# Patient Record
Sex: FEMALE | Race: WHITE | Employment: OTHER | URBAN - METROPOLITAN AREA
[De-identification: names, ages, dates, MRNs, and addresses within clinical notes are randomized per-mention and may not be internally consistent; named-entity substitution may affect disease eponyms.]

---

## 2017-01-18 ENCOUNTER — TELEPHONE (OUTPATIENT)
Dept: CARDIOLOGY CLINIC | Age: 67
End: 2017-01-18

## 2017-01-25 ENCOUNTER — NURSE ONLY (OUTPATIENT)
Dept: CARDIOLOGY CLINIC | Age: 67
End: 2017-01-25

## 2017-01-25 DIAGNOSIS — Z45.09 ENCOUNTER FOR LOOP RECORDER CHECK: ICD-10-CM

## 2017-01-25 DIAGNOSIS — R55 SYNCOPE, UNSPECIFIED SYNCOPE TYPE: ICD-10-CM

## 2017-01-26 PROCEDURE — 93298 REM INTERROG DEV EVAL SCRMS: CPT | Performed by: INTERNAL MEDICINE

## 2017-01-26 PROCEDURE — 93299 PR REM INTERROG ICPMS/SCRMS <30 D TECH REVIEW: CPT | Performed by: INTERNAL MEDICINE

## 2017-02-21 RX ORDER — IBUPROFEN 800 MG/1
TABLET ORAL
Qty: 120 TABLET | Refills: 2 | Status: SHIPPED | OUTPATIENT
Start: 2017-02-21 | End: 2017-05-12 | Stop reason: SDUPTHER

## 2017-03-13 ENCOUNTER — TELEPHONE (OUTPATIENT)
Dept: FAMILY MEDICINE CLINIC | Age: 67
End: 2017-03-13

## 2017-03-13 RX ORDER — CEPHALEXIN 500 MG/1
500 CAPSULE ORAL 3 TIMES DAILY
Qty: 30 CAPSULE | Refills: 0 | Status: SHIPPED | OUTPATIENT
Start: 2017-03-13 | End: 2017-04-10

## 2017-03-31 ENCOUNTER — PROCEDURE VISIT (OUTPATIENT)
Dept: CARDIOLOGY CLINIC | Age: 67
End: 2017-03-31

## 2017-03-31 DIAGNOSIS — R55 SYNCOPE, UNSPECIFIED SYNCOPE TYPE: ICD-10-CM

## 2017-03-31 DIAGNOSIS — I45.5 SINUS PAUSE: ICD-10-CM

## 2017-03-31 DIAGNOSIS — Z45.09 ENCOUNTER FOR LOOP RECORDER CHECK: Primary | ICD-10-CM

## 2017-03-31 PROCEDURE — 93298 REM INTERROG DEV EVAL SCRMS: CPT | Performed by: INTERNAL MEDICINE

## 2017-03-31 PROCEDURE — 93299 PR REM INTERROG ICPMS/SCRMS <30 D TECH REVIEW: CPT | Performed by: INTERNAL MEDICINE

## 2017-04-11 PROBLEM — I49.5 SICK SINUS SYNDROME (HCC): Status: ACTIVE | Noted: 2017-04-11

## 2017-04-17 ENCOUNTER — TELEPHONE (OUTPATIENT)
Dept: FAMILY MEDICINE CLINIC | Age: 67
End: 2017-04-17

## 2017-04-19 ENCOUNTER — OFFICE VISIT (OUTPATIENT)
Dept: FAMILY MEDICINE CLINIC | Age: 67
End: 2017-04-19

## 2017-04-19 VITALS
BODY MASS INDEX: 27.6 KG/M2 | SYSTOLIC BLOOD PRESSURE: 130 MMHG | OXYGEN SATURATION: 97 % | HEART RATE: 66 BPM | DIASTOLIC BLOOD PRESSURE: 82 MMHG | WEIGHT: 171 LBS

## 2017-04-19 DIAGNOSIS — R55 SYNCOPE AND COLLAPSE: Primary | ICD-10-CM

## 2017-04-19 DIAGNOSIS — I25.10 CORONARY ARTERY DISEASE INVOLVING NATIVE HEART WITHOUT ANGINA PECTORIS, UNSPECIFIED VESSEL OR LESION TYPE: ICD-10-CM

## 2017-04-19 DIAGNOSIS — I49.5 SICK SINUS SYNDROME (HCC): ICD-10-CM

## 2017-04-19 DIAGNOSIS — R56.9 NEW ONSET SEIZURE (HCC): ICD-10-CM

## 2017-04-19 PROCEDURE — 99214 OFFICE O/P EST MOD 30 MIN: CPT | Performed by: FAMILY MEDICINE

## 2017-04-19 RX ORDER — LOSARTAN POTASSIUM AND HYDROCHLOROTHIAZIDE 25; 100 MG/1; MG/1
1 TABLET ORAL DAILY
COMMUNITY
End: 2017-05-19 | Stop reason: DRUGHIGH

## 2017-05-03 ENCOUNTER — NURSE ONLY (OUTPATIENT)
Dept: CARDIOLOGY CLINIC | Age: 67
End: 2017-05-03

## 2017-05-03 DIAGNOSIS — Z45.09 ENCOUNTER FOR LOOP RECORDER CHECK: ICD-10-CM

## 2017-05-03 DIAGNOSIS — R55 SYNCOPE, UNSPECIFIED SYNCOPE TYPE: ICD-10-CM

## 2017-05-03 PROCEDURE — 93299 PR REM INTERROG ICPMS/SCRMS <30 D TECH REVIEW: CPT | Performed by: INTERNAL MEDICINE

## 2017-05-03 PROCEDURE — 93298 REM INTERROG DEV EVAL SCRMS: CPT | Performed by: INTERNAL MEDICINE

## 2017-05-12 RX ORDER — OMEPRAZOLE 20 MG/1
CAPSULE, DELAYED RELEASE ORAL
Qty: 60 CAPSULE | Refills: 0 | Status: SHIPPED | OUTPATIENT
Start: 2017-05-12 | End: 2017-05-19 | Stop reason: SDUPTHER

## 2017-05-12 RX ORDER — IBUPROFEN 800 MG/1
TABLET ORAL
Qty: 120 TABLET | Refills: 0 | Status: SHIPPED | OUTPATIENT
Start: 2017-05-12 | End: 2017-09-07 | Stop reason: SDUPTHER

## 2017-05-19 ENCOUNTER — OFFICE VISIT (OUTPATIENT)
Dept: FAMILY MEDICINE CLINIC | Age: 67
End: 2017-05-19

## 2017-05-19 VITALS
OXYGEN SATURATION: 98 % | SYSTOLIC BLOOD PRESSURE: 120 MMHG | BODY MASS INDEX: 27.44 KG/M2 | RESPIRATION RATE: 12 BRPM | DIASTOLIC BLOOD PRESSURE: 72 MMHG | HEART RATE: 73 BPM | WEIGHT: 170 LBS

## 2017-05-19 DIAGNOSIS — I10 ESSENTIAL HYPERTENSION: Primary | ICD-10-CM

## 2017-05-19 DIAGNOSIS — J30.1 SEASONAL ALLERGIC RHINITIS DUE TO POLLEN: ICD-10-CM

## 2017-05-19 DIAGNOSIS — I73.9 PAD (PERIPHERAL ARTERY DISEASE) (HCC): ICD-10-CM

## 2017-05-19 PROCEDURE — 3288F FALL RISK ASSESSMENT DOCD: CPT | Performed by: FAMILY MEDICINE

## 2017-05-19 PROCEDURE — 99213 OFFICE O/P EST LOW 20 MIN: CPT | Performed by: FAMILY MEDICINE

## 2017-05-19 PROCEDURE — G8510 SCR DEP NEG, NO PLAN REQD: HCPCS | Performed by: FAMILY MEDICINE

## 2017-05-19 RX ORDER — OMEPRAZOLE 20 MG/1
20 CAPSULE, DELAYED RELEASE ORAL DAILY
Qty: 30 CAPSULE | Refills: 5 | Status: SHIPPED | OUTPATIENT
Start: 2017-05-19 | End: 2018-03-07

## 2017-05-19 RX ORDER — CLOPIDOGREL BISULFATE 75 MG/1
TABLET ORAL
Qty: 90 TABLET | Refills: 3 | Status: SHIPPED | OUTPATIENT
Start: 2017-05-19 | End: 2018-03-07 | Stop reason: SDUPTHER

## 2017-05-19 RX ORDER — LOSARTAN POTASSIUM AND HYDROCHLOROTHIAZIDE 12.5; 5 MG/1; MG/1
1 TABLET ORAL DAILY
Qty: 30 TABLET | Refills: 5 | Status: SHIPPED | OUTPATIENT
Start: 2017-05-19 | End: 2017-11-20 | Stop reason: DRUGHIGH

## 2017-05-19 RX ORDER — LOSARTAN POTASSIUM AND HYDROCHLOROTHIAZIDE 25; 100 MG/1; MG/1
1 TABLET ORAL DAILY
Qty: 90 TABLET | Refills: 1 | Status: CANCELLED | OUTPATIENT
Start: 2017-05-19

## 2017-05-19 RX ORDER — OMEPRAZOLE 20 MG/1
CAPSULE, DELAYED RELEASE ORAL
Qty: 60 CAPSULE | Refills: 5 | Status: CANCELLED | OUTPATIENT
Start: 2017-05-19

## 2017-05-19 RX ORDER — MONTELUKAST SODIUM 4 MG/1
1 TABLET, CHEWABLE ORAL 2 TIMES DAILY
Qty: 60 TABLET | Refills: 5 | Status: SHIPPED | OUTPATIENT
Start: 2017-05-19 | End: 2018-03-07

## 2017-05-19 ASSESSMENT — PATIENT HEALTH QUESTIONNAIRE - PHQ9
SUM OF ALL RESPONSES TO PHQ QUESTIONS 1-9: 0
1. LITTLE INTEREST OR PLEASURE IN DOING THINGS: 0
2. FEELING DOWN, DEPRESSED OR HOPELESS: 0
SUM OF ALL RESPONSES TO PHQ9 QUESTIONS 1 & 2: 0

## 2017-06-13 ENCOUNTER — NURSE ONLY (OUTPATIENT)
Dept: CARDIOLOGY CLINIC | Age: 67
End: 2017-06-13

## 2017-06-13 DIAGNOSIS — Z45.09 ENCOUNTER FOR LOOP RECORDER CHECK: ICD-10-CM

## 2017-06-13 DIAGNOSIS — R55 SYNCOPE AND COLLAPSE: ICD-10-CM

## 2017-06-13 PROCEDURE — 93299 PR REM INTERROG ICPMS/SCRMS <30 D TECH REVIEW: CPT | Performed by: INTERNAL MEDICINE

## 2017-06-13 PROCEDURE — 93298 REM INTERROG DEV EVAL SCRMS: CPT | Performed by: INTERNAL MEDICINE

## 2017-06-15 ENCOUNTER — TELEPHONE (OUTPATIENT)
Dept: CARDIOLOGY CLINIC | Age: 67
End: 2017-06-15

## 2017-06-28 ENCOUNTER — CARE COORDINATION (OUTPATIENT)
Dept: FAMILY MEDICINE CLINIC | Age: 67
End: 2017-06-28

## 2017-07-07 ENCOUNTER — CARE COORDINATION (OUTPATIENT)
Dept: INTERNAL MEDICINE | Age: 67
End: 2017-07-07

## 2017-07-19 ENCOUNTER — NURSE ONLY (OUTPATIENT)
Dept: CARDIOLOGY CLINIC | Age: 67
End: 2017-07-19

## 2017-07-19 DIAGNOSIS — Z45.09 ENCOUNTER FOR LOOP RECORDER CHECK: ICD-10-CM

## 2017-07-19 DIAGNOSIS — I45.5 SINUS PAUSE: ICD-10-CM

## 2017-07-19 PROCEDURE — 93299 PR REM INTERROG ICPMS/SCRMS <30 D TECH REVIEW: CPT | Performed by: INTERNAL MEDICINE

## 2017-07-19 PROCEDURE — 93298 REM INTERROG DEV EVAL SCRMS: CPT | Performed by: INTERNAL MEDICINE

## 2017-08-21 PROCEDURE — 93298 REM INTERROG DEV EVAL SCRMS: CPT | Performed by: INTERNAL MEDICINE

## 2017-08-21 PROCEDURE — 93299 PR REM INTERROG ICPMS/SCRMS <30 D TECH REVIEW: CPT | Performed by: INTERNAL MEDICINE

## 2017-08-22 ENCOUNTER — TELEPHONE (OUTPATIENT)
Dept: CARDIOLOGY CLINIC | Age: 67
End: 2017-08-22

## 2017-08-23 ENCOUNTER — NURSE ONLY (OUTPATIENT)
Dept: CARDIOLOGY CLINIC | Age: 67
End: 2017-08-23

## 2017-08-23 DIAGNOSIS — R55 SYNCOPE AND COLLAPSE: ICD-10-CM

## 2017-08-23 DIAGNOSIS — Z45.09 ENCOUNTER FOR LOOP RECORDER CHECK: ICD-10-CM

## 2017-09-07 RX ORDER — IBUPROFEN 800 MG/1
TABLET ORAL
Qty: 120 TABLET | Refills: 0 | Status: SHIPPED | OUTPATIENT
Start: 2017-09-07 | End: 2017-12-21 | Stop reason: SDUPTHER

## 2017-09-27 ENCOUNTER — NURSE ONLY (OUTPATIENT)
Dept: CARDIOLOGY CLINIC | Age: 67
End: 2017-09-27

## 2017-09-27 DIAGNOSIS — Z45.09 ENCOUNTER FOR LOOP RECORDER CHECK: ICD-10-CM

## 2017-09-27 DIAGNOSIS — I45.5 SINUS PAUSE: ICD-10-CM

## 2017-09-27 PROCEDURE — 93298 REM INTERROG DEV EVAL SCRMS: CPT | Performed by: INTERNAL MEDICINE

## 2017-09-27 PROCEDURE — 93299 PR REM INTERROG ICPMS/SCRMS <30 D TECH REVIEW: CPT | Performed by: INTERNAL MEDICINE

## 2017-11-20 ENCOUNTER — OFFICE VISIT (OUTPATIENT)
Dept: FAMILY MEDICINE CLINIC | Age: 67
End: 2017-11-20

## 2017-11-20 VITALS
WEIGHT: 172 LBS | HEART RATE: 55 BPM | HEIGHT: 62 IN | DIASTOLIC BLOOD PRESSURE: 76 MMHG | SYSTOLIC BLOOD PRESSURE: 154 MMHG | OXYGEN SATURATION: 94 % | BODY MASS INDEX: 31.65 KG/M2

## 2017-11-20 DIAGNOSIS — E78.00 PURE HYPERCHOLESTEROLEMIA: ICD-10-CM

## 2017-11-20 DIAGNOSIS — I70.219 ATHEROSCLEROSIS OF ARTERY OF EXTREMITY WITH INTERMITTENT CLAUDICATION (HCC): ICD-10-CM

## 2017-11-20 DIAGNOSIS — L57.0 AK (ACTINIC KERATOSIS): ICD-10-CM

## 2017-11-20 DIAGNOSIS — I10 ESSENTIAL HYPERTENSION: Primary | ICD-10-CM

## 2017-11-20 PROCEDURE — G0008 ADMIN INFLUENZA VIRUS VAC: HCPCS | Performed by: FAMILY MEDICINE

## 2017-11-20 PROCEDURE — 99214 OFFICE O/P EST MOD 30 MIN: CPT | Performed by: FAMILY MEDICINE

## 2017-11-20 PROCEDURE — 90662 IIV NO PRSV INCREASED AG IM: CPT | Performed by: FAMILY MEDICINE

## 2017-11-20 PROCEDURE — 17000 DESTRUCT PREMALG LESION: CPT | Performed by: FAMILY MEDICINE

## 2017-11-20 RX ORDER — LOSARTAN POTASSIUM AND HYDROCHLOROTHIAZIDE 25; 100 MG/1; MG/1
1 TABLET ORAL DAILY
Qty: 30 TABLET | Refills: 3 | Status: SHIPPED | OUTPATIENT
Start: 2017-11-20 | End: 2018-02-20 | Stop reason: SDUPTHER

## 2017-11-20 RX ORDER — ATORVASTATIN CALCIUM 10 MG/1
10 TABLET, FILM COATED ORAL DAILY
Qty: 30 TABLET | Refills: 3 | Status: SHIPPED | OUTPATIENT
Start: 2017-11-20 | End: 2018-02-20 | Stop reason: SDUPTHER

## 2017-11-20 ASSESSMENT — ENCOUNTER SYMPTOMS
BLURRED VISION: 0
COUGH: 0
HEARTBURN: 0
ABDOMINAL PAIN: 0
CONSTIPATION: 0
SHORTNESS OF BREATH: 0
BACK PAIN: 0

## 2017-11-20 NOTE — PROGRESS NOTES
Chief Complaint   Patient presents with    Hypertension    Hyperlipidemia    Chronic Kidney Disease    Coronary Artery Disease    Hyperglycemia      Patient has complaints of :  Left foot pain and numbness in toes  Bilateral leg and foot cramping  Skin lesion on her left cheek    Electronically signed by Negar Orosco MA on 11/20/2017 at 9:29 AM       Patient is here for follow-up of the following problems:    Has skin lesion face   has claudication with walking about 2 blocks    Has been intolerant of statins in the past: stomach upset      Treatment Adherence:   Medication compliance:  compliant all of the time  Diet compliance:  compliant most of the time  Weight trend: stable  Current exercise: no regular exercise  Barriers: impairment:  physical: claudication    Hypertension:  Home blood pressure monitoring: No. Patient denies chest pain and shortness of breath. Antihypertensive medication side effects: no medication side effects noted. Use of agents associated with hypertension: none.                                         Sodium (mmol/L)   Date Value   04/12/2017 140    BUN (mg/dL)   Date Value   04/12/2017 16    Glucose (mg/dL)   Date Value   04/12/2017 109 (H)      Potassium (mmol/L)   Date Value   04/12/2017 3.7    CREATININE (mg/dL)   Date Value   04/12/2017 0.9         Hyperlipidemia:  No meds      Lab Results   Component Value Date    CHOL 155 06/08/2016    TRIG 168 (H) 06/08/2016    HDL 32 (L) 06/08/2016    LDLCALC 89 06/08/2016     Lab Results   Component Value Date    ALT 13 04/10/2017    AST 20 04/10/2017        Current Outpatient Prescriptions on File Prior to Visit   Medication Sig Dispense Refill    ibuprofen (ADVIL;MOTRIN) 800 MG tablet TAKE 1 TABLET BY MOUTH 3 TIMES DAILY AS NEEDED FOR PAIN 120 tablet 0    clopidogrel (PLAVIX) 75 MG tablet TAKE 1 TABLET BY MOUTH DAILY 90 tablet 3    colestipol (COLESTID) 1 G tablet Take 1 tablet by mouth 2 times daily 60 tablet 5    visit:    Essential hypertension    Pure hypercholesterolemia    Atherosclerosis of artery of extremity with intermittent claudication (HCC)    AK (actinic keratosis)    Other orders  -     INFLUENZA, HIGH DOSE, 65 YRS +, IM, PF, PREFILL SYR, 0.5ML (FLUZONE HD)     cryo facila lseion   HTN not controlled   increase losartan, trial of stati n again\  rtc 3 months

## 2017-11-20 NOTE — PROGRESS NOTES
Cryotherapy Procedure Note    Pre-operative Diagnosis:actinic keratosis 1    Post-operative Diagnosis: same    Locations:face    Procedure Details   2 cycles of liquid nitrogen applied to affected sites. Complications: none. Plan:  1. Patient was educated regarding the potential risks of blister formation, discomfort, hypopigmentation, and scar. 2. Wound care was discussed. 3. Recommended that the patient use OTC analgesics as needed for pain. 4. Plan for RTC in 3-4 weeks for re-treatment if needed.

## 2017-12-21 RX ORDER — IBUPROFEN 800 MG/1
TABLET ORAL
Qty: 120 TABLET | Refills: 0 | Status: SHIPPED | OUTPATIENT
Start: 2017-12-21 | End: 2018-02-28 | Stop reason: SDUPTHER

## 2018-01-16 ENCOUNTER — NURSE ONLY (OUTPATIENT)
Dept: CARDIOLOGY CLINIC | Age: 68
End: 2018-01-16

## 2018-01-16 DIAGNOSIS — R55 SYNCOPE AND COLLAPSE: ICD-10-CM

## 2018-01-16 DIAGNOSIS — Z45.09 ENCOUNTER FOR LOOP RECORDER CHECK: ICD-10-CM

## 2018-01-16 PROCEDURE — 93298 REM INTERROG DEV EVAL SCRMS: CPT | Performed by: INTERNAL MEDICINE

## 2018-01-16 PROCEDURE — 93299 PR REM INTERROG ICPMS/SCRMS <30 D TECH REVIEW: CPT | Performed by: INTERNAL MEDICINE

## 2018-01-26 ENCOUNTER — HOSPITAL ENCOUNTER (OUTPATIENT)
Dept: OTHER | Age: 68
Discharge: HOME | End: 2018-01-26
Attending: FAMILY MEDICINE | Admitting: FAMILY MEDICINE

## 2018-01-26 LAB
A/G RATIO: 1 (ref 1.1–2.2)
ALBUMIN SERPL-MCNC: 4.1 G/DL (ref 3.4–5)
ALP BLD-CCNC: 79 U/L (ref 40–129)
ALT SERPL-CCNC: 13 U/L (ref 10–40)
ANION GAP SERPL CALCULATED.3IONS-SCNC: 14 MMOL/L (ref 3–16)
AST SERPL-CCNC: 20 U/L (ref 15–37)
BILIRUB SERPL-MCNC: 0.4 MG/DL (ref 0–1)
BUN BLDV-MCNC: 23 MG/DL (ref 7–20)
CALCIUM SERPL-MCNC: 9.6 MG/DL (ref 8.3–10.6)
CHLORIDE BLD-SCNC: 103 MMOL/L (ref 99–110)
CHOLESTEROL, TOTAL: 136 MG/DL (ref 0–199)
CO2: 25 MMOL/L (ref 21–32)
CREAT SERPL-MCNC: 1.2 MG/DL (ref 0.6–1.2)
GFR AFRICAN AMERICAN: 54
GFR NON-AFRICAN AMERICAN: 44
GLOBULIN: 4 G/DL
GLUCOSE BLD-MCNC: 92 MG/DL (ref 70–99)
HDLC SERPL-MCNC: 33 MG/DL (ref 40–60)
LDL CHOLESTEROL CALCULATED: 77 MG/DL
POTASSIUM SERPL-SCNC: 4.6 MMOL/L (ref 3.5–5.1)
SODIUM BLD-SCNC: 142 MMOL/L (ref 136–145)
TOTAL PROTEIN: 8.1 G/DL (ref 6.4–8.2)
TRIGL SERPL-MCNC: 128 MG/DL (ref 0–150)
VLDLC SERPL CALC-MCNC: 26 MG/DL

## 2018-02-20 RX ORDER — ATORVASTATIN CALCIUM 10 MG/1
10 TABLET, FILM COATED ORAL DAILY
Qty: 90 TABLET | Refills: 0 | Status: SHIPPED | OUTPATIENT
Start: 2018-02-20 | End: 2018-03-07 | Stop reason: SDUPTHER

## 2018-02-20 RX ORDER — LOSARTAN POTASSIUM AND HYDROCHLOROTHIAZIDE 25; 100 MG/1; MG/1
1 TABLET ORAL DAILY
Qty: 90 TABLET | Refills: 0 | Status: SHIPPED | OUTPATIENT
Start: 2018-02-20 | End: 2018-03-07 | Stop reason: SDUPTHER

## 2018-02-28 RX ORDER — IBUPROFEN 800 MG/1
TABLET ORAL
Qty: 120 TABLET | Refills: 0 | Status: SHIPPED | OUTPATIENT
Start: 2018-02-28 | End: 2018-04-25 | Stop reason: SDUPTHER

## 2018-03-06 ENCOUNTER — NURSE ONLY (OUTPATIENT)
Dept: CARDIOLOGY CLINIC | Age: 68
End: 2018-03-06

## 2018-03-06 DIAGNOSIS — I45.5 SINUS PAUSE: ICD-10-CM

## 2018-03-06 DIAGNOSIS — Z45.09 ENCOUNTER FOR LOOP RECORDER CHECK: ICD-10-CM

## 2018-03-06 PROCEDURE — 93298 REM INTERROG DEV EVAL SCRMS: CPT | Performed by: INTERNAL MEDICINE

## 2018-03-06 PROCEDURE — 93299 PR REM INTERROG ICPMS/SCRMS <30 D TECH REVIEW: CPT | Performed by: INTERNAL MEDICINE

## 2018-03-07 ENCOUNTER — OFFICE VISIT (OUTPATIENT)
Dept: FAMILY MEDICINE CLINIC | Age: 68
End: 2018-03-07

## 2018-03-07 VITALS
HEART RATE: 70 BPM | SYSTOLIC BLOOD PRESSURE: 138 MMHG | TEMPERATURE: 98.3 F | BODY MASS INDEX: 32.53 KG/M2 | OXYGEN SATURATION: 97 % | DIASTOLIC BLOOD PRESSURE: 80 MMHG | WEIGHT: 175 LBS

## 2018-03-07 DIAGNOSIS — J30.1 CHRONIC SEASONAL ALLERGIC RHINITIS DUE TO POLLEN: ICD-10-CM

## 2018-03-07 DIAGNOSIS — I73.9 PAD (PERIPHERAL ARTERY DISEASE) (HCC): ICD-10-CM

## 2018-03-07 DIAGNOSIS — E78.00 PURE HYPERCHOLESTEROLEMIA: ICD-10-CM

## 2018-03-07 DIAGNOSIS — N18.30 CKD (CHRONIC KIDNEY DISEASE) STAGE 3, GFR 30-59 ML/MIN (HCC): ICD-10-CM

## 2018-03-07 DIAGNOSIS — I10 ESSENTIAL HYPERTENSION: Primary | ICD-10-CM

## 2018-03-07 PROCEDURE — 99214 OFFICE O/P EST MOD 30 MIN: CPT | Performed by: FAMILY MEDICINE

## 2018-03-07 RX ORDER — ASPIRIN 81 MG/1
81 TABLET ORAL DAILY
Status: ON HOLD | COMMUNITY
End: 2018-05-21

## 2018-03-07 RX ORDER — LOSARTAN POTASSIUM AND HYDROCHLOROTHIAZIDE 25; 100 MG/1; MG/1
1 TABLET ORAL DAILY
Qty: 90 TABLET | Refills: 3 | Status: SHIPPED | OUTPATIENT
Start: 2018-03-07 | End: 2018-09-07 | Stop reason: SDUPTHER

## 2018-03-07 RX ORDER — ATORVASTATIN CALCIUM 10 MG/1
10 TABLET, FILM COATED ORAL DAILY
Qty: 90 TABLET | Refills: 3 | Status: SHIPPED | OUTPATIENT
Start: 2018-03-07 | End: 2018-05-24 | Stop reason: SDUPTHER

## 2018-03-07 RX ORDER — CLOPIDOGREL BISULFATE 75 MG/1
TABLET ORAL
Qty: 90 TABLET | Refills: 3 | Status: SHIPPED | OUTPATIENT
Start: 2018-03-07 | End: 2018-09-07 | Stop reason: SDUPTHER

## 2018-03-07 ASSESSMENT — ENCOUNTER SYMPTOMS
ABDOMINAL PAIN: 0
COUGH: 0
HEARTBURN: 0
BACK PAIN: 0
BLURRED VISION: 0
SHORTNESS OF BREATH: 0
CONSTIPATION: 0

## 2018-03-07 NOTE — PROGRESS NOTES
Chief Complaint   Patient presents with    Hypertension    Hyperlipidemia    Coronary Artery Disease    Chronic Kidney Disease      Patient has complaints of:  Headache times 2-3 months  Right eye redness, itching, nasal drainage, runny nose, slightly productive cough times 2 weeks     Electronically signed by Maritza Funes MA on 3/7/2018 at 9:18 AM       Patient is here for follow-up of the following problems:  Has sneezing at times   and has headache right side of scalp and face   no visual changes   needs eye exam   has had left TKR  Using benadryl      Treatment Adherence:   Medication compliance:  compliant all of the time  Diet compliance:  compliant most of the time  Weight trend: stable  Current exercise: lmited due to numbness of legs/feet with walking  Barriers: PAD    Hypertension:  Home blood pressure monitoring: No. Patient denies chest pain and shortness of breath. Antihypertensive medication side effects: no medication side effects noted. Use of agents associated with hypertension: NSAID                                      Sodium (mmol/L)   Date Value   01/26/2018 142    BUN (mg/dL)   Date Value   01/26/2018 23 (H)    Glucose (mg/dL)   Date Value   01/26/2018 92      Potassium (mmol/L)   Date Value   01/26/2018 4.6    CREATININE (mg/dL)   Date Value   01/26/2018 1.2         Hyperlipidemia:  No new myalgias or GI upset on atorvastatin (Lipitor). Lab Results   Component Value Date    CHOL 136 01/26/2018    TRIG 128 01/26/2018    HDL 33 (L) 01/26/2018    LDLCALC 77 01/26/2018     Lab Results   Component Value Date    ALT 13 01/26/2018    AST 20 01/26/2018         Social History     Social History    Marital status:      Spouse name: N/A    Number of children: N/A    Years of education: N/A     Occupational History    Not on file.      Social History Main Topics    Smoking status: Former Smoker     Packs/day: 0.50     Years: 40.00     Types: Cigarettes     Quit date: 7/6/2015   Umm [place] Normal rate, regular rhythm, normal heart sounds and intact distal pulses. Pulses:       Dorsalis pedis pulses are 1+ on the right side, and 1+ on the left side. Posterior tibial pulses are 1+ on the right side, and 1+ on the left side. Pulmonary/Chest: Effort normal and breath sounds normal.   Lymphadenopathy:     She has no cervical adenopathy. Neurological: She is alert and oriented to person, place, and time. She has normal strength. She displays no atrophy. No cranial nerve deficit. She exhibits normal muscle tone. Coordination normal.        Puneet Humphrey was seen today for hypertension, hyperlipidemia, coronary artery disease and chronic kidney disease. Diagnoses and all orders for this visit:    Essential hypertension    Pure hypercholesterolemia    CKD (chronic kidney disease) stage 3, GFR 30-59 ml/min    PAD (peripheral artery disease) ([facility])  -     Guido Plascencia MD    Chronic seasonal allergic rhinitis due to pollen    Other orders  -     losartan-hydrochlorothiazide (HYZAAR) 100-25 MG per tablet; Take 1 tablet by mouth daily  -     atorvastatin (LIPITOR) 10 MG tablet; Take 1 tablet by mouth daily  -     clopidogrel (PLAVIX) 75 MG tablet; TAKE 1 TABLET BY MOUTH DAILY    Reviewed recent labs with patient   BP anf lipids conttollrd, add steroid nasal spray for nose and claritan to try and help with her chronic allergies, other conditions are stable, no change in her chronic medications.   She is to see Dr. Mena Couch because of more symptoms in her legs which she attributes to her PAD

## 2018-04-10 ENCOUNTER — NURSE ONLY (OUTPATIENT)
Dept: CARDIOLOGY CLINIC | Age: 68
End: 2018-04-10

## 2018-04-10 DIAGNOSIS — I45.5 SINUS PAUSE: ICD-10-CM

## 2018-04-10 DIAGNOSIS — R55 SYNCOPE, UNSPECIFIED SYNCOPE TYPE: ICD-10-CM

## 2018-04-10 DIAGNOSIS — Z45.09 ENCOUNTER FOR LOOP RECORDER CHECK: ICD-10-CM

## 2018-04-10 PROCEDURE — 93299 PR REM INTERROG ICPMS/SCRMS <30 D TECH REVIEW: CPT | Performed by: INTERNAL MEDICINE

## 2018-04-10 PROCEDURE — 93298 REM INTERROG DEV EVAL SCRMS: CPT | Performed by: INTERNAL MEDICINE

## 2018-04-13 ENCOUNTER — OFFICE VISIT (OUTPATIENT)
Dept: VASCULAR SURGERY | Age: 68
End: 2018-04-13

## 2018-04-13 VITALS
DIASTOLIC BLOOD PRESSURE: 90 MMHG | WEIGHT: 175 LBS | BODY MASS INDEX: 33.04 KG/M2 | HEIGHT: 61 IN | SYSTOLIC BLOOD PRESSURE: 136 MMHG

## 2018-04-13 DIAGNOSIS — I70.223 ATHEROSCLEROSIS OF NATIVE ARTERIES OF EXTREMITIES WITH REST PAIN, BILATERAL LEGS (HCC): Primary | ICD-10-CM

## 2018-04-13 PROCEDURE — 99213 OFFICE O/P EST LOW 20 MIN: CPT | Performed by: SURGERY

## 2018-04-23 ENCOUNTER — HOSPITAL ENCOUNTER (OUTPATIENT)
Dept: VASCULAR LAB | Age: 68
Discharge: HOME | End: 2018-04-23
Attending: SURGERY | Admitting: SURGERY

## 2018-04-23 DIAGNOSIS — I70.223 ATHEROSCLEROSIS OF NATIVE ARTERY OF BOTH LOWER EXTREMITIES WITH REST PAIN (HCC): ICD-10-CM

## 2018-04-23 DIAGNOSIS — I70.223 ATHEROSCLEROSIS OF NATIVE ARTERIES OF EXTREMITIES WITH REST PAIN, BILATERAL LEGS (HCC): ICD-10-CM

## 2018-04-25 RX ORDER — IBUPROFEN 800 MG/1
TABLET ORAL
Qty: 120 TABLET | Refills: 0 | Status: SHIPPED | OUTPATIENT
Start: 2018-04-25 | End: 2019-03-12

## 2018-04-30 ENCOUNTER — TELEPHONE (OUTPATIENT)
Dept: VASCULAR SURGERY | Age: 68
End: 2018-04-30

## 2018-05-01 ENCOUNTER — TELEPHONE (OUTPATIENT)
Dept: VASCULAR SURGERY | Age: 68
End: 2018-05-01

## 2018-05-01 DIAGNOSIS — I73.9 INTERMITTENT CLAUDICATION (HCC): Primary | ICD-10-CM

## 2018-05-01 DIAGNOSIS — R93.6 ABNORMAL FINDINGS ON DIAGNOSTIC IMAGING OF LIMBS: ICD-10-CM

## 2018-05-10 ENCOUNTER — HOSPITAL ENCOUNTER (OUTPATIENT)
Dept: OTHER | Age: 68
Discharge: HOME | End: 2018-05-10
Attending: NURSE PRACTITIONER | Admitting: NURSE PRACTITIONER

## 2018-05-10 DIAGNOSIS — R93.6 ABNORMAL FINDINGS ON DIAGNOSTIC IMAGING OF LIMBS: ICD-10-CM

## 2018-05-10 DIAGNOSIS — I73.9 INTERMITTENT CLAUDICATION (HCC): ICD-10-CM

## 2018-05-10 LAB
ANION GAP SERPL CALCULATED.3IONS-SCNC: 11 MMOL/L (ref 3–16)
APTT: 35.1 SEC (ref 24.1–34.9)
BASOPHILS ABSOLUTE: 0.1 K/UL (ref 0–0.2)
BASOPHILS RELATIVE PERCENT: 1 %
BUN BLDV-MCNC: 20 MG/DL (ref 7–20)
CALCIUM SERPL-MCNC: 9.2 MG/DL (ref 8.3–10.6)
CHLORIDE BLD-SCNC: 108 MMOL/L (ref 99–110)
CO2: 23 MMOL/L (ref 21–32)
CREAT SERPL-MCNC: 1 MG/DL (ref 0.6–1.2)
EOSINOPHILS ABSOLUTE: 0.2 K/UL (ref 0–0.6)
EOSINOPHILS RELATIVE PERCENT: 2.9 %
GFR AFRICAN AMERICAN: >60
GFR NON-AFRICAN AMERICAN: 55
GLUCOSE BLD-MCNC: 95 MG/DL (ref 70–99)
HCT VFR BLD CALC: 44.8 % (ref 36–48)
HEMOGLOBIN: 15 G/DL (ref 12–16)
INR BLD: 1.04 (ref 0.85–1.15)
LYMPHOCYTES ABSOLUTE: 1.9 K/UL (ref 1–5.1)
LYMPHOCYTES RELATIVE PERCENT: 23.3 %
MCH RBC QN AUTO: 31.2 PG (ref 26–34)
MCHC RBC AUTO-ENTMCNC: 33.5 G/DL (ref 31–36)
MCV RBC AUTO: 93.2 FL (ref 80–100)
MONOCYTES ABSOLUTE: 0.6 K/UL (ref 0–1.3)
MONOCYTES RELATIVE PERCENT: 7.1 %
NEUTROPHILS ABSOLUTE: 5.3 K/UL (ref 1.7–7.7)
NEUTROPHILS RELATIVE PERCENT: 65.7 %
PDW BLD-RTO: 14.5 % (ref 12.4–15.4)
PLATELET # BLD: 201 K/UL (ref 135–450)
PMV BLD AUTO: 9.9 FL (ref 5–10.5)
POTASSIUM SERPL-SCNC: 4.6 MMOL/L (ref 3.5–5.1)
PROTHROMBIN TIME: 11.8 SEC (ref 9.6–13)
RBC # BLD: 4.81 M/UL (ref 4–5.2)
SODIUM BLD-SCNC: 142 MMOL/L (ref 136–145)
WBC # BLD: 8.1 K/UL (ref 4–11)

## 2018-05-15 ENCOUNTER — NURSE ONLY (OUTPATIENT)
Dept: CARDIOLOGY CLINIC | Age: 68
End: 2018-05-15

## 2018-05-15 DIAGNOSIS — R55 SYNCOPE AND COLLAPSE: ICD-10-CM

## 2018-05-15 DIAGNOSIS — Z45.09 ENCOUNTER FOR LOOP RECORDER CHECK: ICD-10-CM

## 2018-05-15 PROCEDURE — 93298 REM INTERROG DEV EVAL SCRMS: CPT | Performed by: INTERNAL MEDICINE

## 2018-05-15 PROCEDURE — 93299 PR REM INTERROG ICPMS/SCRMS <30 D TECH REVIEW: CPT | Performed by: INTERNAL MEDICINE

## 2018-05-22 ENCOUNTER — TELEPHONE (OUTPATIENT)
Dept: CARDIOLOGY CLINIC | Age: 68
End: 2018-05-22

## 2018-05-24 RX ORDER — ATORVASTATIN CALCIUM 10 MG/1
10 TABLET, FILM COATED ORAL DAILY
Qty: 90 TABLET | Refills: 0 | Status: SHIPPED | OUTPATIENT
Start: 2018-05-24 | End: 2018-08-26 | Stop reason: SDUPTHER

## 2018-06-15 ENCOUNTER — TELEPHONE (OUTPATIENT)
Dept: CARDIOLOGY CLINIC | Age: 68
End: 2018-06-15

## 2018-06-19 ENCOUNTER — NURSE ONLY (OUTPATIENT)
Dept: CARDIOLOGY CLINIC | Age: 68
End: 2018-06-19

## 2018-06-19 DIAGNOSIS — Z45.09 ENCOUNTER FOR LOOP RECORDER CHECK: ICD-10-CM

## 2018-06-19 DIAGNOSIS — I45.5 SINUS PAUSE: ICD-10-CM

## 2018-06-19 PROCEDURE — 93298 REM INTERROG DEV EVAL SCRMS: CPT | Performed by: INTERNAL MEDICINE

## 2018-06-19 PROCEDURE — 93299 PR REM INTERROG ICPMS/SCRMS <30 D TECH REVIEW: CPT | Performed by: INTERNAL MEDICINE

## 2018-08-20 PROCEDURE — 93298 REM INTERROG DEV EVAL SCRMS: CPT | Performed by: INTERNAL MEDICINE

## 2018-08-20 PROCEDURE — 93299 PR REM INTERROG ICPMS/SCRMS <30 D TECH REVIEW: CPT | Performed by: INTERNAL MEDICINE

## 2018-08-21 ENCOUNTER — NURSE ONLY (OUTPATIENT)
Dept: CARDIOLOGY CLINIC | Age: 68
End: 2018-08-21

## 2018-08-21 DIAGNOSIS — I45.5 SINUS PAUSE: ICD-10-CM

## 2018-08-21 DIAGNOSIS — Z45.09 ENCOUNTER FOR ELECTRONIC ANALYSIS OF REVEAL EVENT RECORDER: Primary | ICD-10-CM

## 2018-08-27 RX ORDER — ATORVASTATIN CALCIUM 10 MG/1
10 TABLET, FILM COATED ORAL DAILY
Qty: 90 TABLET | Refills: 0 | Status: SHIPPED | OUTPATIENT
Start: 2018-08-27 | End: 2018-09-07 | Stop reason: SDUPTHER

## 2018-09-07 ENCOUNTER — OFFICE VISIT (OUTPATIENT)
Dept: FAMILY MEDICINE CLINIC | Age: 68
End: 2018-09-07

## 2018-09-07 VITALS
BODY MASS INDEX: 32.12 KG/M2 | HEART RATE: 68 BPM | DIASTOLIC BLOOD PRESSURE: 76 MMHG | SYSTOLIC BLOOD PRESSURE: 120 MMHG | WEIGHT: 170 LBS | OXYGEN SATURATION: 96 %

## 2018-09-07 DIAGNOSIS — I10 ESSENTIAL HYPERTENSION: Primary | ICD-10-CM

## 2018-09-07 DIAGNOSIS — E78.00 PURE HYPERCHOLESTEROLEMIA: ICD-10-CM

## 2018-09-07 DIAGNOSIS — R53.82 CHRONIC FATIGUE: ICD-10-CM

## 2018-09-07 DIAGNOSIS — I73.9 PAD (PERIPHERAL ARTERY DISEASE) (HCC): ICD-10-CM

## 2018-09-07 DIAGNOSIS — Z23 NEED FOR VACCINATION: ICD-10-CM

## 2018-09-07 DIAGNOSIS — I70.213 ATHEROSCLEROSIS OF NATIVE ARTERIES OF EXTREMITIES WITH INTERMITTENT CLAUDICATION, BILATERAL LEGS (HCC): ICD-10-CM

## 2018-09-07 PROCEDURE — G0008 ADMIN INFLUENZA VIRUS VAC: HCPCS | Performed by: FAMILY MEDICINE

## 2018-09-07 PROCEDURE — 90662 IIV NO PRSV INCREASED AG IM: CPT | Performed by: FAMILY MEDICINE

## 2018-09-07 PROCEDURE — 99214 OFFICE O/P EST MOD 30 MIN: CPT | Performed by: FAMILY MEDICINE

## 2018-09-07 RX ORDER — ATORVASTATIN CALCIUM 10 MG/1
10 TABLET, FILM COATED ORAL DAILY
Qty: 90 TABLET | Refills: 3 | Status: SHIPPED | OUTPATIENT
Start: 2018-09-07 | End: 2020-02-21 | Stop reason: SDUPTHER

## 2018-09-07 RX ORDER — CLOPIDOGREL BISULFATE 75 MG/1
75 TABLET ORAL DAILY
Qty: 30 TABLET | Refills: 11 | Status: SHIPPED | OUTPATIENT
Start: 2018-09-07 | End: 2019-06-18 | Stop reason: SDUPTHER

## 2018-09-07 RX ORDER — LOSARTAN POTASSIUM AND HYDROCHLOROTHIAZIDE 25; 100 MG/1; MG/1
1 TABLET ORAL DAILY
Qty: 90 TABLET | Refills: 3 | Status: SHIPPED | OUTPATIENT
Start: 2018-09-07 | End: 2020-01-29 | Stop reason: SDUPTHER

## 2018-09-07 ASSESSMENT — PATIENT HEALTH QUESTIONNAIRE - PHQ9
SUM OF ALL RESPONSES TO PHQ QUESTIONS 1-9: 0
1. LITTLE INTEREST OR PLEASURE IN DOING THINGS: 0
SUM OF ALL RESPONSES TO PHQ9 QUESTIONS 1 & 2: 0
SUM OF ALL RESPONSES TO PHQ QUESTIONS 1-9: 0
2. FEELING DOWN, DEPRESSED OR HOPELESS: 0

## 2018-09-07 ASSESSMENT — ENCOUNTER SYMPTOMS
DIARRHEA: 0
SHORTNESS OF BREATH: 0
CONSTIPATION: 0
BACK PAIN: 0
ABDOMINAL PAIN: 0

## 2018-09-07 NOTE — PROGRESS NOTES
SUBJECTIVE:    Patient ID: Say Roy is a 77 y.o. female. HPI Patient is here for a follow-up with multiple medical problems. Patient is here for follow-up of her multiple medical problems has history of hypertension and hyperlipidemia and PAD. She has also had a loop recorder implanted. Interestingly the loop recorder is showing some short pauses. Patient complains of being lightheaded at times both when getting up but also notes that at times with walking. She will feel suddenly lightheaded did not pass out but have to stop for a few minutes. She reports having some knee pain at times when walking on heels    OTC medications has been reviewed. Treatment Adherence:   Medication compliance:  compliant all of the time  Diet compliance:  compliant most of the time  Weight trend: stable  Current exercise: Tends to be busy every day  Barriers: Arthritic pain lightheadedness    Hypertension:  Home blood pressure monitoring: No. Patient denies chest pain and shortness of breath. Antihypertensive medication side effects: no medication side effects noted. Use of agents associated with hypertension: NSAIDS. Takes ibuprofen rarely                                      Sodium (mmol/L)   Date Value   05/10/2018 142    BUN (mg/dL)   Date Value   05/10/2018 20    Glucose (mg/dL)   Date Value   05/10/2018 95      Potassium (mmol/L)   Date Value   05/10/2018 4.6    CREATININE (mg/dL)   Date Value   05/10/2018 1.0         Hyperlipidemia:  No new myalgias or GI upset on atorvastatin (Lipitor).      Lab Results   Component Value Date    CHOL 136 01/26/2018    TRIG 128 01/26/2018    HDL 33 (L) 01/26/2018    LDLCALC 77 01/26/2018     Lab Results   Component Value Date    ALT 13 01/26/2018    AST 20 01/26/2018        Current Outpatient Prescriptions on File Prior to Visit   Medication Sig Dispense Refill    ibuprofen (ADVIL;MOTRIN) 800 MG tablet TAKE 1 TABLET BY MOUTH 3 TIMES DAILY AS NEEDED FOR PAIN 120 tablet 0    Multiple Vitamins-Minerals (MULTIVITAMIN ADULTS PO) Take by mouth       No current facility-administered medications on file prior to visit. Social History     Social History    Marital status:      Spouse name: N/A    Number of children: N/A    Years of education: N/A     Occupational History    Not on file. Social History Main Topics    Smoking status: Former Smoker     Packs/day: 0.50     Years: 40.00     Types: Cigarettes     Quit date: 7/6/2015    Smokeless tobacco: Never Used      Comment: <1 pack of cigarettes per day    Alcohol use No    Drug use: No    Sexual activity: Not on file     Other Topics Concern    Not on file     Social History Narrative    No narrative on file          Review of Systems   Constitutional: Negative for fatigue and unexpected weight change. HENT: Negative for hearing loss. Eyes: Negative for visual disturbance. Respiratory: Negative for shortness of breath. Cardiovascular: Negative for chest pain and leg swelling. Gastrointestinal: Negative for abdominal pain, constipation and diarrhea. Musculoskeletal: Positive for arthralgias. Negative for back pain and joint swelling. Neurological: Positive for dizziness and numbness (Left foot). Psychiatric/Behavioral: Negative for sleep disturbance. OBJECTIVE:  Physical Exam   Constitutional: She is oriented to person, place, and time. She appears well-developed and well-nourished. Neck: Carotid bruit is not present. Cardiovascular: Normal rate, regular rhythm, normal heart sounds and intact distal pulses. Pulses:       Dorsalis pedis pulses are 1+ on the right side, and 1+ on the left side. Posterior tibial pulses are 1+ on the right side, and 1+ on the left side. Pulmonary/Chest: Effort normal and breath sounds normal.   Lymphadenopathy:     She has no cervical adenopathy. Neurological: She is alert and oriented to person, place, and time. She has normal strength.  She displays no atrophy. No cranial nerve deficit. She exhibits normal muscle tone. Coordination normal.   Psychiatric: She has a normal mood and affect. ASSESSMENT:     Diagnosis Orders   1. Essential hypertension  Lipid Panel   2. PAD (peripheral artery disease) ([facility])     3. Atherosclerosis of native arteries of extremities with intermittent claudication, bilateral legs ([facility])  Comprehensive Metabolic Panel    Lipid Panel   4. Pure hypercholesterolemia  Lipid Panel   5. Need for vaccination  INFLUENZA, HIGH DOSE, 65 YRS +, IM, PF, PREFILL SYR, 0.5ML (FLUZONE HD)    zoster recombinant adjuvanted vaccine (SHINGRIX) 50 MCG SUSR injection   6. Chronic fatigue  TSH WITH REFLEX TO FT4           PLAN:    Patient is having some fatigue at times. I'm concerned with the pauses noted with the loop recorder and  the patient's lightheadedness and dizziness that she could need a pacer at some time. Her blood pressure seems controlled. We'll recheck her lipids and CMP and TSH. Flu vaccine given today. Also an Rx for shingles vaccine. She is continuing on the same medicines that are progressive exercise. She will be following up with Dr. Collin Gore in 6 months.

## 2018-12-17 NOTE — PROGRESS NOTES
Pure hypercholesterolemia     Risk for falls 2/2/16    S/p Left TKR    Unspecified essential hypertension         Past Surgical History:   Procedure Laterality Date    CHOLECYSTECTOMY      COLONOSCOPY      INSERTABLE CARDIAC MONITOR  09/2015    JOINT REPLACEMENT Left 20015    knee replacement    KNEE SURGERY      RT    LEG SURGERY      2 stents placed in right leg, 1 stent and balloon in left leg    ROTATOR CUFF REPAIR      TOTAL KNEE ARTHROPLASTY Left 1/12/16    Dr Amira Skelton TUBAL LIGATION         Allergies: Allergies   Allergen Reactions    Ace Inhibitors Other (See Comments)     Cough     Chantix [Varenicline]      depression    Keflex [Cephalexin] Diarrhea    Morphine      Causes mental impairment    Niaspan [Niacin Er] Other (See Comments)     Skin irritation    Pletal [Cilostazol]      Headache and diarrhea    Pravastatin        Social History:  Reviewed. reports that she quit smoking about 3 years ago. Her smoking use included Cigarettes. She has a 20.00 pack-year smoking history. She has never used smokeless tobacco. She reports that she does not drink alcohol or use drugs. Family History:  Reviewed. family history includes Cancer in her brother and mother; Diabetes in her brother and sister; Emphysema in her brother; Heart Disease in her brother and father; Hypertension in her brother and sister; Stroke in her brother. Review of System:  All other systems reviewed and are negative except for that noted above.  Pertinent negatives are:     · General: negative for fever, chills   · Ophthalmic ROS: negative for - eye pain or loss of vision  · ENT ROS: negative for - headaches, sore throat   · Respiratory: negative for - cough, sputum  · Cardiovascular: Reviewed in HPI  · Gastrointestinal: negative for - abdominal pain, diarrhea, N/V  · Hematology: negative for - bleeding, blood clots, bruising or jaundice  · Genito-Urinary:  negative for - Dysuria or incontinence  · Musculoskeletal:

## 2018-12-18 ENCOUNTER — HOSPITAL ENCOUNTER (OUTPATIENT)
Age: 68
Discharge: HOME | End: 2018-12-18
Payer: COMMERCIAL

## 2018-12-18 ENCOUNTER — OFFICE VISIT (OUTPATIENT)
Dept: CARDIOLOGY CLINIC | Age: 68
End: 2018-12-18
Payer: COMMERCIAL

## 2018-12-18 ENCOUNTER — PROCEDURE VISIT (OUTPATIENT)
Dept: CARDIOLOGY CLINIC | Age: 68
End: 2018-12-18
Payer: COMMERCIAL

## 2018-12-18 VITALS
HEART RATE: 70 BPM | SYSTOLIC BLOOD PRESSURE: 152 MMHG | WEIGHT: 170 LBS | BODY MASS INDEX: 32.1 KG/M2 | DIASTOLIC BLOOD PRESSURE: 75 MMHG | HEIGHT: 61 IN

## 2018-12-18 DIAGNOSIS — I25.10 CAD IN NATIVE ARTERY: ICD-10-CM

## 2018-12-18 DIAGNOSIS — I45.5 SINUS PAUSE: ICD-10-CM

## 2018-12-18 DIAGNOSIS — I49.5 SICK SINUS SYNDROME (HCC): Primary | ICD-10-CM

## 2018-12-18 DIAGNOSIS — Z45.09 ENCOUNTER FOR ELECTRONIC ANALYSIS OF REVEAL EVENT RECORDER: ICD-10-CM

## 2018-12-18 DIAGNOSIS — R55 SYNCOPE AND COLLAPSE: ICD-10-CM

## 2018-12-18 LAB
A/G RATIO: 1 (ref 1.1–2.2)
ALBUMIN SERPL-MCNC: 3.9 G/DL (ref 3.4–5)
ALP BLD-CCNC: 79 U/L (ref 40–129)
ALT SERPL-CCNC: 9 U/L (ref 10–40)
ANION GAP SERPL CALCULATED.3IONS-SCNC: 13 MMOL/L (ref 3–16)
AST SERPL-CCNC: 13 U/L (ref 15–37)
BILIRUB SERPL-MCNC: 0.4 MG/DL (ref 0–1)
BUN BLDV-MCNC: 20 MG/DL (ref 7–20)
CALCIUM SERPL-MCNC: 9.6 MG/DL (ref 8.3–10.6)
CHLORIDE BLD-SCNC: 100 MMOL/L (ref 99–110)
CHOLESTEROL, TOTAL: 144 MG/DL (ref 0–199)
CO2: 25 MMOL/L (ref 21–32)
CREAT SERPL-MCNC: 1.1 MG/DL (ref 0.6–1.2)
GFR AFRICAN AMERICAN: 59
GFR NON-AFRICAN AMERICAN: 49
GLOBULIN: 4 G/DL
GLUCOSE BLD-MCNC: 98 MG/DL (ref 70–99)
HDLC SERPL-MCNC: 29 MG/DL (ref 40–60)
LDL CHOLESTEROL CALCULATED: 87 MG/DL
POTASSIUM SERPL-SCNC: 4.4 MMOL/L (ref 3.5–5.1)
SODIUM BLD-SCNC: 138 MMOL/L (ref 136–145)
TOTAL PROTEIN: 7.9 G/DL (ref 6.4–8.2)
TRIGL SERPL-MCNC: 141 MG/DL (ref 0–150)
TSH REFLEX: 2.44 UIU/ML (ref 0.27–4.2)
VLDLC SERPL CALC-MCNC: 28 MG/DL

## 2018-12-18 PROCEDURE — 84443 ASSAY THYROID STIM HORMONE: CPT

## 2018-12-18 PROCEDURE — 93291 INTERROG DEV EVAL SCRMS IP: CPT | Performed by: INTERNAL MEDICINE

## 2018-12-18 PROCEDURE — 80061 LIPID PANEL: CPT

## 2018-12-18 PROCEDURE — 99214 OFFICE O/P EST MOD 30 MIN: CPT | Performed by: NURSE PRACTITIONER

## 2018-12-18 PROCEDURE — 36415 COLL VENOUS BLD VENIPUNCTURE: CPT

## 2018-12-18 PROCEDURE — 93000 ELECTROCARDIOGRAM COMPLETE: CPT | Performed by: NURSE PRACTITIONER

## 2018-12-18 PROCEDURE — 80053 COMPREHEN METABOLIC PANEL: CPT

## 2018-12-20 ENCOUNTER — TELEPHONE (OUTPATIENT)
Dept: FAMILY MEDICINE CLINIC | Age: 68
End: 2018-12-20

## 2019-01-03 ENCOUNTER — TELEPHONE (OUTPATIENT)
Dept: FAMILY MEDICINE CLINIC | Age: 69
End: 2019-01-03

## 2019-03-12 ENCOUNTER — OFFICE VISIT (OUTPATIENT)
Dept: FAMILY MEDICINE CLINIC | Age: 69
End: 2019-03-12
Payer: COMMERCIAL

## 2019-03-12 VITALS
HEART RATE: 70 BPM | SYSTOLIC BLOOD PRESSURE: 150 MMHG | WEIGHT: 169.2 LBS | DIASTOLIC BLOOD PRESSURE: 90 MMHG | BODY MASS INDEX: 31.97 KG/M2 | RESPIRATION RATE: 18 BRPM

## 2019-03-12 DIAGNOSIS — M17.12 PRIMARY OSTEOARTHRITIS OF LEFT KNEE: ICD-10-CM

## 2019-03-12 DIAGNOSIS — I49.5 SICK SINUS SYNDROME (HCC): ICD-10-CM

## 2019-03-12 DIAGNOSIS — I10 ESSENTIAL HYPERTENSION: Primary | ICD-10-CM

## 2019-03-12 DIAGNOSIS — E78.00 PURE HYPERCHOLESTEROLEMIA: ICD-10-CM

## 2019-03-12 DIAGNOSIS — I70.213 ATHEROSCLEROSIS OF NATIVE ARTERIES OF EXTREMITIES WITH INTERMITTENT CLAUDICATION, BILATERAL LEGS (HCC): ICD-10-CM

## 2019-03-12 PROCEDURE — 99214 OFFICE O/P EST MOD 30 MIN: CPT | Performed by: INTERNAL MEDICINE

## 2019-03-12 RX ORDER — LOSARTAN POTASSIUM AND HYDROCHLOROTHIAZIDE 25; 100 MG/1; MG/1
1 TABLET ORAL DAILY
Qty: 90 TABLET | Refills: 3 | Status: SHIPPED | OUTPATIENT
Start: 2019-03-12 | End: 2019-06-18 | Stop reason: SDUPTHER

## 2019-03-12 RX ORDER — ATORVASTATIN CALCIUM 10 MG/1
10 TABLET, FILM COATED ORAL DAILY
Qty: 90 TABLET | Refills: 3 | Status: SHIPPED | OUTPATIENT
Start: 2019-03-12 | End: 2019-06-18 | Stop reason: SDUPTHER

## 2019-03-12 RX ORDER — CLOPIDOGREL BISULFATE 75 MG/1
TABLET ORAL
Qty: 90 TABLET | Refills: 3 | Status: SHIPPED | OUTPATIENT
Start: 2019-03-12 | End: 2020-02-21 | Stop reason: SDUPTHER

## 2019-03-12 ASSESSMENT — PATIENT HEALTH QUESTIONNAIRE - PHQ9
SUM OF ALL RESPONSES TO PHQ QUESTIONS 1-9: 0
SUM OF ALL RESPONSES TO PHQ9 QUESTIONS 1 & 2: 0
2. FEELING DOWN, DEPRESSED OR HOPELESS: 0
SUM OF ALL RESPONSES TO PHQ QUESTIONS 1-9: 0
1. LITTLE INTEREST OR PLEASURE IN DOING THINGS: 0

## 2019-03-14 ASSESSMENT — ENCOUNTER SYMPTOMS
SHORTNESS OF BREATH: 0
ABDOMINAL PAIN: 0
VOMITING: 0
COUGH: 0
CHEST TIGHTNESS: 0
CONSTIPATION: 0
SINUS PRESSURE: 0
NAUSEA: 0
DIARRHEA: 0

## 2019-04-02 ENCOUNTER — OFFICE VISIT (OUTPATIENT)
Dept: VASCULAR SURGERY | Age: 69
End: 2019-04-02
Payer: COMMERCIAL

## 2019-04-02 VITALS
DIASTOLIC BLOOD PRESSURE: 72 MMHG | SYSTOLIC BLOOD PRESSURE: 138 MMHG | HEIGHT: 62 IN | WEIGHT: 168 LBS | BODY MASS INDEX: 30.91 KG/M2

## 2019-04-02 DIAGNOSIS — I70.222 ATHEROSCLEROSIS OF NATIVE ARTERIES OF EXTREMITIES WITH REST PAIN, LEFT LEG (HCC): ICD-10-CM

## 2019-04-02 DIAGNOSIS — I70.222 ATHEROSCLEROSIS OF NATIVE ARTERIES OF EXTREMITIES WITH REST PAIN, LEFT LEG (HCC): Primary | ICD-10-CM

## 2019-04-02 LAB
ANION GAP SERPL CALCULATED.3IONS-SCNC: 13 MMOL/L (ref 3–16)
BUN BLDV-MCNC: 23 MG/DL (ref 7–20)
CALCIUM SERPL-MCNC: 9.5 MG/DL (ref 8.3–10.6)
CHLORIDE BLD-SCNC: 103 MMOL/L (ref 99–110)
CO2: 23 MMOL/L (ref 21–32)
CREAT SERPL-MCNC: 1.3 MG/DL (ref 0.6–1.2)
GFR AFRICAN AMERICAN: 49
GFR NON-AFRICAN AMERICAN: 40
GLUCOSE BLD-MCNC: 99 MG/DL (ref 70–99)
HCT VFR BLD CALC: 43.8 % (ref 36–48)
HEMOGLOBIN: 14.4 G/DL (ref 12–16)
INR BLD: 1.04 (ref 0.86–1.14)
MCH RBC QN AUTO: 30.2 PG (ref 26–34)
MCHC RBC AUTO-ENTMCNC: 32.9 G/DL (ref 31–36)
MCV RBC AUTO: 91.9 FL (ref 80–100)
PDW BLD-RTO: 14.2 % (ref 12.4–15.4)
PLATELET # BLD: 227 K/UL (ref 135–450)
PMV BLD AUTO: 9.9 FL (ref 5–10.5)
POTASSIUM SERPL-SCNC: 4.2 MMOL/L (ref 3.5–5.1)
PROTHROMBIN TIME: 11.9 SEC (ref 9.8–13)
RBC # BLD: 4.76 M/UL (ref 4–5.2)
SODIUM BLD-SCNC: 139 MMOL/L (ref 136–145)
WBC # BLD: 8.1 K/UL (ref 4–11)

## 2019-04-02 PROCEDURE — 99212 OFFICE O/P EST SF 10 MIN: CPT | Performed by: SURGERY

## 2019-04-02 NOTE — PROGRESS NOTES
[facility])   Vascular Surgery Followup    Referring Provider:  Bola Freire MD     Chief Complaint   Patient presents with    Follow-up        History of Present Illness:   52-year-old female here today for follow-up of peripheral vascular disease. She presents today with complaints of numbness in her left foot. She has a history of left SFA PTA and stent placement May 2018 for bilateral lower extremity claudication. He states the last several months she's had mild pain at rest as well as progressive numbness of her left foot. She has very significant claudication in her left leg. Denies any complaints in her right leg. Past Medical History:   has a past medical history of Lumbago, Osteoarthrosis, unspecified whether generalized or localized, unspecified site, Other abnormal blood chemistry, Pure hypercholesterolemia, Risk for falls, and Unspecified essential hypertension. Surgical History:   has a past surgical history that includes Cholecystectomy; Tubal ligation; knee surgery; Rotator cuff repair; Colonoscopy; Leg Surgery; Insertable Cardiac Monitor (09/2015); Total knee arthroplasty (Left, 1/12/16); and joint replacement (Left, 20015). Social History:   reports that she quit smoking about 3 years ago. Her smoking use included cigarettes. She has a 20.00 pack-year smoking history. She has never used smokeless tobacco. She reports that she does not drink alcohol or use drugs. Family History:  family history includes Cancer in her brother and mother; Diabetes in her brother and sister; Emphysema in her brother; Heart Disease in her brother and father; Hypertension in her brother and sister; Stroke in her brother.      Home Medications:  Current Outpatient Medications   Medication Sig Dispense Refill    atorvastatin (LIPITOR) 10 MG tablet TAKE 1 TABLET BY MOUTH DAILY 90 tablet 3    losartan-hydrochlorothiazide (HYZAAR) 100-25 MG per tablet TAKE 1 TABLET BY MOUTH DAILY 90 tablet 3    clopidogrel (PLAVIX) 75 MG tablet TAKE 1 TABLET BY MOUTH DAILY 90 tablet 3    atorvastatin (LIPITOR) 10 MG tablet Take 1 tablet by mouth daily 90 tablet 3    clopidogrel (PLAVIX) 75 MG tablet Take 1 tablet by mouth daily 30 tablet 11    losartan-hydrochlorothiazide (HYZAAR) 100-25 MG per tablet Take 1 tablet by mouth daily 90 tablet 3     No current facility-administered medications for this visit. Allergies:  Ace inhibitors; Chantix [varenicline]; Keflex [cephalexin]; Morphine; Niaspan [niacin er]; Pletal [cilostazol]; and Pravastatin     Review of Systems:   · Constitutional: there has been no unanticipated weight loss. There's been no change in energy level, sleep pattern, or activity level. · Eyes: No visual changes or diplopia. No scleral icterus. · ENT: No Headaches, hearing loss or vertigo. No mouth sores or sore throat. · Cardiovascular: Reviewed in HPI  · Respiratory: No cough or wheezing, no sputum production. No hematemesis. · Gastrointestinal: No abdominal pain, appetite loss, blood in stools. No change in bowel or bladder habits. · Genitourinary: No dysuria, trouble voiding, or hematuria. · Musculoskeletal:  No gait disturbance, weakness or joint complaints. · Integumentary: No rash or pruritis. · Neurological: No headache, diplopia, change in muscle strength, numbness or tingling. No change in gait, balance, coordination, mood, affect, memory, mentation, behavior. · Psychiatric: No anxiety, no depression. · Endocrine: No malaise, fatigue or temperature intolerance. No excessive thirst, fluid intake, or urination. No tremor. · Hematologic/Lymphatic: No abnormal bruising or bleeding, blood clots or swollen lymph nodes. · Allergic/Immunologic: No nasal congestion or hives.     Physical Examination:    Vitals:    04/02/19 0903   BP: 138/72          General appearance: alert, appears stated age, cooperative and no distress  Lungs: clear to auscultation bilaterally  Heart: regular rate and rhythm  Abdomen: soft, non-tender. Bowel sounds normal. No masses,  no organomegaly  Extremities: Moderate rubor of the left foot. No ulceration. 3 second capillary refill. Weak monophasic left posterior tibial signal.  Monophasic anterior tibial signal on the left. Nonpalpable pulses. Biphasic pedal signals noted on the right. Assessment:     Patient Active Problem List   Diagnosis    Essential hypertension    Hyperglycemia    Pure hypercholesterolemia    Lumbago    Osteoarthritis    CKD (chronic kidney disease) stage 3, GFR 30-59 ml/min ([facility])    Abnormal mammogram    Fibrocystic disease of breast    Diarrhea    [facility]peripheral artery disease) [facility]    Atherosclerosis of native arteries of extremities with intermittent claudication, bilateral legs ([facility])    Syncope    CAD (coronary artery disease)    Sinus pause    Encounter for loop recorder check    Status post total left knee replacement    Adhesive capsulitis of right shoulder    Right shoulder pain    Psoriasis    Sick sinus syndrome ([facility]    Syncope and collapse    Urinary tract infection without hematuria    New onset seizure ([facility]    HTN (hypertension), benign    CAD in native artery    Seasonal allergic rhinitis due to pollen       Plan:  1. Atherosclerosis of native arteries of extremities with rest pain, left leg [facility])  58-year-old female with progressive rest pain and significant claudication of the left lower extremity. I suspect she has occlusion of her left SFA stent. Have recommended moving forward peripheral angiography and intervention. All risks of procedure discussed in detail.  - Basic Metabolic Panel; Future  - CBC; Future  - Protime-INR; Future        Thank you for allowing me to participate in the care of this individual.  Please do not hesitate to contact me with any questions. Jakob Florez M.D., FACS.  4/2/2019  9:05 AM

## 2019-04-08 ENCOUNTER — HOSPITAL ENCOUNTER (OUTPATIENT)
Dept: CARDIAC CATH/INVASIVE PROCEDURES | Age: 69
Discharge: HOME | End: 2019-04-08
Attending: SURGERY | Admitting: SURGERY
Payer: COMMERCIAL

## 2019-04-08 LAB
POC ACT LR: 170 SEC
POC ACT LR: 256 SEC
POC ACT LR: 351 SEC

## 2019-04-08 PROCEDURE — C1725 CATH, TRANSLUMIN NON-LASER: HCPCS

## 2019-04-08 PROCEDURE — 37228 HC TIB PER TERRITORY PLASTY: CPT

## 2019-04-08 PROCEDURE — 6360000004 HC RX CONTRAST MEDICATION: Performed by: SURGERY

## 2019-04-08 PROCEDURE — C1769 GUIDE WIRE: HCPCS

## 2019-04-08 PROCEDURE — 85347 COAGULATION TIME ACTIVATED: CPT

## 2019-04-08 PROCEDURE — 6360000002 HC RX W HCPCS

## 2019-04-08 PROCEDURE — 37225 PR REVSC OPN/PRQ FEM/POP W/ATHRC/ANGIOP SM VSL: CPT | Performed by: SURGERY

## 2019-04-08 PROCEDURE — 75625 CONTRAST EXAM ABDOMINL AORTA: CPT | Performed by: SURGERY

## 2019-04-08 PROCEDURE — 2500000003 HC RX 250 WO HCPCS

## 2019-04-08 PROCEDURE — 2580000003 HC RX 258

## 2019-04-08 PROCEDURE — C2623 CATH, TRANSLUMIN, DRUG-COAT: HCPCS

## 2019-04-08 PROCEDURE — C1887 CATHETER, GUIDING: HCPCS

## 2019-04-08 PROCEDURE — 99153 MOD SED SAME PHYS/QHP EA: CPT

## 2019-04-08 PROCEDURE — 75716 ARTERY X-RAYS ARMS/LEGS: CPT | Performed by: SURGERY

## 2019-04-08 PROCEDURE — 6370000000 HC RX 637 (ALT 250 FOR IP)

## 2019-04-08 PROCEDURE — 75625 CONTRAST EXAM ABDOMINL AORTA: CPT

## 2019-04-08 PROCEDURE — 99152 MOD SED SAME PHYS/QHP 5/>YRS: CPT

## 2019-04-08 PROCEDURE — 75774 ARTERY X-RAY EACH VESSEL: CPT | Performed by: SURGERY

## 2019-04-08 PROCEDURE — 37225 HC FEM POP TERRITORY ATHERECTOMY: CPT

## 2019-04-08 PROCEDURE — C1757 CATH, THROMBECTOMY/EMBOLECT: HCPCS

## 2019-04-08 PROCEDURE — 75716 ARTERY X-RAYS ARMS/LEGS: CPT

## 2019-04-08 PROCEDURE — C1724 CATH, TRANS ATHEREC,ROTATION: HCPCS

## 2019-04-08 PROCEDURE — C1894 INTRO/SHEATH, NON-LASER: HCPCS

## 2019-04-08 PROCEDURE — 37228 PR REVSC OPN/PRQ TIB/PERO W/ANGIOPLASTY UNI: CPT | Performed by: SURGERY

## 2019-04-08 PROCEDURE — 75774 ARTERY X-RAY EACH VESSEL: CPT

## 2019-04-08 PROCEDURE — 37184 PRIM ART M-THRMBC 1ST VSL: CPT | Performed by: SURGERY

## 2019-04-08 PROCEDURE — 2709999900 HC NON-CHARGEABLE SUPPLY

## 2019-04-08 RX ORDER — IODIXANOL 320 MG/ML
80 INJECTION, SOLUTION INTRAVASCULAR
Status: COMPLETED | OUTPATIENT
Start: 2019-04-08 | End: 2019-04-08

## 2019-04-08 RX ADMIN — IODIXANOL 80 ML: 320 INJECTION, SOLUTION INTRAVASCULAR at 10:52

## 2019-04-08 NOTE — BRIEF OP NOTE
Brief Postoperative Note    Val Borrego  YOB: 1950  [id number]    Pre-operative Diagnosis: LLE claudication    Post-operative Diagnosis: Same    Procedure: 1. Abdominal aortogram with BLE runoff  2. Selective left PT angiogram  3. Left SFA thrombectomy  4. Left SFA atherectomy  5. Left SFA/pop PTA  6.   Left PT PTA    Anesthesia: Local    Surgeons/Assistants: Sincere Geller    Estimated Blood Loss: less than 50     Complications: None    Specimens: Was Not Obtained    Findings: Left SFA occlusion, 80% left popliteal stenosis, 80% left PT stenosis    Electronically signed by Giuseppe Chan MD on 4/8/2019 at 10:43 AM

## 2019-04-09 NOTE — OP NOTE
[address]                     [address] [facility], [address] [facility]                                OPERATIVE REPORT    PATIENT NAME: Iesha Cid                    :        1950  MED REC NO:   [id number]                          ROOM:  ACCOUNT NO:   [de-identified]                           ADMIT DATE: 2019  PROVIDER:     Darron Khalil Ii, MD    DATE OF PROCEDURE:  2019    PRE-PROCEDURE DIAGNOSIS:  Left lower extremity claudication. POSTPROCEDURE DIAGNOSIS:  Left lower extremity claudication. PROCEDURE:  1. Abdominal aortogram with bilateral lower extremity runoff. 2.  Selective left posterior tibial angiogram.  3.  Left SFA percutaneous mechanical thrombectomy (AngioJet Solent  Omni). 4.  Left SFA atherectomy (Jetstream 2.1/3.0). 5.  Left SFA and popliteal PTA (Fernando 5 x 220, Admiral Impact 5 x  200, 5 x 150). 6.  Left posterior tibial PTA (Kirk 2.5 x 40, 3 x 40). SURGEON:  Sagar Atkins MD    ANESTHESIA:  Local/IV. ESTIMATED BLOOD LOSS:  Minimal    COMPLICATIONS:  None. INDICATIONS:  The patient is a 28-year-old female with a known history  of peripheral vascular disease and previous left lower extremity  intervention, who has developed significant lifestyle-limiting  claudication and borderline rest pain of her left lower extremity. She  presents for repeat angiographic evaluation. All risks, benefits,  alternatives were discussed in detail. All questions were answered. PROCEDURE:  After witnessed consent form was obtained, the patient was  brought to the cath lab, where IV sedation was administered. Her right  groin was carefully prepped and draped. 1% lidocaine was infiltrated  locally into the area overlying the right common femoral artery. Using  a 4-Burundian micropuncture kit, access was obtained without difficulty. A  Bentson wire was introduced into the abdominal aorta and a 5-Burundian  sheath was placed.   An Omniflush catheter was inserted into the level of  renal arteries and an abdominal aortogram was performed. It was then  pulled back to the level of the aortic bifurcation and a bilateral lower  extremity runoff was performed. With this then done, the Omniflush was  used to hook the aortic bifurcation. A stiff Glidewire was placed up  and over the bifurcation into the left profunda. The Omniflush and  5-Ghanaian sheath were removed and a 7-Ghanaian Cook flexor sheath was  placed in the right groin, up and over the bifurcation into the left  common femoral artery and heparin was given to maintain ACTs greater  than 250. A Glidewire and Quick-Cross catheter were advanced into the  origin of the occluded left SFA stent. An 0.014 Command wire and 0.014  Quick-Cross catheter were advanced through the entire length of the SFA  stent into the proximal popliteal and an angiogram confirmed  intraluminal placement. The wire was advanced down into the posterior  tibial as well as the 0.014 Quick-Cross and an angiogram was performed  in the left posterior tibial showing widely patent three-vessel runoff  on the left. With this then done, given the nature of the stenosis  within the stent, there is feeling there was thrombus within the stent  itself. As a result, a Versacore wire was advanced distally. An  AngioJet Solent Omni was used to provide percutaneous mechanical  thrombectomy of the superficial femoral artery. With this then done,  the 0.035 wire was exchanged over an 0.035 Quick-Cross for an 0.014  Spartacore wire that was advanced into the posterior tibial artery. Atherectomy was performed using the Jetstream 2.1/3.0, two passes blades  down in the entire length of the SFA stent. This was then followed  using a Fernando 5 x 220 in the P2 segment of the popliteal, extending  up and through the SFA and then back to the origin of the SFA on two  separate inflations with excellent flow in this location.   There regimen.         Callie Hawkins MD    D: 04/08/2019 10:50:37       T: 04/08/2019 10:52:10     [id number]  Job#: [id number]     Doc#: [id number]    CC:

## 2019-04-30 ENCOUNTER — OFFICE VISIT (OUTPATIENT)
Dept: VASCULAR SURGERY | Age: 69
End: 2019-04-30
Payer: COMMERCIAL

## 2019-04-30 VITALS
WEIGHT: 169 LBS | DIASTOLIC BLOOD PRESSURE: 70 MMHG | SYSTOLIC BLOOD PRESSURE: 132 MMHG | HEIGHT: 62 IN | BODY MASS INDEX: 31.1 KG/M2

## 2019-04-30 DIAGNOSIS — M54.16 LUMBAR RADICULOPATHY: Primary | ICD-10-CM

## 2019-04-30 PROCEDURE — 99212 OFFICE O/P EST SF 10 MIN: CPT | Performed by: SURGERY

## 2019-04-30 NOTE — PROGRESS NOTES
[facility])   Vascular Surgery Followup    Referring Provider:  Jethro Phelan MD     Chief Complaint   Patient presents with    Post-Op Check        History of Present Illness:   49-year-old female here today for follow-up with known history of peripheral vascular disease. Underwent recent peripheral angiography and intervention of left SFA and popliteal and posterior tibial artery, April 8, 2019 for left leg claudication and borderline rest pain. She continues to have numbness in her left foot as well as pain on the left lateral side of her left leg. He feels much better when she is walking. It only occurs when she is sitting or laying. Past Medical History:   has a past medical history of Lumbago, Osteoarthrosis, unspecified whether generalized or localized, unspecified site, Other abnormal blood chemistry, Pure hypercholesterolemia, Risk for falls, and Unspecified essential hypertension. Surgical History:   has a past surgical history that includes Cholecystectomy; Tubal ligation; knee surgery; Rotator cuff repair; Colonoscopy; Leg Surgery; Insertable Cardiac Monitor (09/2015); Total knee arthroplasty (Left, 1/12/16); and joint replacement (Left, 20015). Social History:   reports that she quit smoking about 3 years ago. Her smoking use included cigarettes. She has a 20.00 pack-year smoking history. She has never used smokeless tobacco. She reports that she does not drink alcohol or use drugs. Family History:  family history includes Cancer in her brother and mother; Diabetes in her brother and sister; Emphysema in her brother; Heart Disease in her brother and father; Hypertension in her brother and sister; Stroke in her brother.      Home Medications:  Current Outpatient Medications   Medication Sig Dispense Refill    atorvastatin (LIPITOR) 10 MG tablet TAKE 1 TABLET BY MOUTH DAILY 90 tablet 3    losartan-hydrochlorothiazide (HYZAAR) 100-25 MG per tablet TAKE 1 TABLET BY MOUTH DAILY 90 tablet 3    clopidogrel (PLAVIX) 75 MG tablet TAKE 1 TABLET BY MOUTH DAILY 90 tablet 3    atorvastatin (LIPITOR) 10 MG tablet Take 1 tablet by mouth daily 90 tablet 3    clopidogrel (PLAVIX) 75 MG tablet Take 1 tablet by mouth daily 30 tablet 11    losartan-hydrochlorothiazide (HYZAAR) 100-25 MG per tablet Take 1 tablet by mouth daily 90 tablet 3     No current facility-administered medications for this visit. Allergies:  Ace inhibitors; Chantix [varenicline]; Keflex [cephalexin]; Morphine; Niaspan [niacin er]; Pletal [cilostazol]; and Pravastatin     Review of Systems:   · Constitutional: there has been no unanticipated weight loss. There's been no change in energy level, sleep pattern, or activity level. · Eyes: No visual changes or diplopia. No scleral icterus. · ENT: No Headaches, hearing loss or vertigo. No mouth sores or sore throat. · Cardiovascular: Reviewed in HPI  · Respiratory: No cough or wheezing, no sputum production. No hematemesis. · Gastrointestinal: No abdominal pain, appetite loss, blood in stools. No change in bowel or bladder habits. · Genitourinary: No dysuria, trouble voiding, or hematuria. · Musculoskeletal:  No gait disturbance, weakness or joint complaints. · Integumentary: No rash or pruritis. · Neurological: No headache, diplopia, change in muscle strength, numbness or tingling. No change in gait, balance, coordination, mood, affect, memory, mentation, behavior. · Psychiatric: No anxiety, no depression. · Endocrine: No malaise, fatigue or temperature intolerance. No excessive thirst, fluid intake, or urination. No tremor. · Hematologic/Lymphatic: No abnormal bruising or bleeding, blood clots or swollen lymph nodes. · Allergic/Immunologic: No nasal congestion or hives. Physical Examination:    Vitals:    04/30/19 1236   BP: 132/70          General appearance: alert, appears stated age, cooperative and no distress  Left leg warm.   Palpable posterior tibial

## 2019-05-24 ENCOUNTER — TELEPHONE (OUTPATIENT)
Dept: VASCULAR SURGERY | Age: 69
End: 2019-05-24

## 2019-05-24 ENCOUNTER — HOSPITAL ENCOUNTER (OUTPATIENT)
Dept: MRI IMAGING | Age: 69
Discharge: HOME | End: 2019-05-24
Payer: COMMERCIAL

## 2019-05-24 DIAGNOSIS — M54.16 LUMBAR RADICULOPATHY: ICD-10-CM

## 2019-05-24 DIAGNOSIS — M54.16 LUMBAR RADICULOPATHY: Primary | ICD-10-CM

## 2019-05-24 LAB
BUN BLDV-MCNC: 25 MG/DL (ref 7–20)
CREAT SERPL-MCNC: 1.2 MG/DL (ref 0.6–1.2)
GFR AFRICAN AMERICAN: 54
GFR NON-AFRICAN AMERICAN: 44

## 2019-05-24 PROCEDURE — 82565 ASSAY OF CREATININE: CPT

## 2019-05-24 PROCEDURE — 72148 MRI LUMBAR SPINE W/O DYE: CPT

## 2019-05-24 PROCEDURE — 84520 ASSAY OF UREA NITROGEN: CPT

## 2019-05-24 PROCEDURE — 36415 COLL VENOUS BLD VENIPUNCTURE: CPT

## 2019-05-26 ENCOUNTER — TELEPHONE (OUTPATIENT)
Dept: PRIMARY CARE CLINIC | Age: 69
End: 2019-05-26

## 2019-05-26 NOTE — TELEPHONE ENCOUNTER
Please ask pt if her surgeon contacted her about the results of her MRI, which shows some arthritis in her low back. She can schedule an appt with me if she wants to talk about it more.  Thanks, [facility]

## 2019-05-31 ENCOUNTER — TELEPHONE (OUTPATIENT)
Dept: VASCULAR SURGERY | Age: 69
End: 2019-05-31

## 2019-05-31 NOTE — TELEPHONE ENCOUNTER
Discuss MRI results with patient. She has normal peripheral pulses. Do feel that her symptoms could be because of a radiculopathy.   She is scheduled to follow-up with her primary care physician next month

## 2019-06-17 NOTE — PROGRESS NOTES
[address]   Electrophysiology follow up   Date: 6/18/2019    Consult Requesting Physician: Zoila Hodges MD      HPI: Yvan Fournier is a 68 y.o. female with history of HLD and HTN and syncope. Presented to the hospital with syncope, with no warning symptoms. She has history of syncope before. On her last syncope, she was sitting at the computer playing [employer] and felt lightheaded and dizzy and then lost consciousness. Her grandson, who witnessed it, thought she was out for 1.5 minutes. She had bowel incontinence after she regained consciousness. Reportedly she was not confused. She had dry-heaves once afterwards. She denies chest pain or racing HR surrounding the syncope. She has lightheadedness that lasts for a few seconds, she is unsure of their exact frequency. She has a history of PAD with stents bilateral LE's. She also has history of diffuse non-obstructive coronary artery disease by cath about 10 years ago. Nuclear stress test and echo in the summer of 2015 were normal.     30 day MCOT from 7/24/15 - 8/22/15 revealed a 3.6 second pause (asymptomatic)     She underwent ILR on 10/2/15. On her ILR she has had pauses for 3-4 seconds after midnight, asymptomatic. Declined sleep study.       She has not had any syncope since implantation of loop recorder. Today Bozena's ILR battery is depleted. She states hat she is too busy to do a sleep study. Her heart rate is slow with sleep. She refuses to have a sleep study or consult. Denies having any chest pressure or shortness of breath.     Past Medical History:   Diagnosis Date    Lumbago     Osteoarthrosis, unspecified whether generalized or localized, unspecified site     Other abnormal blood chemistry     Pure hypercholesterolemia     Risk for falls 2/2/16    S/p Left TKR    Unspecified essential hypertension         Past Surgical History:   Procedure Laterality Date    CHOLECYSTECTOMY      COLONOSCOPY      INSERTABLE CARDIAC MONITOR  09/2015    JOINT REPLACEMENT Left 20015    knee replacement    KNEE SURGERY      RT    LEG SURGERY      2 stents placed in right leg, 1 stent and balloon in left leg    ROTATOR CUFF REPAIR      TOTAL KNEE ARTHROPLASTY Left 1/12/16    Dr Burgos Current TUBAL LIGATION         Allergies   Allergen Reactions    Ace Inhibitors Other (See Comments)     Cough     Chantix [Varenicline]      depression    Keflex [Cephalexin] Diarrhea    Morphine      Causes mental impairment    Niaspan [Niacin Er] Other (See Comments)     Skin irritation    Pletal [Cilostazol]      Headache and diarrhea    Pravastatin        Social History:   reports that she quit smoking about 3 years ago. Her smoking use included cigarettes. She has a 20.00 pack-year smoking history. She has never used smokeless tobacco. She reports that she does not drink alcohol or use drugs. Family History:  family history includes Cancer in her brother and mother; Diabetes in her brother and sister; Emphysema in her brother; Heart Disease in her brother and father; Hypertension in her brother and sister; Stroke in her brother. Review of System:  All other systems reviewed except for that noted above. Pertinent negatives and positives are:      General: negative for fever, chills   Ophthalmic ROS: negative for - eye pain or loss of vision  ENT ROS: negative for - headaches, sore throat   Respiratory: negative for - cough, sputum  Cardiovascular: Reviewed in HPI  Gastrointestinal: negative for - abdominal pain, diarrhea, N/V  Hematology: negative for - bleeding, blood clots, bruising or jaundice  Genito-Urinary:  negative for - Dysuria or incontinence  Musculoskeletal: negative for - Joint swelling, muscle pain  Neurological: negative for - confusion, dizziness, headaches   Psychiatric: No anxiety, no depression.   Dermatological: negative for - rash    Physical Examination:  Vitals:    06/18/19 1025   BP: 136/81   Pulse: 69   Resp: 14 Wt Readings from Last 3 Encounters:   19 169 lb (76.7 kg)   19 169 lb (76.7 kg)   19 168 lb (76.2 kg)     · Constitutional: Oriented. No distress. · Head: Normocephalic and atraumatic. · Mouth/Throat: Oropharynx is clear and moist.   · Eyes: Conjunctivae normal. EOM are normal.   · Neck: Neck supple. No JVD present. · Cardiovascular: Normal rate, regular rhythm, S1&S2. · Pulmonary/Chest: Bilateral respiratory sounds. No rhonchi. · Abdominal: Soft. No tenderness. · Musculoskeletal: No tenderness. No edema    · Lymphadenopathy: Has no cervical adenopathy. · Neurological: Alert and oriented. Follows command, No Gross deficit   · Skin: Skin is warm, No rash noted. · Psychiatric: Has a normal behavior     Labs, diagnostic and imaging results reviewed. Reviewed. EC19 sinus rhythm  QTcH 423    Echo: 2019  Summary   -Normal left ventricle size, wall thickness and systolic function with an   estimated ejection fraction of 55%.   -Mild mitral regurgitation is present.   -There is mild tricuspid regurgitation with RVSP estimated at 35 mmHg.       Medication:  Current Outpatient Medications   Medication Sig Dispense Refill    clopidogrel (PLAVIX) 75 MG tablet TAKE 1 TABLET BY MOUTH DAILY 90 tablet 3    atorvastatin (LIPITOR) 10 MG tablet Take 1 tablet by mouth daily 90 tablet 3    losartan-hydrochlorothiazide (HYZAAR) 100-25 MG per tablet Take 1 tablet by mouth daily 90 tablet 3     No current facility-administered medications for this visit. Assessment and plan:     Bradycardia:    Episode of bradycardia detected after midnight, asymptomatic, ? HARRY    - Patient snores at night and has signs of HARRY.    - Discussed HARRY evaluation including sleep studies. She refuses it. - Discussed the risks associated with sleep apnea, including arrhythmia, pulmonary HTN, etc. She does not want to have sleep studies.      S/p ILR implantation:    Device battery is depleted. Discussed removal and replacing loop recorder for continued monitoring. Risks benefits and alternatives discussed. She is not interested in loop removal or replacing it. I have discussed her bradycardia episodes in the past and again today with her. If she has another episode of syncope, she needs a pacemaker implantation.     - Myoview normal with no ischemia in 7/2015. - Echo with normal EF in 7/2015.      - PAD: s/p stent   - On ASA and plavix. - BP controlled. Thank you for allowing me to participate in the care of Bozena Franco. Further evaluation will be based upon the patient's clinical course and testing results. All questions and concerns were addressed to the patient/family. Alternatives to my treatment were discussed. I have discussed the above stated plan and the patient verbalized understanding and agreed with the plan. Scribe attestation: This note was scribed in the presence of Clint Kaufman MD by Brandon Sterlign RN  Physician Attestation: I, Dr. Clint Kaufman, confirm that the scribe's documentation has been prepared under my direction and personally reviewed by me in its entirety. I also confirm that the note above accurately reflects all work, treatment, procedures, and medical decision making performed by me. NOTE: This report was transcribed using voice recognition software. Every effort was made to ensure accuracy, however, inadvertent computerized transcription errors may be present.        Clint Kaufman MD, MPH  [facility]   Office: [phone]

## 2019-06-18 ENCOUNTER — OFFICE VISIT (OUTPATIENT)
Dept: CARDIOLOGY CLINIC | Age: 69
End: 2019-06-18
Payer: COMMERCIAL

## 2019-06-18 VITALS
HEIGHT: 61 IN | RESPIRATION RATE: 14 BRPM | WEIGHT: 169 LBS | HEART RATE: 69 BPM | DIASTOLIC BLOOD PRESSURE: 81 MMHG | SYSTOLIC BLOOD PRESSURE: 136 MMHG | BODY MASS INDEX: 31.91 KG/M2

## 2019-06-18 DIAGNOSIS — R55 SYNCOPE AND COLLAPSE: Primary | ICD-10-CM

## 2019-06-18 PROCEDURE — 99214 OFFICE O/P EST MOD 30 MIN: CPT | Performed by: INTERNAL MEDICINE

## 2019-06-18 PROCEDURE — 93000 ELECTROCARDIOGRAM COMPLETE: CPT | Performed by: INTERNAL MEDICINE

## 2019-07-30 ENCOUNTER — OFFICE VISIT (OUTPATIENT)
Dept: VASCULAR SURGERY | Age: 69
End: 2019-07-30
Payer: COMMERCIAL

## 2019-07-30 VITALS
BODY MASS INDEX: 31.91 KG/M2 | HEIGHT: 61 IN | DIASTOLIC BLOOD PRESSURE: 68 MMHG | WEIGHT: 169 LBS | SYSTOLIC BLOOD PRESSURE: 122 MMHG

## 2019-07-30 DIAGNOSIS — I70.222 ATHEROSCLEROSIS OF NATIVE ARTERIES OF EXTREMITIES WITH REST PAIN, LEFT LEG (HCC): Primary | ICD-10-CM

## 2019-07-30 PROCEDURE — 99212 OFFICE O/P EST SF 10 MIN: CPT | Performed by: SURGERY

## 2019-09-17 ENCOUNTER — OFFICE VISIT (OUTPATIENT)
Dept: FAMILY MEDICINE CLINIC | Age: 69
End: 2019-09-17
Payer: COMMERCIAL

## 2019-09-17 VITALS
BODY MASS INDEX: 31.63 KG/M2 | HEART RATE: 70 BPM | DIASTOLIC BLOOD PRESSURE: 84 MMHG | WEIGHT: 167.4 LBS | OXYGEN SATURATION: 96 % | SYSTOLIC BLOOD PRESSURE: 132 MMHG

## 2019-09-17 DIAGNOSIS — E78.49 OTHER HYPERLIPIDEMIA: ICD-10-CM

## 2019-09-17 DIAGNOSIS — M48.062 SPINAL STENOSIS OF LUMBAR REGION WITH NEUROGENIC CLAUDICATION: ICD-10-CM

## 2019-09-17 DIAGNOSIS — I10 ESSENTIAL HYPERTENSION: Primary | ICD-10-CM

## 2019-09-17 DIAGNOSIS — Z23 NEED FOR VACCINATION: ICD-10-CM

## 2019-09-17 DIAGNOSIS — M17.11 PRIMARY OSTEOARTHRITIS OF RIGHT KNEE: ICD-10-CM

## 2019-09-17 DIAGNOSIS — N18.30 CHRONIC KIDNEY DISEASE, STAGE III (MODERATE) (HCC): ICD-10-CM

## 2019-09-17 PROCEDURE — 90732 PPSV23 VACC 2 YRS+ SUBQ/IM: CPT | Performed by: INTERNAL MEDICINE

## 2019-09-17 PROCEDURE — G0009 ADMIN PNEUMOCOCCAL VACCINE: HCPCS | Performed by: INTERNAL MEDICINE

## 2019-09-17 PROCEDURE — 99213 OFFICE O/P EST LOW 20 MIN: CPT | Performed by: INTERNAL MEDICINE

## 2019-09-17 ASSESSMENT — ENCOUNTER SYMPTOMS
SHORTNESS OF BREATH: 0
CHEST TIGHTNESS: 0
NAUSEA: 0
SINUS PRESSURE: 0
CONSTIPATION: 0
VOMITING: 0
COUGH: 0
ABDOMINAL PAIN: 0
DIARRHEA: 0

## 2019-09-17 NOTE — PATIENT INSTRUCTIONS
Patient Education        Pneumococcal Polysaccharide Vaccine: Care Instructions  Your Care Instructions    The pneumococcal polysaccharide vaccine (PPSV) can prevent some of the serious complications of pneumonia. This includes infection in the bloodstream (bacteremia) or throughout the body (septicemia). PPSV is recommended for people ages 72 years and older. People ages 3 to 59 who have a long-term illness should also get the vaccine. This includes people with diabetes, heart disease, liver disease, or lung disease. PPSV can also help people who have a weakened immune system. This includes cancer patients and people who don't have a spleen. The immune system helps your body fight infection and other illnesses. PPSV is given as a shot. It's usually given in the arm. Healthy older adults get the shot once. Other people may need to have a second dose. The shot may cause pain and redness at the site. It may also cause a mild fever for a short time. Follow-up care is a key part of your treatment and safety. Be sure to make and go to all appointments, and call your doctor if you are having problems. It's also a good idea to know your test results and keep a list of the medicines you take. How can you care for yourself at home? · Take an over-the-counter pain medicine, such as acetaminophen (Tylenol), ibuprofen (Advil, Motrin), or naproxen (Aleve), if your arm is sore after the shot. Be safe with medicines. Read and follow all instructions on the label. · Give acetaminophen (Tylenol) or ibuprofen (Advil, Motrin) to your child for pain or fussiness after the shot. Read and follow all instructions on the label. Do not give aspirin to anyone younger than 20. It has been linked to Reye syndrome, a serious illness. · Put ice or a cold pack on the sore area for 10 to 20 minutes at a time. Put a thin cloth between the ice and your skin. When should you call for help?   Call 911 anytime you think you may need emergency

## 2019-09-17 NOTE — PROGRESS NOTES
Guerline Franco  : 1950  Encounter date: 2019    This nancy 68 y.o. female who presents with  Chief Complaint   Patient presents with    Hypertension       History of present illness:    HPI Patient presents today for 6 month follow up on hypertension. Patient reports she is doing well overall without any major complaints. She reports her back is been doing better over the summer and she has not had any major flares. She reports her right knee continues to bother her and give her problems, especially on stairs or after sitting for long periods of time. The pain is primarily in the medial aspect of her knee, and does not radiate. She reports it feels like her left knee prior to her knee replacement. Allergies   Allergen Reactions    Ace Inhibitors Other (See Comments)     Cough     Chantix [Varenicline]      depression    Keflex [Cephalexin] Diarrhea    Morphine      Causes mental impairment    Niaspan [Niacin Er] Other (See Comments)     Skin irritation    Pletal [Cilostazol]      Headache and diarrhea    Pravastatin      Current Outpatient Medications   Medication Sig Dispense Refill    zoster recombinant adjuvanted vaccine (SHINGRIX) 50 MCG/0.5ML SUSR injection Inject 0.5 mLs into the muscle See Admin Instructions 1 dose now and repeat in 2-6 months 0.5 mL 0    clopidogrel (PLAVIX) 75 MG tablet TAKE 1 TABLET BY MOUTH DAILY 90 tablet 3    atorvastatin (LIPITOR) 10 MG tablet Take 1 tablet by mouth daily 90 tablet 3    losartan-hydrochlorothiazide (HYZAAR) 100-25 MG per tablet Take 1 tablet by mouth daily 90 tablet 3     No current facility-administered medications for this visit. Review of Systems   Constitutional: Negative for activity change, appetite change, chills, fatigue and fever. HENT: Negative for congestion and sinus pressure. Respiratory: Negative for cough, chest tightness and shortness of breath. Cardiovascular: Negative for chest pain and palpitations. Metabolic Panel; Future  -At goal, continue current regimen    2. Other hyperlipidemia  - Lipid Panel; Future  -Repeat lipids, continue statin. 3. Primary osteoarthritis of right knee  -Not interested in surgery at this time, declines steroid injections or physical therapy. At this point I do not feel imaging would necessarily impact her plan of care. She will contact us when the pain is severe enough that she wants to see a surgeon. 4. Spinal stenosis of lumbar region with neurogenic claudication  -Spent long time reviewing images of MRI with patient. She has pretty significant multilevel degenerative disc disease throughout her lumbar spine, and I suspect these will continue to intermittently cause problems for her. Discussed signs of acute worsening and what she needs to pay attention to, but patient again does not want to do any epidurals or physical therapy at this time. 5. Need for vaccination  - Pneumococcal polysaccharide vaccine 23-valent greater than or equal to 1yo subcutaneous/IM        No follow-ups on file.

## 2020-01-29 RX ORDER — LOSARTAN POTASSIUM AND HYDROCHLOROTHIAZIDE 25; 100 MG/1; MG/1
1 TABLET ORAL DAILY
Qty: 90 TABLET | Refills: 3 | Status: SHIPPED | OUTPATIENT
Start: 2020-01-29 | End: 2020-05-18 | Stop reason: SDUPTHER

## 2020-02-21 RX ORDER — CLOPIDOGREL BISULFATE 75 MG/1
TABLET ORAL
Qty: 90 TABLET | Refills: 0 | Status: SHIPPED | OUTPATIENT
Start: 2020-02-21 | End: 2020-05-19 | Stop reason: SDUPTHER

## 2020-02-21 RX ORDER — ATORVASTATIN CALCIUM 10 MG/1
10 TABLET, FILM COATED ORAL DAILY
Qty: 90 TABLET | Refills: 0 | Status: SHIPPED | OUTPATIENT
Start: 2020-02-21 | End: 2020-06-03

## 2020-05-19 RX ORDER — CLOPIDOGREL BISULFATE 75 MG/1
TABLET ORAL
Qty: 90 TABLET | Refills: 0 | Status: SHIPPED | OUTPATIENT
Start: 2020-05-19 | End: 2020-07-28

## 2020-05-19 RX ORDER — LOSARTAN POTASSIUM AND HYDROCHLOROTHIAZIDE 25; 100 MG/1; MG/1
1 TABLET ORAL DAILY
Qty: 90 TABLET | Refills: 0 | Status: SHIPPED | OUTPATIENT
Start: 2020-05-19 | End: 2021-02-18

## 2020-06-02 ENCOUNTER — TELEPHONE (OUTPATIENT)
Dept: CARDIOTHORACIC SURGERY | Age: 70
End: 2020-06-02

## 2020-06-02 ENCOUNTER — OFFICE VISIT (OUTPATIENT)
Dept: VASCULAR SURGERY | Age: 70
End: 2020-06-02
Payer: COMMERCIAL

## 2020-06-02 VITALS
DIASTOLIC BLOOD PRESSURE: 74 MMHG | TEMPERATURE: 98.4 F | WEIGHT: 174 LBS | HEIGHT: 62 IN | SYSTOLIC BLOOD PRESSURE: 118 MMHG | BODY MASS INDEX: 32.02 KG/M2

## 2020-06-02 PROCEDURE — 99212 OFFICE O/P EST SF 10 MIN: CPT | Performed by: SURGERY

## 2020-06-02 NOTE — PROGRESS NOTES
One daily 90 tablet 0    atorvastatin (LIPITOR) 10 MG tablet Take 1 tablet by mouth daily 90 tablet 0    zoster recombinant adjuvanted vaccine (SHINGRIX) 50 MCG/0.5ML SUSR injection Inject 0.5 mLs into the muscle See Admin Instructions 1 dose now and repeat in 2-6 months 0.5 mL 0     No current facility-administered medications for this visit. Allergies:  Ace inhibitors; Chantix [varenicline]; Keflex [cephalexin]; Morphine; Niaspan [niacin er]; Pletal [cilostazol]; and Pravastatin     Review of Systems:   · Constitutional: there has been no unanticipated weight loss. There's been no change in energy level, sleep pattern, or activity level. · Eyes: No visual changes or diplopia. No scleral icterus. · ENT: No Headaches, hearing loss or vertigo. No mouth sores or sore throat. · Cardiovascular: Reviewed in HPI  · Respiratory: No cough or wheezing, no sputum production. No hematemesis. · Gastrointestinal: No abdominal pain, appetite loss, blood in stools. No change in bowel or bladder habits. · Genitourinary: No dysuria, trouble voiding, or hematuria. · Musculoskeletal:  No gait disturbance, weakness or joint complaints. · Integumentary: No rash or pruritis. · Neurological: No headache, diplopia, change in muscle strength, numbness or tingling. No change in gait, balance, coordination, mood, affect, memory, mentation, behavior. · Psychiatric: No anxiety, no depression. · Endocrine: No malaise, fatigue or temperature intolerance. No excessive thirst, fluid intake, or urination. No tremor. · Hematologic/Lymphatic: No abnormal bruising or bleeding, blood clots or swollen lymph nodes. · Allergic/Immunologic: No nasal congestion or hives. Physical Examination:    Vitals:    06/02/20 1421   Temp: 98.4 °F (36.9 °C)          General appearance: alert, appears stated age, cooperative and no distress  Lungs: clear to auscultation bilaterally  Heart: regular rate and rhythm  Abdomen: soft, non-tender.

## 2020-06-03 RX ORDER — ATORVASTATIN CALCIUM 10 MG/1
10 TABLET, FILM COATED ORAL DAILY
Qty: 90 TABLET | Refills: 0 | Status: SHIPPED | OUTPATIENT
Start: 2020-06-03 | End: 2020-10-27

## 2020-07-10 ENCOUNTER — HOSPITAL ENCOUNTER (OUTPATIENT)
Dept: VASCULAR LAB | Age: 70
Discharge: HOME | End: 2020-07-10
Payer: COMMERCIAL

## 2020-07-10 PROCEDURE — 93925 LOWER EXTREMITY STUDY: CPT

## 2020-07-16 ENCOUNTER — TELEPHONE (OUTPATIENT)
Dept: VASCULAR SURGERY | Age: 70
End: 2020-07-16

## 2020-07-17 NOTE — TELEPHONE ENCOUNTER
Left message for patient regarding results of arterial duplex which shows some moderate disease in the right lower extremity and no significant arterial disease in the left lower extremity. Will discuss and review imaging further with Dr. Ubaldo Green when he returns next week and will then contact patient with further plan.     Electronically signed by JIM Larose CNP on 7/17/2020 at 1:14 PM

## 2020-07-21 ENCOUNTER — TELEPHONE (OUTPATIENT)
Dept: VASCULAR SURGERY | Age: 70
End: 2020-07-21

## 2020-07-22 ENCOUNTER — OFFICE VISIT (OUTPATIENT)
Dept: ORTHOPEDIC SURGERY | Age: 70
End: 2020-07-22
Payer: COMMERCIAL

## 2020-07-22 VITALS — HEIGHT: 62 IN | TEMPERATURE: 96.5 F | BODY MASS INDEX: 32.02 KG/M2 | WEIGHT: 174 LBS

## 2020-07-22 PROCEDURE — 99204 OFFICE O/P NEW MOD 45 MIN: CPT | Performed by: ORTHOPAEDIC SURGERY

## 2020-07-22 NOTE — PROGRESS NOTES
Present Illness:  Mattie Wilhelm is a 65 y.o. female seen today for left shoulder pain no specific injury started the second week of June and is gotten significantly worse she cannot use this arm at all    Chief complaint presents in the office today: Severe left shoulder pain    Location left shoulder  Severity severe   Duration more than 4 weeks  Associated sign/symptoms pain, loss of motion, loss of strength, sharp stabbing pain, cannot even sleep    I have reviewed and discussed the below Pain assessment findings with the patient. Medical History  Patient's medications, allergies, past medical, surgical, social and family histories were reviewed and updated as appropriate. Past Medical History:   Diagnosis Date    Lumbago     Osteoarthrosis, unspecified whether generalized or localized, unspecified site     Other abnormal blood chemistry     Pure hypercholesterolemia     Risk for falls 2/2/16    S/p Left TKR    Unspecified essential hypertension      Family History   Problem Relation Age of Onset    Cancer Mother     Heart Disease Father     Cancer Brother     Emphysema Brother     Stroke Brother     Diabetes Brother     Diabetes Sister     Heart Disease Brother     Hypertension Brother     Hypertension Sister     High Cholesterol Neg Hx     High Blood Pressure Neg Hx      Social History     Socioeconomic History    Marital status:       Spouse name: Not on file    Number of children: Not on file    Years of education: Not on file    Highest education level: Not on file   Occupational History    Not on file   Social Needs    Financial resource strain: Not on file    Food insecurity     Worry: Not on file     Inability: Not on file    Transportation needs     Medical: Not on file     Non-medical: Not on file   Tobacco Use    Smoking status: Former Smoker     Packs/day: 0.50     Years: 40.00     Pack years: 20.00     Types: Cigarettes     Last attempt to quit: 7/6/2015 Years since quittin.0    Smokeless tobacco: Never Used    Tobacco comment: <1 pack of cigarettes per day   Substance and Sexual Activity    Alcohol use: No     Alcohol/week: 0.0 standard drinks    Drug use: No    Sexual activity: Not on file   Lifestyle    Physical activity     Days per week: Not on file     Minutes per session: Not on file    Stress: Not on file   Relationships    Social connections     Talks on phone: Not on file     Gets together: Not on file     Attends [employer] service: Not on file     Active member of club or organization: Not on file     Attends meetings of clubs or organizations: Not on file     Relationship status: Not on file    Intimate partner violence     Fear of current or ex partner: Not on file     Emotionally abused: Not on file     Physically abused: Not on file     Forced sexual activity: Not on file   Other Topics Concern    Not on file   Social History Narrative    Not on file     Current Outpatient Medications   Medication Sig Dispense Refill    atorvastatin (LIPITOR) 10 MG tablet TAKE 1 TABLET BY MOUTH DAILY 90 tablet 0    losartan-hydrochlorothiazide (HYZAAR) 100-25 MG per tablet Take 1 tablet by mouth daily 90 tablet 0    clopidogrel (PLAVIX) 75 MG tablet One daily 90 tablet 0    zoster recombinant adjuvanted vaccine (SHINGRIX) 50 MCG/0.5ML SUSR injection Inject 0.5 mLs into the muscle See Admin Instructions 1 dose now and repeat in 2-6 months 0.5 mL 0     No current facility-administered medications for this visit.       Allergies   Allergen Reactions    Ace Inhibitors Other (See Comments)     Cough     Chantix [Varenicline]      depression    Keflex [Cephalexin] Diarrhea    Morphine      Causes mental impairment    Niaspan [Niacin Er] Other (See Comments)     Skin irritation    Pletal [Cilostazol]      Headache and diarrhea    Pravastatin        REVIEW OF SYSTEMS:   No rash  No numbness  No tingling  No fever  No depression      Pertinent items are noted in HPI  Review of systems reviewed from Patient History Form dated on 7/22/2020 and available in the patient's chart under the Media tab. Examination:    General Exam:    Vitals: not currently breastfeeding. BMI Readings from Last 3 Encounters:   06/02/20 32.34 kg/m²   09/17/19 31.63 kg/m²   07/30/19 31.93 kg/m²     Constitutional: Patient is adequately groomed with no evidence of malnutrition  Mental Status: The patient is oriented to time, place and person. The patient's mood and affect are appropriate. Lymphatic: The lymphatic examination bilaterally reveals all areas to be without enlargement or induration. Vascular: Examination reveals no swelling or calf tenderness. Peripheral pulses are palpable and 2+. Neurological: The patient has good coordination. There is no weakness or sensory deficit. Skin:    Head/Neck: inspection reveals no rashes, ulcerations or lesions. Trunk: inspection reveals no rashes, ulcerations or lesions. Right Upper Extremity: inspection reveals no rashes, ulcerations or lesions. Left Upper Extremity: inspection reveals no rashes, ulcerations or lesions. PHYSICAL EXAM:      Shoulder Examination  Inspection:  No obvious deformity, no erythema, no abrasions or lacerations, no obvious muscle atrophy.       Palpation:  Lateral deltoid severe pain to palpation  AC joint severe pain to palpation  Moderate pain Anterior to palpation  Moderate pain Posterior to palpation  Severe trapezial pain to palpation  Range of Motion passively:  Abduction --150 degrees  Flexion-- 180 degrees  Extension-- between 45-60 degrees  Latera/external rotation --close to 90 degrees  Medial/ internal rotation -- between 70-90 degrees  She has minimal motion actively  Strength: Left shoulder strength:   internal rotation against resistance is 5/5  external rotation against resistance is 3/5  and supraspinatus isolation against resistance is 1/5, Shoulder shrug is 5 over 5 , cervical spine strength is excellent, flexion extension at the elbow is 5 over 5, wrist and hand strength is equal bilaterally with supination pronation and flexion and extension  no winging no muscle atrophy. Special Tests:  Palpation demonstrates no swelling no effusion severe pain. There is severely limited active and near full passive range of motion. Strength is extremely weak and painful with internal rotation against resistance external rotation against resistance supraspinatus isolation against resistance. Shoulder shrug strength is 5 over 5 equal bilaterally. Radial ulnar and median nerve function is intact. Capillary refill is brisk. Sensation is intact from neck down to the fingers. Elbow motion finger and wrist motion is full equal bilaterally. Deep tendon reflexes of the Brachial radialis, biceps, triceps are all +2/4 equal bilaterally. Cervical spine range of motion is full without pain negative Spurling's test.  Load-and-shift test is negative. Crank test is negative. Apprehension and relocation are negative. Anterior and posterior glide are equal bilaterally. Negative sulcus sign. No signs of any significant multidirectional instability. There is no scapular winging. There is no muscle atrophy of the latissimus dorsi, the deltoid, the periscapular musculature, the trapezius musculature or the pectoralis musculature. Positive Neer's test, positive Wilder test, positive pain with crossarm elevation. Gait: normal gait     Reflex: Deep tendon reflexes of the biceps, triceps, brachioradialis +2/4 equal bilaterally. Lower extremity reflexes:  +2/4 and equal bilaterally for patella and Achilles. Contralateral Shoulder exam: Palpation demonstrates no swelling no effusion no pain. There is full active and passive range of motion bilaterally.   Strength is excellent with internal rotation against resistance external rotation against resistance supraspinatus isolation against resistance. Shoulder shrug strength is 5 over 5 equal bilaterally. Radial ulnar and median nerve function is intact. Capillary refill is brisk. Elbow motion finger and wrist motion is full equal bilaterally. Deep tendon reflexes of the Brachial radialis, biceps, triceps are all +2/4 equal bilaterally. Cervical spine range of motion is full without pain negative Spurling's test.  Load-and-shift test is negative. Crank test is negative. Apprehension and relocation are negative. Anterior and posterior glide are equal bilaterally. Negative sulcus sign. No signs of any significant multidirectional instability. There is no scapular winging. There is no muscle atrophy of the latissimus dorsi, the deltoid, the periscapular musculature, the trapezius musculature or the pectoralis musculature. Negative Neer's test, negative Wilder test, no pain with crossarm elevation. Abduction --150 degrees  Flexion-- 180 degrees  Extension-- between 45-60 degrees  Latera/external rotation --close to 90 degrees  Medial/ internal rotation -- between 70-90 degree    CERVICAL SPINE  EXAMINATION:  · Inspection: Local inspection shows no step-off or bruising. Cervical alignment is normal. No instability is noted. · Palpation and Percussion: No evidence of tenderness at the midline. Paraspinal tenderness is not present. There is no paraspinal spasm. · Range of Motion:  pain-free ROM   · Strength: 5/5 bilateral upper extremities. · Special Tests:   Spurling's and Child's are negative bilaterally. Wilder and Impingement tests are negative bilaterally. · Skin:There are no rashes, ulcerations or lesions. · Reflexes: Bilaterally triceps, biceps and brachioradialis are 2+. Clonus absent bilaterally at the feet. No pathological reflexes are noted.   · Gait & station:  normal, patient ambulates without assistance and no ataxia      Cranial nerve exam:    1- smell-- patient states no Olfactory problem  2- visual acuity is intact  3- moves eyes, and pupils are reactive  4- extra-ocular muscles are intact  5- facial sensation is intact no muscle atrophy  6- extra ocular muscles are intact  7- mouth moist and facial expressions are intact  8- good hearing and no difficulty with recognition  9- patient has no difficulty swallowing  10- no difficulty breathing and no Gastrointestinal problems good cough   11- moves head with all motion and no swallowing problems  12- normal speech and tongue protrudes midline    Additional Examinations:  Right Lower Extremity: Examination of the right lower extremity does not show any tenderness, deformity or injury. Range of motion is unremarkable. There is no gross instability. There are no rashes, ulcerations or lesions. Strength and tone are normal.  Left Lower Extremity: Examination of the left lower extremity does not show any tenderness, deformity or injury. Range of motion is unremarkable. There is no gross instability. There are no rashes, ulcerations or lesions. Strength and tone are normal.        IMPRESSION:    Diagnostic testing:  X-rays reviewed in office by myself, I personally and independently reviewed the films in the office today : I reviewed multiple X-rays today of the left shoulder:  Anterior posterior, lateral, axillary:  Show no fracture, no dislocation, no signs of any masses or tumors, no significant glenohumeral arthritis,  significant A.C. Joint arthritis, good joint space maintenance,  Very large subacromial spur type III plus  Impression large subacromial spur and severe AC joint arthritis  MRI: Ordered today to look at how big her rotator cuff tear is  Labs:None at this time.     Vl Dup Lower Extremity Arteries Bilateral    Result Date: 7/10/2020  Vascular Lower Extremities Arterial Duplex and Lower Arterial Plethysmography Procedure  Demographics   Patient Name      Esteban Dunbar   Date of Study     07/10/2020         Gender Female   Patient Number    [id number]         Date of Birth       1950   Visit Number      [id number]          Age                 65 year(s)   Accession Number  [id number]          Room Number   Corporate ID      [id number]           Sonographer         JEN Bagley   Ordering          Amparo Gregory MD Interpreting        [facility]  Physician                            Physician           Catherine Manning MD,                                                           [facility]  Procedure Type of Study:   Extremities Arteries:Lower Extremities Arterial Duplex, VL LOWER EXTREMITY  ARTERIES DUPLEX BILATERAL, Lower Arterial Plethysmography, VL ANKLE /  BRACHIAL INDICES EXTREMITY COMPLETE. Vascular Sonographer Report  Additional Indications: Atherosclerosis of native arteries of extremities with intermittent claudication. Impressions Right Impression Right FRANK: 0.75. This is consistent with moderate arterial insufficiency at rest. Duplex imaging reveals <50% stenosis of the proximal common femoral artery and superficial femoral artery. Flow is noted throughout the anterior tibial and posterior tibial artery, the distal peroneal was visualized with monophasic flow noted, the proximal to mid segment was not visualized due to depth of vessel. Left Impression Left FRANK: 0.96. This is consistent with no significant PAD at rest. Duplex imaging reveals <50% stenosis of the proximal superficial femoral artery. Flow is noted throughout the anterior tibial and posterior tibial artery, the distal peroneal was visualized with biphasic flow noted, the proximal to mid segment was not visualized due to depth of vessel. Conclusions   Summary   Right FRANK: 0.75. This is consistent with moderate arterial insufficiency at  rest. Duplex imaging reveals <50% stenosis of the proximal common femoral  artery and superficial femoral artery.  Flow is noted throughout the anterior tibial and posterior tibial artery, the distal peroneal was visualized with  monophasic flow noted, the proximal to mid segment was not visualized due to  depth of vessel. Left FRANK: 0.96. This is consistent with no significant PAD at rest.  Duplex imaging reveals <50% stenosis of the proximal superficial femoral  artery. Flow is noted throughout the anterior tibial and posterior tibial  artery, the distal peroneal was visualized with biphasic flow noted, the  proximal to mid segment was not visualized due to depth of vessel. Signature   ------------------------------------------------------------------  Electronically signed by Jag Barreto MD, [facility] (Interpreting  physician) on 07/10/2020 at 05:12 PM  ------------------------------------------------------------------  Patient Status:Routine. [address] [place] [address] - Vascular Lab. Technical Quality:Limited visualization. Velocities are measured in cm/s ; Diameters are measured in mm LE Duplex Measurements +-------------------++-----+-----+----------++----+-----+----------+ ! ! !Right! ! Left      !!    !     !          ! +-------------------++-----+-----+----------++----+-----+----------+ ! Location           ! !PSV  ! Ratio! Wave Desc. !!PSV ! Ratio! Wave Desc.! +-------------------++-----+-----+----------++----+-----+----------+ ! Prox Common Femoral!!391  ! ! Biphasic  !!140 ! ! Triphasic ! +-------------------++-----+-----+----------++----+-----+----------+ ! Dist Common Femoral!!247  ! ! Biphasic  !!141 ! ! Triphasic ! +-------------------++-----+-----+----------++----+-----+----------+ ! PFA                !!98.8 ! ! Monophasic!!179 ! ! Monophasic! +-------------------++-----+-----+----------++----+-----+----------+ ! Prox SFA           !!329  !1.33 ! Monophasic!!206 !1.46 ! Biphasic  ! +-------------------++-----+-----+----------++----+-----+----------+ ! Mid SFA            !!74.5 !0.23 ! Monophasic!!106 !0.51 !Biphasic  ! +-------------------++-----+-----+----------++----+-----+----------+ ! Dist SFA           !!93.3 !1.25 ! Monophasic! !116 !1.09 ! Biphasic  ! +-------------------++-----+-----+----------++----+-----+----------+ ! Prox Popliteal     !!68.3 !0.73 ! Monophasic!!102 !0.88 ! Biphasic  ! +-------------------++-----+-----+----------++----+-----+----------+ ! Dist Popliteal     !!56.5 !0.83 ! Monophasic!!52.1!0.51 ! Biphasic  ! +-------------------++-----+-----+----------++----+-----+----------+ ! Prox PTA           !!40.3 !0.71 ! Monophasic!!48.3!0.93 ! Biphasic  ! +-------------------++-----+-----+----------++----+-----+----------+ ! Mid PTA            !!27   !0.67 ! Monophasic!!49.4!1.02 ! Biphasic  ! +-------------------++-----+-----+----------++----+-----+----------+ ! Dist PTA           !!31.4 !1.16 ! Monophasic!!37.9!0.77 ! Biphasic  ! +-------------------++-----+-----+----------++----+-----+----------+ ! Prox ANIKET           !!34.9 !0.62 ! Biphasic  ! !56  !1.07 ! Biphasic  ! +-------------------++-----+-----+----------++----+-----+----------+ ! Mid ANIKET            !!27.3 !0.78 ! Monophasic!!50.4!0.9  ! Biphasic  ! +-------------------++-----+-----+----------++----+-----+----------+ ! Dist ANIKET           !!32.4 !1.19 ! Biphasic  !!49  !0.97 ! Biphasic  ! +-------------------++-----+-----+----------++----+-----+----------+ ! Dist Peroneal      !!17.6 !0.44 ! Monophasic! !21.4!0.44 ! Biphasic  ! +-------------------++-----+-----+----------++----+-----+----------+ Velocities are measured in cm/s ; Diameters are measured in mm Pressures +--------++--------+-----+----+--------+-----+ ! ! !Right   ! ! Left!        !     ! +--------++--------+-----+----+--------+-----+ ! Location! !Pressure! Ratio! !Pressure! Ratio! +--------++--------+-----+----+--------+-----+ ! Ankle PT!!110     !0.72 !    !146     ! 0.96 ! +--------++--------+-----+----+--------+-----+ ! Ankle AT!!108     !0.71 !    !138     !0.91 ! +--------++--------+-----+----+--------+-----+   - Brachial Pressure:Right: 148. Left:152.   - FRANK:Right: 0.72. Left: 0.96. Right Plethysmographic Results   - Right Brachial Pressure:148.   - Right FRANK:0.72. Left Plethysmographic Results   - Left Brachial Pressure:152. - Left FRANK:0.96. Past Surgical History:   Procedure Laterality Date    CHOLECYSTECTOMY      COLONOSCOPY      INSERTABLE CARDIAC MONITOR  09/2015    JOINT REPLACEMENT Left 20015    knee replacement    KNEE SURGERY      RT    LEG SURGERY      2 stents placed in right leg, 1 stent and balloon in left leg    ROTATOR CUFF REPAIR      TOTAL KNEE ARTHROPLASTY Left 1/12/16    Dr Ryan Arreaga     . Office Procedures:  Orders Placed This Encounter   Procedures    XR SHOULDER LEFT (MIN 2 VIEWS)     Standing Status:   Future     Number of Occurrences:   1     Standing Expiration Date:   7/22/2021       Previous Treatments: Ice, rest, physical therapy, X-ray, anti-inflammatories,    Differential Diagnoses: Impingement, AC joint osteoarthritis, Rotator cuff tear, Labral tear, Instability, loose body, long head of bicep injury, glenohumeral osteoarthritis, AC joint separation, SLAP tear, Posterior labral tear, Anterior Labral tear, neck pathology, brachioplexis injury, muscle injury, neck radiculopathy, bone tumor, fracture or stress fracture. Diagnosis massive rotator cuff tear      Plan (Medical Decision Making):    I discussed the diagnosis and the treatment options with Leanna Franco today. In Summary:  The various treatment options were outlined and discussed with Leanna Franco including:  Conservative care options: physical therapy, ice, medications, bracing, and activity modification. The indications for therapeutic injections. The indications for additional imaging/laboratory studies. The indications for (possible future) interventions.      After considering the various options discussed, Raheem Franco elected to

## 2020-07-28 RX ORDER — CLOPIDOGREL BISULFATE 75 MG/1
TABLET ORAL
Qty: 90 TABLET | Refills: 0 | Status: SHIPPED | OUTPATIENT
Start: 2020-07-28 | End: 2020-10-27

## 2020-07-31 ENCOUNTER — HOSPITAL ENCOUNTER (OUTPATIENT)
Dept: MRI IMAGING | Age: 70
Discharge: HOME | End: 2020-07-31
Payer: COMMERCIAL

## 2020-07-31 PROCEDURE — 73221 MRI JOINT UPR EXTREM W/O DYE: CPT

## 2020-08-05 ENCOUNTER — OFFICE VISIT (OUTPATIENT)
Dept: ORTHOPEDIC SURGERY | Age: 70
End: 2020-08-05
Payer: COMMERCIAL

## 2020-08-05 VITALS — TEMPERATURE: 97.3 F | BODY MASS INDEX: 32.85 KG/M2 | HEIGHT: 61 IN | WEIGHT: 174 LBS

## 2020-08-05 PROBLEM — M75.122 NONTRAUMATIC COMPLETE TEAR OF LEFT ROTATOR CUFF: Status: ACTIVE | Noted: 2020-08-05

## 2020-08-05 PROCEDURE — 99214 OFFICE O/P EST MOD 30 MIN: CPT | Performed by: ORTHOPAEDIC SURGERY

## 2020-08-05 NOTE — PROGRESS NOTES
Present Illness:  Ernesto Cash is a 65 y.o. female recheck evaluation left shoulder    Chief complaint presents in the office today: Shoulder    Location left shoulder  Severity severe/moderate  Duration several months now  Associated sign/symptoms pain, weakness, loss of motion    Medical History  Patient's medications, allergies, past medical, surgical, social and family histories were reviewed and updated as appropriate. Past Medical History:   Diagnosis Date    Lumbago     Osteoarthrosis, unspecified whether generalized or localized, unspecified site     Other abnormal blood chemistry     Pure hypercholesterolemia     Risk for falls 16    S/p Left TKR    Unspecified essential hypertension      Family History   Problem Relation Age of Onset    Cancer Mother     Heart Disease Father     Cancer Brother     Emphysema Brother     Stroke Brother     Diabetes Brother     Diabetes Sister     Heart Disease Brother     Hypertension Brother     Hypertension Sister     High Cholesterol Neg Hx     High Blood Pressure Neg Hx      Social History     Socioeconomic History    Marital status:       Spouse name: None    Number of children: None    Years of education: None    Highest education level: None   Occupational History    None   Social Needs    Financial resource strain: None    Food insecurity     Worry: None     Inability: None    Transportation needs     Medical: None     Non-medical: None   Tobacco Use    Smoking status: Former Smoker     Packs/day: 0.50     Years: 40.00     Pack years: 20.00     Types: Cigarettes     Last attempt to quit: 2015     Years since quittin.0    Smokeless tobacco: Never Used    Tobacco comment: <1 pack of cigarettes per day   Substance and Sexual Activity    Alcohol use: No     Alcohol/week: 0.0 standard drinks    Drug use: No    Sexual activity: None   Lifestyle    Physical activity     Days per week: None     Minutes per session: None    Stress: None   Relationships    Social connections     Talks on phone: None     Gets together: None     Attends Evangel[employer] service: None     Active member of club or organization: None     Attends meetings of clubs or organizations: None     Relationship status: None    Intimate partner violence     Fear of current or ex partner: None     Emotionally abused: None     Physically abused: None     Forced sexual activity: None   Other Topics Concern    None   Social History Narrative    None     Current Outpatient Medications   Medication Sig Dispense Refill    clopidogrel (PLAVIX) 75 MG tablet TAKE 1 TABLET BY MOUTH DAILY 90 tablet 0    atorvastatin (LIPITOR) 10 MG tablet TAKE 1 TABLET BY MOUTH DAILY 90 tablet 0    losartan-hydrochlorothiazide (HYZAAR) 100-25 MG per tablet Take 1 tablet by mouth daily 90 tablet 0    zoster recombinant adjuvanted vaccine (SHINGRIX) 50 MCG/0.5ML SUSR injection Inject 0.5 mLs into the muscle See Admin Instructions 1 dose now and repeat in 2-6 months 0.5 mL 0     No current facility-administered medications for this visit. Allergies   Allergen Reactions    Ace Inhibitors Other (See Comments)     Cough     Chantix [Varenicline]      depression    Keflex [Cephalexin] Diarrhea    Morphine      Causes mental impairment    Niaspan [Niacin Er] Other (See Comments)     Skin irritation    Pletal [Cilostazol]      Headache and diarrhea    Pravastatin        REVIEW OF SYSTEMS:   No rash  No numbness  No tingling  No fever  No depression      Pertinent items are noted in HPI  Review of systems reviewed from Patient History Form dated on 7/22/2020 and available in the patient's chart under the Media tab. Examination:    General Exam:    Vitals: Temperature 97.3 °F (36.3 °C), height 5' 1\" (1.549 m), weight 174 lb (78.9 kg), not currently breastfeeding.   BMI Readings from Last 3 Encounters:   08/05/20 32.88 kg/m²   07/22/20 32.34 kg/m²   06/02/20 32.34 kg/m²     Constitutional: Patient is adequately groomed with no evidence of malnutrition  Mental Status: The patient is oriented to time, place and person. The patient's mood and affect are appropriate. Lymphatic: The lymphatic examination bilaterally reveals all areas to be without enlargement or induration. Vascular: Examination reveals no swelling or calf tenderness. Peripheral pulses are palpable and 2+. Neurological: The patient has good coordination. There is no weakness or sensory deficit. Skin:    Head/Neck: inspection reveals no rashes, ulcerations or lesions. Trunk: inspection reveals no rashes, ulcerations or lesions. Right Upper Extremity: inspection reveals no rashes, ulcerations or lesions. Left Upper Extremity: inspection reveals no rashes, ulcerations or lesions. PHYSICAL EXAM:      Shoulder Examination  Inspection:  No obvious deformity, no erythema, no abrasions or lacerations, no obvious muscle atrophy. Palpation:  Lateral deltoid moderate pain to palpation  AC joint mild pain to palpation  Moderate pain Anterior to palpation  Mild pain Posterior to palpation  Moderate trapezial pain to palpation  Range of Motion:  Abduction --150 degrees  Flexion-- 180 degrees  Extension-- between 45-60 degrees  Latera/external rotation --close to 90 degrees  Medial/ internal rotation -- between 70-90 degrees    Strength: Left shoulder strength:   internal rotation against resistance is 5/5  external rotation against resistance is 3/5  and supraspinatus isolation against resistance is 3/5, Shoulder shrug is 5 over 5 , cervical spine strength is excellent, flexion extension at the elbow is 5 over 5, wrist and hand strength is equal bilaterally with supination pronation and flexion and extension  no winging no muscle atrophy. Special Tests:  Palpation demonstrates no swelling no effusion moderate to severe pain.   There is near full active and passive range of motion. Strength is he can painful with internal rotation against resistance external rotation against resistance supraspinatus isolation against resistance. Shoulder shrug strength is 5 over 5 equal bilaterally. Radial ulnar and median nerve function is intact. Capillary refill is brisk. Sensation is intact from neck down to the fingers. Elbow motion finger and wrist motion is full equal bilaterally. Deep tendon reflexes of the Brachial radialis, biceps, triceps are all +2/4 equal bilaterally. Cervical spine range of motion is full without pain negative Spurling's test.  Load-and-shift test is negative. Crank test is negative. Apprehension and relocation are negative. Anterior and posterior glide are equal bilaterally. Negative sulcus sign. No signs of any significant multidirectional instability. There is no scapular winging. There is no muscle atrophy of the latissimus dorsi, the deltoid, the periscapular musculature, the trapezius musculature or the pectoralis musculature. Positive Neer's test, positive Wilder test, positive pain with crossarm elevation. Gait: normal gait     Reflex: Deep tendon reflexes of the biceps, triceps, brachioradialis +2/4 equal bilaterally. Lower extremity reflexes:  +2/4 and equal bilaterally for patella and Achilles. Contralateral Shoulder exam: Palpation demonstrates no swelling no effusion moderate pain. There is extremely limited active and passive range of motion bilaterally. Strength is weak and painful with internal rotation against resistance external rotation against resistance supraspinatus isolation against resistance. Shoulder shrug strength is 5 over 5 equal bilaterally. Radial ulnar and median nerve function is intact. Capillary refill is brisk. Elbow motion finger and wrist motion is full equal bilaterally. Deep tendon reflexes of the Brachial radialis, biceps, triceps are all +2/4 equal bilaterally.   Cervical spine range of motion is full without pain negative Spurling's test.  Load-and-shift test is negative. Crank test is negative. Apprehension and relocation are negative. Anterior and posterior glide are equal bilaterally. Negative sulcus sign. No signs of any significant multidirectional instability. There is no scapular winging. There is no muscle atrophy of the latissimus dorsi, the deltoid, the periscapular musculature, the trapezius musculature or the pectoralis musculature. Positive Neer's test, positive Wilder test, no pain with crossarm elevation. Abduction --150 degrees  Flexion-- 180 degrees  Extension-- between 45-60 degrees  Latera/external rotation --close to 90 degrees  Medial/ internal rotation -- between 70-90 degree    CERVICAL SPINE  EXAMINATION:  · Inspection: Local inspection shows no step-off or bruising. Cervical alignment is normal. No instability is noted. · Palpation and Percussion: No evidence of tenderness at the midline. Paraspinal tenderness is not present. There is no paraspinal spasm. · Range of Motion:  pain-free ROM   · Strength: 5/5 bilateral upper extremities. · Special Tests:   Spurling's and Child's are negative bilaterally. Wilder and Impingement tests are negative bilaterally. · Skin:There are no rashes, ulcerations or lesions. · Reflexes: Bilaterally triceps, biceps and brachioradialis are 2+. Clonus absent bilaterally at the feet. No pathological reflexes are noted.   · Gait & station:  normal, patient ambulates without assistance and no ataxia      Cranial nerve exam:    1- smell-- patient states no Olfactory problem  2- visual acuity is intact  3- moves eyes, and pupils are reactive  4- extra-ocular muscles are intact  5- facial sensation is intact no muscle atrophy  6- extra ocular muscles are intact  7- mouth moist and facial expressions are intact  8- good hearing and no difficulty with recognition  9- patient has no difficulty swallowing  10- no difficulty breathing and no Gastrointestinal problems good cough   11- moves head with all motion and no swallowing problems  12- normal speech and tongue protrudes midline    Additional Examinations:  Right Lower Extremity: Examination of the right lower extremity does not show any tenderness, deformity or injury. Range of motion is unremarkable. There is no gross instability. There are no rashes, ulcerations or lesions. Strength and tone are normal.  Left Lower Extremity: Examination of the left lower extremity does not show any tenderness, deformity or injury. Range of motion is unremarkable. There is no gross instability. There are no rashes, ulcerations or lesions. Strength and tone are normal.        IMPRESSION:    Diagnostic testing:  X-rays reviewed in office by myself, I personally and independently reviewed the films in the office today : I reviewed multiple X-rays today of the left shoulder:  Anterior posterior, lateral, axillary:  Show no fracture, no dislocation, no signs of any masses or tumors, no significant glenohumeral arthritis, no significant A.C. Joint arthritis, good joint space maintenance,    Impression no significant bony abnormality  MRI: MRI demonstrates a large supraspinatus rotator cuff tear with retraction  Labs:None at this time. Xr Shoulder Left (min 2 Views)    Result Date: 7/22/2020  Radiology exam is complete. No Radiologist dictation. Please follow up with ordering provider. Mri Shoulder Left Wo Contrast    Result Date: 7/31/2020  EXAMINATION: MRI OF THE LEFT SHOULDER WITHOUT CONTRAST   7/31/2020 8:58 am TECHNIQUE: Multiplanar multisequence MRI of the left shoulder was performed without the administration of intravenous contrast. COMPARISON: None.  HISTORY: ORDERING SYSTEM PROVIDED HISTORY: Acute pain of left shoulder TECHNOLOGIST PROVIDED HISTORY: [facility] 7/31/20 at 8:30am Reason for exam:->r/o supraspinatious tear Reason for Exam: Pain in left shoulder since May 2020, no known inj, no prev surg FINDINGS: ROTATOR CUFF: High-grade partial-thickness tear of the supraspinatus tendon with full-thickness perforation anteriorly. Tendinosis of the infraspinatus tendon. Intact teres minor tendon. Tendinosis of the subscapularis tendon. Atrophy of supraspinatus and infraspinatus muscles. Atrophy of the deltoid muscle. BICEPS TENDON: The biceps tendon is normal. LABRUM: Labral degeneration. No paralabral cyst. GLENOHUMERAL JOINT: Glenohumeral joint degenerative changes with osteophyte formation and chondral loss. Cortical irregularity of the humeral head. No evidence of adhesive capsulitis. AC JOINT AND ACROMIOCLAVICULAR ARCH: AC joint degenerative change. No os acromiale. BONE MARROW: The signal characteristics of the bone marrow are normal.  No evidence of fracture or aggressive osseous lesion. OUTLET SPACES: No space-occupying process or mass. Full-thickness tear of the anterior supraspinatus tendon with background partial-thickness tear and tendinosis of supraspinatus tendon. Tendinosis of infraspinatus tendon and subscapularis tendon. Degenerative changes of the shoulder with degenerative type labral tearing.      Vl Dup Lower Extremity Arteries Bilateral    Result Date: 7/10/2020  Vascular Lower Extremities Arterial Duplex and Lower Arterial Plethysmography Procedure  Demographics   Patient Name      Garett Nichols   Date of Study     07/10/2020         Gender              Female   Patient Number    [id number]         Date of Birth       1950   Visit Number      [id number]          Age                 65 year(s)   Accession Number  [id number]          Room Number   Corporate ID      [id number]           Sonographer         Eddy Van RVS   Ordering          Galina Babin MD Interpreting        [facility]  Physician                            Physician           Jayce Killian MD, [facility]  Procedure Type of Study:   Extremities Arteries:Lower Extremities Arterial Duplex, VL LOWER EXTREMITY  ARTERIES DUPLEX BILATERAL, Lower Arterial Plethysmography, VL ANKLE /  BRACHIAL INDICES EXTREMITY COMPLETE. Vascular Sonographer Report  Additional Indications: Atherosclerosis of native arteries of extremities with intermittent claudication. Impressions Right Impression Right FRANK: 0.75. This is consistent with moderate arterial insufficiency at rest. Duplex imaging reveals <50% stenosis of the proximal common femoral artery and superficial femoral artery. Flow is noted throughout the anterior tibial and posterior tibial artery, the distal peroneal was visualized with monophasic flow noted, the proximal to mid segment was not visualized due to depth of vessel. Left Impression Left FRANK: 0.96. This is consistent with no significant PAD at rest. Duplex imaging reveals <50% stenosis of the proximal superficial femoral artery. Flow is noted throughout the anterior tibial and posterior tibial artery, the distal peroneal was visualized with biphasic flow noted, the proximal to mid segment was not visualized due to depth of vessel. Conclusions   Summary   Right FRANK: 0.75. This is consistent with moderate arterial insufficiency at  rest. Duplex imaging reveals <50% stenosis of the proximal common femoral  artery and superficial femoral artery. Flow is noted throughout the anterior  tibial and posterior tibial artery, the distal peroneal was visualized with  monophasic flow noted, the proximal to mid segment was not visualized due to  depth of vessel. Left FRANK: 0.96. This is consistent with no significant PAD at rest.  Duplex imaging reveals <50% stenosis of the proximal superficial femoral  artery.  Flow is noted throughout the anterior tibial and posterior tibial  artery, the distal peroneal was visualized with biphasic flow noted, the  proximal to mid segment was not visualized due to depth of vessel. Signature   ------------------------------------------------------------------  Electronically signed by Oscar Mccabe MD, [facility] (Interpreting  physician) on 07/10/2020 at 05:12 PM  ------------------------------------------------------------------  Patient Status:Routine. [address] [place] [address] - Vascular Lab. Technical Quality:Limited visualization. Velocities are measured in cm/s ; Diameters are measured in mm LE Duplex Measurements +-------------------++-----+-----+----------++----+-----+----------+ ! ! !Right! ! Left      !!    !     !          ! +-------------------++-----+-----+----------++----+-----+----------+ ! Location           ! !PSV  ! Ratio! Wave Desc. !!PSV ! Ratio! Wave Desc.! +-------------------++-----+-----+----------++----+-----+----------+ ! Prox Common Femoral!!391  ! ! Biphasic  !!140 ! ! Triphasic ! +-------------------++-----+-----+----------++----+-----+----------+ ! Dist Common Femoral!!247  ! ! Biphasic  !!141 ! ! Triphasic ! +-------------------++-----+-----+----------++----+-----+----------+ ! PFA                !!98.8 ! ! Monophasic!!179 ! ! Monophasic! +-------------------++-----+-----+----------++----+-----+----------+ ! Prox SFA           !!329  !1.33 ! Monophasic!!206 !1.46 ! Biphasic  ! +-------------------++-----+-----+----------++----+-----+----------+ ! Mid SFA            !!74.5 !0.23 ! Monophasic!!106 !0.51 ! Biphasic  ! +-------------------++-----+-----+----------++----+-----+----------+ ! Dist SFA           !!93.3 !1.25 ! Monophasic! !116 !1.09 ! Biphasic  ! +-------------------++-----+-----+----------++----+-----+----------+ ! Prox Popliteal     !!68.3 !0.73 ! Monophasic!!102 !0.88 ! Biphasic  ! +-------------------++-----+-----+----------++----+-----+----------+ ! Dist Popliteal     !!56.5 !0.83 ! Monophasic!!52.1!0.51 ! Biphasic  ! +-------------------++-----+-----+----------++----+-----+----------+ ! Prox PTA           !!40.3 !0.71 !Monophasic!!48.3!0.93 ! Biphasic  ! +-------------------++-----+-----+----------++----+-----+----------+ ! Mid PTA            !!27   !0.67 ! Monophasic!!49.4!1.02 ! Biphasic  ! +-------------------++-----+-----+----------++----+-----+----------+ ! Dist PTA           !!31.4 !1.16 ! Monophasic!!37.9!0.77 ! Biphasic  ! +-------------------++-----+-----+----------++----+-----+----------+ ! Prox ANIKET           !!34.9 !0.62 ! Biphasic  ! !56  !1.07 ! Biphasic  ! +-------------------++-----+-----+----------++----+-----+----------+ ! Mid ANIKET            !!27.3 !0.78 ! Monophasic!!50.4!0.9  ! Biphasic  ! +-------------------++-----+-----+----------++----+-----+----------+ ! Dist ANIKET           !!32.4 !1.19 ! Biphasic  !!49  !0.97 ! Biphasic  ! +-------------------++-----+-----+----------++----+-----+----------+ ! Dist Peroneal      !!17.6 !0.44 ! Monophasic! !21.4!0.44 ! Biphasic  ! +-------------------++-----+-----+----------++----+-----+----------+ Velocities are measured in cm/s ; Diameters are measured in mm Pressures +--------++--------+-----+----+--------+-----+ ! ! !Right   ! ! Left!        !     ! +--------++--------+-----+----+--------+-----+ ! Location! !Pressure! Ratio! !Pressure! Ratio! +--------++--------+-----+----+--------+-----+ ! Ankle PT!!110     !0.72 !    !146     ! 0.96 ! +--------++--------+-----+----+--------+-----+ ! Ankle AT!!108     !0.71 !    !138     !0.91 ! +--------++--------+-----+----+--------+-----+   - Brachial Pressure:Right: 148. Left:152.   - FRANK:Right: 0.72. Left: 0.96. Right Plethysmographic Results   - Right Brachial Pressure:148.   - Right FRANK:0.72. Left Plethysmographic Results   - Left Brachial Pressure:152. - Left FRANK:0.96.       Past Surgical History:   Procedure Laterality Date    CHOLECYSTECTOMY      COLONOSCOPY      INSERTABLE CARDIAC MONITOR  09/2015    JOINT REPLACEMENT Left 20015    knee replacement    KNEE SURGERY      RT    LEG SURGERY      2 stents placed in right leg, 1 stent and balloon in left leg    ROTATOR CUFF REPAIR      TOTAL KNEE ARTHROPLASTY Left 16    Dr Suzan Singh     . Office Procedures:  No orders of the defined types were placed in this encounter. Previous Treatments: X-ray, MRI, physical therapy, injections, anti-inflammatories  Full-thickness rotator cuff tear  , X-ray, anti-inflammatories,    Differential Diagnoses: Impingement, AC joint osteoarthritis, Rotator cuff tear, Labral tear, Instability, loose body, long head of bicep injury, glenohumeral osteoarthritis, AC joint separation, SLAP tear, Posterior labral tear, Anterior Labral tear, neck pathology, brachioplexis injury, muscle injury, neck radiculopathy, bone tumor, fracture or stress fracture. Diagnosis full-thickness rotator cuff tear with subacromial impingement distal clavicle arthritis      Plan (Medical Decision Making):    I discussed the diagnosis and the treatment options with Jake Franco today. In Summary:  The various treatment options were outlined and discussed with Jake Franco including:  Conservative care options: physical therapy, ice, medications, bracing, and activity modification. The indications for therapeutic injections. The indications for additional imaging/laboratory studies. The indications for (possible future) interventions. After considering the various options discussed, Violetjewel Burke Franco elected to pursue a course of treatment that includes the followin. Medications: No further recommendations for new medications. 2. PT:  Prescribed home exercise program.    3. Further studies: No further studies. 4. Interventional: We discussed pursuing with surgical intervention of I spent 15+ minutes, face to face, with the patient discussing and answering questions regarding the risks, benefits, and complications of shoulder arthroscopy surgery in detail.  We talked about the arthroscopic nature of the procedure, the portals utilized and the patient was directed to [employer].    6.  Follow up:  after surgery    Tahira Franco was instructed to call the office if her symptoms worsen or if new symptoms appear prior to the next scheduled visit. She is specifically instructed to contact the office between now & her scheduled appointment if she has concerns related to her condition or if she needs assistance in scheduling the above tests. She is   welcome to call for an appointment sooner if she has any additional concerns or questions. Bret Whalen D.O. [facility]  [facility] Trained  Board Certified  [employer] Team Physician      Disclaimer: \"This note was dictated with voice recognition software. Though review and correction are routine, we apologize for any errors. \"

## 2020-08-12 ENCOUNTER — TELEPHONE (OUTPATIENT)
Dept: ORTHOPEDIC SURGERY | Age: 70
End: 2020-08-12

## 2020-08-13 RX ORDER — OXYCODONE HYDROCHLORIDE 10 MG/1
10 TABLET ORAL EVERY 8 HOURS PRN
Qty: 21 TABLET | Refills: 0 | Status: SHIPPED | OUTPATIENT
Start: 2020-08-21 | End: 2020-08-28

## 2020-08-13 RX ORDER — MELOXICAM 15 MG/1
15 TABLET ORAL DAILY
Qty: 90 TABLET | Refills: 3 | Status: SHIPPED | OUTPATIENT
Start: 2020-08-21 | End: 2021-05-04

## 2020-08-17 ENCOUNTER — TELEPHONE (OUTPATIENT)
Dept: ORTHOPEDIC SURGERY | Age: 70
End: 2020-08-17

## 2020-08-17 NOTE — PROGRESS NOTES
Name_______________________________________Printed:____________________  Date and time of [id number]_______________________Fvjmjfx Time:_0630_MASC______________   1. The instructions given regarding when and if a patient needs to stop oral intake prior to surgery varies. Follow the specific instructions you were given                  _X__Nothing to eat or to drink after Midnight the night before.                             ____Endoscopy patient follow your DRS instructions-generally you will be doing a part of the prep after Midnight                   ____Carbo loading or ERAS instructions will be given to select patients-if you have been given those instructions -please do the following                           The evening before your surgery after dinner before midnight drink 40 ounces of gatorade. If you are diabetic use sugar free. The morning of surgery drink 40 ounces of water. This needs to be finished 3 hours prior to your surgery start time. 2. Take the following pills with a small sip of water on the morning of surgery___NONE________________________________________________                  Do not take blood pressure medications ending in pril or sartan the dixie prior to surgery or the morning of surgery_   3. Aspirin, Ibuprofen, Advil, Naproxen, Vitamin E and other Anti-inflammatory products and supplements should be stopped for 5 -7days before surgery or as directed by your physician. 4. Check with your Doctor regarding stopping Plavix, Coumadin,Eliquis, Lovenox,Effient,Pradaxa,Xarelto, Fragmin or other blood thinners and follow their instructions. 5. Do not smoke, and do not drink any alcoholic beverages 24 hours prior to surgery. This includes NA Beer. Refrain from the usage of any recreational drugs. 6. You may brush your teeth and gargle the morning of surgery. DO NOT SWALLOW WATER   7.  You MUST make arrangements for a responsible adult to stay on site while you are here and take you home after your surgery. You will not be allowed to leave alone or drive yourself home. It is strongly suggested someone stay with you the first 24 hrs. Your surgery will be cancelled if you do not have a ride home. 8. A parent/legal guardian must accompany a child scheduled for surgery and plan to stay at the hospital until the child is discharged. Please do not bring other children with you. 9. Please wear simple, loose fitting clothing to the hospital.  Verlon Diver not bring valuables (money, credit cards, checkbooks, etc.) Do not wear any makeup (including no eye makeup) or nail polish on your fingers or toes. 10. DO NOT wear any jewelry or piercings on day of surgery. All body piercing jewelry must be removed. 11. If you have ___dentures, they will be removed before going to the OR; we will provide you a container. If you wear ___contact lenses or ___glasses, they will be removed; please bring a case for them. 12. Please see your family doctor/pediatrician for a history & physical and/or concerning medications. Bring any test results/reports from your physician's office. PCP__________________Phone___________H&P Appt. Date________             13 If you  have a Living Will and Durable Power of  for Healthcare, please bring in a copy. 15. Notify your Surgeon if you develop any illness between now and surgery  time, cough, cold, fever, sore throat, nausea, vomiting, etc.  Please notify your surgeon if you experience dizziness, shortness of breath or blurred vision between now & the time of your surgery             15. DO NOT shave your operative site 96 hours prior to surgery. For face & neck surgery, men may use an electric razor 48 hours prior to surgery. 16. Shower the night before or morning of surgery using an antibacterial soap or as you have been instructed.              17. To provide excellent care visitors will be limited to one in the room at any given time. 18.  Please bring picture ID and insurance card. 19.  Visit our web site for additional information:  [email or web address]              20.During flu season no children under the age of 15 are permitted in the hospital for the safety of all patients. 21. If you take a long acting insulin in the evening only  take half of your usual  dose the night  before your procedure              22. If you use a c-pap please bring DOS if staying overnight,             23.For your convenience [facility] has a pharmacy on site to fill your prescriptions. 24. If you use oxygen and have a portable tank please bring it  with you the DOS             25. Bring a complete list of all your medications with name and dose include any supplements. 26. Other_Patient instructed to get their COVID-19 test done as directed by their doctor (5-7 days prior to procedure)  or patient states will get on __________. Patient was notified that they need to have an appointment,number to call provided. The day the COVID test is done is considered day one. Instructed to self quarantine after test until DOS. There is a one visitor policy at [facility] for all surgeries and endoscopies. Whether the visitor can stay or will be asked to wait in the car will depend on the current policy and if social distancing can be maintained. The policy is subject to change at any time. Please make sure the visitor has a cell phone that is on,charged and able to accept calls, as this may be the way that the staff communicates with them. Pain management is NO VISITOR policyThe patients ride is expected to remain in the car with a cell phone for communication. If the ride is leaving the hospital grounds please make sure they are back in time for pickup. Have the patient inform the staff on arrival what their rides plans are while the patient is in the facility. At the [facility] there is one visitor allowed. Please note that the visitor policy is subject to change._________________________________________   *Please call pre admission testing if you any further questions   Flip Shah [address]    [facility] [place]  [phone]   [address] [place] [address]       All above information reviewed with patient in person or by phone. Patient verbalizes understanding. All questions and concerns addressed.                                                                                                  Patient/Rep__PT__________________                                                                                                                                    PRE OP INSTRUCTIONS

## 2020-08-17 NOTE — PROGRESS NOTES
PT HAS ARTERIAL STENTS PLACED BY DR Caroline Canada. I ASKED PT IF DR ESPITIA' OFFICE WANTED HER TO STOP PLAVIX. SHE STATED \"THEY DIDN'T SAY ANYTHING TO ME ABOUT IT. \"  PT CONTACTED DR DSOUZA TO SEE HOW LONG SHE COULD STOP PLAVIX IF IT WAS REQUESTED. DR Caroline Canada OFFICE SAID SHE COULD STOP FOR 4 DAYS. I NOTIFIED LETICIA AT DR ESPITIA' OFFICE RE SAME.

## 2020-08-18 LAB
HB: SOURCE: NORMAL
INDICATION FOR TESTING: NORMAL
SARS-COV-2: NEGATIVE

## 2020-08-20 ENCOUNTER — HOSPITAL ENCOUNTER (OUTPATIENT)
Age: 70
Setting detail: OUTPATIENT SURGERY
Discharge: HOME | End: 2020-08-21
Attending: ORTHOPAEDIC SURGERY | Admitting: ORTHOPAEDIC SURGERY
Payer: COMMERCIAL

## 2020-08-21 ENCOUNTER — ANESTHESIA (OUTPATIENT)
Dept: OPERATING ROOM | Age: 70
End: 2020-08-21
Payer: COMMERCIAL

## 2020-08-21 ENCOUNTER — ANESTHESIA EVENT (OUTPATIENT)
Dept: OPERATING ROOM | Age: 70
End: 2020-08-21
Payer: COMMERCIAL

## 2020-08-21 VITALS
TEMPERATURE: 97.1 F | HEIGHT: 61 IN | RESPIRATION RATE: 20 BRPM | DIASTOLIC BLOOD PRESSURE: 69 MMHG | OXYGEN SATURATION: 92 % | BODY MASS INDEX: 35.87 KG/M2 | SYSTOLIC BLOOD PRESSURE: 163 MMHG | WEIGHT: 190 LBS | HEART RATE: 76 BPM

## 2020-08-21 VITALS
DIASTOLIC BLOOD PRESSURE: 67 MMHG | OXYGEN SATURATION: 93 % | SYSTOLIC BLOOD PRESSURE: 167 MMHG | TEMPERATURE: 94.8 F | RESPIRATION RATE: 2 BRPM

## 2020-08-21 LAB
ANION GAP SERPL CALCULATED.3IONS-SCNC: 12 MMOL/L (ref 3–16)
BUN BLDV-MCNC: 31 MG/DL (ref 7–20)
CALCIUM SERPL-MCNC: 9.5 MG/DL (ref 8.3–10.6)
CHLORIDE BLD-SCNC: 106 MMOL/L (ref 99–110)
CO2: 22 MMOL/L (ref 21–32)
CREAT SERPL-MCNC: 1.4 MG/DL (ref 0.6–1.2)
GFR AFRICAN AMERICAN: 45
GFR NON-AFRICAN AMERICAN: 37
GLUCOSE BLD-MCNC: 98 MG/DL (ref 70–99)
HCT VFR BLD CALC: 46 % (ref 36–48)
HEMOGLOBIN: 15.3 G/DL (ref 12–16)
MCH RBC QN AUTO: 31.2 PG (ref 26–34)
MCHC RBC AUTO-ENTMCNC: 33.3 G/DL (ref 31–36)
MCV RBC AUTO: 93.8 FL (ref 80–100)
PDW BLD-RTO: 14.2 % (ref 12.4–15.4)
PLATELET # BLD: 201 K/UL (ref 135–450)
PMV BLD AUTO: 8.7 FL (ref 5–10.5)
POTASSIUM SERPL-SCNC: 3.5 MMOL/L (ref 3.5–5.1)
RBC # BLD: 4.91 M/UL (ref 4–5.2)
SODIUM BLD-SCNC: 140 MMOL/L (ref 136–145)
WBC # BLD: 7.6 K/UL (ref 4–11)

## 2020-08-21 PROCEDURE — 6360000002 HC RX W HCPCS: Performed by: ORTHOPAEDIC SURGERY

## 2020-08-21 PROCEDURE — 7100000011 HC PHASE II RECOVERY - ADDTL 15 MIN: Performed by: ORTHOPAEDIC SURGERY

## 2020-08-21 PROCEDURE — 2709999900 HC NON-CHARGEABLE SUPPLY: Performed by: ORTHOPAEDIC SURGERY

## 2020-08-21 PROCEDURE — C1762 CONN TISS, HUMAN(INC FASCIA): HCPCS | Performed by: ORTHOPAEDIC SURGERY

## 2020-08-21 PROCEDURE — 3700000000 HC ANESTHESIA ATTENDED CARE: Performed by: ORTHOPAEDIC SURGERY

## 2020-08-21 PROCEDURE — 7100000001 HC PACU RECOVERY - ADDTL 15 MIN: Performed by: ORTHOPAEDIC SURGERY

## 2020-08-21 PROCEDURE — 2720000010 HC SURG SUPPLY STERILE: Performed by: ORTHOPAEDIC SURGERY

## 2020-08-21 PROCEDURE — 80048 BASIC METABOLIC PNL TOTAL CA: CPT

## 2020-08-21 PROCEDURE — 3600000004 HC SURGERY LEVEL 4 BASE: Performed by: ORTHOPAEDIC SURGERY

## 2020-08-21 PROCEDURE — 6360000002 HC RX W HCPCS: Performed by: ANESTHESIOLOGY

## 2020-08-21 PROCEDURE — 2580000003 HC RX 258: Performed by: ORTHOPAEDIC SURGERY

## 2020-08-21 PROCEDURE — 85027 COMPLETE CBC AUTOMATED: CPT

## 2020-08-21 PROCEDURE — 2500000003 HC RX 250 WO HCPCS: Performed by: ORTHOPAEDIC SURGERY

## 2020-08-21 PROCEDURE — 6360000002 HC RX W HCPCS: Performed by: NURSE ANESTHETIST, CERTIFIED REGISTERED

## 2020-08-21 PROCEDURE — 2500000003 HC RX 250 WO HCPCS: Performed by: NURSE ANESTHETIST, CERTIFIED REGISTERED

## 2020-08-21 PROCEDURE — 2580000003 HC RX 258

## 2020-08-21 PROCEDURE — 3700000001 HC ADD 15 MINUTES (ANESTHESIA): Performed by: ORTHOPAEDIC SURGERY

## 2020-08-21 PROCEDURE — 7100000010 HC PHASE II RECOVERY - FIRST 15 MIN: Performed by: ORTHOPAEDIC SURGERY

## 2020-08-21 PROCEDURE — 2580000003 HC RX 258: Performed by: ANESTHESIOLOGY

## 2020-08-21 PROCEDURE — 7100000000 HC PACU RECOVERY - FIRST 15 MIN: Performed by: ORTHOPAEDIC SURGERY

## 2020-08-21 PROCEDURE — C1713 ANCHOR/SCREW BN/BN,TIS/BN: HCPCS | Performed by: ORTHOPAEDIC SURGERY

## 2020-08-21 PROCEDURE — 3600000014 HC SURGERY LEVEL 4 ADDTL 15MIN: Performed by: ORTHOPAEDIC SURGERY

## 2020-08-21 DEVICE — HEALICOIL RG SA 4.75MM W/2 UB-BL                                    CBRD BL
Type: IMPLANTABLE DEVICE | Site: SHOULDER | Status: FUNCTIONAL
Brand: HEALICOIL

## 2020-08-21 DEVICE — HEALICOIL SA PK 5.5MM W/2 UB-BL                                    CBRD BL
Type: IMPLANTABLE DEVICE | Site: SHOULDER | Status: FUNCTIONAL
Brand: HEALICOIL / ULTRABRAID

## 2020-08-21 DEVICE — IMPLANTABLE DEVICE
Type: IMPLANTABLE DEVICE | Site: SHOULDER | Status: FUNCTIONAL
Brand: BIOINDUCTIVE IMPLANT WITH ARTHROSCOPIC DELIVERY SYSTEM - MEDIUM

## 2020-08-21 DEVICE — BONE ANCHORS 3 WITH ARTHROSCOPIC DELIVERY SYSTEM ADVANCED
Type: IMPLANTABLE DEVICE | Site: SHOULDER | Status: FUNCTIONAL
Brand: BONE ANCHORS WITH ARTHROSCOPIC DELIVERY SYSTEM - ADVANCED

## 2020-08-21 DEVICE — Z INACTIVE USE 2600835 ANCHOR TEND 8 FOR REGENETEN BIOINDUCTIVE IMPL SYS: Type: IMPLANTABLE DEVICE | Site: SHOULDER | Status: FUNCTIONAL

## 2020-08-21 DEVICE — MULTIFIX S-ULTRA 5.5MM KNOTLESS ANCHOR
Type: IMPLANTABLE DEVICE | Site: SHOULDER | Status: FUNCTIONAL
Brand: MULTIFIX

## 2020-08-21 RX ORDER — SODIUM CHLORIDE 9 MG/ML
INJECTION, SOLUTION INTRAVENOUS CONTINUOUS
Status: DISCONTINUED | OUTPATIENT
Start: 2020-08-21 | End: 2020-08-21 | Stop reason: HOSPADM

## 2020-08-21 RX ORDER — FENTANYL CITRATE 50 UG/ML
50 INJECTION, SOLUTION INTRAMUSCULAR; INTRAVENOUS ONCE
Status: DISCONTINUED | OUTPATIENT
Start: 2020-08-21 | End: 2020-08-21

## 2020-08-21 RX ORDER — CLINDAMYCIN PHOSPHATE 900 MG/50ML
900 INJECTION INTRAVENOUS ONCE
Status: COMPLETED | OUTPATIENT
Start: 2020-08-21 | End: 2020-08-21

## 2020-08-21 RX ORDER — DEXAMETHASONE SODIUM PHOSPHATE 4 MG/ML
INJECTION, SOLUTION INTRA-ARTICULAR; INTRALESIONAL; INTRAMUSCULAR; INTRAVENOUS; SOFT TISSUE PRN
Status: DISCONTINUED | OUTPATIENT
Start: 2020-08-21 | End: 2020-08-21 | Stop reason: SDUPTHER

## 2020-08-21 RX ORDER — PROPOFOL 10 MG/ML
INJECTION, EMULSION INTRAVENOUS PRN
Status: DISCONTINUED | OUTPATIENT
Start: 2020-08-21 | End: 2020-08-21 | Stop reason: SDUPTHER

## 2020-08-21 RX ORDER — ACETAMINOPHEN 325 MG/1
650 TABLET ORAL EVERY 6 HOURS
Status: DISCONTINUED | OUTPATIENT
Start: 2020-08-21 | End: 2020-08-21 | Stop reason: HOSPADM

## 2020-08-21 RX ORDER — FENTANYL CITRATE 50 UG/ML
INJECTION, SOLUTION INTRAMUSCULAR; INTRAVENOUS PRN
Status: DISCONTINUED | OUTPATIENT
Start: 2020-08-21 | End: 2020-08-21 | Stop reason: SDUPTHER

## 2020-08-21 RX ORDER — HYDROCODONE BITARTRATE AND ACETAMINOPHEN 5; 325 MG/1; MG/1
1 TABLET ORAL
Status: DISCONTINUED | OUTPATIENT
Start: 2020-08-21 | End: 2020-08-21 | Stop reason: HOSPADM

## 2020-08-21 RX ORDER — HYDROMORPHONE HCL 110MG/55ML
0.5 PATIENT CONTROLLED ANALGESIA SYRINGE INTRAVENOUS EVERY 5 MIN PRN
Status: DISCONTINUED | OUTPATIENT
Start: 2020-08-21 | End: 2020-08-21 | Stop reason: HOSPADM

## 2020-08-21 RX ORDER — LIDOCAINE HYDROCHLORIDE 10 MG/ML
1 INJECTION, SOLUTION EPIDURAL; INFILTRATION; INTRACAUDAL; PERINEURAL
Status: DISCONTINUED | OUTPATIENT
Start: 2020-08-21 | End: 2020-08-21 | Stop reason: HOSPADM

## 2020-08-21 RX ORDER — ONDANSETRON 2 MG/ML
4 INJECTION INTRAMUSCULAR; INTRAVENOUS
Status: COMPLETED | OUTPATIENT
Start: 2020-08-21 | End: 2020-08-21

## 2020-08-21 RX ORDER — SODIUM CHLORIDE 0.9 % (FLUSH) 0.9 %
SYRINGE (ML) INJECTION
Status: COMPLETED
Start: 2020-08-21 | End: 2020-08-21

## 2020-08-21 RX ORDER — NEOSTIGMINE METHYLSULFATE 5 MG/5 ML
SYRINGE (ML) INTRAVENOUS PRN
Status: DISCONTINUED | OUTPATIENT
Start: 2020-08-21 | End: 2020-08-21 | Stop reason: SDUPTHER

## 2020-08-21 RX ORDER — SENNA AND DOCUSATE SODIUM 50; 8.6 MG/1; MG/1
1 TABLET, FILM COATED ORAL 2 TIMES DAILY
Status: DISCONTINUED | OUTPATIENT
Start: 2020-08-21 | End: 2020-08-21 | Stop reason: HOSPADM

## 2020-08-21 RX ORDER — SUCCINYLCHOLINE CHLORIDE 20 MG/ML
INJECTION INTRAMUSCULAR; INTRAVENOUS PRN
Status: DISCONTINUED | OUTPATIENT
Start: 2020-08-21 | End: 2020-08-21 | Stop reason: SDUPTHER

## 2020-08-21 RX ORDER — PROMETHAZINE HYDROCHLORIDE 25 MG/ML
6.25 INJECTION, SOLUTION INTRAMUSCULAR; INTRAVENOUS ONCE
Status: COMPLETED | OUTPATIENT
Start: 2020-08-21 | End: 2020-08-21

## 2020-08-21 RX ORDER — LIDOCAINE HYDROCHLORIDE 20 MG/ML
INJECTION, SOLUTION EPIDURAL; INFILTRATION; INTRACAUDAL; PERINEURAL PRN
Status: DISCONTINUED | OUTPATIENT
Start: 2020-08-21 | End: 2020-08-21 | Stop reason: SDUPTHER

## 2020-08-21 RX ORDER — HYDROMORPHONE HCL 110MG/55ML
0.25 PATIENT CONTROLLED ANALGESIA SYRINGE INTRAVENOUS EVERY 5 MIN PRN
Status: DISCONTINUED | OUTPATIENT
Start: 2020-08-21 | End: 2020-08-21 | Stop reason: HOSPADM

## 2020-08-21 RX ORDER — ONDANSETRON 2 MG/ML
INJECTION INTRAMUSCULAR; INTRAVENOUS PRN
Status: DISCONTINUED | OUTPATIENT
Start: 2020-08-21 | End: 2020-08-21 | Stop reason: SDUPTHER

## 2020-08-21 RX ORDER — ROCURONIUM BROMIDE 10 MG/ML
INJECTION, SOLUTION INTRAVENOUS PRN
Status: DISCONTINUED | OUTPATIENT
Start: 2020-08-21 | End: 2020-08-21 | Stop reason: SDUPTHER

## 2020-08-21 RX ORDER — MIDAZOLAM HYDROCHLORIDE 1 MG/ML
1 INJECTION INTRAMUSCULAR; INTRAVENOUS ONCE
Status: DISCONTINUED | OUTPATIENT
Start: 2020-08-21 | End: 2020-08-21

## 2020-08-21 RX ORDER — GLYCOPYRROLATE 1 MG/5 ML
SYRINGE (ML) INTRAVENOUS PRN
Status: DISCONTINUED | OUTPATIENT
Start: 2020-08-21 | End: 2020-08-21 | Stop reason: SDUPTHER

## 2020-08-21 RX ADMIN — HYDROMORPHONE HYDROCHLORIDE 0.25 MG: 2 INJECTION, SOLUTION INTRAMUSCULAR; INTRAVENOUS; SUBCUTANEOUS at 11:32

## 2020-08-21 RX ADMIN — PROPOFOL 40 MG: 10 INJECTION, EMULSION INTRAVENOUS at 09:56

## 2020-08-21 RX ADMIN — Medication 0.4 MG: at 10:00

## 2020-08-21 RX ADMIN — ONDANSETRON 40 MG: 2 INJECTION INTRAMUSCULAR; INTRAVENOUS at 08:15

## 2020-08-21 RX ADMIN — LIDOCAINE HYDROCHLORIDE 70 MG: 20 INJECTION, SOLUTION EPIDURAL; INFILTRATION; INTRACAUDAL; PERINEURAL at 08:06

## 2020-08-21 RX ADMIN — PROPOFOL 20 MG: 10 INJECTION, EMULSION INTRAVENOUS at 08:43

## 2020-08-21 RX ADMIN — PROMETHAZINE HYDROCHLORIDE 6.25 MG: 25 INJECTION INTRAMUSCULAR; INTRAVENOUS at 10:57

## 2020-08-21 RX ADMIN — HYDROMORPHONE HYDROCHLORIDE 0.25 MG: 2 INJECTION, SOLUTION INTRAMUSCULAR; INTRAVENOUS; SUBCUTANEOUS at 11:12

## 2020-08-21 RX ADMIN — PROPOFOL 30 MG: 10 INJECTION, EMULSION INTRAVENOUS at 09:45

## 2020-08-21 RX ADMIN — FENTANYL CITRATE 25 MCG: 50 INJECTION, SOLUTION INTRAMUSCULAR; INTRAVENOUS at 09:57

## 2020-08-21 RX ADMIN — SODIUM CHLORIDE: 9 INJECTION, SOLUTION INTRAVENOUS at 10:15

## 2020-08-21 RX ADMIN — LIDOCAINE HYDROCHLORIDE 10 MG: 20 INJECTION, SOLUTION EPIDURAL; INFILTRATION; INTRACAUDAL; PERINEURAL at 09:19

## 2020-08-21 RX ADMIN — PROPOFOL 20 MG: 10 INJECTION, EMULSION INTRAVENOUS at 09:19

## 2020-08-21 RX ADMIN — FENTANYL CITRATE 50 MCG: 50 INJECTION, SOLUTION INTRAMUSCULAR; INTRAVENOUS at 08:42

## 2020-08-21 RX ADMIN — Medication 10 ML: at 10:58

## 2020-08-21 RX ADMIN — FENTANYL CITRATE 50 MCG: 50 INJECTION, SOLUTION INTRAMUSCULAR; INTRAVENOUS at 08:15

## 2020-08-21 RX ADMIN — LIDOCAINE HYDROCHLORIDE 10 MG: 20 INJECTION, SOLUTION EPIDURAL; INFILTRATION; INTRACAUDAL; PERINEURAL at 08:43

## 2020-08-21 RX ADMIN — SUCCINYLCHOLINE CHLORIDE 120 MG: 20 INJECTION, SOLUTION INTRAMUSCULAR; INTRAVENOUS at 08:06

## 2020-08-21 RX ADMIN — ONDANSETRON 4 MG: 2 INJECTION INTRAMUSCULAR; INTRAVENOUS at 10:42

## 2020-08-21 RX ADMIN — Medication 10 ML: at 11:45

## 2020-08-21 RX ADMIN — SODIUM CHLORIDE: 9 INJECTION, SOLUTION INTRAVENOUS at 08:01

## 2020-08-21 RX ADMIN — DEXAMETHASONE SODIUM PHOSPHATE 8 MG: 4 INJECTION, SOLUTION INTRAMUSCULAR; INTRAVENOUS at 08:15

## 2020-08-21 RX ADMIN — LIDOCAINE HYDROCHLORIDE 10 MG: 20 INJECTION, SOLUTION EPIDURAL; INFILTRATION; INTRACAUDAL; PERINEURAL at 09:06

## 2020-08-21 RX ADMIN — Medication 3 MG: at 10:00

## 2020-08-21 RX ADMIN — SODIUM CHLORIDE: 9 INJECTION, SOLUTION INTRAVENOUS at 06:54

## 2020-08-21 RX ADMIN — PROMETHAZINE HYDROCHLORIDE 6.25 MG: 25 INJECTION INTRAMUSCULAR; INTRAVENOUS at 11:45

## 2020-08-21 RX ADMIN — ROCURONIUM BROMIDE 15 MG: 10 INJECTION, SOLUTION INTRAVENOUS at 08:21

## 2020-08-21 RX ADMIN — PROPOFOL 140 MG: 10 INJECTION, EMULSION INTRAVENOUS at 08:06

## 2020-08-21 RX ADMIN — PROPOFOL 20 MG: 10 INJECTION, EMULSION INTRAVENOUS at 09:06

## 2020-08-21 RX ADMIN — CLINDAMYCIN IN 5 PERCENT DEXTROSE 900 MG: 18 INJECTION, SOLUTION INTRAVENOUS at 07:59

## 2020-08-21 RX ADMIN — HYDROMORPHONE HYDROCHLORIDE 0.25 MG: 2 INJECTION, SOLUTION INTRAMUSCULAR; INTRAVENOUS; SUBCUTANEOUS at 11:04

## 2020-08-21 ASSESSMENT — PULMONARY FUNCTION TESTS
PIF_VALUE: 4
PIF_VALUE: 22
PIF_VALUE: 23
PIF_VALUE: 14
PIF_VALUE: 23
PIF_VALUE: 2
PIF_VALUE: 23
PIF_VALUE: 5
PIF_VALUE: 23
PIF_VALUE: 23
PIF_VALUE: 3
PIF_VALUE: 23
PIF_VALUE: 3
PIF_VALUE: 22
PIF_VALUE: 21
PIF_VALUE: 22
PIF_VALUE: 22
PIF_VALUE: 23
PIF_VALUE: 14
PIF_VALUE: 22
PIF_VALUE: 14
PIF_VALUE: 22
PIF_VALUE: 22
PIF_VALUE: 21
PIF_VALUE: 23
PIF_VALUE: 3
PIF_VALUE: 23
PIF_VALUE: 22
PIF_VALUE: 23
PIF_VALUE: 20
PIF_VALUE: 21
PIF_VALUE: 23
PIF_VALUE: 14
PIF_VALUE: 22
PIF_VALUE: 20
PIF_VALUE: 3
PIF_VALUE: 20
PIF_VALUE: 22
PIF_VALUE: 24
PIF_VALUE: 0
PIF_VALUE: 22
PIF_VALUE: 22
PIF_VALUE: 23
PIF_VALUE: 3
PIF_VALUE: 22
PIF_VALUE: 23
PIF_VALUE: 14
PIF_VALUE: 14
PIF_VALUE: 22
PIF_VALUE: 23
PIF_VALUE: 1
PIF_VALUE: 9
PIF_VALUE: 4
PIF_VALUE: 21
PIF_VALUE: 14
PIF_VALUE: 20
PIF_VALUE: 35
PIF_VALUE: 17
PIF_VALUE: 21
PIF_VALUE: 22
PIF_VALUE: 22
PIF_VALUE: 1
PIF_VALUE: 16
PIF_VALUE: 14
PIF_VALUE: 24
PIF_VALUE: 23
PIF_VALUE: 2
PIF_VALUE: 22
PIF_VALUE: 23
PIF_VALUE: 15
PIF_VALUE: 14
PIF_VALUE: 22
PIF_VALUE: 3
PIF_VALUE: 4
PIF_VALUE: 23
PIF_VALUE: 22
PIF_VALUE: 23
PIF_VALUE: 0
PIF_VALUE: 21
PIF_VALUE: 5
PIF_VALUE: 22
PIF_VALUE: 14
PIF_VALUE: 4
PIF_VALUE: 23
PIF_VALUE: 23
PIF_VALUE: 14
PIF_VALUE: 23
PIF_VALUE: 22
PIF_VALUE: 23
PIF_VALUE: 1
PIF_VALUE: 23
PIF_VALUE: 24
PIF_VALUE: 22
PIF_VALUE: 22
PIF_VALUE: 0
PIF_VALUE: 41
PIF_VALUE: 3
PIF_VALUE: 23
PIF_VALUE: 24
PIF_VALUE: 23
PIF_VALUE: 22
PIF_VALUE: 16
PIF_VALUE: 5
PIF_VALUE: 1
PIF_VALUE: 14
PIF_VALUE: 23
PIF_VALUE: 22
PIF_VALUE: 19
PIF_VALUE: 14
PIF_VALUE: 22
PIF_VALUE: 23
PIF_VALUE: 22
PIF_VALUE: 22
PIF_VALUE: 21
PIF_VALUE: 14
PIF_VALUE: 20
PIF_VALUE: 24
PIF_VALUE: 0
PIF_VALUE: 14
PIF_VALUE: 14
PIF_VALUE: 3
PIF_VALUE: 19
PIF_VALUE: 23
PIF_VALUE: 14
PIF_VALUE: 23
PIF_VALUE: 4
PIF_VALUE: 22
PIF_VALUE: 23
PIF_VALUE: 22
PIF_VALUE: 11

## 2020-08-21 ASSESSMENT — PAIN - FUNCTIONAL ASSESSMENT
PAIN_FUNCTIONAL_ASSESSMENT: 0-10
PAIN_FUNCTIONAL_ASSESSMENT: PREVENTS OR INTERFERES SOME ACTIVE ACTIVITIES AND ADLS

## 2020-08-21 ASSESSMENT — PAIN SCALES - GENERAL
PAINLEVEL_OUTOF10: 6
PAINLEVEL_OUTOF10: 0

## 2020-08-21 ASSESSMENT — PAIN DESCRIPTION - DESCRIPTORS: DESCRIPTORS: ACHING

## 2020-08-21 NOTE — PROGRESS NOTES
Patient awakening now. Denies any pain at this time. Left arm in sling, able to wiggle fingers,warm to touch with good radial pulse.

## 2020-08-21 NOTE — PROGRESS NOTES
Patient arrived from OR to PACU spot 8. Attached to monitoring system. Report received per KIRSTIN Walters. No problems reported intraoperatively. VSS. Patient unable to have a pre op block since she did not stop her Plavix. Patient arrived deep from general anesthesia at this time. Will observe jesus.

## 2020-08-21 NOTE — ANESTHESIA POSTPROCEDURE EVALUATION
Department of Anesthesiology  Postprocedure Note    Patient: Samina Street  MRN: [id number]  YOB: 1950  Date of evaluation: 8/21/2020  Time:  1:35 PM     Procedure Summary     Date:  08/21/20 Room / Location:  [address]    Anesthesia Start:  0801 Anesthesia Stop:  9444    Procedure:  LEFT SHOULDER ARTHROSCOPY, SUBACROMINAL DECOMPRESSION, DISTAL CLAVICLE EXCISION,DEBRIDEMENT, ROTATOR CUFF REPAIR, AUGMENTATION (Left Shoulder) Diagnosis:       (M75.42  LEFT SHOULDER IMPINGEMENT)      (M19.212 DISTAL CLAVICLE ARTHRITIS)      (M75.102  ROTATOR CUFF TEAR)    Surgeon:  Maurisio Menendez DO Responsible Provider:  Annabell Gaucher, MD    Anesthesia Type:  general ASA Status:  3          Anesthesia Type: general    Nola Phase I: Nola Score: 9    Nola Phase II: Nola Score: 9    Last vitals: Reviewed and per EMR flowsheets.        Anesthesia Post Evaluation    Patient location during evaluation: PACU  Patient participation: complete - patient participated  Level of consciousness: awake  Airway patency: patent  Nausea & Vomiting: no vomiting  Complications: no  Cardiovascular status: hemodynamically stable  Respiratory status: acceptable  Hydration status: euvolemic

## 2020-08-21 NOTE — PROGRESS NOTES
Spoke with Dr. Mook Hankins from anesthesia in regards to patients continued nausea. Received order for Phenergan IV. Phenergan 6.25 IV given.

## 2020-08-21 NOTE — PROGRESS NOTES
Patient states her nausea just keeps coming and going and her pain is unchanged in left shoulder. Medicated with dilaudid IV .

## 2020-08-21 NOTE — ANESTHESIA PRE PROCEDURE
Department of Anesthesiology  Preprocedure Note       Name:  Lucrecia Smoker   Age:  65 y.o.  :  1950                                          MRN:  [id number]         Date:  2020      Surgeon: Karli Warren):  Galina Stern DO    Procedure: Procedure(s):  LEFT SHOULDER ARTHROSCOPE, SUBACROMINAL DECOMPRESSION, DISTAL CLAVICLE EXCISION, ROTATOR CUFF REPAIR AND INDICATED PROCEDURES    Medications prior to admission:   Prior to Admission medications    Medication Sig Start Date End Date Taking? Authorizing Provider   losartan-hydrochlorothiazide [facility]) 100-25 MG per tablet Take 1 tablet by mouth daily 20  Yes Ghazala Hargrove MD   meloxicam JUVENTINO TAPIA [facility]) 15 MG tablet Take 1 tablet by mouth daily 20   Artice Burn, DO   oxyCODONE HCl (OXY-IR) 10 MG immediate release tablet Take 1 tablet by mouth every 8 hours as needed for Pain for up to 7 days.  20  Artice Burn, DO   clopidogrel (PLAVIX) 75 MG tablet TAKE 1 TABLET BY MOUTH DAILY 20   Ghazala Hargrove MD   atorvastatin (LIPITOR) 10 MG tablet TAKE 1 TABLET BY MOUTH DAILY 6/3/20   Ghazala Hargrove MD   zoster recombinant adjuvanted vaccine [facility]) 50 MCG/0.5ML SUSR injection Inject 0.5 mLs into the muscle See Admin Instructions 1 dose now and repeat in 2-6 months 19   Sonya Clinton MD       Current medications:    Current Facility-Administered Medications   Medication Dose Route Frequency Provider Last Rate Last Dose    acetaminophen (TYLENOL) tablet 650 mg  650 mg Oral Q6H Artice Burn, DO        sennosides-docusate sodium (SENOKOT-S) 8.6-50 MG tablet 1 tablet  1 tablet Oral BID Artice Burn, DO        magnesium hydroxide (MILK OF MAGNESIA) 400 MG/5ML suspension 30 mL  30 mL Oral Daily PRN Artice Burn, DO        0.9 % sodium chloride infusion   Intravenous Continuous Artice Burn, DO        clindamycin (CLEOCIN) 900 mg in dextrose 5 % 50 mL IVPB  900 mg Intravenous Once Artice Burn, DO  HYDROcodone-acetaminophen (NORCO) 5-325 MG per tablet 1 tablet  1 tablet Oral Once PRN Sarbjit Nathan MD        ondansetron [facility]) injection 4 mg  4 mg Intravenous Once PRN Sarbjit Nathan MD        HYDROmorphone (DILAUDID) injection 0.25 mg  0.25 mg Intravenous Q5 Min PRN Sarbjit Nathan MD        HYDROmorphone (DILAUDID) injection 0.5 mg  0.5 mg Intravenous Q5 Min PRN Sarbjit Nathan MD           Allergies:     Allergies   Allergen Reactions    Ace Inhibitors Other (See Comments)     Cough     Chantix [Varenicline]      depression    Keflex [Cephalexin] Diarrhea    Morphine      Causes mental impairment,  N&V    Niaspan [Niacin Er] Other (See Comments)     Skin irritation    Pletal [Cilostazol]      Headache and diarrhea    Pravastatin        Problem List:    Patient Active Problem List   Diagnosis Code    Essential hypertension I10    Hyperglycemia R73.9    Pure hypercholesterolemia E78.00    Lumbago M54.5    Osteoarthritis M19.90    CKD (chronic kidney disease) stage 3, GFR 30-59 ml/min ([facility]) N18.3    Abnormal mammogram R92.8    Fibrocystic disease of breast N60.19    Diarrhea R19.7    PAD (peripheral artery disease) ([facility] [address] I73.9    Atherosclerosis of native arteries of extremities with intermittent claudication, left leg ([facility] [address] I70.212    Syncope R55    CAD (coronary artery disease) I25.10    Sinus pause I45.5    Encounter for loop recorder check Z45.09    Status post total left knee replacement Z96.652    Adhesive capsulitis of right shoulder M75.01    Right shoulder pain M25.511    Psoriasis L40.9    Sick sinus syndrome (HCC) I49.5    Syncope and collapse R55    Urinary tract infection without hematuria N39.0    New onset seizure ([facility]) R56.9    HTN (hypertension), benign I10    CAD in native artery I25.10    Seasonal allergic rhinitis due to pollen J30.1    Spinal stenosis of lumbar region with neurogenic claudication M48.062    Nontraumatic complete tear of left rotator cuff M75.122       Past Medical History:        Diagnosis Date    CAD (coronary artery disease)     NONOBSTRUCTING    CKD (chronic kidney disease) stage 3, GFR 30-59 ml/min ([facility])     Lumbago     Osteoarthrosis, unspecified whether generalized or localized, unspecified site     Other abnormal blood chemistry     PAD (peripheral artery disease) ([facility] [address]     Pure hypercholesterolemia     Risk for falls 16    S/p Left TKR    Spinal stenosis     Unspecified essential hypertension        Past Surgical History:        Procedure Laterality Date    CHOLECYSTECTOMY      COLONOSCOPY      INSERTABLE CARDIAC MONITOR  2015    JOINT REPLACEMENT Left     knee replacement    KNEE SURGERY      RT    LEG SURGERY      2 stents placed in right leg, 1 stent and balloon in left leg    ROTATOR CUFF REPAIR      TOTAL KNEE ARTHROPLASTY Left 16    Dr Valderrama Hy TUBAL LIGATION         Social History:    Social History     Tobacco Use    Smoking status: Former Smoker     Packs/day: 0.50     Years: 40.00     Pack years: 20.00     Types: Cigarettes     Last attempt to quit: 2015     Years since quittin.1    Smokeless tobacco: Never Used    Tobacco comment: <1 pack of cigarettes per day   Substance Use Topics    Alcohol use: No     Alcohol/week: 0.0 standard drinks                                Counseling given: Not Answered  Comment: <1 pack of cigarettes per day      Vital Signs (Current):   Vitals:    20 1151 20 1441 20 0610   Weight: 174 lb (78.9 kg) 180 lb (81.6 kg) 190 lb (86.2 kg)   Height: 5' 1\" (1.549 m) 5' 1\" (1.549 m) 5' 1\" (1.549 m)                                              BP Readings from Last 3 Encounters:   20 118/74   19 132/84   19 122/68       NPO Status: Time of last liquid consumption:                         Time of last solid consumption:                         Date of last liquid consumption: 20 Date of last solid food consumption: 08/20/20    BMI:   Wt Readings from Last 3 Encounters:   08/21/20 190 lb (86.2 kg)   08/05/20 174 lb (78.9 kg)   07/22/20 174 lb (78.9 kg)     Body mass index is 35.9 kg/m². CBC:   Lab Results   Component Value Date    WBC 8.1 04/02/2019    RBC 4.76 04/02/2019    HGB 14.4 04/02/2019    HCT 43.8 04/02/2019    MCV 91.9 04/02/2019    RDW 14.2 04/02/2019     04/02/2019       CMP:   Lab Results   Component Value Date     04/02/2019    K 4.2 04/02/2019     04/02/2019    CO2 23 04/02/2019    BUN 25 05/24/2019    CREATININE 1.2 05/24/2019    GFRAA 54 05/24/2019    GFRAA >60 11/30/2011    AGRATIO 1.0 12/18/2018    LABGLOM 44 05/24/2019    GLUCOSE 99 04/02/2019    PROT 7.9 12/18/2018    PROT 8.0 11/30/2011    CALCIUM 9.5 04/02/2019    BILITOT 0.4 12/18/2018    ALKPHOS 79 12/18/2018    AST 13 12/18/2018    ALT 9 12/18/2018       POC Tests: No results for input(s): POCGLU, POCNA, POCK, POCCL, POCBUN, POCHEMO, POCHCT in the last 72 hours.     Coags:   Lab Results   Component Value Date    PROTIME 11.9 04/02/2019    PROTIME 25.2 01/21/2016    INR 1.04 04/02/2019    APTT 35.1 05/10/2018       HCG (If Applicable): No results found for: PREGTESTUR, PREGSERUM, HCG, HCGQUANT     ABGs: No results found for: PHART, PO2ART, FUL9CEW, VIT9CDP, BEART, D7UDBWBD     Type & Screen (If Applicable):  No results found for: LABABO, LABRH    Drug/Infectious Status (If Applicable):  No results found for: HIV, HEPCAB    COVID-19 Screening (If Applicable): No results found for: COVID19      Anesthesia Evaluation  Patient summary reviewed no history of anesthetic complications:   Airway: Mallampati: II  TM distance: >3 FB   Neck ROM: full  Mouth opening: > = 3 FB Dental:    (+) upper dentures and lower dentures      Pulmonary: breath sounds clear to auscultation      (-) rhonchi and wheezes                          ROS comment: Former smoker   Cardiovascular:    (+) hypertension:, CAD: non-obstructive, dysrhythmias (bradycardia and sinus pauses with sleep. EP note appreciated):,         Rhythm: regular  Rate: normal                 ROS comment: Loop recorder- depleted battery      Neuro/Psych:      (-) TIA and CVA            ROS comment: Unsure if syncopal episode in 2015 was seizure or cardiac. GI/Hepatic/Renal:   (+) renal disease: CRI,           Endo/Other:    (+) blood dyscrasia (last plavix taken Monday -surgeon aware): arthritis: OA., .                 Abdominal:           Vascular:   + PVD, aortic or cerebral, . Anesthesia Plan      general     ASA 3     (Patient not a candidate for interscalene block due to recent plavix use. )  Induction: intravenous. MIPS: Postoperative opioids intended, Prophylactic antiemetics administered and Postoperative trial extubation. Anesthetic plan and risks discussed with patient. Use of blood products discussed with patient whom consented to blood products. Plan discussed with CRNA.                 Izaiah Rodriguez MD   8/21/2020

## 2020-08-21 NOTE — OP NOTE
[place]; [facility]. [address] [place] [address],  [id number]    Surgeon: Dr. Maurilio Garza    Assistant: None    Anesthesia: Local with General    Indications:  Chronic pain not alleviated by conservative therapy, positive MRI, not improved with conservative care    Complications: None    Estimated blood loss: Minimal    Preoperative antibiotics: Given and documented in the chart        Preoperative diagnosis :  1.  Left shoulder subacromial impingement  2. Left shoulder distal clavicle arthritis  3. Left shoulder rotator cuff tear            Postoperative diagnosis :  1.  Left shoulder subacromial impingement  2. Left shoulder distal clavicle arthritis  3. Left shoulder rotator cuff tear  4. Left shoulder intra-articular synovitis, type I labral tear, undersurface rotator cuff tear  5. Left shoulder adhesive capsulitis      Procedure performed:  1. Left shoulder diagnostic arthroscopy  2. Left shoulder glenohumeral arthroscopy with labral debridement, synovectomy, undersurface rotator cuff debridement  3. Left shoulder subacromial arthroscopy with subacromial decompression  4. Left shoulder subacromial arthroscopy with arthroscopic distal clavicle excision  5. Left shoulder subacromial arthroscopy with arthroscopic rotator cuff repair  6. Left shoulder subacromial arthroscopy with arthroscopic rotator cuff augmentation  7. Left shoulder arthroscopic anterior capsular release and manipulation under anesthesia               Procedure in detail:  Patient was seen and evaluated A history and physical was obtained a written consent was discussed and signed by the patient. Following the anesthesia evaluation and discussion with the patient about the scheduled procedure and recovery along with postoperative follow-up plans the patient was brought back to the operative suite put on the operative table in the supine position. Following the patient's general anesthesia.   The patient was with the shaver, bipolar or probe. Following this I went ahead and did an anterior capsular release and posterior capsular release followed by manipulation under anesthesia right full range of motion prior to fixing the rotator cuff and doing an augmentation. Following my standard diagnostic arthroscopy the cannula was removed from the glenohumeral joint. The blunt trocar was inserted into the cannula and through the posterior portal the cannula was entered into the subacromial space without any difficulty. Now under direct visualization we could see there was moderate bursitis, synovitis, a subacromial spur and distal clavicle spur. Under direct visualization I put a 18-gauge spinal needle laterally through the deltoid into the subacromial space, liking this location I made my portal with a sharp scalpel and a blunt trocar. At this point I entered the subacromial space with a shaver and I removed all the bursa tissue synovial tissue and tissue lining the subacromial joint space and also the bone spur and around the distal clavicle. I used the bipolar to remove all soft tissue from the undersurface bone spur and the distal clavicle spur. I removed the bipolar tissue ablator and entered with the 5.5 mm  barrel bur and proceeded to perform a generous subacromial decompression through the lateral portal and through the posterior portal.      Next I went ahead and addressed the distal clavicle with a shaver and a bipolar through the lateral portal and then resecting 5 mm to the level of the subacromial decompression. I also used the shaver the bipolar and the 5.5 mm barrel bur to undermine the distal clavicle to make sure there was no impingement on the rotator cuff tissue.   I now removed the 5.5 mm barrel bur from the lateral portal and I made an anterior portal with a blunt trocar and entered with a shaver then a bipolar and I finished off the distal clavicle to the capsule with a 5.5 mm barrel bur. The subacromial space was visualized through the anterior portal lateral portal and the posterior portal into bleeding tissue was cauterized any loose debris was removed with the shaver once all this was performed I removed all instruments from the subacromial space. Following our glenohumeral evaluation and our subacromial preparation with a subacromial decompression and distal clavicle excision. I moved on to the rotator cuff repair. At this point the footprint of the repair was debrided with a bipolar and a shaver until we had clean base and bleeding tissue for the rotator cuff repair. The rotator cuff was mobilized using graspers and the blunt trocar until it was easily brought to the footprint repair area. The tendon was prepared with a duckbill biter shaver to clean up any nonviable tissue and tissue without a blood supply. Following this an anterior and a lateral cannula were inserted into the subacromial space using a switching stick the cannula obturators and both cannulas were inserted into the subacromial space. Using the posterior portal for the camera I went ahead and made a separate stab incision using an 18-gauge spinal needle a sharp scalpel and blunt trocar to put in the 1st dual loaded 4.75 mm osteo-Raptor by Sylvia Aschoff by 1st using a 4.75 mm tap and then inserting the anchor to the laser line of the screwdriver then removing the screwdriver showing the 4 sutures coming out of the anchor. Each suture was put through the rotator cuff in a fan shaped manner from anterior to posterior using a Elite Pass suture passer at 16 mm depth. Once each suture was passed through the rotator cuff it was pulled out through the anterior portal with a grasper. Both ends of each suture were pulled out through the lateral portal with a ice tong and tied with a Talbot loop and 3 half hitches.   Once all of the medial row sutures were tied with started to prepare for the lateral row fixation of the 2 row rotator cuff repair technique. The lateral row was inserted using 4.5 mm lateral row anchors tapped in place the sutures were pulled taut and the locking mechanism was cranked until they were caught in the anchor. Once these 2 anchors were inserted we had an M shaped meenu cross pattern to a rotator cuff repair which was probed showed that it was a solid fixation with good pressure against the lateral humeral head. Following this I went ahead and cut these sutures short at the anchor site. The joint was now copiously irrigated any bleeding tissue was cauterized both cannulas were removed all instruments were removed. The patient was put in a shoulder immobilizer with a pillow bolster. Rotator cuff instructions were given to the patient in the postoperative area. Following the preparation of the subacromial space a rotator cuff augmentation was performed. Through a separate lateral portal made with a sharp scalpel and a blunt trocar I inserted the medium sized Regeneten augmentation graft. Once the graft was deployed and maneuvered into place a separate portal was made with a spinal needle sharp scalpel blunt trocar and a separate cannula to insert the soft tissue staples starting in each corner then the sides and then the center. Following the fixation of the graft I went ahead and removed the  and used no bone staples to secure the inferior border of the augmentation graft. At this point all instruments were removed from the shoulder each portal was closed with a simple interrupted suture. Each portal was covered with a sterile Band-Aid sterile gauze and ABD then tape. The patient was brought to recovery in stable condition. The Patient was given typewritten instructions for postoperative care.   The patient was given exercises, pain medication, anti-inflammatory medication, a follow-up appointment in the office in 1 week. The patient was given instructions and a number to call if there is any concerns or problems. The patient will be discharged to home from the postoperative area when in stable condition and understands the instructions. _____________________         Niko Whalen MS, DO         Orthopedic Surgeon          [facility]         Board-certified         Team Physician for [employer]

## 2020-08-26 ENCOUNTER — OFFICE VISIT (OUTPATIENT)
Dept: ORTHOPEDIC SURGERY | Age: 70
End: 2020-08-26

## 2020-08-26 VITALS — HEIGHT: 61 IN | WEIGHT: 190 LBS | BODY MASS INDEX: 35.87 KG/M2

## 2020-08-26 PROCEDURE — 99024 POSTOP FOLLOW-UP VISIT: CPT | Performed by: ORTHOPAEDIC SURGERY

## 2020-09-02 ENCOUNTER — HOSPITAL ENCOUNTER (OUTPATIENT)
Dept: PHYSICAL THERAPY | Age: 70
Setting detail: THERAPIES SERIES
Discharge: HOME | End: 2020-09-02
Payer: COMMERCIAL

## 2020-09-02 PROCEDURE — 97161 PT EVAL LOW COMPLEX 20 MIN: CPT

## 2020-09-02 PROCEDURE — 97140 MANUAL THERAPY 1/> REGIONS: CPT

## 2020-09-02 PROCEDURE — 97110 THERAPEUTIC EXERCISES: CPT

## 2020-09-02 NOTE — FLOWSHEET NOTE
[facility]  [address]  Phone: [phone] Fax: [phone]    Physical Therapy Treatment Note/ Progress Report:     Date:  2020    Patient Name:  Katharina Gong    :  1950  MRN: [id number]  Restrictions/Precautions:    Medical/Treatment Diagnosis Information:  · Diagnosis: L RTC repair, R shoulder BRUCE  · Treatment Diagnosis: L RTC tear M75.122, L shoulder pain M25.512, R shoulder pain T06.111  Insurance/Certification information:  PT Insurance Information: Stef[employer] Rocha  Physician Information:  Referring Practitioner: Eleazar Holder of care signed (Y/N):     Date of Patient follow up with Physician:      Progress Report: [x]  Yes  []  No     Date Range for reporting period:  Beginnin2020  Ending:  -    Progress report due (10 Rx/or 30 days whichever is less): 96/3/2705     Recertification due (POC duration/ or 90 days whichever is less): 2020     Visit # Insurance Allowable Auth Needed   1 [employer], med necessity ($40/co-pay) []Yes    [x]No     Pain level:  2-4/10     SUBJECTIVE:  See eval    OBJECTIVE: See eval   Observation:    Test measurements:      RESTRICTIONS/PRECAUTIONS: none    Exercises/Interventions:   Therapeutic Ex (00995)  Min: 12 min Sets/sec Reps CUES/Notes   UBE      Pendulum/Ball rolls 1 10 L shoulder   Cane AAROM flex/press      3 way Isomet      T- band Row/pinch      T- band lower pinch      T- band ER activation      Supine SA punch      SL ER/SL punch      Prone Rows/ext      Prone HAB/Prone Flex      Seat Table slides/Wall Slides 1 10 R   Seated HH Depression      No Money      Scap Wall Lat touches/wall walks            Standing flex/scap      Standing Punch      Lawnmower      Standing ER stretch 1 10    PROM standing table- bilat 1 10    Table slide 1 10 R               Manual Intervention  (50114)  Min: 20 min      Shld /GH Mobs 1 10 R shoulder   Post Cap mobs      Thoracic/Rib manipualtion      CT MT/Mobs      PROM MT 1 10 L shoulder   Elbow mobs            NMR re-education (51672)  Min:      T-spine Ext      GH depress/compress      Scap/GH NMR      Body blade      Wall ball roll      Wall Ball bounce      Ball drops      Cynthia Scap Bio      Floor Snow angels-sliders            Therapeutic Activity (98197)  Min:      UE throwing porgression      Dynamic UE stability      Earthquake Bar      Bodyblade                Therapeutic Exercise and NMR EXR  [x] (06419) Provided verbal/tactile cueing for activities related to strengthening, flexibility, endurance, ROM  for improvements in scapular, scapulothoracic and UE control with self care, reaching, carrying, lifting, house/yardwork, driving/computer work. [x] (32888) Provided verbal/tactile cueing for activities related to improving balance, coordination, kinesthetic sense, posture, motor skill, proprioception  to assist with  scapular, scapulothoracic and UE control with self care, reaching, carrying, lifting, house/yardwork, driving/computer work. Therapeutic Activities:    [] (83987 or 10780) Provided verbal/tactile cueing for activities related to improving balance, coordination, kinesthetic sense, posture, motor skill, proprioception and motor activation to allow for proper function of scapular, scapulothoracic and UE control with self care, carrying, lifting, driving/computer work.      Home Exercise Program:    [x] (12549) Reviewed/Progressed HEP activities related to strengthening, flexibility, endurance, ROM of scapular, scapulothoracic and UE control with self care, reaching, carrying, lifting, house/yardwork, driving/computer work  [] (26998) Reviewed/Progressed HEP activities related to improving balance, coordination, kinesthetic sense, posture, motor skill, proprioception of scapular, scapulothoracic and UE control with self care, reaching, carrying, lifting, house/yardwork, driving/computer work      Manual Treatments: PROM / STM / Oscillations-Mobs:  G-I, II, III, IV (PA's, Inf., Post.)  [] (28446) Provided manual therapy to mobilize soft tissue/joints of cervical/CT, scapular GHJ and UE for the purpose of modulating pain, promoting relaxation,  increasing ROM, reducing/eliminating soft tissue swelling/inflammation/restriction, improving soft tissue extensibility and allowing for proper ROM for normal function with self care, reaching, carrying, lifting, house/yardwork, driving/computer work    Modalities:      Charges:  Timed Code Treatment Minutes: 32   Total Treatment Minutes: 50       [x] EVAL (LOW) 04992 (typically 20 minutes face-to-face)  [] EVAL (MOD) 46529 (typically 30 minutes face-to-face)  [] EVAL (HIGH) 41915 (typically 45 minutes face-to-face)  [] RE-EVAL     [x] QQ(51245) x   1  [] DRY NEEDLE 1 OR 2 MUSCLES  [] NMR (23159) x     [] DRY NEEDLE 3+ MUSCLES  [x] Manual (42866) x  1     [] TA (01313) x     [] Mech Traction (18151)  [] ES(attended) (32719)     [] ES (un) (85075):   [] VASO (28067)  [] Other:    If [facility] Please Indicate Time In/Out  CPT Code Time in Time out                                   GOALS:  Patient stated goal: regain use of shoulders  [] Progressing: [] Met: [] Not Met: [] Adjusted    Therapist goals for Patient:   Short Term Goals: To be achieved in: 2 weeks  1. Independent in HEP and progression per patient tolerance, in order to prevent re-injury. [] Progressing: [] Met: [] Not Met: [] Adjusted  2. Patient will have a decrease in pain to facilitate improvement in movement, function, and ADLs as indicated by Functional Deficits. [] Progressing: [] Met: [] Not Met: [] Adjusted    Long Term Goals: To be achieved in: 12 weeks  1. Disability index score of 15% or less for the Quick DASH to assist with reaching prior level of function. [] Progressing: [] Met: [] Not Met: [] Adjusted  2.  Patient will demonstrate increased AROM to 0- 160 L shoulder elevation and 0-140 R shoulder elevation to allow for proper joint functioning as indicated by Functional Deficits. [] Progressing: [] Met: [] Not Met: [] Adjusted  3. Patient will demonstrate an increase in NM recruitment/activation and overall GH and scapular strength to within n5lbs HHD or WNL for proper functional mobility as indicated by patients Functional Deficits. [] Progressing: [] Met: [] Not Met: [] Adjusted  4. Patient will return to lifting/reaching  activities without increased symptoms or restriction. [] Progressing: [] Met: [] Not Met: [] Adjusted  5. Patient will report being able to utilize UE for household chores without symptoms or restriction. (patient specific functional goal)     [] Progressing: [] Met: [] Not Met: [] Adjusted    ASSESSMENT:  See eval    Return to Play: (if applicable)   []  Stage 1: Intro to Strength   []  Stage 2: Dynamic Strength and Intro to Plyometrics   []  Stage 3: Advanced Plyometrics and Intro to Throwing   []  Stage 4: Sport specific Training/Return to Sport     []  Ready to Return to Play, [employer]   []  Not Ready for Return to Sports   Comments:      Treatment/Activity Tolerance:  [x] Patient tolerated treatment well [] Patient limited by fatique  [] Patient limited by pain  [] Patient limited by other medical complications  [] Other:     Overall Progression Towards Functional goals/ Treatment Progress Update:  [] Patient is progressing as expected towards functional goals listed. [] Progression is slowed due to complexities/Impairments listed. [] Progression has been slowed due to co-morbidities.   [x] Plan just implemented, too soon to assess goals progression <30days   [] Goals require adjustment due to lack of progress  [] Patient is not progressing as expected and requires additional follow up with physician  [] Other    Prognosis for POC: [x] Good [] Fair  [] Poor    Patient requires continued skilled intervention: [x] Yes  [] No      PLAN: See eval  [] Continue per plan of care [] Alter current plan (see comments)  [x] Plan of care initiated [] Hold pending MD visit [] Discharge    Electronically signed by: Brenda Blount, PT     Note: If patient does not return for scheduled/recommended follow up visits, this note will serve as a discharge from care along with the most recent update on progress.

## 2020-09-02 NOTE — PLAN OF CARE
BRUCE for R shoulder done due to R shoulder being partially frozen. Has been working on pendulums 3x day. Notes that L shoulder hasn't been very painful since surgery. Prior RTC repair on R shoulder 4-5 years ago. Relevant Medical History:R RTC repair 2016, TKR 2016  Functional Scale/Score: Quick Dash 43% disability    Pain Scale: 1-4/10  L shoulder , 0/10 R shoulder  Easing factors: resting  Provocative factors: moving arm     Type: []Constant   []Intermittent  []Radiating []Localized []other:     Numbness/Tingling: slight tingling into index finger on occasion    Occupation/School: retired     Living Status/Prior Level of Function: Independent with ADLs and IADLs, gardening    OBJECTIVE:     ROM Left- PROM Right- AROM   Shoulder Flex 110 85   Shoulder Abd 70 85   Shoulder ER 15 25   Shoulder IR 70 45             Strength  Left- limited Right- limited   Shoulder Flex     Shoulder Scap     Shoulder ER     Shoulder IR             Reflexes Normal Abnormal Comments   [x]ALL NORMAL            S1-2 Seated achilles [] []    S1-2 Prone knee bend [] []    L3-4 Patellar tendon [] []    C5-6 Biceps [] []    C6 Brachioradialis [] []    C7-8 Triceps [] []    Clonus [] []    Babinski [] []    Child's [] []      Reflexes/Sensation:    [x]Dermatomes/Myotomes intact    [x]Reflexes equal and normal bilaterally   []Other:    Joint mobility: R shoulder hypo- limited inferiorly   []Normal    [x]Hypo   []Hyper    Palpation: denies tenderness    Functional Mobility/Transfers: limited in lifting, reaching, using L UE and R UE for all ADLs    Posture: WNL    Bandages/Dressings/Incisions: healing portal incisions    Gait: (include devices/WB status): WNL     Orthopedic Special Tests: n/a                       [x] Patient history, allergies, meds reviewed. Medical chart reviewed. See intake form. Review Of Systems (ROS):  [x]Performed Review of systems (Integumentary, CardioPulmonary, Neurological) by intake and observation.  Intake form has been scanned into medical record. Patient has been instructed to contact their primary care physician regarding ROS issues if not already being addressed at this time. Co-morbidities/Complexities (which will affect course of rehabilitation):   []None           Arthritic conditions   [x]Rheumatoid arthritis (M05.9)  []Osteoarthritis (M19.91)   Cardiovascular conditions   [x]Hypertension (I10)  []Hyperlipidemia (E78.5)  []Angina pectoris (I20)  []Atherosclerosis (I70)  []CVA Musculoskeletal conditions   []Disc pathology   []Congenital spine pathologies   []Prior surgical intervention  []Osteoporosis (M81.8)  []Osteopenia (M85.8)   Endocrine conditions   []Hypothyroid (E03.9)  []Hyperthyroid Gastrointestinal conditions   []Constipation (F21.75)   Metabolic conditions   []Morbid obesity (E66.01)  []Diabetes type 1(E10.65) or 2 (E11.65)   []Neuropathy (G60.9)     Pulmonary conditions   []Asthma (J45)  []Coughing   []COPD (J44.9)   Psychological Disorders  []Anxiety (F41.9)  []Depression (F32.9)   []Other:   [x]Other:   Multiple blood thinners 2* heart       Barriers to/and or personal factors that will affect rehab potential:              []Age  []Sex   []Smoker              []Motivation/Lack of Motivation                        []Co-Morbidities              []Cognitive Function, education/learning barriers              []Environmental, home barriers              []profession/work barriers  []past PT/medical experience  [x]other:  Justification:previous onset of adhesive capsulitis on L and new onset on R . Only able to attend PT 1x week 2* financials. Falls Risk Assessment (30 days):   [x] Falls Risk assessed and no intervention required.   [] Falls Risk assessed and Patient requires intervention due to being higher risk   TUG score (>12s at risk):     [] Falls education provided, including         ASSESSMENT: Patient is a 66 yo female who presents to therapy s/p L RTC repair and R shoulder BRUCE (s/p RTC 4 years ago). Upon assessment, patient with limited shoulder ROM, strength and NM control. Passive motion assessed with L shoulder and AROM assessed with R. Patient R shoulder ROM improved from 85 degrees to 122 degrees post GH joint mobilization. Patient provided with HEP for bilateral shoulders. Will benefit from continued PT services to address noted deficits. Patient only able to come to therapy 1x week due to financials. Functional Impairments:     [x]Noted spinal or UE joint hypomobility   []Noted spinal or UE joint hypermobility   [x]Decreased spinal/UE functional ROM   []Abnormal reflexes/sensation/myotomal/dermatomal deficits   [x]Decreased RC/scapular/core strength and neuromuscular control    [x]Decreased UE functional strength   []other:      Functional Activity Limitations (from functional questionnaire and intake)   []Reduced ability to tolerate prolonged functional positions   []Reduced ability or difficulty with changes of positions or transfers between positions   []Reduced ability to maintain good posture and demonstrate good body mechanics with sitting, bending, and lifting   [] Reduced ability or tolerance with driving and/or computer work   [x]Reduced ability to perform lifting, reaching, carrying tasks   [x]Reduced ability to reach behind back   [x]Reduced ability to sleep    [x]Reduced ability to tolerate any impact through UE or spine   [x]Reduced ability to  or hold objects   []Reduced ability to throw or toss an object   []other:    Participation Restrictions   [x]Reduced participation in self care activities   [x]Reduced participation in home management activities   []Reduced participation in work activities   []Reduced participation in social activities. []Reduced participation in sport/recreational activities. Classification/Subgrouping:   [x]signs/symptoms consistent with post-surgical status including decreased ROM, strength and function.     []signs/symptoms consistent with joint sprain/strain    []signs/symptoms consistent with shoulder impingement (internal, external, primary or secondary)   []signs/symptoms consistent with shoulder/elbow/wrist tendinopathy   []Signs/symptoms consistent with Rotator cuff tear   []sign/symptoms consistent with labral tear   []signs/symptoms consistent with rib dysfunction   []signs/symptoms consistent with postural dysfunction   []signs/symptoms consistent with Glenohumeral IR Deficit - <45 degrees   []signs/symptoms consistent with facet dysfunction of cervical/thoracic spine   []signs/symptoms consistent with pathology which may benefit from Dry Needling   []signs/symptoms which may limit the use of advanced manual therapy techniques: (Elevated CV risk profile, recent trauma, intolerance to end range positions, prior TIA, visual issues, UE neurological compromise )     Prognosis/Rehab Potential:      []Excellent   [x]Good    []Fair   []Poor    Tolerance of evaluation/treatment:    []Excellent   [x]Good    []Fair   []Poor    Physical Therapy Evaluation Complexity Justification  [x] A history of present problem with:  [x] no personal factors and/or comorbidities that impact the plan of care;  []1-2 personal factors and/or comorbidities that impact the plan of care  []3 personal factors and/or comorbidities that impact the plan of care  [x] An examination of body systems using standardized tests and measures addressing any of the following: body structures and functions (impairments), activity limitations, and/or participation restrictions;:  [x] a total of 1-2 or more elements   [] a total of 3 or more elements   [] a total of 4 or more elements   [x] A clinical presentation with:  [x] stable and/or uncomplicated characteristics   [] evolving clinical presentation with changing characteristics  [] unstable and unpredictable characteristics;   [x] Clinical decision making of [x] low, [] moderate, [] high complexity using standardized patient assessment instrument and/or measurable assessment of functional outcome. [x] EVAL (LOW) 68655 (typically 20 minutes face-to-face)  [] EVAL (MOD) 76847 (typically 30 minutes face-to-face)  [] EVAL (HIGH) 14613 (typically 45 minutes face-to-face)  [] RE-EVAL     PLAN:   Frequency/Duration:  1-2 days per week for 12 Weeks:  Interventions:  [x]  Therapeutic exercise including: strength training, ROM, for scapula, core and Upper extremity, including postural re-education. [x]  NMR activation and proprioception for UE, periscapular and RC muscles and Core, including postural re-education. [x]  Manual therapy as indicated for shoulder, scapula, spine and associated soft tissue including: Dry Needling/IASTM, STM, PROM, Gr I-IV mobilizations, manipulation. [x] Modalities as needed that may include: thermal agents, E-stim, Biofeedback, US, iontophoresis as indicated  [x] Patient education on joint protection, postural re-education, activity modification, progression of HEP. HEP instruction: pendulums, PROM flexion L, wrist and elbow AROM L, wall climb R, shoulder ER stretch R, AROM flexion slide R (see scanned forms)    GOALS:  Patient stated goal: regain use of shoulders  [] Progressing: [] Met: [] Not Met: [] Adjusted    Therapist goals for Patient:   Short Term Goals: To be achieved in: 2 weeks  1. Independent in HEP and progression per patient tolerance, in order to prevent re-injury. [] Progressing: [] Met: [] Not Met: [] Adjusted  2. Patient will have a decrease in pain to facilitate improvement in movement, function, and ADLs as indicated by Functional Deficits. [] Progressing: [] Met: [] Not Met: [] Adjusted    Long Term Goals: To be achieved in: 12 weeks  1. Disability index score of 15% or less for the Quick DASH to assist with reaching prior level of function. [] Progressing: [] Met: [] Not Met: [] Adjusted  2.  Patient will demonstrate increased AROM to 0- 160 L shoulder elevation and 0-140 R shoulder elevation to allow for proper joint functioning as indicated by Functional Deficits. [] Progressing: [] Met: [] Not Met: [] Adjusted  3. Patient will demonstrate an increase in NM recruitment/activation and overall GH and scapular strength to within n5lbs HHD or WNL for proper functional mobility as indicated by patients Functional Deficits. [] Progressing: [] Met: [] Not Met: [] Adjusted  4. Patient will return to lifting/reaching  activities without increased symptoms or restriction. [] Progressing: [] Met: [] Not Met: [] Adjusted  5.  Patient will report being able to utilize UE for household chores without symptoms or restriction. (patient specific functional goal)     [] Progressing: [] Met: [] Not Met: [] Adjusted    Electronically signed by:  Kelly Nguyen PT

## 2020-09-10 ENCOUNTER — HOSPITAL ENCOUNTER (OUTPATIENT)
Dept: PHYSICAL THERAPY | Age: 70
Setting detail: THERAPIES SERIES
Discharge: HOME | End: 2020-09-10
Payer: COMMERCIAL

## 2020-09-10 PROCEDURE — 97110 THERAPEUTIC EXERCISES: CPT

## 2020-09-10 PROCEDURE — 97140 MANUAL THERAPY 1/> REGIONS: CPT

## 2020-09-10 NOTE — FLOWSHEET NOTE
[email or web address] [address] [address]  Phone: [phone] Fax: [phone]    Physical Therapy Treatment Note/ Progress Report:     Date:  9/10/2020    Patient Name:  Dom Duarte    :  1950  MRN: [id number]  Restrictions/Precautions:    Medical/Treatment Diagnosis Information:  · Diagnosis: L RTC repair, R shoulder BRUCE  · Treatment Diagnosis: L RTC tear M75.122, L shoulder pain M25.512, R shoulder pain O30.368  Insurance/Certification information:  PT Insurance Information: [employer]  Physician Information:  Referring Practitioner: Javier Seeds of care signed (Y/N):     Date of Patient follow up with Physician:      Progress Report: [x]  Yes  []  No     Date Range for reporting period:  Beginnin2020  Ending:  -    Progress report due (10 Rx/or 30 days whichever is less):      Recertification due (POC duration/ or 90 days whichever is less): 2020     Visit # Insurance Allowable Auth Needed   2 [employer], med necessity ($40/co-pay) []Yes    [x]No     Pain level:  2/10     SUBJECTIVE:  Patient reports that her shoulders are feeling pretty good. States that the L is a bit more sore. Feels like the R has a little more motion. Has been doing good with all her HEP.     OBJECTIVE: See eval   Observation:    Test measurements:      RESTRICTIONS/PRECAUTIONS: none    Exercises/Interventions:   Therapeutic Ex (67832)  Min: 30 min Sets/sec Reps CUES/Notes   UBE      Pendulum/Ball rolls   L shoulder   Cane AAROM flex/press/ER 1 15 L shoulder (assist with press)   3 way Isomet      T- band Row/pinch 1 15 R, green   T- band lower pinch 1 15 R, green   T- band ER activation      Supine SA punch 1 15 AAROM on L, cues for R   SL ER/SL punch      Prone Rows/ext      Prone HAB/Prone Flex      Seat Table slides/Wall Slides 1 10 R   Seated HH Depression      No Money      Scap Wall Lat touches/wall walks            Standing flex/scap 1 15 To 100-110 degrees on R   Standing Punch      Lawnmower      Standing ER stretch 1 10 R   PROM standing table- bilat 1 10 L   Table slide 1 10 L               Manual Intervention  (31180)  Min: 20 min      Shld /GH Mobs 1 10 R shoulder   Post Cap mobs      Thoracic/Rib manipualtion      CT MT/Mobs      PROM MT 1 10 L shoulder   Elbow mobs            NMR re-education (55624)  Min:      T-spine Ext      GH depress/compress      Scap/GH NMR      Body blade      Wall ball roll      Wall Ball bounce      Ball drops      Cynthia Scap Bio      Floor Snow angels-sliders            Therapeutic Activity (00522)  Min:      UE throwing porgression      Dynamic UE stability      Earthquake Bar      Bodyblade                Therapeutic Exercise and NMR EXR  [x] (26301) Provided verbal/tactile cueing for activities related to strengthening, flexibility, endurance, ROM  for improvements in scapular, scapulothoracic and UE control with self care, reaching, carrying, lifting, house/yardwork, driving/computer work. [x] (38384) Provided verbal/tactile cueing for activities related to improving balance, coordination, kinesthetic sense, posture, motor skill, proprioception  to assist with  scapular, scapulothoracic and UE control with self care, reaching, carrying, lifting, house/yardwork, driving/computer work. Therapeutic Activities:    [] (55698 or 09740) Provided verbal/tactile cueing for activities related to improving balance, coordination, kinesthetic sense, posture, motor skill, proprioception and motor activation to allow for proper function of scapular, scapulothoracic and UE control with self care, carrying, lifting, driving/computer work.      Home Exercise Program:    [x] (27078) Reviewed/Progressed HEP activities related to strengthening, flexibility, endurance, ROM of scapular, scapulothoracic and UE control with self care, reaching, carrying, lifting, house/yardwork, driving/computer work  [] (62797) Reviewed/Progressed HEP activities related to improving balance, coordination, kinesthetic sense, posture, motor skill, proprioception of scapular, scapulothoracic and UE control with self care, reaching, carrying, lifting, house/yardwork, driving/computer work      Manual Treatments:  PROM / STM / Oscillations-Mobs:  G-I, II, III, IV (PA's, Inf., Post.)  [] (41682) Provided manual therapy to mobilize soft tissue/joints of cervical/CT, scapular GHJ and UE for the purpose of modulating pain, promoting relaxation,  increasing ROM, reducing/eliminating soft tissue swelling/inflammation/restriction, improving soft tissue extensibility and allowing for proper ROM for normal function with self care, reaching, carrying, lifting, house/yardwork, driving/computer work    Modalities:      Charges:  Timed Code Treatment Minutes: 50   Total Treatment Minutes: 50       [] EVAL (LOW) 66474 (typically 20 minutes face-to-face)  [] EVAL (MOD) 53972 (typically 30 minutes face-to-face)  [] EVAL (HIGH) 59350 (typically 45 minutes face-to-face)  [] RE-EVAL     [x] XN(93608) x   2  [] DRY NEEDLE 1 OR 2 MUSCLES  [] NMR (30703) x     [] DRY NEEDLE 3+ MUSCLES  [x] Manual (72307) x  1     [] TA (40202) x     [] Mech Traction (86668)  [] ES(attended) (57759)     [] ES (un) (01322):   [] VASO (90739)  [] Other:    If [facility] Please Indicate Time In/Out  CPT Code Time in Time out                                   GOALS:  Patient stated goal: regain use of shoulders  [] Progressing: [] Met: [] Not Met: [] Adjusted    Therapist goals for Patient:   Short Term Goals: To be achieved in: 2 weeks  1. Independent in HEP and progression per patient tolerance, in order to prevent re-injury. [] Progressing: [] Met: [] Not Met: [] Adjusted  2. Patient will have a decrease in pain to facilitate improvement in movement, function, and ADLs as indicated by Functional Deficits. [] Progressing: [] Met: [] Not Met: [] Adjusted    Long Term Goals:  To be achieved in: 12 weeks  1. Disability index score of 15% or less for the Quick DASH to assist with reaching prior level of function. [] Progressing: [] Met: [] Not Met: [] Adjusted  2. Patient will demonstrate increased AROM to 0- 160 L shoulder elevation and 0-140 R shoulder elevation to allow for proper joint functioning as indicated by Functional Deficits. [] Progressing: [] Met: [] Not Met: [] Adjusted  3. Patient will demonstrate an increase in NM recruitment/activation and overall GH and scapular strength to within n5lbs HHD or WNL for proper functional mobility as indicated by patients Functional Deficits. [] Progressing: [] Met: [] Not Met: [] Adjusted  4. Patient will return to lifting/reaching  activities without increased symptoms or restriction. [] Progressing: [] Met: [] Not Met: [] Adjusted  5. Patient will report being able to utilize UE for household chores without symptoms or restriction. (patient specific functional goal)     [] Progressing: [] Met: [] Not Met: [] Adjusted    ASSESSMENT:  Patient continues to demonstrate decreased ROM on R and decreased NM control with elevation. Tight with inferior mobs. Patient tolerated PROM to L shoulder with most restriction noted with ER. Progressed exercises for R shoulder. Cues throughout for decreased scapular hiking. Appropriately fatigued at conclusion. Updated HEP.     Return to Play: (if applicable)   []  Stage 1: Intro to Strength   []  Stage 2: Dynamic Strength and Intro to Plyometrics   []  Stage 3: Advanced Plyometrics and Intro to Throwing   []  Stage 4: Sport specific Training/Return to Sport     []  Ready to Return to Play, [employer]   []  Not Ready for Return to Sports   Comments:      Treatment/Activity Tolerance:  [x] Patient tolerated treatment well [] Patient limited by fatique  [] Patient limited by pain  [] Patient limited by other medical complications  [] Other:     Overall Progression Towards Functional goals/ Treatment Progress Update:  [] Patient is progressing as expected towards functional goals listed. [] Progression is slowed due to complexities/Impairments listed. [] Progression has been slowed due to co-morbidities. [x] Plan just implemented, too soon to assess goals progression <30days   [] Goals require adjustment due to lack of progress  [] Patient is not progressing as expected and requires additional follow up with physician  [] Other    Prognosis for POC: [x] Good [] Fair  [] Poor    Patient requires continued skilled intervention: [x] Yes  [] No      PLAN: See eval  [] Continue per plan of care [] Alter current plan (see comments)  [x] Plan of care initiated [] Hold pending MD visit [] Discharge    Electronically signed by: Prashant Munoz PT     Note: If patient does not return for scheduled/recommended follow up visits, this note will serve as a discharge from care along with the most recent update on progress.

## 2020-09-18 ENCOUNTER — HOSPITAL ENCOUNTER (OUTPATIENT)
Dept: PHYSICAL THERAPY | Age: 70
Setting detail: THERAPIES SERIES
Discharge: HOME | End: 2020-09-18
Payer: COMMERCIAL

## 2020-09-18 PROCEDURE — 97110 THERAPEUTIC EXERCISES: CPT

## 2020-09-18 PROCEDURE — 97140 MANUAL THERAPY 1/> REGIONS: CPT

## 2020-09-18 NOTE — FLOWSHEET NOTE
[place] [id number] [address]  Phone: [phone] Fax: [phone]    Physical Therapy Treatment Note/ Progress Report:     Date:  2020    Patient Name:  Lachelle Jerry    :  1950  MRN: [id number]  Restrictions/Precautions:    Medical/Treatment Diagnosis Information:  · Diagnosis: L RTC repair, R shoulder BRUCE  · Treatment Diagnosis: L RTC tear M75.122, L shoulder pain M25.512, R shoulder pain S27.028  Insurance/Certification information:  PT Insurance Information: Ste[employer] 42  Physician Information:  Referring Practitioner: Chel Marie of care signed (Y/N):     Date of Patient follow up with Physician:      Progress Report: [x]  Yes  []  No     Date Range for reporting period:  Beginnin2020  Ending:  -    Progress report due (10 Rx/or 30 days whichever is less):      Recertification due (POC duration/ or 90 days whichever is less): 2020     Visit # Insurance Allowable Auth Needed   3 [employer], med necessity ($40/co-pay) []Yes    [x]No     Pain level:  2/10     SUBJECTIVE:  Patient reports that her shoulders are feeling pretty good. Notes sleeping has been difficult still. Patient notes HEP is going good- doing better every day with it. Feels like the R has a little more motion. L is less sore overall.      OBJECTIVE: L PROM flexion 140 degrees; R PROM flexion 125 after mobilization   Observation:    Test measurements:      RESTRICTIONS/PRECAUTIONS: none    Exercises/Interventions:   Therapeutic Ex (65225)  Min: 25 min Sets/sec Reps CUES/Notes   UBE 1 4    Pendulum/Ball rolls      Cane AAROM press 1 6 L shoulder   Cane AAROM ER 1 15 L shoulder    3 way Isomet      T- band Row/pinch 1 15 R, green   T- band lower pinch 1 15 R, green   T- band ER activation      Supine SA punch AAROM on L, cues for R   SL ER/SL punch      Prone Rows/ext      Prone HAB/Prone Flex      Seated HH Depression      No Money Scap Wall Lat touches/wall walks            Standing flex/scap 1 15 1#, 100 degrees on R   Standing Punch      Lawnmower      Standing ER stretch 1 10 R   PROM standing table- bilat 1 10 L   Table slide 1 10 L   Wall slide flexion + stretch 1 10 R   Seated table ER (slide board)   L   Manual Intervention  ([id number])  Min: 20 min      Shld /GH Mobs 1 10 R shoulder   Post Cap mobs      Thoracic/Rib manipualtion      CT MT/Mobs      PROM MT 1 10 L shoulder   Elbow mobs            NMR re-education (31241)  Min:      T-spine Ext      GH depress/compress      Scap/GH NMR      Body blade      Wall ball roll      Wall Ball bounce      Ball drops      Cynthia Scap Bio      Floor Snow angels-sliders            Therapeutic Activity (08014)  Min:      UE throwing porgression      Dynamic UE stability      Earthquake Bar      Bodyblade                Therapeutic Exercise and NMR EXR  [x] (27463) Provided verbal/tactile cueing for activities related to strengthening, flexibility, endurance, ROM  for improvements in scapular, scapulothoracic and UE control with self care, reaching, carrying, lifting, house/yardwork, driving/computer work. [x] (68262) Provided verbal/tactile cueing for activities related to improving balance, coordination, kinesthetic sense, posture, motor skill, proprioception  to assist with  scapular, scapulothoracic and UE control with self care, reaching, carrying, lifting, house/yardwork, driving/computer work. Therapeutic Activities:    [] (05039 or 17141) Provided verbal/tactile cueing for activities related to improving balance, coordination, kinesthetic sense, posture, motor skill, proprioception and motor activation to allow for proper function of scapular, scapulothoracic and UE control with self care, carrying, lifting, driving/computer work.      Home Exercise Program:    [x] (06481) Reviewed/Progressed HEP activities related to strengthening, flexibility, endurance, ROM of scapular, scapulothoracic and UE control with self care, reaching, carrying, lifting, house/yardwork, driving/computer work  [] (95224) Reviewed/Progressed HEP activities related to improving balance, coordination, kinesthetic sense, posture, motor skill, proprioception of scapular, scapulothoracic and UE control with self care, reaching, carrying, lifting, house/yardwork, driving/computer work      Manual Treatments:  PROM / STM / Oscillations-Mobs:  G-I, II, III, IV (PA's, Inf., Post.)  [] ([id number]) Provided manual therapy to mobilize soft tissue/joints of cervical/CT, scapular GHJ and UE for the purpose of modulating pain, promoting relaxation,  increasing ROM, reducing/eliminating soft tissue swelling/inflammation/restriction, improving soft tissue extensibility and allowing for proper ROM for normal function with self care, reaching, carrying, lifting, house/yardwork, driving/computer work    Modalities:      Charges:  Timed Code Treatment Minutes: 45   Total Treatment Minutes: 46       [] EVAL (LOW) 36108 (typically 20 minutes face-to-face)  [] EVAL (MOD) 19780 (typically 30 minutes face-to-face)  [] EVAL (HIGH) 423 8935 (typically 45 minutes face-to-face)  [] RE-EVAL     [x] QT(37487) x   2  [] DRY NEEDLE 1 OR 2 MUSCLES  [] NMR (08778) x     [] DRY NEEDLE 3+ MUSCLES  [x] Manual (94464) x  1     [] TA (44548) x     [] Mech Traction (20187)  [] ES(attended) (86475)     [] ES (un) (91930):   [] VASO (90686)  [] Other:    If BW Please Indicate Time In/Out  CPT Code Time in Time out                                   GOALS:  Patient stated goal: regain use of shoulders  [] Progressing: [] Met: [] Not Met: [] Adjusted    Therapist goals for Patient:   Short Term Goals: To be achieved in: 2 weeks  1. Independent in HEP and progression per patient tolerance, in order to prevent re-injury. [] Progressing: [] Met: [] Not Met: [] Adjusted  2.  Patient will have a decrease in pain to facilitate improvement in movement, function, and ADLs as indicated by Functional Deficits. [] Progressing: [] Met: [] Not Met: [] Adjusted    Long Term Goals: To be achieved in: 12 weeks  1. Disability index score of 15% or less for the Quick DASH to assist with reaching prior level of function. [] Progressing: [] Met: [] Not Met: [] Adjusted  2. Patient will demonstrate increased AROM to 0- 160 L shoulder elevation and 0-140 R shoulder elevation to allow for proper joint functioning as indicated by Functional Deficits. [] Progressing: [] Met: [] Not Met: [] Adjusted  3. Patient will demonstrate an increase in NM recruitment/activation and overall GH and scapular strength to within n5lbs HHD or WNL for proper functional mobility as indicated by patients Functional Deficits. [] Progressing: [] Met: [] Not Met: [] Adjusted  4. Patient will return to lifting/reaching  activities without increased symptoms or restriction. [] Progressing: [] Met: [] Not Met: [] Adjusted  5. Patient will report being able to utilize UE for household chores without symptoms or restriction. (patient specific functional goal)     [] Progressing: [] Met: [] Not Met: [] Adjusted    ASSESSMENT:  Patient continues to demonstrate decreased ROM on R and decreased NM control with elevation. Tight with inferior mobs. Patient tolerated PROM to L shoulder with most restriction noted with ER. Progressed exercises for R shoulder. Cues throughout for decreased scapular hiking. Appropriately fatigued at conclusion. Updated HEP.     Return to Play: (if applicable)   []  Stage 1: Intro to Strength   []  Stage 2: Dynamic Strength and Intro to Plyometrics   []  Stage 3: Advanced Plyometrics and Intro to Throwing   []  Stage 4: Sport specific Training/Return to Sport     []  Ready to Return to Play, [employer]   []  Not Ready for Return to Sports   Comments:      Treatment/Activity Tolerance:  [x] Patient tolerated treatment well [] Patient limited by fatique  [] Patient limited by pain  [] Patient limited

## 2020-09-23 ENCOUNTER — HOSPITAL ENCOUNTER (OUTPATIENT)
Dept: PHYSICAL THERAPY | Age: 70
Setting detail: THERAPIES SERIES
Discharge: HOME | End: 2020-09-23
Payer: COMMERCIAL

## 2020-09-23 ENCOUNTER — OFFICE VISIT (OUTPATIENT)
Dept: ORTHOPEDIC SURGERY | Age: 70
End: 2020-09-23

## 2020-09-23 PROCEDURE — 97140 MANUAL THERAPY 1/> REGIONS: CPT

## 2020-09-23 PROCEDURE — 97110 THERAPEUTIC EXERCISES: CPT

## 2020-09-23 PROCEDURE — 99024 POSTOP FOLLOW-UP VISIT: CPT | Performed by: ORTHOPAEDIC SURGERY

## 2020-09-23 NOTE — FLOWSHEET NOTE
[place] [address] [address]  Phone: [phone] Fax: [phone]    Physical Therapy Treatment Note/ Progress Report:     Date:  2020    Patient Name:  Zara Collins    :  1950  MRN: [id number]  Restrictions/Precautions:    Medical/Treatment Diagnosis Information:  · Diagnosis: L RTC repair, R shoulder BRUCE  · Treatment Diagnosis: L RTC tear M75.122, L shoulder pain M25.512, R shoulder pain X30.297  Insurance/Certification information:  PT Insurance Information: Stef[employer] 42  Physician Information:  Referring Practitioner: Steve Zurita of care signed (Y/N):     Date of Patient follow up with Physician:      Progress Report: [x]  Yes  []  No     Date Range for reporting period:  Beginnin2020  Ending:  -    Progress report due (10 Rx/or 30 days whichever is less): 4163     Recertification due (POC duration/ or 90 days whichever is less): 2020     Visit # Insurance Allowable Auth Needed   4 [employer], med necessity ($40/co-pay) []Yes    [x]No     Pain level:  0/10; just sore in upper arm but not pain     SUBJECTIVE:  Patient reports that her shoulders are feeling pretty good. Reports that she has been working on HEP but L is having discomfort with some of the exercises. Had follow up with MD today and he is pleased with progress. Is allowed to come out of sling more throughout day and will continue to use sling at night until week 6.       OBJECTIVE: L PROM flexion 140 degrees; R PROM flexion 125 after mobilization   Observation:    Test measurements:      RESTRICTIONS/PRECAUTIONS: none    Exercises/Interventions:   Therapeutic Ex (63669)  Min: 25 min Sets/sec Reps CUES/Notes   UBE 1 4    Pendulum/Ball rolls      Cane AAROM press/flexion 1 10    Cane AAROM ER 1 15    3 way Isomet      T- band Row/pinch 1 15 red   T- band lower pinch 1 15 red   T- band ER activation      Supine SA punch AAROM on L, cues for R SL AAROM punch 1 15    SL AAROM sh abd (chicken wing) 1 15    SL AAROM sh flexion 1 15          SL ER/SL punch 1 15    Prone Rows/ext      Prone HAB/Prone Flex      Seated HH Depression      No Money      Scap Wall Lat touches/wall walks            Standing flex/scap   1#, 100 degrees on R   Standing Punch      Lawnmower      Standing ER stretch PROM standing table- bilat Table slide Wall slide flexion + stretch Seated table ER (slide board)   L   Manual Intervention  (43160)  Min: 20 min      Shld /GH Mobs 1 10 R shoulder   Post Cap mobs      Thoracic/Rib manipualtion      CT MT/Mobs      PROM MT 1 10 L shoulder   Elbow mobs            NMR re-education (93667)  Min:      T-spine Ext      GH depress/compress      Scap/GH NMR      Body blade      Wall ball roll      Wall Ball bounce      Ball drops      Cynthia Scap Bio      Floor Snow angels-sliders            Therapeutic Activity (24085)  Min:      UE throwing porgression      Dynamic UE stability      Earthquake Bar      Bodyblade                Therapeutic Exercise and NMR EXR  [x] (94889) Provided verbal/tactile cueing for activities related to strengthening, flexibility, endurance, ROM  for improvements in scapular, scapulothoracic and UE control with self care, reaching, carrying, lifting, house/yardwork, driving/computer work. [x] (06342) Provided verbal/tactile cueing for activities related to improving balance, coordination, kinesthetic sense, posture, motor skill, proprioception  to assist with  scapular, scapulothoracic and UE control with self care, reaching, carrying, lifting, house/yardwork, driving/computer work.     Therapeutic Activities:    [] (91726 or 36024) Provided verbal/tactile cueing for activities related to improving balance, coordination, kinesthetic sense, posture, motor skill, proprioception and motor activation to allow for proper function of scapular, scapulothoracic and UE control with self care, carrying, lifting, driving/computer work.     Home Exercise Program:    [x] (97670) Reviewed/Progressed HEP activities related to strengthening, flexibility, endurance, ROM of scapular, scapulothoracic and UE control with self care, reaching, carrying, lifting, house/yardwork, driving/computer work  [] (00618) Reviewed/Progressed HEP activities related to improving balance, coordination, kinesthetic sense, posture, motor skill, proprioception of scapular, scapulothoracic and UE control with self care, reaching, carrying, lifting, house/yardwork, driving/computer work      Manual Treatments:  PROM / STM / Oscillations-Mobs:  G-I, II, III, IV (PA's, Inf., Post.)  [x] (01219) Provided manual therapy to mobilize soft tissue/joints of cervical/CT, scapular GHJ and UE for the purpose of modulating pain, promoting relaxation,  increasing ROM, reducing/eliminating soft tissue swelling/inflammation/restriction, improving soft tissue extensibility and allowing for proper ROM for normal function with self care, reaching, carrying, lifting, house/yardwork, driving/computer work    Modalities:      Charges:  Timed Code Treatment Minutes: 45   Total Treatment Minutes: 46       [] EVAL (LOW) 99151 (typically 20 minutes face-to-face)  [] EVAL (MOD) 80148 (typically 30 minutes face-to-face)  [] EVAL (HIGH) 89625 (typically 45 minutes face-to-face)  [] RE-EVAL     [x] TU(15648) x   2  [] DRY NEEDLE 1 OR 2 MUSCLES  [] NMR (28614) x     [] DRY NEEDLE 3+ MUSCLES  [x] Manual (72099) x  1     [] TA (21496) x     [] [facility] (58218[facility]  [] ES(attended) (02810)     [] ES (un) (98444):   [] VASO (63438)  [] Other:    If [facility] Please Indicate Time In/Out  CPT Code Time in Time out                                   GOALS:  Patient stated goal: regain use of shoulders  [] Progressing: [] Met: [] Not Met: [] Adjusted    Therapist goals for Patient:   Short Term Goals: To be achieved in: 2 weeks  1.  Independent in HEP and progression per patient tolerance, in order to prevent Ready for Return to Sports   Comments:      Treatment/Activity Tolerance:  [x] Patient tolerated treatment well [] Patient limited by fatique  [] Patient limited by pain  [] Patient limited by other medical complications  [] Other:     Overall Progression Towards Functional goals/ Treatment Progress Update:  [] Patient is progressing as expected towards functional goals listed. [] Progression is slowed due to complexities/Impairments listed. [] Progression has been slowed due to co-morbidities. [x] Plan just implemented, too soon to assess goals progression <30days   [] Goals require adjustment due to lack of progress  [] Patient is not progressing as expected and requires additional follow up with physician  [] Other    Prognosis for POC: [x] Good [] Fair  [] Poor    Patient requires continued skilled intervention: [x] Yes  [] No      PLAN: See eval  [] Continue per plan of care [] Alter current plan (see comments)  [x] Plan of care initiated [] Hold pending MD visit [] Discharge    Electronically signed by: Miriam Chandler PT     Note: If patient does not return for scheduled/recommended follow up visits, this note will serve as a discharge from care along with the most recent update on progress.

## 2020-09-23 NOTE — PROGRESS NOTES
9/23/20  History of Present of Illness:  S/P Rotator Cuff Repair  The patient returns today for left shoulder evaluation 4-1/2 weeks after 8/21/2020 rotator cuff repair    Examination:  Inspection reveals warm, dry, intact skin. There is no adenopathy. The distal neurovascular exam is grossly intact. Examination of the contralateral shoulder reveals no atrophy or deformity. The skin is warm and dry. Range of motion is within normal limits. There is no focal tenderness with palpation. Provocative SLAP, biceps tension, apprehension AC joint or rotator cuff tests are negative. Strength is graded 5/5 in all muscle groups outside of the rotator cuff. Rotator cuff strength is tested and internal and external rotation both are very strong I did not want a test supraspinatus isolation yet it is too early. The distal neurovascular exam is grossly intact. Cervical spine: The skin is warm and dry. There is no swelling, warmth, or erythema. Range of motion is within normal limits. There is no paraspinal or spinous process tenderness. Spurling's sign is negative and did not produce shoulder pain. The distal neurovascular exam is grossly intact. Diagnostic Test Findings:    No orders of the defined types were placed in this encounter. Treatment Plan:  She is having bilateral knee pain also will get a see her next visit and x-ray her knees were also going to continue physical therapy for her shoulder and have her wear the sling for another 10 days at night and then when I see her back will get her back to doing more regular activities      [facility]  Sports Fellowship Trained  Board Certified  [employer] Team Physician    Disclaimer: \"This note was dictated with voice recognition software. Though review and correction are routine, we apologize for any errors. \"

## 2020-10-01 ENCOUNTER — HOSPITAL ENCOUNTER (OUTPATIENT)
Dept: PHYSICAL THERAPY | Age: 70
Setting detail: THERAPIES SERIES
Discharge: HOME | End: 2020-10-01
Payer: COMMERCIAL

## 2020-10-01 PROCEDURE — 97110 THERAPEUTIC EXERCISES: CPT

## 2020-10-01 PROCEDURE — 97140 MANUAL THERAPY 1/> REGIONS: CPT

## 2020-10-01 NOTE — FLOWSHEET NOTE
[place] [address] [facility]  Phone: [phone] Fax: [phone]    Physical Therapy Treatment Note/ Progress Report:     Date:  10/1/2020    Patient Name:  Rodell Galeazzi    :  1950  MRN: [id number]  Restrictions/Precautions:    Medical/Treatment Diagnosis Information:  · Diagnosis: L RTC repair, R shoulder BRUCE  · Treatment Diagnosis: L RTC tear M75.122, L shoulder pain M25.512, R shoulder pain D18.617  Insurance/Certification information:  PT Insurance Information: Shayna Ruiz  Physician Information:  Referring Practitioner: Susan Dobbs of care signed (Y/N):     Date of Patient follow up with Physician:      Progress Report: [x]  Yes  []  No     Date Range for reporting period:  Beginnin2020  Ending:  -    Progress report due (10 Rx/or 30 days whichever is less):      Recertification due (POC duration/ or 90 days whichever is less): 2020     Visit # Insurance Allowable Auth Needed   5 [employer], med necessity ($40/co-pay) []Yes    [x]No     Pain level:  0/10; just sore in upper arm but not pain     SUBJECTIVE:  Patient reports that her shoulders are feeling pretty good. Pt notes occasional throbbing every once in a while in both shoulders but goes away.      OBJECTIVE: L PROM flexion 140 degrees; R PROM flexion 125 after mobilization   Observation:    Test measurements:      RESTRICTIONS/PRECAUTIONS: none    Exercises/Interventions:   Therapeutic Ex (51520)  Min: 20 min Sets/sec Reps CUES/Notes   UBE 1 4    Pendulum/Ball rolls      Cane AAROM press/flexion 1 10    Cane AAROM ER 1 15    3 way Isomet      T- band Row/pinch 1 15 HEP-red   T- band lower pinch 1 15 HEP-red   T- band ER activation      Supine SA punch AAROM on L, cues for R                     Prone Rows/ext      Prone HAB/Prone Flex      Seated HH Depression      No Money      Scap Wall Lat touches/wall walks            Standing flex/scap 1#, 100 degrees on R   Standing Punch      Lawnmower      Standing ER stretch PROM standing table- bilat Table slide 1 10 Elevated 45 deg, L   Wall slide flexion + stretch Seated table ER (slide board)   L   Manual Intervention  ([id number])  Min: 25 min      Shld /GH Mobs 1 13 R shoulder   Post Cap mobs      Thoracic/Rib manipualtion      CT MT/Mobs      PROM MT 1 12 L shoulder   Elbow mobs            NMR re-education (90534)  Min:      T-spine Ext      GH depress/compress      Scap/GH NMR      Body blade      Wall ball roll      Wall Ball bounce      Ball drops      Cynthia Scap Bio      Floor Snow angels-sliders            Therapeutic Activity (89057)  Min:      UE throwing porgression      Dynamic UE stability      Earthquake Bar      Bodyblade                Therapeutic Exercise and NMR EXR  [x] (28639) Provided verbal/tactile cueing for activities related to strengthening, flexibility, endurance, ROM  for improvements in scapular, scapulothoracic and UE control with self care, reaching, carrying, lifting, house/yardwork, driving/computer work. [x] (16948) Provided verbal/tactile cueing for activities related to improving balance, coordination, kinesthetic sense, posture, motor skill, proprioception  to assist with  scapular, scapulothoracic and UE control with self care, reaching, carrying, lifting, house/yardwork, driving/computer work. Therapeutic Activities:    [] (80456 or 14216) Provided verbal/tactile cueing for activities related to improving balance, coordination, kinesthetic sense, posture, motor skill, proprioception and motor activation to allow for proper function of scapular, scapulothoracic and UE control with self care, carrying, lifting, driving/computer work.      Home Exercise Program:    [x] (08295) Reviewed/Progressed HEP activities related to strengthening, flexibility, endurance, ROM of scapular, scapulothoracic and UE control with self care, reaching, carrying, lifting, house/yardwork, driving/computer work  [] (47094) Reviewed/Progressed HEP activities related to improving balance, coordination, kinesthetic sense, posture, motor skill, proprioception of scapular, scapulothoracic and UE control with self care, reaching, carrying, lifting, house/yardwork, driving/computer work      Manual Treatments:  PROM / STM / Oscillations-Mobs:  G-I, II, III, IV (PA's, Inf., Post.)  [x] (73363) Provided manual therapy to mobilize soft tissue/joints of cervical/CT, scapular GHJ and UE for the purpose of modulating pain, promoting relaxation,  increasing ROM, reducing/eliminating soft tissue swelling/inflammation/restriction, improving soft tissue extensibility and allowing for proper ROM for normal function with self care, reaching, carrying, lifting, house/yardwork, driving/computer work    Modalities:      Charges:  Timed Code Treatment Minutes: 45   Total Treatment Minutes: 45       [] EVAL (LOW) 05339 (typically 20 minutes face-to-face)  [] EVAL (MOD) 30950 (typically 30 minutes face-to-face)  [] EVAL (HIGH) 423 8935 (typically 45 minutes face-to-face)  [] RE-EVAL     [x] TT(85634) x   1  [] DRY NEEDLE 1 OR 2 MUSCLES  [] NMR (50574) x     [] DRY NEEDLE 3+ MUSCLES  [x] Manual (78414) x  2     [] TA (87792) x     [] Mech Traction (98867)  [] ES(attended) (75251)     [] ES (un) (50540):   [] VASO (58791)  [] Other:    If [facility] Please Indicate Time In/Out  CPT Code Time in Time out                                   GOALS:  Patient stated goal: regain use of shoulders  [] Progressing: [] Met: [] Not Met: [] Adjusted    Therapist goals for Patient:   Short Term Goals: To be achieved in: 2 weeks  1. Independent in HEP and progression per patient tolerance, in order to prevent re-injury. [] Progressing: [] Met: [] Not Met: [] Adjusted  2. Patient will have a decrease in pain to facilitate improvement in movement, function, and ADLs as indicated by Functional Deficits.   [] Progressing: [] Met: [] Not Met: [] Adjusted    Long Term Goals: To be achieved in: 12 weeks  1. Disability index score of 15% or less for the Quick DASH to assist with reaching prior level of function. [] Progressing: [] Met: [] Not Met: [] Adjusted  2. Patient will demonstrate increased AROM to 0- 160 L shoulder elevation and 0-140 R shoulder elevation to allow for proper joint functioning as indicated by Functional Deficits. [] Progressing: [] Met: [] Not Met: [] Adjusted  3. Patient will demonstrate an increase in NM recruitment/activation and overall GH and scapular strength to within n5lbs HHD or WNL for proper functional mobility as indicated by patients Functional Deficits. [] Progressing: [] Met: [] Not Met: [] Adjusted  4. Patient will return to lifting/reaching  activities without increased symptoms or restriction. [] Progressing: [] Met: [] Not Met: [] Adjusted  5. Patient will report being able to utilize UE for household chores without symptoms or restriction. (patient specific functional goal)     [] Progressing: [] Met: [] Not Met: [] Adjusted    ASSESSMENT:  Patient continues to demonstrate decreased ROM on R at level of joint and soft tissue. R shoulder ROM improved after PROM but still limited. Pt had increased discomfort during wand exercises in L shoulder and required several rest breaks. Pt notes pain in delt/bicep area with chest press and flexion. Pt educated to continue current HEP to further improve bilateral shoulder ROM. Continue to progress as indicated. Patient will be 6 weeks post op next week, so will plan to progress program further and indicated and tolerated by patient.      Return to Play: (if applicable)   []  Stage 1: Intro to Strength   []  Stage 2: Dynamic Strength and Intro to Plyometrics   []  Stage 3: Advanced Plyometrics and Intro to Throwing   []  Stage 4: Sport specific Training/Return to Sport     []  Ready to Return to Play, [employer]   []  Not Ready for Return to Sports   Comments: Treatment/Activity Tolerance:  [x] Patient tolerated treatment well [] Patient limited by fatique  [] Patient limited by pain  [] Patient limited by other medical complications  [] Other:     Overall Progression Towards Functional goals/ Treatment Progress Update:  [] Patient is progressing as expected towards functional goals listed. [] Progression is slowed due to complexities/Impairments listed. [] Progression has been slowed due to co-morbidities. [x] Plan just implemented, too soon to assess goals progression <30days   [] Goals require adjustment due to lack of progress  [] Patient is not progressing as expected and requires additional follow up with physician  [] Other    Prognosis for POC: [x] Good [] Fair  [] Poor    Patient requires continued skilled intervention: [x] Yes  [] No      PLAN: See eval  [] Continue per plan of care [] Alter current plan (see comments)  [x] Plan of care initiated [] Hold pending MD visit [] Discharge    Electronically signed by: Buck Orona, SPT  Therapist was present, directed the patient's care, made skilled judgement, and was responsible for assessment and treatment of the patient      Note: If patient does not return for scheduled/recommended follow up visits, this note will serve as a discharge from care along with the most recent update on progress.

## 2020-10-08 ENCOUNTER — HOSPITAL ENCOUNTER (OUTPATIENT)
Dept: PHYSICAL THERAPY | Age: 70
Setting detail: THERAPIES SERIES
Discharge: HOME | End: 2020-10-08
Payer: COMMERCIAL

## 2020-10-08 PROCEDURE — 97140 MANUAL THERAPY 1/> REGIONS: CPT

## 2020-10-08 PROCEDURE — 97110 THERAPEUTIC EXERCISES: CPT

## 2020-10-08 NOTE — FLOWSHEET NOTE
punch 1 15    Prone Rows/ext      Prone HAB/Prone Flex      Seated HH Depression      No Money      Scap Wall Lat touches/wall walks            Standing flex/scap   1#, 100 degrees on R   Standing Punch      Lawnmower 2 10 0#L, 3#R   Table slide 1 10 Elevated 45 deg, L   Wall slide flexion + stretch 1 10 R   Manual Intervention  (73746)  Min: 20 min      Shld /GH Mobs 1 8' B inferior   Post Cap mobs      Thoracic/Rib manipualtion      CT MT/Mobs      PROM MT 1 7' R/L shoulder   STM/IASTM 1 5' L bicep         NMR re-education (61353)  Min:      T-spine Ext      GH depress/compress      Scap/GH NMR      Body blade      Wall ball roll      Wall Ball bounce      Ball drops      Cynthia Scap Bio      Floor Snow angels-sliders            Therapeutic Activity (81562)  Min:      UE throwing porgression      Dynamic UE stability      Earthquake Bar      Bodyblade                Therapeutic Exercise and NMR EXR  [x] (72931) Provided verbal/tactile cueing for activities related to strengthening, flexibility, endurance, ROM  for improvements in scapular, scapulothoracic and UE control with self care, reaching, carrying, lifting, house/yardwork, driving/computer work. [x] (42277) Provided verbal/tactile cueing for activities related to improving balance, coordination, kinesthetic sense, posture, motor skill, proprioception  to assist with  scapular, scapulothoracic and UE control with self care, reaching, carrying, lifting, house/yardwork, driving/computer work. Therapeutic Activities:    [] (68294 or 04334) Provided verbal/tactile cueing for activities related to improving balance, coordination, kinesthetic sense, posture, motor skill, proprioception and motor activation to allow for proper function of scapular, scapulothoracic and UE control with self care, carrying, lifting, driving/computer work.      Home Exercise Program:    [x] (89639) Reviewed/Progressed HEP activities related to strengthening, flexibility, endurance, ROM of scapular, scapulothoracic and UE control with self care, reaching, carrying, lifting, house/yardwork, driving/computer work  [] (84484) Reviewed/Progressed HEP activities related to improving balance, coordination, kinesthetic sense, posture, motor skill, proprioception of scapular, scapulothoracic and UE control with self care, reaching, carrying, lifting, house/yardwork, driving/computer work      Manual Treatments:  PROM / STM / Oscillations-Mobs:  G-I, II, III, IV (PA's, Inf., Post.)  [x] (86075) Provided manual therapy to mobilize soft tissue/joints of cervical/CT, scapular GHJ and UE for the purpose of modulating pain, promoting relaxation,  increasing ROM, reducing/eliminating soft tissue swelling/inflammation/restriction, improving soft tissue extensibility and allowing for proper ROM for normal function with self care, reaching, carrying, lifting, house/yardwork, driving/computer work    Modalities:      Charges:  Timed Code Treatment Minutes: 45   Total Treatment Minutes: 45       [] EVAL (LOW) 75347 (typically 20 minutes face-to-face)  [] EVAL (MOD) 34857 (typically 30 minutes face-to-face)  [] EVAL (HIGH) 58402 (typically 45 minutes face-to-face)  [] RE-EVAL     [x] AT(27572) x   2  [] DRY NEEDLE 1 OR 2 MUSCLES  [] NMR (81093) x     [] DRY NEEDLE 3+ MUSCLES  [x] Manual (34267) x  1     [] TA (61417) x     [] Mech Traction (83152)  [] ES(attended) (02956)     [] ES (un) (38700):   [] VASO (23230)  [] Other:    If [facility] Please Indicate Time In/Out  CPT Code Time in Time out                                   GOALS:  Patient stated goal: regain use of shoulders  [] Progressing: [] Met: [] Not Met: [] Adjusted    Therapist goals for Patient:   Short Term Goals: To be achieved in: 2 weeks  1. Independent in HEP and progression per patient tolerance, in order to prevent re-injury. [] Progressing: [] Met: [] Not Met: [] Adjusted  2.  Patient will have a decrease in pain to facilitate improvement in movement, function, and ADLs as indicated by Functional Deficits. [] Progressing: [] Met: [] Not Met: [] Adjusted    Long Term Goals: To be achieved in: 12 weeks  1. Disability index score of 15% or less for the Quick DASH to assist with reaching prior level of function. [] Progressing: [] Met: [] Not Met: [] Adjusted  2. Patient will demonstrate increased AROM to 0- 160 L shoulder elevation and 0-140 R shoulder elevation to allow for proper joint functioning as indicated by Functional Deficits. [] Progressing: [] Met: [] Not Met: [] Adjusted  3. Patient will demonstrate an increase in NM recruitment/activation and overall GH and scapular strength to within n5lbs HHD or WNL for proper functional mobility as indicated by patients Functional Deficits. [] Progressing: [] Met: [] Not Met: [] Adjusted  4. Patient will return to lifting/reaching  activities without increased symptoms or restriction. [] Progressing: [] Met: [] Not Met: [] Adjusted  5. Patient will report being able to utilize UE for household chores without symptoms or restriction. (patient specific functional goal)     [] Progressing: [] Met: [] Not Met: [] Adjusted    ASSESSMENT:  Patient tolerated session well with improvement of soreness in L shoulder following STM. Pt able to perform additional strengthening exercises with minimal cueing for scapula positioning. Continue to progress as tolerated.      Return to Play: (if applicable)   []  Stage 1: Intro to Strength   []  Stage 2: Dynamic Strength and Intro to Plyometrics   []  Stage 3: Advanced Plyometrics and Intro to Throwing   []  Stage 4: Sport specific Training/Return to Sport     []  Ready to Return to Play, [employer]   []  Not Ready for Return to Sports   Comments:      Treatment/Activity Tolerance:  [x] Patient tolerated treatment well [] Patient limited by fatique  [] Patient limited by pain  [] Patient limited by other medical complications  [] Other:     Overall Progression Towards Functional goals/ Treatment Progress Update:  [] Patient is progressing as expected towards functional goals listed. [] Progression is slowed due to complexities/Impairments listed. [] Progression has been slowed due to co-morbidities. [x] Plan just implemented, too soon to assess goals progression <30days   [] Goals require adjustment due to lack of progress  [] Patient is not progressing as expected and requires additional follow up with physician  [] Other    Prognosis for POC: [x] Good [] Fair  [] Poor    Patient requires continued skilled intervention: [x] Yes  [] No      PLAN: See eval  [] Continue per plan of care [] Alter current plan (see comments)  [x] Plan of care initiated [] Hold pending MD visit [] Discharge    Electronically signed by: Sonia Naranjo, MOUSTAPHA  Therapist was present, directed the patient's care, made skilled judgement, and was responsible for assessment and treatment of the patient      Note: If patient does not return for scheduled/recommended follow up visits, this note will serve as a discharge from care along with the most recent update on progress.

## 2020-10-15 ENCOUNTER — HOSPITAL ENCOUNTER (OUTPATIENT)
Dept: PHYSICAL THERAPY | Age: 70
Setting detail: THERAPIES SERIES
Discharge: HOME | End: 2020-10-15
Payer: COMMERCIAL

## 2020-10-15 PROCEDURE — 97140 MANUAL THERAPY 1/> REGIONS: CPT

## 2020-10-15 PROCEDURE — 97110 THERAPEUTIC EXERCISES: CPT

## 2020-10-15 NOTE — FLOWSHEET NOTE
[place] [address] [facility]  Phone: [phone] Fax: [phone]    Physical Therapy Treatment Note/ Progress Report:     Date:  10/15/2020    Patient Name:  Rodell Galeazzi    :  1950  MRN: [id number]  Restrictions/Precautions:    Medical/Treatment Diagnosis Information:  · Diagnosis: L RTC repair, R shoulder BRUCE  · Treatment Diagnosis: L RTC tear M75.122, L shoulder pain M25.512, R shoulder pain S86.870  Insurance/Certification information:  PT Insurance Information: Stefano 42  Physician Information:  Referring Practitioner: Susan Dobbs of care signed (Y/N):     Date of Patient follow up with Physician:      Progress Report: [x]  Yes  []  No     Date Range for reporting period:  Beginnin2020  Ending:  -    Progress report due (10 Rx/or 30 days whichever is less): 8930     Recertification due (POC duration/ or 90 days whichever is less): 2020     Visit # Insurance Allowable Auth Needed   7 [employer], med necessity ($40/co-pay) []Yes    [x]No     Pain level:  0/10; just tender in upper arm but not pain     SUBJECTIVE:  Patient reports that she gets a \"catch\" in the L shoulder at times, but not with a consistent motion or exercise.                       OBJECTIVE: L PROM flexion 140 degrees; R PROM flexion 125 after mobilization      Observation:    Test measurements:      RESTRICTIONS/PRECAUTIONS: none    Exercises/Interventions:   Therapeutic Ex (67436)  Min: 25 min Sets/sec Reps CUES/Notes   UBE 1 4    Pendulum/Ball rolls      Cane AAROM press/flexion 1 10    Cane AAROM ER 1 15    3 way Isomet      T- band Row/pinch 1 15 HEP-red   T- band lower pinch 1 15 HEP-red   T- band ER activation 1 15 Red    Supine SA punch AAROM on L, cues for R   SL AAROM punch 1 15    SL AAROM sh abd (chicken wing) 1 15    SL AAROM sh flexion 1 15          SL ER/SL punch 1 15    Prone Rows/ext      Prone HAB/Prone Flex      Seated HH Depression      No Money      Scap Wall Lat touches/wall walks            Standing flex/scap   1#, 100 degrees on R   Standing Punch      Lawnmower 2 10 0#L, 3#R   Table slide 1 10 Elevated 45 deg, L   Wall slide flexion + stretch 1 10 R   Manual Intervention  (29915)  Min: 20 min      Shld /GH Mobs 1 8' B inferior   Post Cap mobs      Thoracic/Rib manipualtion      CT MT/Mobs      PROM MT 1 7' R/L shoulder   STM/IASTM 1 5' L bicep         NMR re-education (13325)  Min:      T-spine Ext      GH depress/compress      Scap/GH NMR      Body blade      Wall ball roll      Wall Ball bounce      Ball drops      Cynthia Scap Bio      Floor Snow angels-sliders            Therapeutic Activity (55491)  Min:      UE throwing porgression      Dynamic UE stability      Earthquake Bar      Bodyblade                Therapeutic Exercise and NMR EXR  [x] (87569) Provided verbal/tactile cueing for activities related to strengthening, flexibility, endurance, ROM  for improvements in scapular, scapulothoracic and UE control with self care, reaching, carrying, lifting, house/yardwork, driving/computer work. [x] (57136) Provided verbal/tactile cueing for activities related to improving balance, coordination, kinesthetic sense, posture, motor skill, proprioception  to assist with  scapular, scapulothoracic and UE control with self care, reaching, carrying, lifting, house/yardwork, driving/computer work. Therapeutic Activities:    [] (89591 or 16618) Provided verbal/tactile cueing for activities related to improving balance, coordination, kinesthetic sense, posture, motor skill, proprioception and motor activation to allow for proper function of scapular, scapulothoracic and UE control with self care, carrying, lifting, driving/computer work.      Home Exercise Program:    [x] (35993) Reviewed/Progressed HEP activities related to strengthening, flexibility, endurance, ROM of scapular, scapulothoracic and UE control with self care, Progressing: [] Met: [] Not Met: [] Adjusted    Long Term Goals: To be achieved in: 12 weeks  1. Disability index score of 15% or less for the Quick DASH to assist with reaching prior level of function. [] Progressing: [] Met: [] Not Met: [] Adjusted  2. Patient will demonstrate increased AROM to 0- 160 L shoulder elevation and 0-140 R shoulder elevation to allow for proper joint functioning as indicated by Functional Deficits. [] Progressing: [] Met: [] Not Met: [] Adjusted  3. Patient will demonstrate an increase in NM recruitment/activation and overall GH and scapular strength to within n5lbs HHD or WNL for proper functional mobility as indicated by patients Functional Deficits. [] Progressing: [] Met: [] Not Met: [] Adjusted  4. Patient will return to lifting/reaching  activities without increased symptoms or restriction. [] Progressing: [] Met: [] Not Met: [] Adjusted  5. Patient will report being able to utilize UE for household chores without symptoms or restriction. (patient specific functional goal)     [] Progressing: [] Met: [] Not Met: [] Adjusted    ASSESSMENT:  Pt had some generalized soreness in the L arm by end of session and plans to ice when she gets home. Pt able to perform additional strengthening exercises with minimal cueing for scapula positioning. Continue to progress as tolerated.      Return to Play: (if applicable)   []  Stage 1: Intro to Strength   []  Stage 2: Dynamic Strength and Intro to Plyometrics   []  Stage 3: Advanced Plyometrics and Intro to Throwing   []  Stage 4: Sport specific Training/Return to Sport     []  Ready to Return to Play, [employer]   []  Not Ready for Return to Sports   Comments:      Treatment/Activity Tolerance:  [x] Patient tolerated treatment well [] Patient limited by fatique  [] Patient limited by pain  [] Patient limited by other medical complications  [] Other:     Overall Progression Towards Functional goals/ Treatment Progress

## 2020-10-22 ENCOUNTER — HOSPITAL ENCOUNTER (OUTPATIENT)
Dept: PHYSICAL THERAPY | Age: 70
Setting detail: THERAPIES SERIES
Discharge: HOME | End: 2020-10-22
Payer: COMMERCIAL

## 2020-10-22 PROCEDURE — 97140 MANUAL THERAPY 1/> REGIONS: CPT

## 2020-10-22 PROCEDURE — 97110 THERAPEUTIC EXERCISES: CPT

## 2020-10-22 NOTE — FLOWSHEET NOTE
[facility] [address]  Phone: [phone] Fax: [phone]    Physical Therapy Treatment Note/ Progress Report:     Date:  10/22/2020    Patient Name:  Irene Ochoa    :  1950  MRN: [id number]  Restrictions/Precautions:    Medical/Treatment Diagnosis Information:  · Diagnosis: L RTC repair, R shoulder BRUCE  · Treatment Diagnosis: L RTC tear M75.122, L shoulder pain M25.512, R shoulder pain P60.144  Insurance/Certification information:  PT Insurance Information: [facility]  Physician Information:  Referring Practitioner: Jacques Glover  care signed (Y/N):     Date of Patient follow up with Physician:      Progress Report: [x]  Yes  []  No     Date Range for reporting period:  Beginnin2020  Ending:  -    Progress report due (10 Rx/or 30 days whichever is less): 47/3/0007     Recertification due (POC duration/ or 90 days whichever is less): 2020     Visit # Insurance Allowable Auth Needed   8 [employer], med necessity ($40/co-pay) []Yes    [x]No     Pain level:  0/10; just tender in upper arm but not pain     SUBJECTIVE:  Patient reports that her shoulder is doing well. Still has some soreness at the top of shoulder.                     OBJECTIVE: L PROM flexion 140 degrees; R PROM flexion 125 after mobilization      Observation:    Test measurements:      RESTRICTIONS/PRECAUTIONS: none    Exercises/Interventions:   Therapeutic Ex (34406)  Min: 25 min Sets/sec Reps CUES/Notes   UBE 1 4    Pendulum/Ball rolls      Cane AAROM press/flexion 1 10    Cane AAROM ER 1 15    3 way Isomet      T- band Row/pinch 1 15 HEP-red   T- band lower pinch 1 15 HEP-red   T- band ER activation 1 15 Red    Supine SA punch AAROM on L, cues for R   SL AAROM punch 1 15    SL AAROM sh abd (chicken wing) 1 15    SL AAROM sh flexion 1 15          SL ER/SL punch 1 15    Prone Rows/ext      Prone HAB/Prone Flex      Seated HH Depression [] Not Met: [] Adjusted    Long Term Goals: To be achieved in: 12 weeks  1. Disability index score of 15% or less for the Quick DASH to assist with reaching prior level of function. [] Progressing: [] Met: [] Not Met: [] Adjusted  2. Patient will demonstrate increased AROM to 0- 160 L shoulder elevation and 0-140 R shoulder elevation to allow for proper joint functioning as indicated by Functional Deficits. [] Progressing: [] Met: [] Not Met: [] Adjusted  3. Patient will demonstrate an increase in NM recruitment/activation and overall GH and scapular strength to within n5lbs HHD or WNL for proper functional mobility as indicated by patients Functional Deficits. [] Progressing: [] Met: [] Not Met: [] Adjusted  4. Patient will return to lifting/reaching  activities without increased symptoms or restriction. [] Progressing: [] Met: [] Not Met: [] Adjusted  5. Patient will report being able to utilize UE for household chores without symptoms or restriction. (patient specific functional goal)     [] Progressing: [] Met: [] Not Met: [] Adjusted    ASSESSMENT:  Good tolerance to treatment. Decreased soreness with PROM. Appropriately fatigued at conclusion. Return to Play: (if applicable)   []  Stage 1: Intro to Strength   []  Stage 2: Dynamic Strength and Intro to Plyometrics   []  Stage 3: Advanced Plyometrics and Intro to Throwing   []  Stage 4: Sport specific Training/Return to Sport     []  Ready to Return to Play, [employer]   []  Not Ready for Return to Sports   Comments:      Treatment/Activity Tolerance:  [x] Patient tolerated treatment well [] Patient limited by fatique  [] Patient limited by pain  [] Patient limited by other medical complications  [] Other:     Overall Progression Towards Functional goals/ Treatment Progress Update:  [] Patient is progressing as expected towards functional goals listed. [] Progression is slowed due to complexities/Impairments listed.   [] Progression has been slowed due to co-morbidities. [x] Plan just implemented, too soon to assess goals progression <30days   [] Goals require adjustment due to lack of progress  [] Patient is not progressing as expected and requires additional follow up with physician  [] Other    Prognosis for POC: [x] Good [] Fair  [] Poor    Patient requires continued skilled intervention: [x] Yes  [] No      PLAN: See eval  [x] Continue per plan of care [] Alter current plan (see comments)  [] Plan of care initiated [] Hold pending MD visit [] Discharge    Electronically signed by: Rubio Lauren PTA      Note: If patient does not return for scheduled/recommended follow up visits, this note will serve as a discharge from care along with the most recent update on progress.

## 2020-10-27 ENCOUNTER — TELEPHONE (OUTPATIENT)
Dept: FAMILY MEDICINE CLINIC | Age: 70
End: 2020-10-27

## 2020-10-27 RX ORDER — CLOPIDOGREL BISULFATE 75 MG/1
TABLET ORAL
Qty: 90 TABLET | Refills: 0 | Status: SHIPPED | OUTPATIENT
Start: 2020-10-27 | End: 2020-11-06 | Stop reason: SDUPTHER

## 2020-10-27 RX ORDER — ATORVASTATIN CALCIUM 10 MG/1
10 TABLET, FILM COATED ORAL DAILY
Qty: 90 TABLET | Refills: 0 | Status: SHIPPED | OUTPATIENT
Start: 2020-10-27 | End: 2020-11-06 | Stop reason: SDUPTHER

## 2020-10-27 NOTE — TELEPHONE ENCOUNTER
----- Message from Shakira Vega sent at 10/27/2020 11:15 AM EDT -----  Subject: Appointment Request    Reason for Call: Routine Existing Condition Follow Up    QUESTIONS  Type of Appointment? Established Patient  Reason for appointment request? Available appointments did not meet   patient need  Additional Information for Provider? patient is needing a FU and   medication refill review. ---------------------------------------------------------------------------  --------------  [place]  What is the best way for the office to contact you? OK to leave message on   voicemail  Preferred Call Back Phone Number? [phone]  ---------------------------------------------------------------------------  --------------  SCRIPT ANSWERS  Relationship to Patient? Self  Appointment reason? Well Care/Follow Ups  Select a Well Care/Follow Ups appointment reason? Adult Existing Condition   Follow Up [Diabetes   CHF   COPD   Hypertension/Blood Pressure Check]  (Is the patient requesting to be seen urgently for their symptoms?)? No  Is this follow up request related to routine Diabetes Management? No  Are you having any new concerns about your existing condition? No  Have you been diagnosed with   tested for   or told that you are suspected of having COVID-19 (Coronavirus)? No  Have you had a fever or taken medication to treat a fever within the past   3 days? No  Have you had a cough   shortness of breath or flu-like symptoms within the past 3 days? No  Do you currently have flu-like symptoms including fever or chills   cough   shortness of breath   or difficulty breathing   or new loss of taste or smell? No  (Service Expert  click yes below to proceed with [employer] As Usual   Scheduling)?  Yes

## 2020-10-27 NOTE — TELEPHONE ENCOUNTER
Medication:   Requested Prescriptions     Pending Prescriptions Disp Refills    clopidogrel (PLAVIX) 75 MG tablet [Pharmacy Med Name: CLOPIDOGREL 75 MG TABLET 75 Tablet] 90 tablet 0     Sig: TAKE 1 TABLET BY MOUTH DAILY *NEED TO ESTABLISH WITH A NEW PROVIDER FOR FUTURE REFILLS*    atorvastatin (LIPITOR) 10 MG tablet [Pharmacy Med Name: ATORVASTATIN 10 MG TABLET 10 Tablet] 90 tablet 0     Sig: TAKE 1 TABLET BY MOUTH DAILY     Last Filled:  6/3/20    Last appt: 9/17/2019   Next appt: Visit date not found    Last Lipid:   Lab Results   Component Value Date    CHOL 144 12/18/2018    TRIG 141 12/18/2018    HDL 29 12/18/2018    HDL 35 11/30/2011    LDLCALC 87 12/18/2018

## 2020-10-27 NOTE — TELEPHONE ENCOUNTER
----- Message from Bryan Small sent at 10/27/2020 11:15 AM EDT -----  Subject: Appointment Request    Reason for Call: Routine Existing Condition Follow Up    QUESTIONS  Type of Appointment? Established Patient  Reason for appointment request? Available appointments did not meet   patient need  Additional Information for Provider? patient is needing a FU and   medication refill review. ---------------------------------------------------------------------------  --------------  [employer]  What is the best way for the office to contact you? OK to leave message on   voicemail  Preferred Call Back Phone Number? [phone]  ---------------------------------------------------------------------------  --------------  SCRIPT ANSWERS  Relationship to Patient? Self  Appointment reason? Well Care/Follow Ups  Select a Well Care/Follow Ups appointment reason? Adult Existing Condition   Follow Up [Diabetes   CHF   COPD   Hypertension/Blood Pressure Check]  (Is the patient requesting to be seen urgently for their symptoms?)? No  Is this follow up request related to routine Diabetes Management? No  Are you having any new concerns about your existing condition? No  Have you been diagnosed with   tested for   or told that you are suspected of having COVID-19 (Coronavirus)? No  Have you had a fever or taken medication to treat a fever within the past   3 days? No  Have you had a cough   shortness of breath or flu-like symptoms within the past 3 days? No  Do you currently have flu-like symptoms including fever or chills   cough   shortness of breath   or difficulty breathing   or new loss of taste or smell? No  (Service Expert  click yes below to proceed with [employer] As Usual   Scheduling)?  Yes

## 2020-10-29 ENCOUNTER — HOSPITAL ENCOUNTER (OUTPATIENT)
Dept: PHYSICAL THERAPY | Age: 70
Setting detail: THERAPIES SERIES
Discharge: HOME | End: 2020-10-29
Payer: COMMERCIAL

## 2020-10-29 PROCEDURE — 97110 THERAPEUTIC EXERCISES: CPT

## 2020-10-29 PROCEDURE — 97140 MANUAL THERAPY 1/> REGIONS: CPT

## 2020-10-29 NOTE — FLOWSHEET NOTE
[facility] [address] [facility]  Phone: [phone] Fax: [phone]    Physical Therapy Treatment Note/ Progress Report:     Date:  10/29/2020    Patient Name:  Irene Ochoa    :  1950  MRN: [id number]  Restrictions/Precautions:    Medical/Treatment Diagnosis Information:  · Diagnosis: L RTC repair, R shoulder BRUCE  · Treatment Diagnosis: L RTC tear M75.122, L shoulder pain M25.512, R shoulder pain F06.227  Insurance/Certification information:  PT Insurance Information: [employer]  Physician Information:  Referring Practitioner: Jacques Glover of care signed (Y/N):     Date of Patient follow up with Physician:      Progress Report: [x]  Yes  []  No     Date Range for reporting period:  Beginnin2020  Ending:  -    Progress report due (10 Rx/or 30 days whichever is less):      Recertification due (POC duration/ or 90 days whichever is less): 2020     Visit # Insurance Allowable Auth Needed   9 [employer], med necessity ($40/co-pay) []Yes    [x]No     Pain level:  0/10; just tender in upper arm but not pain     SUBJECTIVE:  Patient reports that B shoulders are doing well. Pt reports still having limitations in R shoulder ROM and some soreness.                    OBJECTIVE: L PROM flexion 140 degrees; R PROM flexion 125 after mobilization      Observation:    Test measurements:      RESTRICTIONS/PRECAUTIONS: none    Exercises/Interventions:   Therapeutic Ex (75139)  Min: 30 min Sets/sec Reps CUES/Notes   UBE 1 4    Pendulum/Ball rolls      Cane AAROM press/flexion 1 10    Cane AAROM ER 1 15    3 way Isomet      T- band Row/pinch 1 15 HEP-red   T- band lower pinch 1 15 HEP-red   T- band ER  1 15 Red    Supine SA punch 1 15  L, cues for R   SL AAROM punch 1 15    SL AROM sh abd  1 15    SL AROM sh flexion 1 15    Standing      SL ER/SL punch 1 15    Prone Rows/ext      Prone HAB/Prone Flex      Seated HH Depression control with self care, reaching, carrying, lifting, house/yardwork, driving/computer work  [] (68063) Reviewed/Progressed HEP activities related to improving balance, coordination, kinesthetic sense, posture, motor skill, proprioception of scapular, scapulothoracic and UE control with self care, reaching, carrying, lifting, house/yardwork, driving/computer work      Manual Treatments:  PROM / STM / Oscillations-Mobs:  G-I, II, III, IV (PA's, Inf., Post.)  [x] (53201) Provided manual therapy to mobilize soft tissue/joints of cervical/CT, scapular GHJ and UE for the purpose of modulating pain, promoting relaxation,  increasing ROM, reducing/eliminating soft tissue swelling/inflammation/restriction, improving soft tissue extensibility and allowing for proper ROM for normal function with self care, reaching, carrying, lifting, house/yardwork, driving/computer work    Modalities:      Charges:  Timed Code Treatment Minutes: 42   Total Treatment Minutes: 42       [] EVAL (LOW) 41024 (typically 20 minutes face-to-face)  [] EVAL (MOD) 26977 (typically 30 minutes face-to-face)  [] EVAL (HIGH) [id number] (typically 45 minutes face-to-face)  [] RE-EVAL     [x] HF(28130) x   2  [] DRY NEEDLE 1 OR 2 MUSCLES  [] NMR (58427) x     [] DRY NEEDLE 3+ MUSCLES  [x] Manual (85429) x  1     [] TA (91983) x     [] Mech Traction (93829)  [] ES(attended) (39413)     [] ES (un) (87832):   [] VASO (07882)  [] Other:    If [facility] Please Indicate Time In/Out  CPT Code Time in Time out                                   GOALS:  Patient stated goal: regain use of shoulders  [] Progressing: [] Met: [] Not Met: [] Adjusted    Therapist goals for Patient:   Short Term Goals: To be achieved in: 2 weeks  1. Independent in HEP and progression per patient tolerance, in order to prevent re-injury. [] Progressing: [] Met: [] Not Met: [] Adjusted  2.  Patient will have a decrease in pain to facilitate improvement in movement, function, and ADLs as indicated by Functional Deficits. [] Progressing: [] Met: [] Not Met: [] Adjusted    Long Term Goals: To be achieved in: 12 weeks  1. Disability index score of 15% or less for the Quick DASH to assist with reaching prior level of function. [] Progressing: [] Met: [] Not Met: [] Adjusted  2. Patient will demonstrate increased AROM to 0- 160 L shoulder elevation and 0-140 R shoulder elevation to allow for proper joint functioning as indicated by Functional Deficits. [] Progressing: [] Met: [] Not Met: [] Adjusted  3. Patient will demonstrate an increase in NM recruitment/activation and overall GH and scapular strength to within n5lbs HHD or WNL for proper functional mobility as indicated by patients Functional Deficits. [] Progressing: [] Met: [] Not Met: [] Adjusted  4. Patient will return to lifting/reaching  activities without increased symptoms or restriction. [] Progressing: [] Met: [] Not Met: [] Adjusted  5. Patient will report being able to utilize UE for household chores without symptoms or restriction. (patient specific functional goal)     [] Progressing: [] Met: [] Not Met: [] Adjusted    ASSESSMENT:  Good tolerance to treatment. No increase in soreness at session conclusion. Cues for scapula position during elevation on bilateral UE to prevent compensation from UT/levator. Pt verbalized understanding & showed improved control following cues. Recommend to implement wall climb for warm up on L for next session and focus more on manual for R shoulder.     Return to Play: (if applicable)   []  Stage 1: Intro to Strength   []  Stage 2: Dynamic Strength and Intro to Plyometrics   []  Stage 3: Advanced Plyometrics and Intro to Throwing   []  Stage 4: Sport specific Training/Return to Sport     []  Ready to Return to Play, [employer]   []  Not Ready for Return to Sports   Comments:      Treatment/Activity Tolerance:  [x] Patient tolerated treatment well [] Patient limited by miguel  [] Patient limited by pain  [] Patient limited by other medical complications  [] Other:     Overall Progression Towards Functional goals/ Treatment Progress Update:  [] Patient is progressing as expected towards functional goals listed. [] Progression is slowed due to complexities/Impairments listed. [] Progression has been slowed due to co-morbidities. [x] Plan just implemented, too soon to assess goals progression <30days   [] Goals require adjustment due to lack of progress  [] Patient is not progressing as expected and requires additional follow up with physician  [] Other    Prognosis for POC: [x] Good [] Fair  [] Poor    Patient requires continued skilled intervention: [x] Yes  [] No      PLAN: See eval  [x] Continue per plan of care [] Alter current plan (see comments)  [] Plan of care initiated [] Hold pending MD visit [] Discharge    Electronically signed by: Philip Stewart, SPT  Therapist was present, directed the patient's care, made skilled judgement, and was responsible for assessment and treatment of the patient        Note: If patient does not return for scheduled/recommended follow up visits, this note will serve as a discharge from care along with the most recent update on progress.

## 2020-11-04 ENCOUNTER — HOSPITAL ENCOUNTER (OUTPATIENT)
Dept: PHYSICAL THERAPY | Age: 70
Setting detail: THERAPIES SERIES
Discharge: HOME | End: 2020-11-04
Payer: COMMERCIAL

## 2020-11-04 PROCEDURE — 97110 THERAPEUTIC EXERCISES: CPT

## 2020-11-04 PROCEDURE — 97140 MANUAL THERAPY 1/> REGIONS: CPT

## 2020-11-04 NOTE — PLAN OF CARE
[place] [address] [address]  Phone: [phone] Fax: [phone]        Physical Therapy Re-Certification Plan of Care/MD UPDATE      Dear Referring Practitioner: Dr Narendra Jimenes,    We had the pleasure of treating the following patient for physical therapy services at [address] [place] [address]. A summary of our findings can be found in the updated assessment below. This includes our plan of care. If you have any questions or concerns regarding these findings, please do not hesitate to contact me at the office phone number checked above.   Thank you for the referral.     Physician Signature:________________________________Date:__________________  By signing above (or electronic signature), therapists plan is approved by physician    Date Range Of Visits: 2020 thru 2020  Total Visits to Date: 8  Overall Response to Treatment:   [x]Patient is responding well to treatment and improvement is noted with regards  to goals   []Patient should continue to improve in reasonable time if they continue HEP   []Patient has plateaued and is no longer responding to skilled PT intervention    []Patient is getting worse and would benefit from return to referring MD   []Patient unable to adhere to initial POC   []Other:     Physical Therapy Treatment Note/ Progress Report:     Date:  2020    Patient Name:  Vandana Miranda    :  1950  MRN: [id number]  Restrictions/Precautions:    Medical/Treatment Diagnosis Information:  · Diagnosis: L RTC repair, R shoulder BRUCE  · Treatment Diagnosis: L RTC tear M75.122, L shoulder pain M25.512, R shoulder pain L98.143  Insurance/Certification information:  PT Insurance Information: [employer]  Physician Information:  Referring Practitioner: Rishi Yadav signed (Y/N):     Date of Patient follow up with Physician:      Progress Report: [x]  Yes  []  No     Date Range for reporting period:  Beginnin2020  Endin2020    Progress report due (10 Rx/or 30 days whichever is less):      Recertification due (POC duration/ or 90 days whichever is less): 2020     Visit # Insurance Allowable Auth Needed   10 [employer], med necessity ($40/co-pay) []Yes    [x]No     Pain level:  0/10; just tender in upper arm but not pain     SUBJECTIVE:  Patient reports that shoulders continue to improve. Was able to raise L arm to cupboard the other day.  R shoulder is still limited compared to L.                   OBJECTIVE:      Observation:    Test measurements:      ROM Left Right   Shoulder Flex 120 AROM, 160 PROM 100 AROM, 130 PROM   Shoulder Abd 110 AROM, 140 PROM 80 AROM, 110 PROM   Shoulder ER 50 AROM, 65 PROM 40 AROM, 55 PROM   Shoulder IR 70 AROM, L3 70 AROM, L3     RESTRICTIONS/PRECAUTIONS: none    Exercises/Interventions:   Therapeutic Ex (34299)  Min: 35 min Sets/sec Reps CUES/Notes   UBE 1 4    Pendulum/Ball rolls      Cane AAROM press/flexion 2 10 orange   Cane AAROM ER 2 10 [place]- bilat   3 way Isomet      T- band Row/pinch 1 15 HEP-red   T- band lower pinch 1 15 HEP-red   T- band ER  1 15 Red    Supine SA punch 1 15  L, cues for R   SL AROM punch 1 15 1 lb   SL AROM sh abd  1 15 1 lb   SL AROM sh flexion 1 15 0-1 lb   SL ER 1 15 1 lb   Prone Rows/ext      Prone HAB/Prone Flex      Seated HH Depression to elevation 2 10 Cues for elbow straight   No Money      Scap Wall Lat touches/wall walks            Standing flex/scap 2 10 1#, bilat   Wall slide flexion + stretch 1 10 L   Manual Intervention  (73573)  Min: 25 min      Shld /GH Mobs 1 15' B inferior & posterior   Post Cap mobs      Thoracic/Rib manipualtion      CT MT/Mobs      PROM MT 1 10' R/L shoulder   STM/IASTM 1 0' L bicep         NMR re-education (86227)  Min:      T-spine Ext      GH depress/compress      Scap/GH NMR      Body blade      Wall ball roll      Wall Ball bounce      Ball drops      Cynthia Scap Bio Floor Snow angels-sliders            Therapeutic Activity (25545)  Min:      UE throwing porgression      Dynamic UE stability      Earthquake Bar      Bodyblade                Therapeutic Exercise and NMR EXR  [x] (64810) Provided verbal/tactile cueing for activities related to strengthening, flexibility, endurance, ROM  for improvements in scapular, scapulothoracic and UE control with self care, reaching, carrying, lifting, house/yardwork, driving/computer work. [x] (82622) Provided verbal/tactile cueing for activities related to improving balance, coordination, kinesthetic sense, posture, motor skill, proprioception  to assist with  scapular, scapulothoracic and UE control with self care, reaching, carrying, lifting, house/yardwork, driving/computer work. Therapeutic Activities:    [] (38063 or 61746) Provided verbal/tactile cueing for activities related to improving balance, coordination, kinesthetic sense, posture, motor skill, proprioception and motor activation to allow for proper function of scapular, scapulothoracic and UE control with self care, carrying, lifting, driving/computer work.      Home Exercise Program:    [x] (33939) Reviewed/Progressed HEP activities related to strengthening, flexibility, endurance, ROM of scapular, scapulothoracic and UE control with self care, reaching, carrying, lifting, house/yardwork, driving/computer work  [] (40936) Reviewed/Progressed HEP activities related to improving balance, coordination, kinesthetic sense, posture, motor skill, proprioception of scapular, scapulothoracic and UE control with self care, reaching, carrying, lifting, house/yardwork, driving/computer work      Manual Treatments:  PROM / STM / Oscillations-Mobs:  G-I, II, III, IV (PA's, Inf., Post.)  [x] (48369) Provided manual therapy to mobilize soft tissue/joints of cervical/CT, scapular GHJ and UE for the purpose of modulating pain, promoting relaxation,  increasing ROM, reducing/eliminating Plan just implemented, too soon to assess goals progression <30days   [] Goals require adjustment due to lack of progress  [] Patient is not progressing as expected and requires additional follow up with physician  [] Other    Prognosis for POC: [x] Good [] Fair  [] Poor    Patient requires continued skilled intervention: [x] Yes  [] No      PLAN: Continue to address Bilat shoulder ROM, strength and NM control to improve overall functional use of shoulders. [x] Continue per plan of care [] Alter current plan (see comments)  [] Plan of care initiated [] Hold pending MD visit [] Discharge    Electronically signed by: Ethel Goodman PT    Note: If patient does not return for scheduled/recommended follow up visits, this note will serve as a discharge from care along with the most recent update on progress.

## 2020-11-06 ENCOUNTER — OFFICE VISIT (OUTPATIENT)
Dept: FAMILY MEDICINE CLINIC | Age: 70
End: 2020-11-06
Payer: COMMERCIAL

## 2020-11-06 VITALS
BODY MASS INDEX: 37.42 KG/M2 | WEIGHT: 198.2 LBS | HEIGHT: 61 IN | SYSTOLIC BLOOD PRESSURE: 136 MMHG | HEART RATE: 70 BPM | DIASTOLIC BLOOD PRESSURE: 88 MMHG | OXYGEN SATURATION: 99 % | TEMPERATURE: 99.6 F

## 2020-11-06 PROCEDURE — 99214 OFFICE O/P EST MOD 30 MIN: CPT | Performed by: FAMILY MEDICINE

## 2020-11-06 PROCEDURE — 90694 VACC AIIV4 NO PRSRV 0.5ML IM: CPT | Performed by: FAMILY MEDICINE

## 2020-11-06 PROCEDURE — 90471 IMMUNIZATION ADMIN: CPT | Performed by: FAMILY MEDICINE

## 2020-11-06 PROCEDURE — G0008 ADMIN INFLUENZA VIRUS VAC: HCPCS | Performed by: FAMILY MEDICINE

## 2020-11-06 PROCEDURE — 90715 TDAP VACCINE 7 YRS/> IM: CPT | Performed by: FAMILY MEDICINE

## 2020-11-06 RX ORDER — CLOPIDOGREL BISULFATE 75 MG/1
TABLET ORAL
Qty: 90 TABLET | Refills: 3 | Status: SHIPPED | OUTPATIENT
Start: 2020-11-06 | End: 2021-11-09

## 2020-11-06 RX ORDER — ATORVASTATIN CALCIUM 10 MG/1
10 TABLET, FILM COATED ORAL DAILY
Qty: 90 TABLET | Refills: 3 | Status: SHIPPED | OUTPATIENT
Start: 2020-11-06 | End: 2021-11-09

## 2020-11-06 ASSESSMENT — PATIENT HEALTH QUESTIONNAIRE - PHQ9
SUM OF ALL RESPONSES TO PHQ QUESTIONS 1-9: 0
SUM OF ALL RESPONSES TO PHQ9 QUESTIONS 1 & 2: 0
1. LITTLE INTEREST OR PLEASURE IN DOING THINGS: 0
SUM OF ALL RESPONSES TO PHQ QUESTIONS 1-9: 0
2. FEELING DOWN, DEPRESSED OR HOPELESS: 0
SUM OF ALL RESPONSES TO PHQ QUESTIONS 1-9: 0

## 2020-11-06 ASSESSMENT — ENCOUNTER SYMPTOMS
DIARRHEA: 0
BLOATING: 1
RHINORRHEA: 0
CONSTIPATION: 0
CHEST TIGHTNESS: 0
ABDOMINAL PAIN: 0
SHORTNESS OF BREATH: 0

## 2020-11-06 NOTE — PROGRESS NOTES
SUBJECTIVE:    Constantine Aranda is a 65 y.o. female who presents for a follow up visit. Chief Complaint   Patient presents with    Annual Exam        Coronary Artery Disease   Presents for follow-up visit. Symptoms include leg swelling ( occ'l). Pertinent negatives include no chest pain, chest tightness, palpitations or shortness of breath. Risk factors include hyperlipidemia. The symptoms have been stable. Compliance with diet is good. Compliance with exercise is variable. Compliance with medications is good. Hyperlipidemia   This is a chronic problem. The current episode started more than 1 year ago. Factors aggravating her hyperlipidemia include fatty foods. Pertinent negatives include no chest pain or shortness of breath. Current antihyperlipidemic treatment includes statins. The current treatment provides significant improvement of lipids. Compliance problems include adherence to exercise and adherence to diet. Risk factors for coronary artery disease include dyslipidemia, hypertension, a sedentary lifestyle and post-menopausal.   Hypertension   This is a chronic problem. The current episode started more than 1 year ago. The problem is controlled. Pertinent negatives include no chest pain, palpitations or shortness of breath. Agents associated with hypertension include NSAIDs. Risk factors for coronary artery disease include dyslipidemia, obesity and sedentary lifestyle. Past treatments include diuretics and angiotensin blockers. The current treatment provides significant improvement. Knee Pain    Incident onset: Chronic. There was no injury mechanism. The pain is present in the left leg and right leg. The quality of the pain is described as aching. The pain is moderate. The pain has been fluctuating since onset. The symptoms are aggravated by weight bearing. She has tried rest, acetaminophen and NSAIDs for the symptoms. The treatment provided mild relief. Diarrhea    This is a chronic problem.  The current episode started more than 1 year ago. The problem occurs 2 to 4 times per day. The problem has been waxing and waning. Diarrhea characteristics: Denies blood or mucus in stool. Associated symptoms include bloating. Pertinent negatives include no abdominal pain or fever. Nothing aggravates the symptoms. Patient's medications, allergies, past medical,surgical, social and family histories were reviewed and updated as appropriate.      Past Medical History:   Diagnosis Date    CAD (coronary artery disease)     NONOBSTRUCTING    CKD (chronic kidney disease) stage 3, GFR 30-59 ml/min     Lumbago     Osteoarthrosis, unspecified whether generalized or localized, unspecified site     Other abnormal blood chemistry     [facility]AD [facility]per[facility]) [facility] [address]     Pure hypercholesterolemia     Risk for falls 2/2/16    S/p Left TKR    Spinal stenosis     Unspecified essential hypertension      Past Surgical History:   Procedure Laterality Date    CHOLECYSTECTOMY      COLONOSCOPY      INSERTABLE CARDIAC MONITOR  09/2015    JOINT REPLACEMENT Left 20015    knee replacement    KNEE SURGERY      RT    LEG SURGERY      2 stents placed in right leg, 1 stent and balloon in left leg    ROTATOR CUFF REPAIR      SHOULDER ARTHROSCOPY Left 8/21/2020    LEFT SHOULDER ARTHROSCOPY, SUBACROMINAL DECOMPRESSION, DISTAL CLAVICLE EXCISION,DEBRIDEMENT, ROTATOR CUFF REPAIR, AUGMENTATION performed by Winifred Pope DO at [facility] OR    TOTAL KNEE ARTHROPLASTY Left 1/12/16    Dr Vega Prom       Family History   Problem Relation Age of Onset   [facility] Cancer Mother     Heart Disease Father     Cancer Brother     Emphysema Brother     Stroke Brother     Diabetes Brother     Diabetes Sister     Heart Disease Brother     Hypertension Brother     Hypertension Sister     High Cholesterol Neg Hx     High Blood Pressure Neg Hx      Social History     Tobacco Use    Smoking status: Former Smoker     Packs/day: 0.50     Years: 40.00     Pack years: 20.00     Types: Cigarettes     Last attempt to quit: 2015     Years since quittin.3    Smokeless tobacco: Never Used    Tobacco comment: <1 pack of cigarettes per day   Substance Use Topics    Alcohol use: No     Alcohol/week: 0.0 standard drinks      Allergies   Allergen Reactions    Ace Inhibitors Other (See Comments)     Cough     Chantix [Varenicline]      depression    Keflex [Cephalexin] Diarrhea    Morphine      Causes mental impairment,  N&V    Niaspan [Niacin Er] Other (See Comments)     Skin irritation    Pletal [Cilostazol]      Headache and diarrhea    Pravastatin      Current Outpatient Medications on File Prior to Visit   Medication Sig Dispense Refill    meloxicam (MOBIC) 15 MG tablet Take 1 tablet by mouth daily 90 tablet 3    losartan-hydrochlorothiazide (HYZAAR) 100-25 MG per tablet Take 1 tablet by mouth daily 90 tablet 0    zoster recombinant adjuvanted vaccine (SHINGRIX) 50 MCG/0.5ML SUSR injection Inject 0.5 mLs into the muscle See Admin Instructions 1 dose now and repeat in 2-6 months (Patient not taking: Reported on 2020) 0.5 mL 0     No current facility-administered medications on file prior to visit. Review of Systems   Constitutional: Negative for activity change, fatigue, fever and unexpected weight change. HENT: Negative for congestion, postnasal drip and rhinorrhea. Respiratory: Negative for chest tightness and shortness of breath. Cardiovascular: Positive for leg swelling ( occ'l). Negative for chest pain and palpitations. Gastrointestinal: Positive for bloating. Negative for abdominal pain, constipation and diarrhea.        OBJECTIVE:    /88 (Cuff Size: Large Adult)   Pulse 70   Temp 99.6 °F (37.6 °C)   Ht 5' 1\" (1.549 m)   Wt 198 lb 3.2 oz (89.9 kg)   SpO2 99%   BMI 37.45 kg/m²    Vitals:    20 1309 20 1423   BP: (!) 148/92 136/88   Cuff Size:  Large Adult   Pulse: 70    Temp: 99.6 °F (37.6 °C)    SpO2: 99%    Weight: 198 lb 3.2 oz (89.9 kg)    Height: 5' 1\" (1.549 m)        Physical Exam  Constitutional:       Appearance: Normal appearance. HENT:      Head: Normocephalic and atraumatic. Right Ear: Tympanic membrane normal.      Left Ear: Tympanic membrane normal.      Nose: Nose normal. No rhinorrhea. Mouth/Throat:      Mouth: Mucous membranes are moist.      Pharynx: No posterior oropharyngeal erythema. Neck:      Musculoskeletal: Normal range of motion and neck supple. Cardiovascular:      Rate and Rhythm: Normal rate and regular rhythm. Pulses: Normal pulses. Heart sounds: Normal heart sounds. Pulmonary:      Effort: Pulmonary effort is normal.      Breath sounds: Normal breath sounds. Musculoskeletal:      Right lower leg: No edema. Left lower leg: No edema. Skin:     General: Skin is warm and dry. Neurological:      General: No focal deficit present. Mental Status: She is alert and oriented to person, place, and time. Gait: Gait abnormal ( antalgic). Psychiatric:         Mood and Affect: Mood normal.         Behavior: Behavior normal.         ASSESSMENT/PLAN:    Pilar Be was seen today for annual exam.    Diagnoses and all orders for this visit:    CAD in native artery  Followed by cardiology  \  HTN (hypertension), benign  In good control    [facility]  Followed by Dr. Ramos Epp    Diarrhea due to malabsorption  Recommended that she start 8 ounces of Kefir with 1 tablespoon of Benefiber once daily    Hyperglycemia  -     Hemoglobin A1C; Future    Mixed hyperlipidemia  -     Comprehensive Metabolic Panel, Fasting; Future  -     CBC Auto Differential; Future  -     Lipid, Fasting; Future  -     TSH with Reflex; Future    Labs to be done soon. Will call with results. Return in about 6 months (around 5/6/2021).     Please note portions of this note were completed with a voicerecognition program.  Efforts were made to edit the dictations but occasionally words are mis-transcribed.

## 2020-11-11 ENCOUNTER — HOSPITAL ENCOUNTER (OUTPATIENT)
Dept: PHYSICAL THERAPY | Age: 70
Setting detail: THERAPIES SERIES
Discharge: HOME | End: 2020-11-11
Payer: COMMERCIAL

## 2020-11-11 PROCEDURE — 97140 MANUAL THERAPY 1/> REGIONS: CPT

## 2020-11-11 PROCEDURE — 97110 THERAPEUTIC EXERCISES: CPT

## 2020-11-11 NOTE — FLOWSHEET NOTE
[facility] [address] [address]  Phone: [phone] Fax: [phone]        Physical Therapy Treatment Note/ Progress Report:     Date:  2020    Patient Name:  Yazan Griggs    :  1950  MRN: [id number]  Restrictions/Precautions:    Medical/Treatment Diagnosis Information:  · Diagnosis: L RTC repair, R shoulder BRUCE  · Treatment Diagnosis: L RTC tear M75.122, L shoulder pain M25.512, R shoulder pain E06.247  Insurance/Certification information:  PT Insurance Information: [employer]  Physician Information:  Referring Practitioner: Sirisha Branch of care signed (Y/N):     Date of Patient follow up with Physician:      Progress Report: [x]  Yes  []  No     Date Range for reporting period:  Beginnin2020  Endin2020    Progress report due (10 Rx/or 30 days whichever is less): 8116     Recertification due (POC duration/ or 90 days whichever is less): 2020     Visit # Insurance Allowable Auth Needed   [address] [place], med necessity ($40/co-pay) []Yes    [x]No     Pain level:  0/10; just tender in upper arm but not pain     SUBJECTIVE:  Patient reports that shoulders continue to improve. Notes R arm is able to get up to cupboard now if she stands on her tip toes. L continues to do very well.                      OBJECTIVE:      Observation:    Test measurements:      ROM Left Right   Shoulder Flex 120 AROM, 160 PROM 100 AROM, 130 PROM   Shoulder Abd 110 AROM, 140 PROM 80 AROM, 110 PROM   Shoulder ER 50 AROM, 65 PROM 40 AROM, 55 PROM   Shoulder IR 70 AROM, L3 70 AROM, L3     RESTRICTIONS/PRECAUTIONS: none    Exercises/Interventions:   Therapeutic Ex (36245)  Min: 35 min Sets/sec Reps CUES/Notes   UBE 1 4    Pendulum/Ball rolls      Cane AAROM press/flexion 2 10 orange   Cane AAROM ER 2 10 [place]- bilat   3 way [place]      T- band ER  1 15 Bilat, Red    Supine SA punch 2 15  L, cues for R   SL AROM punch 2 15 L , 1 lb   SL AROM sh abd  2 15 L , 1 lb   SL AROM sh flexion 1 15 L, 0-1 lb   SL ER 2 15 L , 1 lb   Prone Rows/ext      Prone HAB/Prone Flex      Seated HH Depression to elevation 2 10 Cues for elbow straight   No Money      Scap Wall Lat touches/wall walks            Standing flex/scap 2 10 1#, bilat   Wall slide flexion + stretch 1 10 bilat   Manual Intervention  (96166)  Min: 25 min      Shld /GH Mobs 1 15' B inferior & posterior   Post Cap mobs      Thoracic/Rib manipualtion      CT MT/Mobs      PROM MT 1 10' R/L shoulder   STM/IASTM 1 0' L bicep         NMR re-education (43535)  Min:      T-spine Ext      GH depress/compress      Scap/GH NMR      Body blade      Wall ball roll      Wall Ball bounce      Ball drops      Cynthia Scap Bio      Floor Snow angels-sliders            Therapeutic Activity (10577)  Min:      UE throwing porgression      Dynamic UE stability      Earthquake Bar      Bodyblade                Therapeutic Exercise and NMR EXR  [x] (80276) Provided verbal/tactile cueing for activities related to strengthening, flexibility, endurance, ROM  for improvements in scapular, scapulothoracic and UE control with self care, reaching, carrying, lifting, house/yardwork, driving/computer work. [x] (68211) Provided verbal/tactile cueing for activities related to improving balance, coordination, kinesthetic sense, posture, motor skill, proprioception  to assist with  scapular, scapulothoracic and UE control with self care, reaching, carrying, lifting, house/yardwork, driving/computer work. Therapeutic Activities:    [] (33463 or 45728) Provided verbal/tactile cueing for activities related to improving balance, coordination, kinesthetic sense, posture, motor skill, proprioception and motor activation to allow for proper function of scapular, scapulothoracic and UE control with self care, carrying, lifting, driving/computer work.      Home Exercise Program:    [x] (42772) Reviewed/Progressed HEP activities related to strengthening, flexibility, endurance, ROM of scapular, scapulothoracic and UE control with self care, reaching, carrying, lifting, house/yardwork, driving/computer work  [] (85384) Reviewed/Progressed HEP activities related to improving balance, coordination, kinesthetic sense, posture, motor skill, proprioception of scapular, scapulothoracic and UE control with self care, reaching, carrying, lifting, house/yardwork, driving/computer work      Manual Treatments:  PROM / STM / Oscillations-Mobs:  G-I, II, III, IV (PA's, Inf., Post.)  [x] (20741) Provided manual therapy to mobilize soft tissue/joints of cervical/CT, scapular GHJ and UE for the purpose of modulating pain, promoting relaxation,  increasing ROM, reducing/eliminating soft tissue swelling/inflammation/restriction, improving soft tissue extensibility and allowing for proper ROM for normal function with self care, reaching, carrying, lifting, house/yardwork, driving/computer work    Modalities:      Charges:  Timed Code Treatment Minutes: 60   Total Treatment Minutes: 60       [] EVAL (LOW) 68858 (typically 20 minutes face-to-face)  [] EVAL (MOD) 39174 (typically 30 minutes face-to-face)  [] EVAL (HIGH) 20792 (typically 45 minutes face-to-face)  [] RE-EVAL     [x] JE(60171) x   2  [] DRY NEEDLE 1 OR 2 MUSCLES  [] NMR (62671) x     [] DRY NEEDLE 3+ MUSCLES  [x] Manual (39058) x  2     [] TA (00756) x     [] Mech Traction (50666)  [] ES(attended) (76340)     [] ES (un) (49030):   [] VASO (84001)  [] Other:    If [facility] Please Indicate Time In/Out  CPT Code Time in Time out                                   GOALS:  Patient stated goal: regain use of shoulders  [x] Progressing: [] Met: [] Not Met: [] Adjusted    Therapist goals for Patient:   Short Term Goals: To be achieved in: 2 weeks  1. Independent in HEP and progression per patient tolerance, in order to prevent re-injury. [] Progressing: [x] Met: [] Not Met: [] Adjusted  2.  Patient will have a decrease in pain to facilitate improvement in movement, function, and ADLs as indicated by Functional Deficits. [] Progressing: [x] Met: [] Not Met: [] Adjusted    Long Term Goals: To be achieved in: 12 weeks  1. Disability index score of 15% or less for the Quick DASH to assist with reaching prior level of function. [] Progressing: [x] Met: [] Not Met: [] Adjusted  2. Patient will demonstrate increased AROM to 0- 160 L shoulder elevation and 0-140 R shoulder elevation to allow for proper joint functioning as indicated by Functional Deficits. [x] Progressing: [] Met: [] Not Met: [] Adjusted  3. Patient will demonstrate an increase in NM recruitment/activation and overall GH and scapular strength to within n5lbs HHD or WNL for proper functional mobility as indicated by patients Functional Deficits. [x] Progressing: [] Met: [] Not Met: [] Adjusted  4. Patient will return to lifting/reaching  activities without increased symptoms or restriction. [x] Progressing: [] Met: [] Not Met: [] Adjusted  5. Patient will report being able to utilize UE for household chores without symptoms or restriction. (patient specific functional goal)     [x] Progressing: [] Met: [] Not Met: [] Adjusted    ASSESSMENT:  Continues to demonstrate appropriate improvement in L shoulder ROM and strength. R shoulder still limited in end range elevation and ER. Increased focus on form with ER with stretching and strengthening. Patient  needing cues for periscapular activation throughout exercises for improved form and activation of shoulder musculature. Patient will benefit from continued skilled PT services 1x week to address noted deficits. .    Return to Play: (if applicable)   []  Stage 1: Intro to Strength   []  Stage 2: Dynamic Strength and Intro to Plyometrics   []  Stage 3: Advanced Plyometrics and Intro to Throwing   []  Stage 4: Sport specific Training/Return to Sport     []  Ready to Return to Play, Meets All Above Stages   [] Not Ready for Return to Sports   Comments:      Treatment/Activity Tolerance:  [x] Patient tolerated treatment well [] Patient limited by fatique  [] Patient limited by pain  [] Patient limited by other medical complications  [] Other:     Overall Progression Towards Functional goals/ Treatment Progress Update:  [x] Patient is progressing as expected towards functional goals listed. [] Progression is slowed due to complexities/Impairments listed. [] Progression has been slowed due to co-morbidities. [] Plan just implemented, too soon to assess goals progression <30days   [] Goals require adjustment due to lack of progress  [] Patient is not progressing as expected and requires additional follow up with physician  [] Other    Prognosis for POC: [x] Good [] Fair  [] Poor    Patient requires continued skilled intervention: [x] Yes  [] No      PLAN: Continue to address Bilat shoulder ROM, strength and NM control to improve overall functional use of shoulders. [x] Continue per plan of care [] Alter current plan (see comments)  [] Plan of care initiated [] Hold pending MD visit [] Discharge    Electronically signed by: Reba Connelly PT    Note: If patient does not return for scheduled/recommended follow up visits, this note will serve as a discharge from care along with the most recent update on progress.

## 2020-11-18 ENCOUNTER — HOSPITAL ENCOUNTER (OUTPATIENT)
Dept: PHYSICAL THERAPY | Age: 70
Setting detail: THERAPIES SERIES
Discharge: HOME | End: 2020-11-18
Payer: COMMERCIAL

## 2020-11-18 PROCEDURE — 97140 MANUAL THERAPY 1/> REGIONS: CPT

## 2020-11-18 PROCEDURE — 97110 THERAPEUTIC EXERCISES: CPT

## 2020-11-18 NOTE — FLOWSHEET NOTE
[place] [address] [facility]  Phone: [phone] Fax: [phone]        Physical Therapy Treatment Note/ Progress Report:     Date:  2020    Patient Name:  Yazan Griggs    :  1950  MRN: [id number]  Restrictions/Precautions:    Medical/Treatment Diagnosis Information:  · Diagnosis: L RTC repair, R shoulder BRUCE  · Treatment Diagnosis: L RTC tear M75.122, L shoulder pain M25.512, R shoulder pain K12.568  Insurance/Certification information:  PT Insurance Information: Ste[employer] 42  Physician Information:  Referring Practitioner: Sirisha Branch  care signed (Y/N):     Date of Patient follow up with Physician:      Progress Report: [x]  Yes  []  No     Date Range for reporting period:  Beginnin2020  Endin2020    Progress report due (10 Rx/or 30 days whichever is less):      Recertification due (POC duration/ or 90 days whichever is less): 2020     Visit # Insurance Allowable Auth Needed   [employer], med necessity ($40/co-pay) []Yes    [x]No     Pain level:  0/10; just tender in upper arm but not pain     SUBJECTIVE:  Patient reports that shoulders continue to improve. Notes R arm is able to get up to the second shelf of the cupboard now if she stands on her tip toes. L continues to do very well.                      OBJECTIVE:      Observation:    Test measurements:      ROM Left Right   Shoulder Flex 120 AROM, 160 PROM 100 AROM, 130 PROM   Shoulder Abd 110 AROM, 140 PROM 80 AROM, 110 PROM   Shoulder ER 50 AROM, 65 PROM 40 AROM, 55 PROM   Shoulder IR 70 AROM, L3 70 AROM, L3     RESTRICTIONS/PRECAUTIONS: none    Exercises/Interventions:   Therapeutic Ex (87417)  Min: 30 min Sets/sec Reps CUES/Notes   UBE 1 5    Pendulum/Ball rolls      Cane AAROM press/flexion 2 10 orange   Cane AAROM ER 2 10 [place]- bilat   3 way Isomet      T- band ER  1 15 Bilat, Red    Supine SA punch 2 15  L, cues for R SL AROM punch 2 15 L , 1 lb   SL AROM sh abd  2 15 L , 1 lb   SL AROM sh flexion 1 15 L, 0-1 lb   SL ER 2 15 L , 2 lb   Supine sh flexion 1 15 14 oz, L   Prone Rows/ext      Prone HAB/Prone Flex      No Money      Scap Wall Lat touches/wall walks      Shoulder flex wall walks with ball lift 1 5 Red ball   Standing flex/scap 2 10 1#, bilat   Wall slide flexion + stretch 1 10 bilat   Manual Intervention  (29758)  Min: 20 min      Shld /GH Mobs 1 10' B inferior & posterior   Post Cap mobs      Thoracic/Rib manipualtion      CT MT/Mobs      PROM MT 1 10' R/L shoulder   STM/IASTM 1 0' L bicep         NMR re-education (15372)  Min:      T-spine Ext      GH depress/compress      Scap/GH NMR      Body blade      Wall ball roll      Wall Ball bounce      Ball drops      Cynthia Scap Bio      Floor Snow angels-sliders            Therapeutic Activity (34675)  Min:      UE throwing porgression      Dynamic UE stability      Earthquake Bar      Bodyblade                Therapeutic Exercise and NMR EXR  [x] (17810) Provided verbal/tactile cueing for activities related to strengthening, flexibility, endurance, ROM  for improvements in scapular, scapulothoracic and UE control with self care, reaching, carrying, lifting, house/yardwork, driving/computer work. [x] (70165) Provided verbal/tactile cueing for activities related to improving balance, coordination, kinesthetic sense, posture, motor skill, proprioception  to assist with  scapular, scapulothoracic and UE control with self care, reaching, carrying, lifting, house/yardwork, driving/computer work. Therapeutic Activities:    [] (97610 or 94720) Provided verbal/tactile cueing for activities related to improving balance, coordination, kinesthetic sense, posture, motor skill, proprioception and motor activation to allow for proper function of scapular, scapulothoracic and UE control with self care, carrying, lifting, driving/computer work.      Home Exercise Program:    [x] (94588) Reviewed/Progressed HEP activities related to strengthening, flexibility, endurance, ROM of scapular, scapulothoracic and UE control with self care, reaching, carrying, lifting, house/yardwork, driving/computer work  [] (51483) Reviewed/Progressed HEP activities related to improving balance, coordination, kinesthetic sense, posture, motor skill, proprioception of scapular, scapulothoracic and UE control with self care, reaching, carrying, lifting, house/yardwork, driving/computer work      Manual Treatments:  PROM / STM / Oscillations-Mobs:  G-I, II, III, IV (PA's, Inf., Post.)  [x] (01165) Provided manual therapy to mobilize soft tissue/joints of cervical/CT, scapular GHJ and UE for the purpose of modulating pain, promoting relaxation,  increasing ROM, reducing/eliminating soft tissue swelling/inflammation/restriction, improving soft tissue extensibility and allowing for proper ROM for normal function with self care, reaching, carrying, lifting, house/yardwork, driving/computer work    Modalities:      Charges:  Timed Code Treatment Minutes: 50   Total Treatment Minutes: 50       [] EVAL (LOW) 95737 (typically 20 minutes face-to-face)  [] EVAL (MOD) 65839 (typically 30 minutes face-to-face)  [] EVAL (HIGH) 60912 (typically 45 minutes face-to-face)  [] RE-EVAL     [x] UL(53449) x   2  [] DRY NEEDLE 1 OR 2 MUSCLES  [] NMR (72718) x     [] DRY NEEDLE 3+ MUSCLES  [x] Manual (96764) x  1     [] TA (71417) x     [] [facility] Traction (22047)  [] ES(attended) (78459)     [] ES (un) (22940):   [] VASO (35217)  [] Other:    If [facility] Please Indicate Time In/Out  CPT Code Time in Time out                                   GOALS:  Patient stated goal: regain use of shoulders  [x] Progressing: [] Met: [] Not Met: [] Adjusted    Therapist goals for Patient:   Short Term Goals: To be achieved in: 2 weeks  1. Independent in HEP and progression per patient tolerance, in order to prevent re-injury.    [] Progressing: [x] Met: [] Not Met: [] Adjusted  2. Patient will have a decrease in pain to facilitate improvement in movement, function, and ADLs as indicated by Functional Deficits. [] Progressing: [x] Met: [] Not Met: [] Adjusted    Long Term Goals: To be achieved in: 12 weeks  1. Disability index score of 15% or less for the Quick DASH to assist with reaching prior level of function. [] Progressing: [x] Met: [] Not Met: [] Adjusted  2. Patient will demonstrate increased AROM to 0- 160 L shoulder elevation and 0-140 R shoulder elevation to allow for proper joint functioning as indicated by Functional Deficits. [x] Progressing: [] Met: [] Not Met: [] Adjusted  3. Patient will demonstrate an increase in NM recruitment/activation and overall GH and scapular strength to within n5lbs HHD or WNL for proper functional mobility as indicated by patients Functional Deficits. [x] Progressing: [] Met: [] Not Met: [] Adjusted  4. Patient will return to lifting/reaching  activities without increased symptoms or restriction. [x] Progressing: [] Met: [] Not Met: [] Adjusted  5. Patient will report being able to utilize UE for household chores without symptoms or restriction. (patient specific functional goal)     [x] Progressing: [] Met: [] Not Met: [] Adjusted    ASSESSMENT:  Continues to demonstrate appropriate improvement in L shoulder ROM and strength. R shoulder still limited in end range elevation and ER. Increased focus on form with ER with stretching and strengthening. Patient  needing cues for periscapular activation throughout exercises for improved form and activation of shoulder musculature. Patient will benefit from continued skilled PT services 1x week to address noted deficits. .    Return to Play: (if applicable)   []  Stage 1: Intro to Strength   []  Stage 2: Dynamic Strength and Intro to Plyometrics   []  Stage 3: Advanced Plyometrics and Intro to Throwing   []  Stage 4: Sport specific Training/Return to Sport     []  Ready to Return

## 2020-11-24 ENCOUNTER — OFFICE VISIT (OUTPATIENT)
Dept: ORTHOPEDIC SURGERY | Age: 70
End: 2020-11-24
Payer: COMMERCIAL

## 2020-11-24 VITALS — TEMPERATURE: 97.3 F | HEIGHT: 62 IN | WEIGHT: 180 LBS | BODY MASS INDEX: 33.13 KG/M2

## 2020-11-24 PROCEDURE — 99214 OFFICE O/P EST MOD 30 MIN: CPT | Performed by: ORTHOPAEDIC SURGERY

## 2020-11-24 RX ORDER — MELOXICAM 15 MG/1
15 TABLET ORAL DAILY
Qty: 30 TABLET | Refills: 3 | Status: SHIPPED | OUTPATIENT
Start: 2020-11-24 | End: 2021-05-04

## 2020-11-24 NOTE — PROGRESS NOTES
Chief Complaint    Knee Pain (right knee)      History of Present Illness:  Jose Enrique Mendez is a 65 y.o. female. She is here today for evaluation of her right knee. She has had right knee pain that is been intermittent over the last 3 years. She denies any specific injury to her knee which onset of the pain. She has no past history of surgery on this right knee. Her pain is primarily over the medial side of the knee. It is painful with any type of weightbearing. She does feel somewhat limited with activity because of this knee pain. She does have prior history of left total knee arthroplasty she states back in 2016 and overall has not been happy with that knee           Medical History:  Patient's medications, allergies, past medical, surgical, social and family histories were reviewed and updated as appropriate. Review of Systems:  Pertinent items are noted in HPI  Review of systems reviewed from Patient History Form dated on 11/24/20 and available in the patient's chart under the Media tab. Vital Signs:  Temp 97.3 °F (36.3 °C)   Ht 5' 1.5\" (1.562 m)   Wt 180 lb (81.6 kg)   BMI 33.46 kg/m²     General Exam:   Constitutional: Patient is adequately groomed with no evidence of malnutrition  DTRs: Deep tendon reflexes are intact  Mental Status: The patient is oriented to time, place and person. The patient's mood and affect are appropriate. Lymphatics: No pitting edema  Knee Examination:    Inspection: She does have an overall varus alignment of the right knee    Palpation: She does have a lot of tenderness palpation primarily along the medial joint line. No significant palpable effusion today    Range of Motion: Extension of the right knee is 10 degrees short of 0 with knee flexion today to 110 degrees    Strength: She is able to do a straight leg raise    Special Tests: She does have some pseudovalgus laxity. Negative posterior drawer. Negative Apley Thelma.     Skin: There are no rashes, does not get her more mobile or feeling better with the knee to come back and we will do an injection into the knee. She is understanding if she feels that she will be a candidate for total knee arthroplasty on the right knee. She is somewhat hesitant with this as she had a bad experience on her left knee.

## 2020-11-25 ENCOUNTER — TELEPHONE (OUTPATIENT)
Dept: ORTHOPEDIC SURGERY | Age: 70
End: 2020-11-25

## 2020-11-25 ENCOUNTER — APPOINTMENT (OUTPATIENT)
Dept: PHYSICAL THERAPY | Age: 70
End: 2020-11-25
Payer: COMMERCIAL

## 2020-11-25 RX ORDER — CELECOXIB 200 MG/1
200 CAPSULE ORAL DAILY
Qty: 60 CAPSULE | Refills: 3 | Status: SHIPPED | OUTPATIENT
Start: 2020-11-25 | End: 2021-08-11 | Stop reason: SDUPTHER

## 2020-11-25 NOTE — TELEPHONE ENCOUNTER
Appointment Request     Patient requesting earlier appointment: Yes  Appointment offered to patient: YES  Patient Contact Number: [phone]

## 2020-12-01 ENCOUNTER — HOSPITAL ENCOUNTER (OUTPATIENT)
Dept: PHYSICAL THERAPY | Age: 70
Setting detail: THERAPIES SERIES
Discharge: HOME | End: 2020-12-01
Payer: COMMERCIAL

## 2020-12-01 PROCEDURE — 97110 THERAPEUTIC EXERCISES: CPT

## 2020-12-01 PROCEDURE — 97140 MANUAL THERAPY 1/> REGIONS: CPT

## 2020-12-01 NOTE — PLAN OF CARE
[place] [address] [address] [place]  Phone: [phone] Fax: [phone]        Physical Therapy Re-Certification Plan of Care/MD UPDATE      Dear Referring Practitioner: Deysi Yanes,    We had the pleasure of treating the following patient for physical therapy services at [address] [place] [address]. A summary of our findings can be found in the updated assessment below. This includes our plan of care. If you have any questions or concerns regarding these findings, please do not hesitate to contact me at the office phone number checked above.   Thank you for the referral.     Physician Signature:________________________________Date:__________________  By signing above (or electronic signature), therapists plan is approved by physician    Date Range Of Visits: 2020 thru 2020 (total treatment time 2020 thru 2020)  Total Visits to Date: 3 (total visits 13)  Overall Response to Treatment:   [x]Patient is responding well to treatment and improvement is noted with regards  to goals   []Patient should continue to improve in reasonable time if they continue HEP   []Patient has plateaued and is no longer responding to skilled PT intervention    []Patient is getting worse and would benefit from return to referring MD   []Patient unable to adhere to initial POC   []Other:       Physical Therapy Treatment Note/ Progress Report:     Date:  2020    Patient Name:  Amanda Olivera    :  1950  MRN: [id number]  Restrictions/Precautions:    Medical/Treatment Diagnosis Information:  · Diagnosis: L RTC repair, R shoulder BRUCE  · Treatment Diagnosis: L RTC tear M75.122, L shoulder pain M25.512, R shoulder pain K12.803  Insurance/Certification information:  PT Insurance Information: [employer] 42  Physician Information:  Referring Practitioner: Jimbo Stewart of care signed (Y/N):     Date of Patient follow up with Physician: Progress Report: [x]  Yes  []  No     Date Range for reporting period:  Beginnin2020  Endin2020    Progress report due (10 Rx/or 30 days whichever is less): 9/3/4712     Recertification due (POC duration/ or 90 days whichever is less): 2021    Quick Dash 2020 9% disability    Visit # Insurance Allowable Auth Needed   [employer], med necessity ($40/co-pay) []Yes    [x]No     Pain level:  0/10; just tender in upper arm but not pain     SUBJECTIVE:  Patient reports that shoulders continue to improve. Reports that she can reach to second shelf with L arm. Reports that L shoulder feels about 60% improved since onset of therapy and R shoulder is about 50% improved. Overall R arm doing much better since BRUCE. Patient notes that she still is limited in strength with L arm.                      OBJECTIVE:      Observation:    Test measurements:      ROM Left Right   Shoulder Flex 130 AROM, 160 PROM 100 AROM, 130 PROM   Shoulder Abd 120 AROM, 160 PROM 90 AROM, 110 PROM   Shoulder ER 60 AROM, 65 PROM 40 AROM, 55 PROM   Shoulder IR 70 AROM, L3 70 AROM, L3       STRENGTH Left Right   Shoulder Flex 4-/5 4+/5   Shoulder Abd 4-/5 4+/5   Shoulder ER 4-/5 4+/5   Shoulder IR 4+/5 4+/5     RESTRICTIONS/PRECAUTIONS: none    Exercises/Interventions:   Therapeutic Ex (42572)  Min: 30 min Sets/sec Reps CUES/Notes   UBE 1 5    Pendulum/Ball rolls      Cane AAROM press/flexion 2 10 orange   Cane AAROM ER 2 10 [place]- bilat   Towel IR stretch 30 4 Bilateral    T- band ER  1 15 Bilat, Red    Supine SA punch 2 15 Bilat, 1 lb   SL AROM punch 2 15 L , 1 lb   SL AROM sh abd  2 15 L , 1 lb   SL AROM sh flexion 1 15 L, 1 lb   SL ER 2 15 L , 2 lb   Supine sh flexion 1 15 14 oz, L   Prone Rows/ext      Prone HAB/Prone Flex      No Money      Scap Wall Lat touches/wall walks      Shoulder flex wall walks with ball lift 1 5 Red ball   Standing flex/scap 2 10 1#, bilat   Wall slide flexion + stretch 1 10 bilat   Manual Intervention  (69533)  Min: 20 min      Shld /GH Mobs 1 10' B inferior & posterior   Post Cap mobs      Thoracic/Rib manipualtion      CT MT/Mobs      PROM MT 1 10' R/L shoulder   STM/IASTM 1 0' L bicep         NMR re-education (62800)  Min:      T-spine Ext      GH depress/compress      Scap/GH NMR      Body blade      Wall ball roll      Wall Ball bounce      Ball drops      Cynthia Scap Bio      Floor Snow angels-sliders            Therapeutic Activity (50400)  Min:      UE throwing porgression      Dynamic UE stability      Earthquake Bar      Bodyblade                Therapeutic Exercise and NMR EXR  [x] (95791) Provided verbal/tactile cueing for activities related to strengthening, flexibility, endurance, ROM  for improvements in scapular, scapulothoracic and UE control with self care, reaching, carrying, lifting, house/yardwork, driving/computer work. [x] (50201) Provided verbal/tactile cueing for activities related to improving balance, coordination, kinesthetic sense, posture, motor skill, proprioception  to assist with  scapular, scapulothoracic and UE control with self care, reaching, carrying, lifting, house/yardwork, driving/computer work. Therapeutic Activities:    [] (48007 or 85702) Provided verbal/tactile cueing for activities related to improving balance, coordination, kinesthetic sense, posture, motor skill, proprioception and motor activation to allow for proper function of scapular, scapulothoracic and UE control with self care, carrying, lifting, driving/computer work.      Home Exercise Program:    [x] (33061) Reviewed/Progressed HEP activities related to strengthening, flexibility, endurance, ROM of scapular, scapulothoracic and UE control with self care, reaching, carrying, lifting, house/yardwork, driving/computer work  [] (36566) Reviewed/Progressed HEP activities related to improving balance, coordination, kinesthetic sense, posture, motor skill, proprioception of scapular, scapulothoracic and UE control with self care, reaching, carrying, lifting, house/yardwork, driving/computer work      Manual Treatments:  PROM / STM / Oscillations-Mobs:  G-I, II, III, IV (PA's, Inf., Post.)  [x] (06044) Provided manual therapy to mobilize soft tissue/joints of cervical/CT, scapular GHJ and UE for the purpose of modulating pain, promoting relaxation,  increasing ROM, reducing/eliminating soft tissue swelling/inflammation/restriction, improving soft tissue extensibility and allowing for proper ROM for normal function with self care, reaching, carrying, lifting, house/yardwork, driving/computer work    Modalities:      Charges:  Timed Code Treatment Minutes: 50   Total Treatment Minutes: 50       [] EVAL (LOW) 19703 (typically 20 minutes face-to-face)  [] EVAL (MOD) 87503 (typically 30 minutes face-to-face)  [] EVAL (HIGH) 37175 (typically 45 minutes face-to-face)  [] RE-EVAL     [x] HV(74400) x   2  [] DRY NEEDLE 1 OR 2 MUSCLES  [] NMR (30255) x     [] DRY NEEDLE 3+ MUSCLES  [x] Manual (36431) x  1     [] TA (76847) x     [] Mech Traction (65794)  [] ES(attended) (07625)     [] ES (un) (86783):   [] VASO (28981)  [] Other:    If [facility] Please Indicate Time In/Out  CPT Code Time in Time out                                   GOALS:  Patient stated goal: regain use of shoulders  [x] Progressing: [] Met: [] Not Met: [] Adjusted    Therapist goals for Patient:   Short Term Goals: To be achieved in: 2 weeks  1. Independent in HEP and progression per patient tolerance, in order to prevent re-injury. [] Progressing: [x] Met: [] Not Met: [] Adjusted  2. Patient will have a decrease in pain to facilitate improvement in movement, function, and ADLs as indicated by Functional Deficits. [] Progressing: [x] Met: [] Not Met: [] Adjusted    Long Term Goals: To be achieved in: 12 weeks  1. Disability index score of 15% or less for the Quick DASH to assist with reaching prior level of function.    [] Progressing: [x] Met: [] Not Met: [] Adjusted  2. Patient will demonstrate increased AROM to 0- 160 L shoulder elevation and 0-140 R shoulder elevation to allow for proper joint functioning as indicated by Functional Deficits. [x] Progressing: [] Met: [] Not Met: [] Adjusted  3. Patient will demonstrate an increase in NM recruitment/activation and overall GH and scapular strength to within n5lbs HHD or WNL for proper functional mobility as indicated by patients Functional Deficits. [x] Progressing: [] Met: [] Not Met: [] Adjusted  4. Patient will return to lifting/reaching  activities without increased symptoms or restriction. [x] Progressing: [] Met: [] Not Met: [] Adjusted  5. Patient will report being able to utilize UE for household chores without symptoms or restriction. (patient specific functional goal)     [x] Progressing: [] Met: [] Not Met: [] Adjusted    ASSESSMENT:  Patient has been seen for 3 additional visits since last progress note. Patient continues to demonstrate appropriate progress in ROM and strength in L shoulder. R shoulder continues to be limited at current range of motion, but strength continues to progress in available range. Patient needs continued cues throughout session for improved periscapular activation to maintain proper form. Patient will benefit from continued skilled PT services 1x week to address noted deficits.     Return to Play: (if applicable)   []  Stage 1: Intro to Strength   []  Stage 2: Dynamic Strength and Intro to Plyometrics   []  Stage 3: Advanced Plyometrics and Intro to Throwing   []  Stage 4: Sport specific Training/Return to Sport     []  Ready to Return to Play, [employer]   []  Not Ready for Return to Sports   Comments:      Treatment/Activity Tolerance:  [x] Patient tolerated treatment well [] Patient limited by fatique  [] Patient limited by pain  [] Patient limited by other medical complications  [] Other:     Overall Progression Towards Functional goals/ Treatment Progress Update:  [x] Patient is progressing as expected towards functional goals listed. [] Progression is slowed due to complexities/Impairments listed. [] Progression has been slowed due to co-morbidities. [] Plan just implemented, too soon to assess goals progression <30days   [] Goals require adjustment due to lack of progress  [] Patient is not progressing as expected and requires additional follow up with physician  [] Other    Prognosis for POC: [x] Good [] Fair  [] Poor    Patient requires continued skilled intervention: [x] Yes  [] No      PLAN: Continue to address Bilat shoulder ROM, strength and NM control to improve overall functional use of shoulders. [x] Continue per plan of care [] Alter current plan (see comments)  [] Plan of care initiated [] Hold pending MD visit [] Discharge    Electronically signed by: Mika Fu PT    Note: If patient does not return for scheduled/recommended follow up visits, this note will serve as a discharge from care along with the most recent update on progress.

## 2020-12-08 ENCOUNTER — HOSPITAL ENCOUNTER (OUTPATIENT)
Dept: PHYSICAL THERAPY | Age: 70
Setting detail: THERAPIES SERIES
Discharge: HOME | End: 2020-12-08
Payer: COMMERCIAL

## 2020-12-08 PROCEDURE — 97110 THERAPEUTIC EXERCISES: CPT

## 2020-12-08 PROCEDURE — 97140 MANUAL THERAPY 1/> REGIONS: CPT

## 2020-12-08 NOTE — FLOWSHEET NOTE
[place] [address] [address]  Phone: [phone] Fax: [phone]        Physical Therapy Treatment Note/ Progress Report:     Date:  2020    Patient Name:  Farhana Gant    :  1950  MRN: [id number]  Restrictions/Precautions:    Medical/Treatment Diagnosis Information:  · Diagnosis: L RTC repair, R shoulder BRUCE  · Treatment Diagnosis: L RTC tear M75.122, L shoulder pain M25.512, R shoulder pain G14.919  Insurance/Certification information:  PT Insurance Information: Stef[employer] 42  Physician Information:  Referring Practitioner: Joni Simpson of care signed (Y/N):     Date of Patient follow up with Physician:      Progress Report: [x]  Yes  []  No     Date Range for reporting period:  Beginnin2020  Endin2020    Progress report due (10 Rx/or 30 days whichever is less): 3/0/3662     Recertification due (POC duration/ or 90 days whichever is less): 2021    Quick Dash 2020 9% disability    Visit # Insurance Allowable Auth Needed   15 [employer], med necessity ($40/co-pay) []Yes    [x]No     Pain level:  0/10; just tender in upper arm but not pain     SUBJECTIVE:  Patient reports that she has been doing more with washing windows and having a little bit of soreness- not pain- in shoulders today. Feels like both shoulders continue to improve in ROM- R is doing better with overhead.                        OBJECTIVE:      Observation:    Test measurements:      ROM Left Right   Shoulder Flex 130 AROM, 160 PROM 100 AROM, 130 PROM   Shoulder Abd 120 AROM, 160 PROM 90 AROM, 110 PROM   Shoulder ER 60 AROM, 65 PROM 40 AROM, 55 PROM   Shoulder IR 70 AROM, L3 70 AROM, L3       STRENGTH Left Right   Shoulder Flex 4-/5 4+/5   Shoulder Abd 4-/5 4+/5   Shoulder ER 4-/5 4+/5   Shoulder IR 4+/5 4+/5     RESTRICTIONS/PRECAUTIONS: none    Exercises/Interventions:   Therapeutic Ex (20376)  Min: 30 min Sets/sec Reps CUES/Notes UBE 1 5    Pendulum/Ball rolls      Cane AAROM press/flexion 2 10 orange   Cane AAROM ER 2 10 [place]- bilat   Towel IR stretch 30 4 Bilateral    T- band ER  1 15 Bilat, Red    Supine SA punch 2 15 Bilat, 1 lb   SL AROM punch 2 15 L , 1 lb   SL AROM sh abd  2 15 L , 1 lb   SL AROM sh flexion 1 15 L, 2 lb   SL ER 2 15 L , 2 lb         Prone Rows/ext      Prone HAB/Prone Flex      No Money      Scap Wall Lat touches/wall walks      Shoulder flex wall walks with ball lift 1 5 Red ball   Standing flex/scap 2 10 1#, bilat   Wall slide flexion + stretch 1 10 bilat   Manual Intervention  (49685)  Min: 20 min      Shld /GH Mobs 1 10' B inferior & posterior   Post Cap mobs      Thoracic/Rib manipualtion      CT MT/Mobs      PROM MT 1 10' R/L shoulder   STM/IASTM 1 0' L bicep         NMR re-education (25578)  Min:      T-spine Ext      GH depress/compress      Scap/GH NMR      Body blade      Wall ball roll      Wall Ball bounce      Ball drops      Cynthia Scap Bio      Floor Snow angels-sliders            Therapeutic Activity (02315)  Min:      UE throwing porgression      Dynamic UE stability      Earthquake Bar      Bodyblade                Therapeutic Exercise and NMR EXR  [x] (25278) Provided verbal/tactile cueing for activities related to strengthening, flexibility, endurance, ROM  for improvements in scapular, scapulothoracic and UE control with self care, reaching, carrying, lifting, house/yardwork, driving/computer work. [x] (45042) Provided verbal/tactile cueing for activities related to improving balance, coordination, kinesthetic sense, posture, motor skill, proprioception  to assist with  scapular, scapulothoracic and UE control with self care, reaching, carrying, lifting, house/yardwork, driving/computer work.     Therapeutic Activities:    [] (20041 or 17443) Provided verbal/tactile cueing for activities related to improving balance, coordination, kinesthetic sense, posture, motor skill, proprioception and motor activation to allow for proper function of scapular, scapulothoracic and UE control with self care, carrying, lifting, driving/computer work.      Home Exercise Program:    [x] (05748) Reviewed/Progressed HEP activities related to strengthening, flexibility, endurance, ROM of scapular, scapulothoracic and UE control with self care, reaching, carrying, lifting, house/yardwork, driving/computer work  [] (97709) Reviewed/Progressed HEP activities related to improving balance, coordination, kinesthetic sense, posture, motor skill, proprioception of scapular, scapulothoracic and UE control with self care, reaching, carrying, lifting, house/yardwork, driving/computer work      Manual Treatments:  PROM / STM / Oscillations-Mobs:  G-I, II, III, IV (PA's, Inf., Post.)  [x] (37250) Provided manual therapy to mobilize soft tissue/joints of cervical/CT, scapular GHJ and UE for the purpose of modulating pain, promoting relaxation,  increasing ROM, reducing/eliminating soft tissue swelling/inflammation/restriction, improving soft tissue extensibility and allowing for proper ROM for normal function with self care, reaching, carrying, lifting, house/yardwork, driving/computer work    Modalities:      Charges:  Timed Code Treatment Minutes: 50   Total Treatment Minutes: 50       [] EVAL (LOW) 51624 (typically 20 minutes face-to-face)  [] EVAL (MOD) 01960 (typically 30 minutes face-to-face)  [] EVAL (HIGH) 44535 (typically 45 minutes face-to-face)  [] RE-EVAL     [x] AO(31918) x   2  [] DRY NEEDLE 1 OR 2 MUSCLES  [] NMR (64529) x     [] DRY NEEDLE 3+ MUSCLES  [x] Manual (80632) x  1     [] TA (44236) x     [] [facility] (94663)  [] ES(attended) (90630)     [] ES (un) (38103):   [] VASO (31529)  [] Other:    If BW Please Indicate Time In/Out  CPT Code Time in Time out                                   GOALS:  Patient stated goal: regain use of shoulders  [x] Progressing: [] Met: [] Not Met: [] Adjusted    Therapist goals for Dynamic Strength and Intro to Plyometrics   []  Stage 3: Advanced Plyometrics and Intro to Throwing   []  Stage 4: Sport specific Training/Return to Sport     []  Ready to Return to Play, [employer]   []  Not Ready for Return to Sports   Comments:      Treatment/Activity Tolerance:  [x] Patient tolerated treatment well [] Patient limited by fatique  [] Patient limited by pain  [] Patient limited by other medical complications  [] Other:     Overall Progression Towards Functional goals/ Treatment Progress Update:  [x] Patient is progressing as expected towards functional goals listed. [] Progression is slowed due to complexities/Impairments listed. [] Progression has been slowed due to co-morbidities. [] Plan just implemented, too soon to assess goals progression <30days   [] Goals require adjustment due to lack of progress  [] Patient is not progressing as expected and requires additional follow up with physician  [] Other    Prognosis for POC: [x] Good [] Fair  [] Poor    Patient requires continued skilled intervention: [x] Yes  [] No      PLAN: Continue to address Bilat shoulder ROM, strength and NM control to improve overall functional use of shoulders. [x] Continue per plan of care [] Alter current plan (see comments)  [] Plan of care initiated [] Hold pending MD visit [] Discharge    Electronically signed by: Edouard Huynh PT    Note: If patient does not return for scheduled/recommended follow up visits, this note will serve as a discharge from care along with the most recent update on progress.

## 2020-12-15 ENCOUNTER — APPOINTMENT (OUTPATIENT)
Dept: PHYSICAL THERAPY | Age: 70
End: 2020-12-15
Payer: COMMERCIAL

## 2021-01-12 ENCOUNTER — HOSPITAL ENCOUNTER (OUTPATIENT)
Age: 71
Discharge: HOME | End: 2021-01-12
Payer: COMMERCIAL

## 2021-01-12 ENCOUNTER — HOSPITAL ENCOUNTER (OUTPATIENT)
Dept: VASCULAR LAB | Age: 71
Discharge: HOME | End: 2021-01-12
Payer: COMMERCIAL

## 2021-01-12 ENCOUNTER — OFFICE VISIT (OUTPATIENT)
Dept: VASCULAR SURGERY | Age: 71
End: 2021-01-12
Payer: COMMERCIAL

## 2021-01-12 VITALS
HEIGHT: 62 IN | WEIGHT: 167 LBS | TEMPERATURE: 96.5 F | SYSTOLIC BLOOD PRESSURE: 126 MMHG | DIASTOLIC BLOOD PRESSURE: 72 MMHG | BODY MASS INDEX: 30.73 KG/M2

## 2021-01-12 DIAGNOSIS — E78.2 MIXED HYPERLIPIDEMIA: ICD-10-CM

## 2021-01-12 DIAGNOSIS — I70.213 ATHEROSCLEROSIS OF NATIVE ARTERIES OF EXTREMITIES WITH INTERMITTENT CLAUDICATION, BILATERAL LEGS (HCC): ICD-10-CM

## 2021-01-12 DIAGNOSIS — I70.213 ATHEROSCLEROSIS OF NATIVE ARTERIES OF EXTREMITIES WITH INTERMITTENT CLAUDICATION, BILATERAL LEGS (HCC): Primary | ICD-10-CM

## 2021-01-12 DIAGNOSIS — R73.9 HYPERGLYCEMIA: ICD-10-CM

## 2021-01-12 LAB
A/G RATIO: 1.1 (ref 1.1–2.2)
ALBUMIN SERPL-MCNC: 4 G/DL (ref 3.4–5)
ALP BLD-CCNC: 83 U/L (ref 40–129)
ALT SERPL-CCNC: 10 U/L (ref 10–40)
ANION GAP SERPL CALCULATED.3IONS-SCNC: 10 MMOL/L (ref 3–16)
AST SERPL-CCNC: 17 U/L (ref 15–37)
BASOPHILS ABSOLUTE: 0.1 K/UL (ref 0–0.2)
BASOPHILS RELATIVE PERCENT: 1.3 %
BILIRUB SERPL-MCNC: 0.3 MG/DL (ref 0–1)
BUN BLDV-MCNC: 23 MG/DL (ref 7–20)
CALCIUM SERPL-MCNC: 9.7 MG/DL (ref 8.3–10.6)
CHLORIDE BLD-SCNC: 107 MMOL/L (ref 99–110)
CHOLESTEROL, FASTING: 140 MG/DL (ref 0–199)
CO2: 25 MMOL/L (ref 21–32)
CREAT SERPL-MCNC: 1.3 MG/DL (ref 0.6–1.2)
EOSINOPHILS ABSOLUTE: 0.3 K/UL (ref 0–0.6)
EOSINOPHILS RELATIVE PERCENT: 3.4 %
GFR AFRICAN AMERICAN: 49
GFR NON-AFRICAN AMERICAN: 40
GLOBULIN: 3.6 G/DL
GLUCOSE FASTING: 86 MG/DL (ref 70–99)
HCT VFR BLD CALC: 43 % (ref 36–48)
HDLC SERPL-MCNC: 31 MG/DL (ref 40–60)
HEMOGLOBIN: 14.2 G/DL (ref 12–16)
LDL CHOLESTEROL CALCULATED: 83 MG/DL
LYMPHOCYTES ABSOLUTE: 2 K/UL (ref 1–5.1)
LYMPHOCYTES RELATIVE PERCENT: 23 %
MCH RBC QN AUTO: 31.2 PG (ref 26–34)
MCHC RBC AUTO-ENTMCNC: 33 G/DL (ref 31–36)
MCV RBC AUTO: 94.7 FL (ref 80–100)
MONOCYTES ABSOLUTE: 0.6 K/UL (ref 0–1.3)
MONOCYTES RELATIVE PERCENT: 7.3 %
NEUTROPHILS ABSOLUTE: 5.6 K/UL (ref 1.7–7.7)
NEUTROPHILS RELATIVE PERCENT: 65 %
PDW BLD-RTO: 14.2 % (ref 12.4–15.4)
PLATELET # BLD: 216 K/UL (ref 135–450)
PMV BLD AUTO: 9.8 FL (ref 5–10.5)
POTASSIUM SERPL-SCNC: 4.4 MMOL/L (ref 3.5–5.1)
RBC # BLD: 4.54 M/UL (ref 4–5.2)
SODIUM BLD-SCNC: 142 MMOL/L (ref 136–145)
TOTAL PROTEIN: 7.6 G/DL (ref 6.4–8.2)
TRIGLYCERIDE, FASTING: 132 MG/DL (ref 0–150)
TSH REFLEX: 1.88 UIU/ML (ref 0.27–4.2)
VLDLC SERPL CALC-MCNC: 26 MG/DL
WBC # BLD: 8.6 K/UL (ref 4–11)

## 2021-01-12 PROCEDURE — 84443 ASSAY THYROID STIM HORMONE: CPT

## 2021-01-12 PROCEDURE — 99213 OFFICE O/P EST LOW 20 MIN: CPT | Performed by: SURGERY

## 2021-01-12 PROCEDURE — 80053 COMPREHEN METABOLIC PANEL: CPT

## 2021-01-12 PROCEDURE — 36415 COLL VENOUS BLD VENIPUNCTURE: CPT

## 2021-01-12 PROCEDURE — 80061 LIPID PANEL: CPT

## 2021-01-12 PROCEDURE — 85025 COMPLETE CBC W/AUTO DIFF WBC: CPT

## 2021-01-12 PROCEDURE — 83036 HEMOGLOBIN GLYCOSYLATED A1C: CPT

## 2021-01-12 PROCEDURE — 93925 LOWER EXTREMITY STUDY: CPT

## 2021-01-12 NOTE — PROGRESS NOTES
 clopidogrel (PLAVIX) 75 MG tablet TAKE 1 TABLET BY MOUTH DAILY *NEED TO ESTABLISH WITH A NEW PROVIDER FOR FUTURE REFILLS* 90 tablet 3    atorvastatin (LIPITOR) 10 MG tablet Take 1 tablet by mouth daily 90 tablet 3    meloxicam (MOBIC) 15 MG tablet Take 1 tablet by mouth daily 90 tablet 3    losartan-hydrochlorothiazide (HYZAAR) 100-25 MG per tablet Take 1 tablet by mouth daily 90 tablet 0    zoster recombinant adjuvanted vaccine (SHINGRIX) 50 MCG/0.5ML SUSR injection Inject 0.5 mLs into the muscle See Admin Instructions 1 dose now and repeat in 2-6 months 0.5 mL 0     No current facility-administered medications for this visit. Allergies:  Ace inhibitors, Chantix [varenicline], Keflex [cephalexin], Morphine, Niaspan [niacin er], Pletal [cilostazol], and Pravastatin     Review of Systems:   · Constitutional: there has been no unanticipated weight loss. There's been no change in energy level, sleep pattern, or activity level. · Eyes: No visual changes or diplopia. No scleral icterus. · ENT: No Headaches, hearing loss or vertigo. No mouth sores or sore throat. · Cardiovascular: Reviewed in HPI  · Respiratory: No cough or wheezing, no sputum production. No hematemesis. · Gastrointestinal: No abdominal pain, appetite loss, blood in stools. No change in bowel or bladder habits. · Genitourinary: No dysuria, trouble voiding, or hematuria. · Musculoskeletal:  No gait disturbance, weakness or joint complaints. · Integumentary: No rash or pruritis. · Neurological: No headache, diplopia, change in muscle strength, numbness or tingling. No change in gait, balance, coordination, mood, affect, memory, mentation, behavior. · Psychiatric: No anxiety, no depression. · Endocrine: No malaise, fatigue or temperature intolerance. No excessive thirst, fluid intake, or urination. No tremor. · Hematologic/Lymphatic: No abnormal bruising or bleeding, blood clots or swollen lymph nodes. · Allergic/Immunologic: No nasal congestion or hives. Physical Examination:    Vitals:    01/12/21 1134   BP: 126/72   Temp: 96.5 °F (35.8 °C)           General appearance: alert, appears stated age, cooperative and no distress  Head: Normocephalic, without obvious abnormality, atraumatic  Neck: no adenopathy, no carotid bruit, no JVD, supple, symmetrical, trachea midline and thyroid: not enlarged, symmetric, no tenderness/mass/nodules  Lungs: clear to auscultation bilaterally  Heart: regular rate and rhythm, S1, S2 normal, no murmur, click, rub or gallop  Abdomen: soft, non-tender. Bowel sounds normal. No masses,  no organomegaly  Extremities: extremities normal, atraumatic, no cyanosis or edema    Pulses:   L brachial 2 R brachial 2   L radial 2 R radial 2   L femoral 2 R femoral 2   L popliteal 2 R popliteal 2   L posterior tibial 1 R posterior tibial +   L dorsalis pedis 1 R dorsalis pedis +   Doppler Signals:  +    Neurologic: Grossly normal    MEDICAL DECISION MAKING/TESTING  I have reviewed the testing personally and my interpretation is below.     Duplex images reviewed and showed patent bilateral SFA stent    Assessment:     Patient Active Problem List   Diagnosis    Hyperglycemia    Pure hypercholesterolemia    Lumbago    Osteoarthritis    CKD (chronic kidney disease) stage 3, GFR 30-59 ml/min    Abnormal mammogram    Fibrocystic disease of breast    Diarrhea    PAD (peripheral artery disease) ([address]    Atherosclerosis of native arteries of extremities with intermittent claudication, left leg (HCC)    Sinus pause    Encounter for loop recorder check    Status post total left knee replacement    Adhesive capsulitis of right shoulder    Right shoulder pain    Psoriasis    Sick sinus syndrome (HCC)    Syncope and collapse    Urinary tract infection without hematuria    New onset seizure ([address]    HTN (hypertension), benign    CAD in native artery  Seasonal allergic rhinitis due to pollen    Spinal stenosis of lumbar region with neurogenic claudication    Nontraumatic complete tear of left rotator cuff       Plan:  1. Atherosclerosis of native arteries of extremities with intermittent claudication, bilateral legs (HCC)  Stable asymptomatic peripheral vascular disease. Continue dual antiplatelet regimen. Follow-up in one year with repeat peripheral noninvasive studies. - VL DUP LOWER EXTREMITY ARTERIES BILATERAL; Future        Thank you for allowing me to participate in the care of this individual.  Please do not hesitate to contact me with any questions. Justin Mcqueen M.D., FACS.  1/12/2021  11:59 AM

## 2021-01-13 LAB
ESTIMATED AVERAGE GLUCOSE: 105.4 MG/DL
HBA1C MFR BLD: 5.3 %

## 2021-02-18 RX ORDER — LOSARTAN POTASSIUM AND HYDROCHLOROTHIAZIDE 25; 100 MG/1; MG/1
1 TABLET ORAL DAILY
Qty: 90 TABLET | Refills: 3 | Status: SHIPPED | OUTPATIENT
Start: 2021-02-18 | End: 2021-11-09

## 2021-04-21 ENCOUNTER — OFFICE VISIT (OUTPATIENT)
Dept: ORTHOPEDIC SURGERY | Age: 71
End: 2021-04-21
Payer: COMMERCIAL

## 2021-04-21 VITALS — WEIGHT: 167 LBS | HEIGHT: 61 IN | BODY MASS INDEX: 31.53 KG/M2

## 2021-04-21 DIAGNOSIS — M17.11 PRIMARY OSTEOARTHRITIS OF RIGHT KNEE: Primary | ICD-10-CM

## 2021-04-21 PROCEDURE — 20610 DRAIN/INJ JOINT/BURSA W/O US: CPT | Performed by: ORTHOPAEDIC SURGERY

## 2021-04-21 PROCEDURE — 99215 OFFICE O/P EST HI 40 MIN: CPT | Performed by: ORTHOPAEDIC SURGERY

## 2021-04-21 RX ORDER — BUPIVACAINE HYDROCHLORIDE 5 MG/ML
4 INJECTION, SOLUTION PERINEURAL ONCE
Status: COMPLETED | OUTPATIENT
Start: 2021-04-21 | End: 2021-04-21

## 2021-04-21 RX ORDER — METHYLPREDNISOLONE ACETATE 40 MG/ML
40 INJECTION, SUSPENSION INTRA-ARTICULAR; INTRALESIONAL; INTRAMUSCULAR; SOFT TISSUE ONCE
Status: COMPLETED | OUTPATIENT
Start: 2021-04-21 | End: 2021-04-21

## 2021-04-21 RX ADMIN — METHYLPREDNISOLONE ACETATE 40 MG: 40 INJECTION, SUSPENSION INTRA-ARTICULAR; INTRALESIONAL; INTRAMUSCULAR; SOFT TISSUE at 10:09

## 2021-04-21 RX ADMIN — BUPIVACAINE HYDROCHLORIDE 20 MG: 5 INJECTION, SOLUTION PERINEURAL at 10:09

## 2021-04-21 NOTE — PROGRESS NOTES
4/21/2021     Reason for visit:  Right knee pain    History of Present Illness: The patient is a 29-year-old female who presents for evaluation of her right knee. She reports pain for several years with recent worsening. No traumatic injury. She localizes the pain diffuse about the knee. The pain is made worse with standing, walking, stairs. No catching or locking. Medical History:  Past Medical History:   Diagnosis Date    CAD (coronary artery disease)     NONOBSTRUCTING    CKD (chronic kidney disease) stage 3, GFR 30-59 ml/min     Lumbago     Osteoarthrosis, unspecified whether generalized or localized, unspecified site     Other abnormal blood chemistry     [facility]) [facility]     Pure hypercholesterolemia     Risk for falls 2/2/16    S/p Left TKR    Spinal stenosis     Unspecified essential hypertension       Past Surgical History:   Procedure Laterality Date    CHOLECYSTECTOMY      COLONOSCOPY      INSERTABLE CARDIAC MONITOR  09/2015    JOINT REPLACEMENT Left 20015    knee replacement    KNEE SURGERY      RT    LEG SURGERY      2 stents placed in right leg, 1 stent and balloon in left leg    ROTATOR CUFF REPAIR      SHOULDER ARTHROSCOPY Left 8/21/2020    LEFT SHOULDER ARTHROSCOPY, SUBACROMINAL DECOMPRESSION, DISTAL CLAVICLE EXCISION,DEBRIDEMENT, ROTATOR CUFF REPAIR, AUGMENTATION performed by Adi Sutton DO at [facility] OR    TOTAL KNEE ARTHROPLASTY Left 1/12/16    Dr Ewa Castaneda        Family History   Problem Relation Age of Onset   May Maddox Cancer Mother     Heart Disease Father     Cancer Brother     Emphysema Brother     Stroke Brother     Diabetes Brother     Diabetes Sister     Heart Disease Brother     Hypertension Brother     Hypertension Sister     High Cholesterol Neg Hx     High Blood Pressure Neg Hx       Social History     Socioeconomic History    Marital status:       Spouse name: Not on file    Number of children: Not on file    Years of education: Not on file    Highest education level: Not on file   Occupational History    Not on file   Social Needs    Financial resource strain: Not on file    Food insecurity     Worry: Not on file     Inability: Not on file    Transportation needs     Medical: Not on file     Non-medical: Not on file   Tobacco Use    Smoking status: Former Smoker     Packs/day: 0.50     Years: 40.00     Pack years: 20.00     Types: Cigarettes     Quit date: 2015     Years since quittin.7    Smokeless tobacco: Never Used    Tobacco comment: <1 pack of cigarettes per day   Substance and Sexual Activity    Alcohol use: No     Alcohol/week: 0.0 standard drinks    Drug use: No    Sexual activity: Not on file   Lifestyle    Physical activity     Days per week: Not on file     Minutes per session: Not on file    Stress: Not on file   Relationships    Social connections     Talks on phone: Not on file     Gets together: Not on file     Attends Jehovah's witness service: Not on file     Active member of club or organization: Not on file     Attends meetings of clubs or organizations: Not on file     Relationship status: Not on file    Intimate partner violence     Fear of current or ex partner: Not on file     Emotionally abused: Not on file     Physically abused: Not on file     Forced sexual activity: Not on file   Other Topics Concern    Not on file   Social History Narrative    Not on file      Current Outpatient Medications on File Prior to Visit   Medication Sig Dispense Refill    losartan-hydroCHLOROthiazide (HYZAAR) 100-25 MG per tablet TAKE 1 TABLET BY MOUTH DAILY 90 tablet 3    celecoxib (CELEBREX) 200 MG capsule Take 1 capsule by mouth daily 60 capsule 3    meloxicam (MOBIC) 15 MG tablet Take 1 tablet by mouth daily 30 tablet 3    clopidogrel (PLAVIX) 75 MG tablet TAKE 1 TABLET BY MOUTH DAILY *NEED TO ESTABLISH WITH A NEW PROVIDER FOR FUTURE REFILLS* 90 tablet 3    atorvastatin (LIPITOR) 10 MG tablet Take 1 tablet by mouth daily 90 tablet 3    meloxicam (MOBIC) 15 MG tablet Take 1 tablet by mouth daily 90 tablet 3    zoster recombinant adjuvanted vaccine (SHINGRIX) 50 MCG/0.5ML SUSR injection Inject 0.5 mLs into the muscle See Admin Instructions 1 dose now and repeat in 2-6 months 0.5 mL 0     No current facility-administered medications on file prior to visit. Allergies   Allergen Reactions    Ace Inhibitors Other (See Comments)     Cough     Chantix [Varenicline]      depression    Keflex [Cephalexin] Diarrhea    Morphine      Causes mental impairment,  N&V    Niaspan [Niacin Er] Other (See Comments)     Skin irritation    Pletal [Cilostazol]      Headache and diarrhea    Pravastatin         Review of Systems:  Constitutional: Patient is adequately groomed with no evidence of malnutrition  Mental Status: The patient is oriented to time, place and person. The patient's mood and affect are appropriate. Lymphatic: The lymphatic examination bilaterally reveals all areas to be without enlargement or induration. Vascular: Examination reveals no swelling or calf tenderness. Peripheral pulses are palpable and 2+. Neurological: The patient has good coordination. There is no weakness or sensory deficit. Skin:  Head/Neck: inspection reveals no rashes, ulcerations or lesions. Trunk: inspection reveals no rashes, ulcerations or lesions.     Objective:  Ht 5' 1\" (1.549 m)   Wt 167 lb (75.8 kg)   BMI 31.55 kg/m²      Physical Exam:  The patient is well-appearing and in no apparent distress  Examination of the right knee   There is a small effusion, no gross deformity or skin changes  Range of motion reveals 0 to 125  neg lachman, negative posterior drawer, no pain or laxity with varus or valgus stress at 0 degrees and 30 degrees of flexion  + joint line tenderness  5 out of 5 strength throughout distal muscle groups  Sensation is intact to light touch throughout all distributions  There is no calf swelling or tenderness  Palpable DP pulse, brisk cap refill, 2+ symmetric reflexes    Imaging:  X-rays of the right knee from November 2020 were reviewed. Tricompartmental degenerative changes are present which are most pronounced within the medial compartment where there is significant joint space loss and osteophyte formation. Assessment:  Right knee osteoarthritis    Plan:  I discussed the patient's diagnosis and treatment options. We discussed operative and nonoperative options. Nonoperative treatment options include activity modification, anti-inflammatory medications, physical therapy, and injections. She is interested in a cortisone injection today. This was given with 40 mg of Depo-Medrol combined with 4 mL of 0.5% Marcaine. She tolerated it well. She will return as needed. In future we could repeat cortisone or proceed with hyaluronic acid. Alternatively she may be a candidate for total knee arthroplasty. Total time spent with this encounter was greater than 40 minutes. Time spent included reviewing previous notes and imaging within the patient's chart. In addition it was spent obtaining a history today, examining the patient, reviewing imaging with her, discussing treatment options and next steps. Lastly, it included communicating orders with my staff and providing documentation in the chart. Yessica Francis MD            Orthopaedic Surgery Sports Medicine and [address] [facility] and [facility]            Team Physician [facility] ([place]      Disclaimer: This note was dictated with voice recognition software. Though review and correction are routine, we apologize for any errors.

## 2021-04-30 ENCOUNTER — TELEPHONE (OUTPATIENT)
Dept: FAMILY MEDICINE CLINIC | Age: 71
End: 2021-04-30

## 2021-04-30 NOTE — TELEPHONE ENCOUNTER
----- Message from Glendora Meckel sent at 4/30/2021  1:57 PM EDT -----  Subject: Appointment Request    Reason for Call: Routine (Patient Request) No Script    QUESTIONS  Type of Appointment? Established Patient  Reason for appointment request? No appointments available during search  Additional Information for Provider? Patient called in and stated has a   swollen gland and want to know if the pt can get a earlier appointment   instead of Thursday .  ---------------------------------------------------------------------------  --------------  CALL BACK INFO  What is the best way for the office to contact you? OK to leave message on   voicemail  Preferred Call Back Phone Number? [phone]  ---------------------------------------------------------------------------  --------------  SCRIPT ANSWERS  Relationship to Patient? Self  Appointment reason? Symptomatic  Select script based on patient symptoms? Adult No Script   (Is the patient requesting to see the provider for a procedure?)? No  (Is the patient requesting to see the provider urgently  today or   tomorrow. )? No  Have you been diagnosed with, tested for, or told that you are suspected   of having COVID-19 (Coronavirus)? No  Have you had a fever or taken medication to treat a fever within the past   3 days? No  Have you had a cough, shortness of breath or flu-like symptoms within the   past 3 days? No  Do you currently have flu-like symptoms including fever or chills, cough,   shortness of breath, or difficulty breathing, or new loss of taste or   smell? No  (Service Expert  click yes below to proceed with [employer] As Usual   Scheduling)?  Yes

## 2021-05-03 NOTE — PROGRESS NOTES
Ref eberlySUBJECTIVE:    Payam Starr is a 66 y.o. female who presents for a follow up visit. Chief Complaint   Patient presents with    6 Month Follow-Up     Pt c/o swollen lymph node on R side of neck x 4 days. Also reports spot on face. Wants to discuss taking Prilosec. Gastroesophageal Reflux  She complains of heartburn. This is a chronic problem. The current episode started more than 1 month ago. The problem occurs frequently. The heartburn is located in the substernum. The heartburn is of moderate intensity. The heartburn does not wake her from sleep. The heartburn does not limit her activity. The heartburn doesn't change with position. The symptoms are aggravated by certain foods. Risk factors include obesity, lack of exercise and NSAIDs. She has tried nothing for the symptoms. Edema right side of face  This is a new problem. Present for a few days. She felt fullness at the angle of her mandible on the right. She also complains of a fullness in her ear. She does have an area of erythema on the right side of her face but states this has been there for years and she has had it frozen in the past.        Patient's medications, allergies, past medical,surgical, social and family histories were reviewed and updated as appropriate.      Past Medical History:   Diagnosis Date    CAD (coronary artery disease)     NONOBSTRUCTING    CKD (chronic kidney disease) stage 3, GFR 30-59 ml/min     Lumbago     Osteoarthrosis, unspecified whether generalized or localized, unspecified site     Other abnormal blood chemistry     PAD [facility]peripheral artery disease) ([facility] [address]     Pure hypercholesterolemia     Risk for falls 2/2/16    S/p Left TKR    Spinal stenosis     Unspecified essential hypertension      Past Surgical History:   Procedure Laterality Date    CHOLECYSTECTOMY      COLONOSCOPY      INSERTABLE CARDIAC MONITOR  09/2015    JOINT REPLACEMENT Left 20015    knee replacement    KNEE SURGERY RT    LEG SURGERY      2 stents placed in right leg, 1 stent and balloon in left leg    ROTATOR CUFF REPAIR      SHOULDER ARTHROSCOPY Left 2020    LEFT SHOULDER ARTHROSCOPY, SUBACROMINAL DECOMPRESSION, DISTAL CLAVICLE EXCISION,DEBRIDEMENT, ROTATOR CUFF REPAIR, AUGMENTATION performed by Eri Mcgovern DO at [address] [place] [address] Left 16    Dr Vargas Living History   Problem Relation Age of Onset   Karena Villanueva Cancer Mother     Heart Disease Father     Cancer Brother     Emphysema Brother     Stroke Brother     Diabetes Brother     Diabetes Sister     Heart Disease Brother     Hypertension Brother     Hypertension Sister     High Cholesterol Neg Hx     High Blood Pressure Neg Hx      Social History     Tobacco Use    Smoking status: Former Smoker     Packs/day: 0.50     Years: 40.00     Pack years: 20.00     Types: Cigarettes     Quit date: 2015     Years since quittin.8    Smokeless tobacco: Never Used    Tobacco comment: <1 pack of cigarettes per day   Substance Use Topics    Alcohol use: No     Alcohol/week: 0.0 standard drinks      Allergies   Allergen Reactions    Ace Inhibitors Other (See Comments)     Cough     Chantix [Varenicline]      depression    Keflex [Cephalexin] Diarrhea    Morphine      Causes mental impairment,  N&V    Niaspan [Niacin Er] Other (See Comments)     Skin irritation    Pletal [Cilostazol]      Headache and diarrhea    Pravastatin      Current Outpatient Medications on File Prior to Visit   Medication Sig Dispense Refill    losartan-hydroCHLOROthiazide (HYZAAR) 100-25 MG per tablet TAKE 1 TABLET BY MOUTH DAILY 90 tablet 3    celecoxib (CELEBREX) 200 MG capsule Take 1 capsule by mouth daily 60 capsule 3    clopidogrel (PLAVIX) 75 MG tablet TAKE 1 TABLET BY MOUTH DAILY *NEED TO ESTABLISH WITH A NEW PROVIDER FOR FUTURE REFILLS* 90 tablet 3    atorvastatin (LIPITOR) 10 MG tablet Take 1 tablet by mouth daily 90 tablet 3    zoster recombinant adjuvanted vaccine (SHINGRIX) 50 MCG/0.5ML SUSR injection Inject 0.5 mLs into the muscle See Admin Instructions 1 dose now and repeat in 2-6 months 0.5 mL 0     No current facility-administered medications on file prior to visit. Review of Systems   Gastrointestinal: Positive for heartburn. OBJECTIVE:    BP (!) 156/78   Pulse 68   Temp 97.7 °F (36.5 °C) (Temporal)   Ht 5' 1\" (1.549 m)   Wt 168 lb (76.2 kg)   BMI 31.74 kg/m²    Physical Exam  Constitutional:       Appearance: Normal appearance. HENT:      Right Ear: Hearing and ear canal normal. Tympanic membrane is perforated. Left Ear: Hearing and ear canal normal. Tympanic membrane is scarred. Nose: No congestion or rhinorrhea. Mouth/Throat:      Mouth: Mucous membranes are moist.      Pharynx: No posterior oropharyngeal erythema. Neck:     Cardiovascular:      Rate and Rhythm: Normal rate and regular rhythm. Heart sounds: Normal heart sounds. Pulmonary:      Effort: Pulmonary effort is normal.      Breath sounds: Normal breath sounds. Musculoskeletal:      Right lower leg: No edema. Left lower leg: No edema. Skin:     Findings: Lesion ( erythematous area right cheek. Multiple Seb Keratoses) present. Neurological:      General: No focal deficit present. Mental Status: She is alert and oriented to person, place, and time. Psychiatric:         Mood and Affect: Mood normal.         Behavior: Behavior normal.         ASSESSMENT/PLAN:    Stephany Best was seen today for 6 month follow-up. Diagnoses and all orders for this visit:    Gastroesophageal reflux disease without esophagitis  -     omeprazole (PRILOSEC) 40 MG delayed release capsule;  Take 1 capsule by mouth every morning (before breakfast)    HTN (hypertension), benign    Pure hypercholesterolemia    Parotiditis  -     [facility] [place], [place], DO, Otolaryngology, [facility]  -     cephALEXin (KEFLEX) 500 MG capsule; Take 1 capsule by mouth 2 times daily for 10 days        Return in about 4 weeks (around 6/1/2021). With previously ordered lab prior    Please note portions of this note were completed with a voicerecognition program.  Efforts were made to edit the dictations but occasionally words are mis-transcribed.

## 2021-05-04 ENCOUNTER — OFFICE VISIT (OUTPATIENT)
Dept: FAMILY MEDICINE CLINIC | Age: 71
End: 2021-05-04
Payer: COMMERCIAL

## 2021-05-04 VITALS
HEART RATE: 68 BPM | SYSTOLIC BLOOD PRESSURE: 156 MMHG | DIASTOLIC BLOOD PRESSURE: 78 MMHG | TEMPERATURE: 97.7 F | HEIGHT: 61 IN | BODY MASS INDEX: 31.72 KG/M2 | WEIGHT: 168 LBS

## 2021-05-04 DIAGNOSIS — E78.00 PURE HYPERCHOLESTEROLEMIA: ICD-10-CM

## 2021-05-04 DIAGNOSIS — K11.20 PAROTIDITIS: ICD-10-CM

## 2021-05-04 DIAGNOSIS — I10 HTN (HYPERTENSION), BENIGN: ICD-10-CM

## 2021-05-04 DIAGNOSIS — K21.9 GASTROESOPHAGEAL REFLUX DISEASE WITHOUT ESOPHAGITIS: Primary | ICD-10-CM

## 2021-05-04 PROCEDURE — 99213 OFFICE O/P EST LOW 20 MIN: CPT | Performed by: FAMILY MEDICINE

## 2021-05-04 RX ORDER — OMEPRAZOLE 40 MG/1
40 CAPSULE, DELAYED RELEASE ORAL
Qty: 90 CAPSULE | Refills: 1 | Status: SHIPPED | OUTPATIENT
Start: 2021-05-04 | End: 2021-11-09

## 2021-05-04 RX ORDER — CEPHALEXIN 500 MG/1
500 CAPSULE ORAL 2 TIMES DAILY
Qty: 20 CAPSULE | Refills: 0 | Status: SHIPPED | OUTPATIENT
Start: 2021-05-04 | End: 2021-05-14

## 2021-05-04 ASSESSMENT — PATIENT HEALTH QUESTIONNAIRE - PHQ9
1. LITTLE INTEREST OR PLEASURE IN DOING THINGS: 0
SUM OF ALL RESPONSES TO PHQ QUESTIONS 1-9: 0
SUM OF ALL RESPONSES TO PHQ QUESTIONS 1-9: 0

## 2021-05-04 ASSESSMENT — ENCOUNTER SYMPTOMS: HEARTBURN: 1

## 2021-05-25 ENCOUNTER — TELEPHONE (OUTPATIENT)
Dept: FAMILY MEDICINE CLINIC | Age: 71
End: 2021-05-25

## 2021-05-25 NOTE — TELEPHONE ENCOUNTER
[address] [facility], [facility]. [address] [facility] 63 Vika Mike [address] [place] [address] [address] [address]   Phone:  [phone]  Fax:  [phone]        Patient called and would like to know if you will call in prescription for an antibiotic.   She states she either has a sinus infection or allergies

## 2021-05-27 ENCOUNTER — OFFICE VISIT (OUTPATIENT)
Dept: ENT CLINIC | Age: 71
End: 2021-05-27
Payer: COMMERCIAL

## 2021-05-27 VITALS
BODY MASS INDEX: 31.72 KG/M2 | HEART RATE: 76 BPM | DIASTOLIC BLOOD PRESSURE: 80 MMHG | HEIGHT: 61 IN | WEIGHT: 168 LBS | SYSTOLIC BLOOD PRESSURE: 157 MMHG | TEMPERATURE: 97.8 F

## 2021-05-27 DIAGNOSIS — K11.20 PAROTITIS: Primary | ICD-10-CM

## 2021-05-27 DIAGNOSIS — J34.3 HYPERTROPHY OF BOTH INFERIOR NASAL TURBINATES: ICD-10-CM

## 2021-05-27 DIAGNOSIS — J34.2 DNS (DEVIATED NASAL SEPTUM): ICD-10-CM

## 2021-05-27 DIAGNOSIS — J30.9 ALLERGIC RHINITIS, UNSPECIFIED SEASONALITY, UNSPECIFIED TRIGGER: ICD-10-CM

## 2021-05-27 PROCEDURE — 99204 OFFICE O/P NEW MOD 45 MIN: CPT | Performed by: STUDENT IN AN ORGANIZED HEALTH CARE EDUCATION/TRAINING PROGRAM

## 2021-05-27 RX ORDER — FLUTICASONE PROPIONATE 50 MCG
1 SPRAY, SUSPENSION (ML) NASAL DAILY
Qty: 2 BOTTLE | Refills: 1 | Status: SHIPPED | OUTPATIENT
Start: 2021-05-27 | End: 2022-02-01

## 2021-05-27 RX ORDER — LORATADINE 10 MG/1
10 TABLET ORAL DAILY
Qty: 30 TABLET | Refills: 3 | Status: SHIPPED | OUTPATIENT
Start: 2021-05-27 | End: 2021-09-02

## 2021-05-27 NOTE — PATIENT INSTRUCTIONS
-Start Flonase daily (1 spray each nostril). It is best to do this after using a sinus rinse if you can tolerate it.  -Instill nasal sprays as we discussed in the office. Use your right hand to instill spray into your left nostril pointed towards the left eye as to avoid spraying the nasal septum and vice versa.  After spray sniff in GENTLY!  -Start anti-histamine (I.e. Claritin) as needed daily  -Sugar free sour candies or lemon wedges to help encourage salivary flow  - Increase water intake  - Massage right side of face from back to front

## 2021-05-27 NOTE — PROGRESS NOTES
[address] [facility] NECK SURGERY  NEW PATIENT HISTORY AND PHYSICAL NOTE      Patient Name: Eloy Mendez  Medical Record Number:  [id number]  Primary Care Physician:  Noa Malloy MD    ChiefComplaint     Chief Complaint   Patient presents with    Ear Problem     Rt ear pressure, swelling around the ear, possible TM perforation x 1 month. Finished abx, helped some       History of Present Illness     Eloy Mendez is an 66 y.o. female presenting with history of right facial swelling. Seen by PCP on 5/4, had some swelling around right ear at that the time. PCP also noted possible tympanic membrane perforation at that time. Was placed on Keflex x 10 days. Symptoms included right facial swelling, especially while eating. Initially had some pain at first, mild, sharp. No changes in hearing, no tinnitus. No hx of similar issues. Questionable foul taste in mouth. No hx of head or neck surgery. No history of salivary gland stones. Currently her symptoms have resolved. + environmental allergies, symptoms include sneezing, cough, PND. Has been taking tylenol sinus, no nasal sprays. No hx of allergy testing or immunotherapy.        Past Medical History     Past Medical History:   Diagnosis Date    CAD (coronary artery disease)     NONOBSTRUCTING    CKD (chronic kidney disease) stage 3, GFR 30-59 ml/min     Lumbago     Osteoarthrosis, unspecified whether generalized or localized, unspecified site     Other abnormal blood chemistry     PAD (peripheral artery disease) ([facility] [address]     Pure hypercholesterolemia     Risk for falls 2/2/16    S/p Left TKR    Spinal stenosis     Unspecified essential hypertension        Past Surgical History     Past Surgical History:   Procedure Laterality Date    CHOLECYSTECTOMY      COLONOSCOPY      INSERTABLE CARDIAC MONITOR  09/2015    JOINT REPLACEMENT Left 20015    knee replacement    KNEE SURGERY      RT    LEG SURGERY      2 stents placed in right leg, 1 stent and balloon in left leg    ROTATOR CUFF REPAIR      SHOULDER ARTHROSCOPY Left 2020    LEFT SHOULDER ARTHROSCOPY, SUBACROMINAL DECOMPRESSION, DISTAL CLAVICLE EXCISION,DEBRIDEMENT, ROTATOR CUFF REPAIR, AUGMENTATION performed by Kavitha Mcqueen DO at [facility] Left 16    Dr Catherine Stephen History     Family History   Problem Relation Age of Onset   [facility] Cancer Mother     Heart Disease Father     Cancer Brother     Emphysema Brother     Stroke Brother     Diabetes Brother     Diabetes Sister     Heart Disease Brother     Hypertension Brother     Hypertension Sister     High Cholesterol Neg Hx     High Blood Pressure Neg Hx        Social History     Social History     Tobacco Use    Smoking status: Former Smoker     Packs/day: 0.50     Years: 40.00     Pack years: 20.00     Types: Cigarettes     Quit date: 2015     Years since quittin.8    Smokeless tobacco: Never Used    Tobacco comment: <1 pack of cigarettes per day   Vaping Use    Vaping Use: Never used   Substance Use Topics    Alcohol use: No     Alcohol/week: 0.0 standard drinks    Drug use: No        Allergies     Allergies   Allergen Reactions    Ace Inhibitors Other (See Comments)     Cough     Chantix [Varenicline]      depression    Keflex [Cephalexin] Diarrhea    Morphine      Causes mental impairment,  N&V    Niaspan [Niacin Er] Other (See Comments)     Skin irritation    Pletal [Cilostazol]      Headache and diarrhea    Pravastatin        Medications     Current Outpatient Medications   Medication Sig Dispense Refill    fluticasone (FLONASE) 50 MCG/ACT nasal spray 1 spray by Each Nostril route daily 2 Bottle 1    loratadine (CLARITIN) 10 MG tablet Take 1 tablet by mouth daily 30 tablet 3    omeprazole (PRILOSEC) 40 MG delayed release capsule Take 1 capsule by mouth every morning (before breakfast) 90 capsule 1    membrane, no middle ear effusions or retractions.      -As: External auditory canal without stenosis, tympanic membrane clear, no middle ear effusions or retractions. Pneumatic otoscopy: Bilateral tympanic membranes mobile pneumatic otoscopy  FACE: HB 1/6 bilaterally, symmetric appearing, sensation equal bilaterally. No preauricular or parotid gland swelling. ORAL CAVITY: Clear saliva easily expressed through bilateral Stensen's and Telfair's ducts. No masses or lesions palpated, uvula is midline, moist mucous membranes, symmetric 2+ tonsils  NECK: Normal range of motion, no thyromegaly, trachea is midline, no palpable lymphadenopathy or neck masses, no crepitus  NEURO: Cranial Nerves 2, 3, 4, 5, 6, 7, 11, 12 grossly intact bilaterally     I have performed a head and neck physical exam personally or was physically present during the key or critical portions of the service. Assessment and Plan     1. Parotitis  Possibly secondary to underlying several gland stone. Currently resolved. Increase p.o. fluid hydration  Sugar-free sour candies or lemon wedges to encourage salivary flow  Posterior anterior massage techniques  This was her first isolated incident. If she develops similar issues in the future she will call and make a follow-up appointment. We can consider imaging for stones at that time. 2. Allergic rhinitis, unspecified seasonality, unspecified trigger  3. DNS (deviated nasal septum)  4. Hypertrophy of both inferior nasal turbinates  Start nasal saline rinses daily  Start Flonase nasal spray daily. Instructed on how to properly instill  Claritin as needed      Follow Up     Return if symptoms worsen or fail to improve. Dr. [place] [address]  Department of Otolaryngology/Head & Neck Surgery  5/27/21    Medical Decision Making:   The following items were considered in medical decision making:  Independent review of images  Review / order clinical lab tests  Review / order radiology tests  Decision to obtain old records    Portions of this note were dictated using Dragon.  There may be linguistic errors secondary to the use of this program.

## 2021-08-11 DIAGNOSIS — M17.11 PRIMARY OSTEOARTHRITIS OF RIGHT KNEE: ICD-10-CM

## 2021-08-11 RX ORDER — CELECOXIB 200 MG/1
200 CAPSULE ORAL DAILY
Qty: 30 CAPSULE | Refills: 3 | Status: SHIPPED | OUTPATIENT
Start: 2021-08-11 | End: 2021-11-12

## 2021-08-11 NOTE — TELEPHONE ENCOUNTER
----- Message from [facility] sent at 8/11/2021 11:10 AM EDT -----  Subject: Refill Request    QUESTIONS  Name of Medication? celecoxib (CELEBREX) 200 MG capsule  Patient-reported dosage and instructions? 1 tablet a day   How many days do you have left? 0  Preferred Pharmacy? [id number] phone number (if available)? [phone]  Additional Information for Provider? Pt is completely out of medication   and wanted to have this filled. Pt's old pcp prescribed the medication and   she would like to have it refilled. ---------------------------------------------------------------------------  --------------  [place]  What is the best way for the office to contact you? OK to leave message on   voicemail  Preferred Call Back Phone Number?  [phone]

## 2021-08-24 NOTE — TELEPHONE ENCOUNTER
Recieved a call from Camille (pre-admin testing) regarding plavix the patient is on. Patient was not notified from Dr. Rolf Bañuelos' office that she needed to come off her plavix. Camille stated that Dr. Janna Olson is ok with the patient stopping the plavix for 4 days. Spoke with Dr. Rolf Bañuelos, he said that is fine and will address tomorrow with the patient. [place] PDMP website reviewed for other prescriptions of controlled substances and dates that these were last filled.  Reports are compliant with drug, quantity, prescribe, dispenser, and recipient history.  Therapy appropriate, no aberrant behavior identified, prescription given.

## 2021-09-02 RX ORDER — LORATADINE 10 MG/1
10 TABLET ORAL DAILY
Qty: 30 TABLET | Refills: 3 | Status: SHIPPED | OUTPATIENT
Start: 2021-09-02 | End: 2022-02-01

## 2021-09-29 ENCOUNTER — TELEPHONE (OUTPATIENT)
Dept: FAMILY MEDICINE CLINIC | Age: 71
End: 2021-09-29

## 2021-09-29 ENCOUNTER — TELEPHONE (OUTPATIENT)
Dept: VASCULAR SURGERY | Age: 71
End: 2021-09-29

## 2021-09-29 ENCOUNTER — TELEPHONE (OUTPATIENT)
Dept: CARDIOLOGY CLINIC | Age: 71
End: 2021-09-29

## 2021-09-29 NOTE — TELEPHONE ENCOUNTER
Dr. Nikole Eaton from [facility] is requesting vascular clearance for patient having right total knee replacement on Oct 19.  Please advise

## 2021-09-29 NOTE — TELEPHONE ENCOUNTER
----- Message from Hardy Vera sent at 9/29/2021  2:35 PM EDT -----  Subject: Appointment Request    Reason for Call: Routine Pre-Op    QUESTIONS  Type of Appointment? Established Patient  Reason for appointment request? Available appointments did not meet   patient need  Additional Information for Provider? Pt needs a pre op apt for her total   knee replacement surgery before 10/18. Can you assist and squeeze her in?   ---------------------------------------------------------------------------  --------------  CALL BACK INFO  What is the best way for the office to contact you? OK to leave message on   voicemail  Preferred Call Back Phone Number? [phone]  ---------------------------------------------------------------------------  --------------  SCRIPT ANSWERS  Relationship to Patient? Self  Do you have questions for your provider that need to be answered prior to   scheduling your pre-op appointment? No  Have you been diagnosed with, awaiting test results for, or told that you   are suspected of having COVID-19 (Coronavirus)? (If patient has tested   negative or was tested as a requirement for work, school, or travel and   not based on symptoms, answer no)? No  Within the past two weeks have you developed any of the following symptoms   (answer no if symptoms have been present longer than 2 weeks or began   more than 2 weeks ago)? Fever or Chills, Cough, Shortness of breath or   difficulty breathing, Loss of taste or smell, Sore throat, Nasal   congestion, Sneezing or runny nose, Fatigue or generalized body aches   (answer no if pain is specific to a body part e.g. back pain), Diarrhea,   Headache? No  Have you had close contact with someone with COVID-19 in the last 14 days? No  (Service Expert  click yes below to proceed with [employer] As Usual   Scheduling)?  Yes

## 2021-09-29 NOTE — TELEPHONE ENCOUNTER
CARDIAC CLEARANCE     What type of procedure are you having? Rt Total Knee Replacement    Which physician is performing your procedure? Dr Akiko Thomas    When is your procedure scheduled for? 10/19/2021    Where are you having this procedure? Are you taking Blood Thinners? yes   If so what? Cleebrax and Plavix  (Name/dose/frequesncy)     Does the surgeon want you to stop your blood thinner? If so for how long?  Needs to be off 5 days prior    Phone Number and Contact Name for Physicians office: [phone]    Fax number to send [phone]

## 2021-09-30 ENCOUNTER — TELEPHONE (OUTPATIENT)
Dept: VASCULAR SURGERY | Age: 71
End: 2021-09-30

## 2021-09-30 ENCOUNTER — OFFICE VISIT (OUTPATIENT)
Dept: FAMILY MEDICINE CLINIC | Age: 71
End: 2021-09-30
Payer: COMMERCIAL

## 2021-09-30 VITALS
TEMPERATURE: 98.4 F | OXYGEN SATURATION: 97 % | DIASTOLIC BLOOD PRESSURE: 84 MMHG | SYSTOLIC BLOOD PRESSURE: 142 MMHG | HEART RATE: 77 BPM | BODY MASS INDEX: 31.91 KG/M2 | WEIGHT: 169 LBS | HEIGHT: 61 IN

## 2021-09-30 DIAGNOSIS — M17.11 OSTEOARTHRITIS OF RIGHT KNEE, UNSPECIFIED OSTEOARTHRITIS TYPE: Primary | ICD-10-CM

## 2021-09-30 DIAGNOSIS — Z01.818 PREOP EXAMINATION: ICD-10-CM

## 2021-09-30 DIAGNOSIS — Z23 NEED FOR IMMUNIZATION AGAINST INFLUENZA: ICD-10-CM

## 2021-09-30 PROCEDURE — G0008 ADMIN INFLUENZA VIRUS VAC: HCPCS | Performed by: NURSE PRACTITIONER

## 2021-09-30 PROCEDURE — 90694 VACC AIIV4 NO PRSRV 0.5ML IM: CPT | Performed by: NURSE PRACTITIONER

## 2021-09-30 PROCEDURE — 99213 OFFICE O/P EST LOW 20 MIN: CPT | Performed by: NURSE PRACTITIONER

## 2021-09-30 NOTE — PROGRESS NOTES
Preoperative Consultation    Angelica Franco  YOB: 1950    This patient presents to the office today for a preoperative consultation at the request of surgeon, Dr. Richie Bello, who plans on performing Right total knee arthroscopy on October 19 at 1030.       Planned anesthesia: spinal  Known anesthesia problems: None   Bleeding risk: Anticoagulant therapy- plavix  Personal or FH of DVT/PE: No      Patient Active Problem List   Diagnosis    Hyperglycemia    Pure hypercholesterolemia    Lumbago    Osteoarthritis    CKD (chronic kidney disease) stage 3, GFR 30-59 ml/min ([facility])    Abnormal mammogram    Fibrocystic disease of breast    Diarrhea    PAD [facility]peripheral artery disease) [facility] [address]    Atherosclerosis of native arteries of extremities with intermittent claudication, left leg ([facility])    Sinus pause    Encounter for loop recorder check    Status post total left knee replacement    Adhesive capsulitis of right shoulder    Right shoulder pain    Psoriasis    Sick sinus syndrome ([facility])    Syncope and collapse    Urinary tract infection without hematuria    New onset seizure ([facility] [address]    HTN (hypertension), benign    CAD in native artery    Seasonal allergic rhinitis due to pollen    Spinal stenosis of lumbar region with neurogenic claudication    Nontraumatic complete tear of left rotator cuff     Past Surgical History:   Procedure Laterality Date    CHOLECYSTECTOMY      COLONOSCOPY      INSERTABLE CARDIAC MONITOR  09/2015    JOINT REPLACEMENT Left 20015    knee replacement    KNEE SURGERY      RT    LEG SURGERY      2 stents placed in right leg, 1 stent and balloon in left leg    ROTATOR CUFF REPAIR      SHOULDER ARTHROSCOPY Left 8/21/2020    LEFT SHOULDER ARTHROSCOPY, SUBACROMINAL DECOMPRESSION, DISTAL CLAVICLE EXCISION,DEBRIDEMENT, ROTATOR CUFF REPAIR, AUGMENTATION performed by Kehinde Chacon DO at [address] [facility] ARTHROPLASTY Left 1/12/16    Dr Jacobo Chris Allergies   Allergen Reactions    Ace Inhibitors Other (See Comments)     Cough     Chantix [Varenicline]      depression    Keflex [Cephalexin] Diarrhea    Morphine      Causes mental impairment,  N&V    Niaspan [Niacin Er] Other (See Comments)     Skin irritation    Pletal [Cilostazol]      Headache and diarrhea    Pravastatin      Outpatient Medications Marked as Taking for the 21 encounter (Office Visit) with JIM Jade NP   Medication Sig Dispense Refill    loratadine (CLARITIN) 10 MG tablet TAKE 1 TABLET BY MOUTH DAILY 30 tablet 3    celecoxib (CELEBREX) 200 MG capsule Take 1 capsule by mouth daily 30 capsule 3    fluticasone (FLONASE) 50 MCG/ACT nasal spray 1 spray by Each Nostril route daily 2 Bottle 1    omeprazole (PRILOSEC) 40 MG delayed release capsule Take 1 capsule by mouth every morning (before breakfast) 90 capsule 1    losartan-hydroCHLOROthiazide (HYZAAR) 100-25 MG per tablet TAKE 1 TABLET BY MOUTH DAILY 90 tablet 3    clopidogrel (PLAVIX) 75 MG tablet TAKE 1 TABLET BY MOUTH DAILY *NEED TO ESTABLISH WITH A NEW PROVIDER FOR FUTURE REFILLS* 90 tablet 3    atorvastatin (LIPITOR) 10 MG tablet Take 1 tablet by mouth daily 90 tablet 3    zoster recombinant adjuvanted vaccine (SHINGRIX) 50 MCG/0.5ML SUSR injection Inject 0.5 mLs into the muscle See Admin Instructions 1 dose now and repeat in 2-6 months 0.5 mL 0       Social History     Tobacco Use    Smoking status: Former Smoker     Packs/day: 0.50     Years: 40.00     Pack years: 20.00     Types: Cigarettes     Quit date: 2015     Years since quittin.2    Smokeless tobacco: Never Used    Tobacco comment: <1 pack of cigarettes per day   Substance Use Topics    Alcohol use: No     Alcohol/week: 0.0 standard drinks     Family History   Problem Relation Age of Onset    Cancer Mother     Heart Disease Father     Cancer Brother     Emphysema Brother     Stroke Brother     Diabetes Brother  Diabetes Sister     Heart Disease Brother     Hypertension Brother     Hypertension Sister     High Cholesterol Neg Hx     High Blood Pressure Neg Hx        Review of Systems  A comprehensive review of systems was negative except for what was noted in the HPI. Physical Exam   BP (!) 142/84   Pulse 77   Temp 98.4 °F (36.9 °C)   Ht 5' 1\" (1.549 m)   Wt 169 lb (76.7 kg)   SpO2 97%   BMI 31.93 kg/m²   Weight: 169 lb (76.7 kg)   Constitutional: She is oriented to person, place, and time. She appears well-developed and well-nourished. No distress. HENT:   Head: Normocephalic and atraumatic. Mouth/Throat: Uvula is midline, oropharynx is clear and moist and mucous membranes are normal.  Dentures in place  Eyes: Conjunctivae and EOM are normal. Pupils are equal, round, and reactive to light. Neck: Trachea normal and normal range of motion. Neck supple. No JVD present. Carotid bruit is not present. No mass and no thyromegaly present. Cardiovascular: Normal rate, regular rhythm, normal heart sounds and intact distal pulses. Exam reveals no gallop and no friction rub. No murmur heard. Bilateral lower extremity PAD  Pulmonary/Chest: Effort normal and breath sounds normal. No respiratory distress. She has no wheezes. She has no rales. Abdominal: Soft. Normal aorta and bowel sounds are normal. She exhibits no distension and no mass. There is no hepatosplenomegaly. No tenderness. Musculoskeletal: She exhibits bilateral +1 edema, R knee tenderness, limited ROM   Neurological: She is alert and oriented to person, place, and time. She has normal strength. No cranial nerve deficit or sensory deficit. Coordination and gait normal.   Skin: Skin is warm and dry. Bilateral rash on arms, being seen by dermatology     EKG Interpretation:  Pt will have completed at cardiology office on 10/1/21.   Last EKG 06/2019    Lab Review labs ordered, COVID test 2-5 days prior to procedure       Assessment:       Lalita Randle was seen today for pre-op exam.    Diagnoses and all orders for this visit:    Osteoarthritis of right knee, unspecified osteoarthritis type    Preop examination  -     CBC Auto Differential; Future  -     Comprehensive Metabolic Panel; Future  -     Microalbumin / Creatinine Urine Ratio; Future  -     APTT; Future  -     PROTIME-INR; Future  -     MAGNESIUM; Future      66 y.o. patient  approved for Surgery         Plan:     1. Preoperative workup as follows: ECG, labs to be completed, cardiac clearance  2. Change in medication regimen before surgery: hold plavix 5 days prior to procedure, hold all other medications on day os procedure  3. Proceed with surgery following cardiac clearance and lab review  4. Clearance on 10/18/21, reviewed labs, CKD stage 3 with elevated creatinine, advised hydration and avoidance of NSAID medications. Cleared for surgery. Electronically signed by JIM Heredia NP on 9/30/21 at 3:40 PM EDT     This dictation was generated by voice recognition computer software. Although all attempts are made to edit the dictation for accuracy, there may be errors in the transcription that are not intended.

## 2021-10-01 ENCOUNTER — OFFICE VISIT (OUTPATIENT)
Dept: CARDIOLOGY CLINIC | Age: 71
End: 2021-10-01
Payer: COMMERCIAL

## 2021-10-01 VITALS
DIASTOLIC BLOOD PRESSURE: 74 MMHG | HEIGHT: 61 IN | OXYGEN SATURATION: 98 % | WEIGHT: 168 LBS | SYSTOLIC BLOOD PRESSURE: 118 MMHG | BODY MASS INDEX: 31.72 KG/M2 | HEART RATE: 79 BPM

## 2021-10-01 DIAGNOSIS — E78.2 MIXED HYPERLIPIDEMIA: ICD-10-CM

## 2021-10-01 DIAGNOSIS — I25.10 CORONARY ARTERY DISEASE INVOLVING NATIVE HEART WITHOUT ANGINA PECTORIS, UNSPECIFIED VESSEL OR LESION TYPE: ICD-10-CM

## 2021-10-01 DIAGNOSIS — I10 ESSENTIAL HYPERTENSION: ICD-10-CM

## 2021-10-01 DIAGNOSIS — I73.9 PAD (PERIPHERAL ARTERY DISEASE) (HCC): Primary | ICD-10-CM

## 2021-10-01 DIAGNOSIS — I25.119 CORONARY ARTERY DISEASE INVOLVING NATIVE CORONARY ARTERY OF NATIVE HEART WITH ANGINA PECTORIS (HCC): ICD-10-CM

## 2021-10-01 DIAGNOSIS — I10 HTN (HYPERTENSION), BENIGN: ICD-10-CM

## 2021-10-01 DIAGNOSIS — Z01.810 PREOP CARDIOVASCULAR EXAM: ICD-10-CM

## 2021-10-01 PROCEDURE — 99204 OFFICE O/P NEW MOD 45 MIN: CPT | Performed by: INTERNAL MEDICINE

## 2021-10-01 PROCEDURE — 93000 ELECTROCARDIOGRAM COMPLETE: CPT | Performed by: INTERNAL MEDICINE

## 2021-10-01 NOTE — ASSESSMENT & PLAN NOTE
April 8, 2019 with left SFA, pop, and PT intervention for left leg claudication  Followed by Dr Yokasta Contreras

## 2021-10-01 NOTE — PROGRESS NOTES
[facility]   Cardiac Evaluation      Patient: Kristin Scherer  YOB: 1950         Chief Complaint   Patient presents with    Coronary Artery Disease     no cardiac complaints    Hypertension    New Patient    Cardiac Clearance        Referring provider: Morena Perez MD    History of Present Illness:   Kristin Scherer is a 66 y.o. female here as a new patient in consult with Dr Samina Delcid for clearance for knee surgery. She has a history of nonobstructive CAD (2005), hypertension, hyperlipidemia, PAD, with stents in bilateral lower extremities. She quit smoking 7 years ago after smoking 30 years  Today she states she is doing well. She has no chest pain, shortness of breath palpitations or dizziness. She is able to walk up two flights of stairs without stopping. Exercise only limited by knee pain. With regard to medication therapy he/she has been compliant with prescribed regimen and has tolerated therapy to date. Past Medical History:   has a past medical history of CAD (coronary artery disease), CKD (chronic kidney disease) stage 3, GFR 30-59 ml/min ([facility]), Lumbago, Osteoarthrosis, unspecified whether generalized or localized, unspecified site, Other abnormal blood chemistry, PAD (peripheral artery disease) ([facility] [address] Pure hypercholesterolemia, Risk for falls, Spinal stenosis, and Unspecified essential hypertension. Surgical History:   has a past surgical history that includes Cholecystectomy; Tubal ligation; knee surgery; Rotator cuff repair; Colonoscopy; Leg Surgery; Insertable Cardiac Monitor (09/2015); Total knee arthroplasty (Left, 1/12/16); joint replacement (Left, 20015); and Shoulder arthroscopy (Left, 8/21/2020).      Current Outpatient Medications   Medication Sig Dispense Refill    loratadine (CLARITIN) 10 MG tablet TAKE 1 TABLET BY MOUTH DAILY 30 tablet 3    celecoxib (CELEBREX) 200 MG capsule Take 1 capsule by mouth daily 30 capsule 3    fluticasone (FLONASE) 50 MCG/ACT nasal spray 1 spray by Each Nostril route daily 2 Bottle 1    omeprazole (PRILOSEC) 40 MG delayed release capsule Take 1 capsule by mouth every morning (before breakfast) 90 capsule 1    losartan-hydroCHLOROthiazide (HYZAAR) 100-25 MG per tablet TAKE 1 TABLET BY MOUTH DAILY 90 tablet 3    clopidogrel (PLAVIX) 75 MG tablet TAKE 1 TABLET BY MOUTH DAILY *NEED TO ESTABLISH WITH A NEW PROVIDER FOR FUTURE REFILLS* 90 tablet 3    atorvastatin (LIPITOR) 10 MG tablet Take 1 tablet by mouth daily 90 tablet 3     No current facility-administered medications for this visit. Allergies:  Ace inhibitors, Chantix [varenicline], Keflex [cephalexin], Morphine, Niaspan [niacin er], Pletal [cilostazol], and Pravastatin     Social History:  Social History     Socioeconomic History    Marital status:      Spouse name: Not on file    Number of children: Not on file    Years of education: Not on file    Highest education level: Not on file   Occupational History    Not on file   Tobacco Use    Smoking status: Former Smoker     Packs/day: 0.50     Years: 40.00     Pack years: 20.00     Types: Cigarettes     Quit date: 2015     Years since quittin.2    Smokeless tobacco: Never Used    Tobacco comment: <1 pack of cigarettes per day   Vaping Use    Vaping Use: Never used   Substance and Sexual Activity    Alcohol use: No     Alcohol/week: 0.0 standard drinks    Drug use: No    Sexual activity: Not on file   Other Topics Concern    Not on file   Social History Narrative    Not on file     Social Determinants of Health     Financial Resource Strain:     Difficulty of Paying Living Expenses:    Food Insecurity:     Worried About Running Out of Food in the Last Year:     [address] in the Last Year:    Transportation Needs:     Lack of Transportation (Medical):      Lack of Transportation (Non-Medical):    Physical Activity:     Days of Exercise per Week:     Minutes of Exercise per Session:    Stress:     Feeling of Stress :    Social Connections:     Frequency of Communication with Friends and Family:     Frequency of Social Gatherings with Friends and Family:     Attends [employer] Services:     Active Member of Clubs or Organizations:     Attends Club or Organization Meetings:     Marital Status:    Intimate Partner Violence:     Fear of Current or Ex-Partner:     Emotionally Abused:     Physically Abused:     Sexually Abused:        Family History:   Family History   Problem Relation Age of Onset    Cancer Mother     Heart Disease Father     Cancer Brother     Emphysema Brother     Stroke Brother     Diabetes Brother     Diabetes Sister     Heart Disease Brother     Hypertension Brother     Hypertension Sister     High Cholesterol Neg Hx     High Blood Pressure Neg Hx      Family history has been reviewed and not pertinent except as noted above. Review of Systems:   · Constitutional: there has been no unanticipated weight loss. No change in energy or activity level   · Eyes: No visual changes   · ENT: No Headaches, hearing loss or vertigo. No mouth sores or sore throat. · Cardiovascular: Reviewed in HPI  · Respiratory: No cough or wheezing, no sputum production. · Gastrointestinal: No abdominal pain, appetite loss, blood in stools. No change in bowel or bladder habits. · Genitourinary: No nocturia, dysuria, trouble voiding  · Musculoskeletal:  No gait disturbance, weakness or joint complaints. · Integumentary: No rash or pruritis. · Neurological: No headache, change in muscle strength, numbness or tingling. No change in gait, balance, coordination, mood, affect, memory, mentation, behavior. · Psychiatric: No anxiety or depression  · Endocrine: No malaise or fever  · Hematologic/Lymphatic: No abnormal bruising or bleeding, blood clots or swollen lymph nodes. · Allergic/Immunologic: No nasal congestion or hives.     Physical Examination:    Vitals:

## 2021-10-05 LAB
ALBUMIN SERPL-MCNC: 3.9 G/DL (ref 3.5–5)
ALP BLD-CCNC: 84 IU/L (ref 35–104)
ALT SERPL-CCNC: 11 IU/L (ref 10–35)
ANION GAP SERPL CALCULATED.3IONS-SCNC: 13 MMOL/L (ref 6–18)
APTT: 39.6 SECONDS (ref 23.6–34)
AST SERPL-CCNC: 13 IU/L (ref 10–35)
BILIRUB SERPL-MCNC: 0.3 MG/DL (ref 0–1)
BUN BLDV-MCNC: 28 MG/DL (ref 8–26)
CALCIUM SERPL-MCNC: 10 MG/DL (ref 8.8–10.1)
CHLORIDE BLD-SCNC: 103 MEQ/L (ref 98–111)
CO2: 25 MMOL/L (ref 21–31)
CREAT SERPL-MCNC: 1.43 MG/DL (ref 0.44–1.03)
CREATININE URINE: 60 MG/DL
GFR AFRICAN AMERICAN: 41 ML/MIN/1.73 M2
GFR NON-AFRICAN AMERICAN: 35 ML/MIN/1.73 M2
GLUCOSE BLD-MCNC: 93 MG/DL (ref 70–99)
MAGNESIUM: 2 MG/DL (ref 1.7–2.4)
MICROALBUMIN UR-MCNC: 27.3 MG/L
MICROALBUMIN/CREAT UR-RTO: 46 MG/G CREAT
POTASSIUM SERPL-SCNC: 4.5 MEQ/L (ref 3.6–5.1)
SODIUM BLD-SCNC: 141 MEQ/L (ref 135–145)
TOTAL PROTEIN: 7.3 G/DL (ref 6.6–8.7)

## 2021-10-06 ENCOUNTER — TELEPHONE (OUTPATIENT)
Dept: CARDIOLOGY CLINIC | Age: 71
End: 2021-10-06

## 2021-10-06 NOTE — TELEPHONE ENCOUNTER
Who is requesting records:  MARIAM at [place] [id number]    What is needed:  Last EKG Tracings    Phone number of the doctor/facility where records are to be sent: [phone]    Fax number of the doctor/facility where records are to be sent[de-identified]  [phone]

## 2021-10-08 ENCOUNTER — HOSPITAL ENCOUNTER (OUTPATIENT)
Dept: NON INVASIVE DIAGNOSTICS | Age: 71
Discharge: HOME | End: 2021-10-08
Payer: COMMERCIAL

## 2021-10-08 DIAGNOSIS — I25.119 CORONARY ARTERY DISEASE INVOLVING NATIVE CORONARY ARTERY OF NATIVE HEART WITH ANGINA PECTORIS (HCC): ICD-10-CM

## 2021-10-08 DIAGNOSIS — Z01.810 PREOP CARDIOVASCULAR EXAM: ICD-10-CM

## 2021-10-08 DIAGNOSIS — I73.9 PAD (PERIPHERAL ARTERY DISEASE) (HCC): ICD-10-CM

## 2021-10-08 LAB
LV EF: 59 %
LVEF MODALITY: NORMAL

## 2021-10-08 PROCEDURE — 93017 CV STRESS TEST TRACING ONLY: CPT | Performed by: INTERNAL MEDICINE

## 2021-10-08 PROCEDURE — 3430000000 HC RX DIAGNOSTIC RADIOPHARMACEUTICAL: Performed by: INTERNAL MEDICINE

## 2021-10-08 PROCEDURE — 78452 HT MUSCLE IMAGE SPECT MULT: CPT

## 2021-10-08 PROCEDURE — A9502 TC99M TETROFOSMIN: HCPCS | Performed by: INTERNAL MEDICINE

## 2021-10-08 PROCEDURE — 6360000002 HC RX W HCPCS: Performed by: INTERNAL MEDICINE

## 2021-10-08 RX ADMIN — REGADENOSON 0.4 MG: 0.08 INJECTION, SOLUTION INTRAVENOUS at 10:52

## 2021-10-08 RX ADMIN — TETROFOSMIN 30 MILLICURIE: 1.38 INJECTION, POWDER, LYOPHILIZED, FOR SOLUTION INTRAVENOUS at 10:59

## 2021-10-08 RX ADMIN — TETROFOSMIN 10 MILLICURIE: 1.38 INJECTION, POWDER, LYOPHILIZED, FOR SOLUTION INTRAVENOUS at 08:51

## 2021-10-08 NOTE — PROGRESS NOTES
Instructed on Lexiscan Stress Test Procedure including possible side effects/ adverse reactions. Patient verbalizes  understanding and denies having any questions. See [facility].   Earle Francis RN

## 2021-10-18 ENCOUNTER — TELEPHONE (OUTPATIENT)
Dept: FAMILY MEDICINE CLINIC | Age: 71
End: 2021-10-18

## 2021-10-18 NOTE — TELEPHONE ENCOUNTER
[facility]ety [facility] office   [phone] is calling because the patient is getting a complete joint/ right knee replacement tomorrow 10/19 and they need clearance and to go over the patients Renal Function Panel with a provider before they can do anything with this surgery.       Please advise       Provider out of the office

## 2021-10-18 NOTE — TELEPHONE ENCOUNTER
Pt with CKD, stage 3, worsened labs since last draw but remains stage 3, advised hydration. Proceed with surgery.

## 2021-10-18 NOTE — TELEPHONE ENCOUNTER
Spoke to Vijay Morales with [facility] office   [phone] that per Haylee Georges pt can proceed with surgery. Vijay Morales states she needs something faxed over stating that patient is clear for surgery by tomorrow morning.    Fax number: [phone]

## 2021-10-31 PROBLEM — Z01.810 PREOP CARDIOVASCULAR EXAM: Status: RESOLVED | Noted: 2021-10-01 | Resolved: 2021-10-31

## 2021-11-09 ENCOUNTER — TELEPHONE (OUTPATIENT)
Dept: SURGERY | Age: 71
End: 2021-11-09

## 2021-11-09 DIAGNOSIS — I25.10 CAD IN NATIVE ARTERY: ICD-10-CM

## 2021-11-09 DIAGNOSIS — E78.2 MIXED HYPERLIPIDEMIA: ICD-10-CM

## 2021-11-09 RX ORDER — ATORVASTATIN CALCIUM 10 MG/1
10 TABLET, FILM COATED ORAL DAILY
Qty: 90 TABLET | Refills: 3 | Status: SHIPPED | OUTPATIENT
Start: 2021-11-09

## 2021-11-09 RX ORDER — CLOPIDOGREL BISULFATE 75 MG/1
TABLET ORAL
Qty: 90 TABLET | Refills: 3 | Status: SHIPPED | OUTPATIENT
Start: 2021-11-09 | End: 2022-09-23

## 2021-11-09 RX ORDER — LOSARTAN POTASSIUM AND HYDROCHLOROTHIAZIDE 25; 100 MG/1; MG/1
1 TABLET ORAL DAILY
Qty: 90 TABLET | Refills: 3 | Status: SHIPPED | OUTPATIENT
Start: 2021-11-09

## 2021-11-09 NOTE — TELEPHONE ENCOUNTER
Okay to refill meds but patient should switch from omeprazole to Protonix because the omeprazole makes the Plavix less effective.

## 2021-11-09 NOTE — TELEPHONE ENCOUNTER
[facility] for patient to call and schedule BLE ADS and OV with Dr. Katina Lainez at the [facility].  Patient is due after 1/7/22

## 2021-11-09 NOTE — TELEPHONE ENCOUNTER
LOV 09/30/21 (freya-pre-op)       Ref Range & Units 1/12/21 1247   Cholesterol, Fasting 0 - 199 mg/dL 140    Triglyceride, Fasting 0 - 150 mg/dL 132    HDL 40 - 60 mg/dL 31 Low     LDL Calculated <100 mg/dL 83    VLDL Cholesterol Calculated Not Established mg/dL 26

## 2021-11-09 NOTE — TELEPHONE ENCOUNTER
Patient advised, she said she rarely takes this medication anyway, maybe once every 2-3 months. Advised pt to stop taking all together and to let us know if in the future she needs something else for her heartburn.

## 2021-11-12 DIAGNOSIS — M17.11 PRIMARY OSTEOARTHRITIS OF RIGHT KNEE: ICD-10-CM

## 2021-11-12 RX ORDER — CELECOXIB 200 MG/1
200 CAPSULE ORAL DAILY
Qty: 30 CAPSULE | Refills: 3 | Status: SHIPPED | OUTPATIENT
Start: 2021-11-12 | End: 2022-04-05

## 2021-11-16 DIAGNOSIS — M17.11 PRIMARY OSTEOARTHRITIS OF RIGHT KNEE: ICD-10-CM

## 2021-12-21 ENCOUNTER — OFFICE VISIT (OUTPATIENT)
Dept: FAMILY MEDICINE CLINIC | Age: 71
End: 2021-12-21
Payer: COMMERCIAL

## 2021-12-21 ENCOUNTER — NURSE TRIAGE (OUTPATIENT)
Dept: OTHER | Facility: CLINIC | Age: 71
End: 2021-12-21

## 2021-12-21 VITALS — OXYGEN SATURATION: 97 % | HEART RATE: 75 BPM | TEMPERATURE: 97.9 F

## 2021-12-21 DIAGNOSIS — R19.7 DIARRHEA, UNSPECIFIED TYPE: Primary | ICD-10-CM

## 2021-12-21 PROCEDURE — 99213 OFFICE O/P EST LOW 20 MIN: CPT | Performed by: NURSE PRACTITIONER

## 2021-12-21 RX ORDER — DICYCLOMINE HYDROCHLORIDE 10 MG/1
10 CAPSULE ORAL 3 TIMES DAILY
Qty: 30 CAPSULE | Refills: 0 | Status: SHIPPED | OUTPATIENT
Start: 2021-12-21 | End: 2022-08-03 | Stop reason: SDUPTHER

## 2021-12-21 NOTE — PROGRESS NOTES
2021  Bozena Franco (:  1950)    Allergies: Allergies   Allergen Reactions    Ace Inhibitors Other (See Comments)     Cough     Chantix [Varenicline]      depression    Keflex [Cephalexin] Diarrhea    Morphine      Causes mental impairment,  N&V    Niaspan [Niacin Er] Other (See Comments)     Skin irritation    Pletal [Cilostazol]      Headache and diarrhea    Pravastatin            FLU/RESPIRATORY/COVID-19 CLINIC EVALUATION    HPI:   Chief Complaint   Patient presents with    Diarrhea        SYMPTOMS:  Pt is 66year old female with fatigue, nausea, diarrhea started 2 months ago. Pt reports diarrhea following colonoscopy. Pt denies cough, chest congestion or dyspnea. Not covid vaccinated. Recent labs 10/2021, no signs anemia, NL TSH. INSTRUCTIONS:  \"[x]\" Indicates a positive item  \"[]\" Indicates a negative item        Symptom duration, days:    Date symptoms started : ____________    [] 1   [] 2   [] 3   [] 4 - 7   [] 8 - 10   [] 11 - 13   [x] >14    [] Fevers    [] Symptom (not measured)  [] Measured (Result:  degrees)  [] Chills  [] Cough [] Dry [] Productive   []Loss of Taste  [] Loss of Smell  [x]Decreased Appetite  [] Coughing up blood  }  [] Chest Congestion  [] Nasal Congestion  [] Runny  Nose  [] Sneezing  [] Feeling short of breath   []Sometimes    [] Frequently    [] All the time     [] Chest pain     [] Headaches  []Tolerable  [] Severe     [x] Fatigue  [] Sore throat  [] Muscle aches  [x] Nausea  [] Vomiting  []Unable to keep fluids down     [x] Diarrhea  [x] Mild  []Severe       [] Vaccinated for COVID 19  [] History of COVID 19 (Date:           )      [] OTHER SYMPTOMS:      Symptom course:   [x] Worsening     [] Stable     [] Improving      RISK FACTORS:1INSTRUCTIONS:  \"[x]\" Indicates a positive item. Negative  for risk factors if not checked.     [] Close contact with a lab confirmed COVID-19 patient within 14 days of symptom onset  [] History of travel from affected geographical areas within 14 days of symptom onset        PHYSICAL EXAMINATION:    Vitals:    12/21/21 1641   Pulse: 75   Temp: 97.9 °F (36.6 °C)   SpO2: 97%          [x] Alert  [x] Oriented to person/place/time    [x] No apparent distress   [] Toxic appearing  [] Face flushed appearing     [x] Normal Mood  [] Anxious appearing      [] Sclera clear    [] Pinna, TMs,  Canals normal bilaterally  [] TM Red  [] Right [] Left [] Bilateral  [] TM Bulging [] Right [] Left []  Billateral    [] Oropharynx [] Clear [] Red [] Exudate [] Swollen    [] No adenopathy [] Adenopathy __________    [x] Lungs clear with good movement and effort  [x] Breathing appears normal     [x] Speaks in complete sentences  [] Appears tachypneic   [] Wheezing           [] Rhonchi   [] Decreased    [x] CV RRR  [] No Murmur  [] Murmur  [] Irregular  [] Tachycardic    [] OTHER:  1}      TESTS ORDERED:    [] POCT FLU  [] POCT STREP  [] COVID-19 Test sent  [] Appointment made at testing clinic for patient to get a COVID test.       TEST RESULTS:    POCT FLU test:  [] Positive  [] Negative  POCT STREP test:  [] Positive  [] Negative    ASSESSMENT:  [] Allergic Rhinitis  [] Asthma Exacerbation  [] Bronchitis  [] COPD Exacerbation  [x] Gastroenteritis  [] Influenza  [] Sinusitis  [] Strep Throat [] Sore Throat  [] Viral URI   [] Possible COVID-19   [] Exposure to COVID -19  [] Positive for COVID  [] Screening for Viral Disease (COVID test no sx)        Wayne Mcqueen was seen today for diarrhea. Diagnoses and all orders for this visit:    Diarrhea, unspecified type  -     dicyclomine (BENTYL) 10 MG capsule;  Take 1 capsule by mouth three times daily  -     MARIAELENA Aguilar MD, Gastroenterology, [facility]              [x] Low risk for complications from [place] 19  [] Moderate risk for complications from COVID 19  [] High risk for complications from COVID 19    PLAN:    [] Discharge home with written instructions for:  [] Flu management  [] Strep throat management  [] Viral respiratory illness management  [] Sinusitis management  [] Bronchitis Management  [] Possible COVID-19 infection with self-quarantine and management of symptoms  [] Follow-up with primary care physician or emergency department if worsens  [] Note given for work    [] Referred to emergency department for evaluation

## 2021-12-21 NOTE — TELEPHONE ENCOUNTER
Received call from April at [facility] with Red Flag Complaint. Subjective: Caller states Arnie Franco ( daughter in law) \" She has been laying down and resting a lot more than usual. She does not want to eat or drink. She is just more weak than usual. I notice with the dizziness spells, she becomes more nauseous. \"     Current Symptoms: lightheaded , dizziness, nausea     Onset: 3 weeks. Associated Symptoms: reduced appetite, reduced fluid intake    Pain Severity: 7/10; throbbing; constant (PMH sciatica)    Temperature: Denies     What has been tried: heating pad for pain. LMP: NA Pregnant: NA    Recommended disposition: Patient to be seen in office today. If no appointments available. Pt advised to go [facility]/ED    Care advice provided, patient verbalizes understanding; denies any other questions or concerns; instructed to call back for any new or worsening symptoms. Writer provided warm transfer to [facility] at [facility] for appointment scheduling     Attention Provider: Thank you for allowing me to participate in the care of your patient. The patient was connected to triage in response to information provided to the ECC/PSC. Please do not respond through this encounter as the response is not directed to a shared pool.       Reason for Disposition   MODERATE weakness (i.e., interferes with work, school, normal activities) and cause unknown (Exceptions: weakness with acute minor illness, or weakness from poor fluid intake)    Protocols used: WEAKNESS (GENERALIZED) AND FATIGUE-ADULT-OH

## 2022-01-31 NOTE — PROGRESS NOTES
SUBJECTIVE:    Christiano Gonzalez is a 74 y.o. female who presents for a follow up visit. Chief Complaint   Patient presents with    Follow-up     Patient is here for a follow up. Would like alt to celebrex, too exspensive. C/O tremors in hands x 6-8 months, off and on. Has had a couple syncope episodes since Oct, has gone to E/R. Hypertension  This is a chronic problem. The current episode started more than 1 year ago. The problem is controlled. Pertinent negatives include no chest pain, palpitations or shortness of breath. Risk factors for coronary artery disease include dyslipidemia, obesity, sedentary lifestyle, post-menopausal state and smoking/tobacco exposure. Past treatments include angiotensin blockers and diuretics. The current treatment provides significant improvement. Compliance problems include exercise and diet. Hypertensive end-organ damage includes CAD/MI and PVD. Hyperlipidemia  This is a chronic problem. The current episode started more than 1 year ago. The problem is controlled. Exacerbating diseases include obesity. Factors aggravating her hyperlipidemia include thiazides. Pertinent negatives include no chest pain or shortness of breath. Current antihyperlipidemic treatment includes statins. The current treatment provides significant improvement of lipids. Compliance problems include adherence to exercise and adherence to diet. Risk factors for coronary artery disease include dyslipidemia, hypertension, a sedentary lifestyle and post-menopausal.   Coronary Artery Disease  Presents for follow-up visit. Pertinent negatives include no chest pain, chest tightness, leg swelling, palpitations or shortness of breath. Risk factors include hyperlipidemia and obesity. The symptoms have been stable. Compliance with diet is variable. Compliance with exercise is poor. Compliance with medications is good. Tremor  Onset about 6 to 7 months ago. Not associated with anxiety.  Noted worse if arm is bent at the elbow. Father with Benign familial tremor. Sister with Parkinson's. Denies any problem with gait or balance. Syncope  Patient had a near syncope episode in October of last year. She had become weak and dizzy. EMS was called and patient was hypoxic on arrival and was hypotensive. She was 1 week postop from right knee replacement. She was evaluated at [facility] emergency room. We did order a CT of her head that was negative. Labs revealed hemoglobin at 10.9 and a white count of 10.9. EKG revealed sinus rhythm 69 bpm with a left bundle branch block. There was no ST segment elevation or depression. No arrhythmias seen on monitor. She had a CT arteriogram of the chest that revealed emphysema and a 9 mm pleural-based left upper lobe pulmonary nodule. It is time for repeat chest CT with the patient having had a nodule greater than 8 mm. .  Insomnia  Onset 4 months ago after knee surgery. Able to fall asleep but awakening fairly quickly and multiple times. Taking Tylenol pm.      Patient's medications, allergies, past medical,surgical, social and family histories were reviewed and updated as appropriate.      Past Medical History:   Diagnosis Date    CAD (coronary artery disease)     NONOBSTRUCTING    CKD (chronic kidney disease) stage 3, GFR 30-59 ml/min ([facility])     Lumbago     Osteoarthrosis, unspecified whether generalized or localized, unspecified site     Other abnormal blood chemistry     PAD (peripheral artery disease) ([facility])     Pure hypercholesterolemia     Risk for falls 2/2/16    S/p Left TKR    Spinal stenosis     Unspecified essential hypertension      Past Surgical History:   Procedure Laterality Date    CHOLECYSTECTOMY      COLONOSCOPY      INSERTABLE CARDIAC MONITOR  09/2015    JOINT REPLACEMENT Left 20015    knee replacement    KNEE SURGERY      RT    LEG SURGERY      2 stents placed in right leg, 1 stent and balloon in left leg    ROTATOR CUFF REPAIR      SHOULDER ARTHROSCOPY Left 2020    LEFT SHOULDER ARTHROSCOPY, SUBACROMINAL DECOMPRESSION, DISTAL CLAVICLE EXCISION,DEBRIDEMENT, ROTATOR CUFF REPAIR, AUGMENTATION performed by Chase Lima DO at [facility] Left 16    Dr Jennifer Grullon TUBAL LIGATION       Family History   Problem Relation Age of Onset   Bertrand Cancer Mother     Heart Disease Father     Cancer Brother     Emphysema Brother     Stroke Brother     Diabetes Brother     Diabetes Sister     Heart Disease Brother     Hypertension Brother     Hypertension Sister     High Cholesterol Neg Hx     High Blood Pressure Neg Hx      Social History     Tobacco Use    Smoking status: Former Smoker     Packs/day: 0.50     Years: 40.00     Pack years: 20.00     Types: Cigarettes     Quit date: 2015     Years since quittin.5    Smokeless tobacco: Never Used    Tobacco comment: <1 pack of cigarettes per day   Substance Use Topics    Alcohol use: No     Alcohol/week: 0.0 standard drinks      Allergies   Allergen Reactions    Ace Inhibitors Other (See Comments)     Cough     Chantix [Varenicline]      depression    Keflex [Cephalexin] Diarrhea    Morphine      Causes mental impairment,  N&V    Niaspan [Niacin Er] Other (See Comments)     Skin irritation    Pletal [Cilostazol]      Headache and diarrhea    Pravastatin      Current Outpatient Medications on File Prior to Visit   Medication Sig Dispense Refill    dicyclomine (BENTYL) 10 MG capsule Take 1 capsule by mouth three times daily 30 capsule 0    celecoxib (CELEBREX) 200 MG capsule TAKE 1 CAPSULE BY MOUTH DAILY 30 capsule 3    atorvastatin (LIPITOR) 10 MG tablet TAKE 1 TABLET BY MOUTH DAILY 90 tablet 3    clopidogrel (PLAVIX) 75 MG tablet TAKE 1 TABLET BY MOUTH DAILY *NEED TO ESTABLISH WITH A NEW PROVIDER FOR FUTURE REFILLS* 90 tablet 3    losartan-hydroCHLOROthiazide (HYZAAR) 100-25 MG per tablet TAKE 1 TABLET BY MOUTH DAILY 90 tablet 3     No current facility-administered medications on file prior to visit. Review of Systems   HENT: Negative for congestion and postnasal drip. Respiratory: Negative for chest tightness and shortness of breath. Cardiovascular: Negative for chest pain, palpitations and leg swelling. Gastrointestinal: Positive for diarrhea ( intermittent). Negative for constipation. Genitourinary: Negative for difficulty urinating. Psychiatric/Behavioral: Positive for sleep disturbance (MNA). Negative for dysphoric mood. The patient is not nervous/anxious. OBJECTIVE:    /80   Temp 97.1 °F (36.2 °C)   Wt 158 lb (71.7 kg)   BMI 29.85 kg/m²    Physical Exam  Constitutional:       Appearance: Normal appearance. She is well-developed. She is obese. HENT:      Head: Normocephalic and atraumatic. Right Ear: External ear normal.      Left Ear: External ear normal.      Nose: Nose normal.      Mouth/Throat:      Mouth: Mucous membranes are moist.      Pharynx: No posterior oropharyngeal erythema. Eyes:      General:         Right eye: No discharge. Conjunctiva/sclera: Conjunctivae normal.   Neck:      Thyroid: No thyromegaly. Vascular: No JVD. Trachea: No tracheal deviation. Cardiovascular:      Rate and Rhythm: Normal rate and regular rhythm. Heart sounds: Normal heart sounds. Pulmonary:      Effort: Pulmonary effort is normal. No respiratory distress. Breath sounds: Normal breath sounds. No rales. Musculoskeletal:      Cervical back: Normal range of motion and neck supple. Right lower leg: No edema. Left lower leg: No edema. Lymphadenopathy:      Cervical: No cervical adenopathy. Skin:     General: Skin is warm and dry. Neurological:      Mental Status: She is alert and oriented to person, place, and time.    Psychiatric:         Mood and Affect: Mood normal.         Behavior: Behavior normal.         ASSESSMENT/PLAN:    Brendan Pugh was seen today for follow-up. Diagnoses and all orders for this visit:    Primary insomnia  -     traZODone (DESYREL) 50 MG tablet; Take 1 tablet by mouth nightly as needed for Sleep    Atherosclerosis of native arteries of extremities with intermittent claudication, left leg (HCC)  Followed by vascular surgeon    PAD (peripheral artery disease) ([facility]  Followed by vascular surgeon    Pure hypercholesterolemia  -     Comprehensive Metabolic Panel, Fasting; Future  -     CBC Auto Differential; Future  -     Lipid, Fasting; Future  -     TSH with Reflex; Future    Stage 3b chronic kidney disease ([facility]  Stable. Patient is encouraged to increase her water intake, avoid nonsteroidals, avoid albert, and discontinue smoking completely. HTN (hypertension), benign  Stable on current medications. Pulmonary nodule  9 mm pulmonary nodule discovered on CT during the emergency room visit in October. Follow-up at 3 months was recommended. -     CT CHEST W CONTRAST; Future    Tremor  -     Franky Biggs MD, Neurology, [facility]        Return in about 3 months (around 5/1/2022). Please note portions of this note were completed with a voicerecognition program.  Efforts were made to edit the dictations but occasionally words are mis-transcribed.

## 2022-02-01 ENCOUNTER — OFFICE VISIT (OUTPATIENT)
Dept: FAMILY MEDICINE CLINIC | Age: 72
End: 2022-02-01
Payer: COMMERCIAL

## 2022-02-01 VITALS
BODY MASS INDEX: 29.85 KG/M2 | DIASTOLIC BLOOD PRESSURE: 80 MMHG | WEIGHT: 158 LBS | SYSTOLIC BLOOD PRESSURE: 126 MMHG | TEMPERATURE: 97.1 F

## 2022-02-01 DIAGNOSIS — I73.9 PAD (PERIPHERAL ARTERY DISEASE) (HCC): ICD-10-CM

## 2022-02-01 DIAGNOSIS — I10 HTN (HYPERTENSION), BENIGN: ICD-10-CM

## 2022-02-01 DIAGNOSIS — I70.212 ATHEROSCLEROSIS OF NATIVE ARTERIES OF EXTREMITIES WITH INTERMITTENT CLAUDICATION, LEFT LEG (HCC): ICD-10-CM

## 2022-02-01 DIAGNOSIS — N18.32 STAGE 3B CHRONIC KIDNEY DISEASE (HCC): ICD-10-CM

## 2022-02-01 DIAGNOSIS — R25.1 TREMOR: ICD-10-CM

## 2022-02-01 DIAGNOSIS — R91.1 PULMONARY NODULE: ICD-10-CM

## 2022-02-01 DIAGNOSIS — E78.00 PURE HYPERCHOLESTEROLEMIA: ICD-10-CM

## 2022-02-01 DIAGNOSIS — F51.01 PRIMARY INSOMNIA: Primary | ICD-10-CM

## 2022-02-01 PROCEDURE — 99214 OFFICE O/P EST MOD 30 MIN: CPT | Performed by: FAMILY MEDICINE

## 2022-02-01 RX ORDER — TRAZODONE HYDROCHLORIDE 50 MG/1
50 TABLET ORAL NIGHTLY PRN
Qty: 30 TABLET | Refills: 5 | Status: SHIPPED | OUTPATIENT
Start: 2022-02-01 | End: 2022-05-03

## 2022-02-01 ASSESSMENT — ENCOUNTER SYMPTOMS
CONSTIPATION: 0
DIARRHEA: 1
SHORTNESS OF BREATH: 0
CHEST TIGHTNESS: 0

## 2022-02-08 ENCOUNTER — OFFICE VISIT (OUTPATIENT)
Dept: VASCULAR SURGERY | Age: 72
End: 2022-02-08
Payer: COMMERCIAL

## 2022-02-08 ENCOUNTER — PROCEDURE VISIT (OUTPATIENT)
Dept: VASCULAR SURGERY | Age: 72
End: 2022-02-08
Payer: COMMERCIAL

## 2022-02-08 VITALS
DIASTOLIC BLOOD PRESSURE: 80 MMHG | HEIGHT: 61 IN | SYSTOLIC BLOOD PRESSURE: 172 MMHG | BODY MASS INDEX: 29.45 KG/M2 | WEIGHT: 156 LBS

## 2022-02-08 DIAGNOSIS — I70.213 ATHEROSCLEROSIS OF NATIVE ARTERIES OF EXTREMITIES WITH INTERMITTENT CLAUDICATION, BILATERAL LEGS (HCC): ICD-10-CM

## 2022-02-08 DIAGNOSIS — I70.213 ATHEROSCLEROSIS OF NATIVE ARTERIES OF EXTREMITIES WITH INTERMITTENT CLAUDICATION, BILATERAL LEGS (HCC): Primary | ICD-10-CM

## 2022-02-08 PROCEDURE — 93925 LOWER EXTREMITY STUDY: CPT | Performed by: SURGERY

## 2022-02-08 PROCEDURE — 99213 OFFICE O/P EST LOW 20 MIN: CPT | Performed by: SURGERY

## 2022-02-08 NOTE — PROGRESS NOTES
[facility])   Vascular Surgery Followup    Referring Provider:  Lavelle Lui MD     No chief complaint on file. History of Present Illness:  70-year-old female here today for follow-up of peripheral vascular disease. She presents today for routine lower extremity duplex. Patient with history of SFA stent. [place] any complaints of pain in her right leg. States over the last 4 to 5 months she has had progressive discomfort of her left thigh with limited activity. Does have some numbness in her left foot which she has had for several years    Past Medical History:   has a past medical history of CAD (coronary artery disease), CKD (chronic kidney disease) stage 3, GFR 30-59 ml/min ([facility]), Lumbago, Osteoarthrosis, unspecified whether generalized or localized, unspecified site, Other abnormal blood chemistry, PAD (peripheral artery disease) ([facility] Pure hypercholesterolemia, Risk for falls, Spinal stenosis, and Unspecified essential hypertension. Surgical History:   has a past surgical history that includes Cholecystectomy; Tubal ligation; knee surgery; Rotator cuff repair; Colonoscopy; Leg Surgery; Insertable Cardiac Monitor (09/2015); Total knee arthroplasty (Left, 1/12/16); joint replacement (Left, 20015); and Shoulder arthroscopy (Left, 8/21/2020). Social History:   reports that she quit smoking about 6 years ago. Her smoking use included cigarettes. She has a 20.00 pack-year smoking history. She has never used smokeless tobacco. She reports that she does not drink alcohol and does not use drugs. Family History:  family history includes Cancer in her brother and mother; Diabetes in her brother and sister; Emphysema in her brother; Heart Disease in her brother and father; Hypertension in her brother and sister; Stroke in her brother.      Home Medications:  Current Outpatient Medications   Medication Sig Dispense Refill    traZODone (DESYREL) 50 MG tablet Take 1 tablet by mouth nightly as needed for Sleep 30 tablet 5    dicyclomine (BENTYL) 10 MG capsule Take 1 capsule by mouth three times daily 30 capsule 0    celecoxib (CELEBREX) 200 MG capsule TAKE 1 CAPSULE BY MOUTH DAILY 30 capsule 3    atorvastatin (LIPITOR) 10 MG tablet TAKE 1 TABLET BY MOUTH DAILY 90 tablet 3    clopidogrel (PLAVIX) 75 MG tablet TAKE 1 TABLET BY MOUTH DAILY *NEED TO ESTABLISH WITH A NEW PROVIDER FOR FUTURE REFILLS* 90 tablet 3    losartan-hydroCHLOROthiazide (HYZAAR) 100-25 MG per tablet TAKE 1 TABLET BY MOUTH DAILY 90 tablet 3     No current facility-administered medications for this visit. Allergies:  Ace inhibitors, Chantix [varenicline], Keflex [cephalexin], Morphine, Niaspan [niacin er], Pletal [cilostazol], and Pravastatin     Review of Systems:   · Constitutional: there has been no unanticipated weight loss. There's been no change in energy level, sleep pattern, or activity level. · Eyes: No visual changes or diplopia. No scleral icterus. · ENT: No Headaches, hearing loss or vertigo. No mouth sores or sore throat. · Cardiovascular: Reviewed in HPI  · Respiratory: No cough or wheezing, no sputum production. No hematemesis. · Gastrointestinal: No abdominal pain, appetite loss, blood in stools. No change in bowel or bladder habits. · Genitourinary: No dysuria, trouble voiding, or hematuria. · Musculoskeletal:  No gait disturbance, weakness or joint complaints. · Integumentary: No rash or pruritis. · Neurological: No headache, diplopia, change in muscle strength, numbness or tingling. No change in gait, balance, coordination, mood, affect, memory, mentation, behavior. · Psychiatric: No anxiety, no depression. · Endocrine: No malaise, fatigue or temperature intolerance. No excessive thirst, fluid intake, or urination. No tremor. · Hematologic/Lymphatic: No abnormal bruising or bleeding, blood clots or swollen lymph nodes. · Allergic/Immunologic: No nasal congestion or hives.     Physical Examination:    There were no vitals filed for this visit. General appearance: alert, appears stated age, cooperative and no distress  Head: Normocephalic, without obvious abnormality, atraumatic  Neck: no adenopathy, no carotid bruit, no JVD, supple, symmetrical, trachea midline and thyroid: not enlarged, symmetric, no tenderness/mass/nodules  Lungs: clear to auscultation bilaterally  Heart: regular rate and rhythm, S1, S2 normal, no murmur, click, rub or gallop  Abdomen: soft, non-tender. Bowel sounds normal. No masses,  no organomegaly  Extremities: extremities normal, atraumatic, no cyanosis or edema    MEDICAL DECISION MAKING/TESTING  I have reviewed the testing personally and my interpretation is below.     Ultrasound today shows a right FRANK of 0.73 and left FRANK of 0.29 with monophasic waveforms throughout the left lower extremity    Assessment:     Patient Active Problem List   Diagnosis    Hyperglycemia    Pure hypercholesterolemia    Lumbago    Osteoarthritis    CKD (chronic kidney disease) stage 3, GFR 30-59 ml/min ([facility])    Abnormal mammogram    Fibrocystic disease of breast    Diarrhea    PAD (peripheral artery disease) ([address]    Atherosclerosis of native arteries of extremities with intermittent claudication, left leg (HCC)    Sinus pause    Encounter for loop recorder check    Status post total left knee replacement    Adhesive capsulitis of right shoulder    Right shoulder pain    Psoriasis    Sick sinus syndrome (HCC)    Syncope and collapse    Urinary tract infection without hematuria    New onset seizure ([address]    HTN (hypertension), benign    Coronary artery disease involving native coronary artery of native heart with angina pectoris ([address]    Seasonal allergic rhinitis due to pollen    Spinal stenosis of lumbar region with neurogenic claudication    Nontraumatic complete tear of left rotator cuff       Plan:  26-year-old female with significant new onset progressive claudication of her left lower extremity. Her FRANK went from 0.7-0.3 over the last year. Recommend moving forward with peripheral angiography and possible left lower extremity intervention. All risk discussed. All questions answered    Thank you for allowing me to participate in the care of this individual.  Please do not hesitate to contact me with any questions. Silvana Doe M.D., FACS.  2/8/2022  9:51 AM

## 2022-02-10 ENCOUNTER — PREP FOR PROCEDURE (OUTPATIENT)
Dept: VASCULAR SURGERY | Age: 72
End: 2022-02-10

## 2022-02-10 RX ORDER — SODIUM CHLORIDE 9 MG/ML
INJECTION, SOLUTION INTRAVENOUS CONTINUOUS
Status: CANCELLED | OUTPATIENT
Start: 2022-02-10

## 2022-02-10 RX ORDER — SODIUM CHLORIDE 0.9 % (FLUSH) 0.9 %
5-40 SYRINGE (ML) INJECTION EVERY 12 HOURS SCHEDULED
Status: CANCELLED | OUTPATIENT
Start: 2022-02-10

## 2022-02-10 RX ORDER — SODIUM CHLORIDE 9 MG/ML
25 INJECTION, SOLUTION INTRAVENOUS PRN
Status: CANCELLED | OUTPATIENT
Start: 2022-02-10

## 2022-02-10 RX ORDER — SODIUM CHLORIDE 0.9 % (FLUSH) 0.9 %
5-40 SYRINGE (ML) INJECTION PRN
Status: CANCELLED | OUTPATIENT
Start: 2022-02-10

## 2022-02-25 DIAGNOSIS — I70.213 ATHEROSCLEROSIS OF NATIVE ARTERIES OF EXTREMITIES WITH INTERMITTENT CLAUDICATION, BILATERAL LEGS (HCC): ICD-10-CM

## 2022-02-25 LAB
ANION GAP SERPL CALCULATED.3IONS-SCNC: 17 MMOL/L (ref 3–16)
BUN BLDV-MCNC: 24 MG/DL (ref 7–20)
CALCIUM SERPL-MCNC: 10.1 MG/DL (ref 8.3–10.6)
CHLORIDE BLD-SCNC: 103 MMOL/L (ref 99–110)
CO2: 21 MMOL/L (ref 21–32)
CREAT SERPL-MCNC: 1.3 MG/DL (ref 0.6–1.2)
GFR AFRICAN AMERICAN: 48
GFR NON-AFRICAN AMERICAN: 40
GLUCOSE BLD-MCNC: 104 MG/DL (ref 70–99)
HCT VFR BLD CALC: 40.9 % (ref 36–48)
HEMOGLOBIN: 13.4 G/DL (ref 12–16)
INR BLD: 1.02 (ref 0.88–1.12)
MCH RBC QN AUTO: 30.2 PG (ref 26–34)
MCHC RBC AUTO-ENTMCNC: 32.9 G/DL (ref 31–36)
MCV RBC AUTO: 91.9 FL (ref 80–100)
PDW BLD-RTO: 15.5 % (ref 12.4–15.4)
PLATELET # BLD: 275 K/UL (ref 135–450)
PMV BLD AUTO: 8.9 FL (ref 5–10.5)
POTASSIUM SERPL-SCNC: 4.1 MMOL/L (ref 3.5–5.1)
PROTHROMBIN TIME: 11.5 SEC (ref 9.9–12.7)
RBC # BLD: 4.45 M/UL (ref 4–5.2)
SODIUM BLD-SCNC: 141 MMOL/L (ref 136–145)
WBC # BLD: 7.8 K/UL (ref 4–11)

## 2022-03-02 ENCOUNTER — HOSPITAL ENCOUNTER (OUTPATIENT)
Dept: CARDIAC CATH/INVASIVE PROCEDURES | Age: 72
Discharge: HOME | End: 2022-03-02
Attending: SURGERY | Admitting: SURGERY
Payer: COMMERCIAL

## 2022-03-02 VITALS
HEIGHT: 61 IN | TEMPERATURE: 98 F | HEART RATE: 70 BPM | WEIGHT: 156 LBS | SYSTOLIC BLOOD PRESSURE: 166 MMHG | DIASTOLIC BLOOD PRESSURE: 72 MMHG | RESPIRATION RATE: 16 BRPM | BODY MASS INDEX: 29.45 KG/M2

## 2022-03-02 LAB — POC ACT LR: 324 SEC

## 2022-03-02 PROCEDURE — C1724 CATH, TRANS ATHEREC,ROTATION: HCPCS

## 2022-03-02 PROCEDURE — 2580000003 HC RX 258

## 2022-03-02 PROCEDURE — 75716 ARTERY X-RAYS ARMS/LEGS: CPT | Performed by: SURGERY

## 2022-03-02 PROCEDURE — 2500000003 HC RX 250 WO HCPCS

## 2022-03-02 PROCEDURE — 75625 CONTRAST EXAM ABDOMINL AORTA: CPT

## 2022-03-02 PROCEDURE — 76937 US GUIDE VASCULAR ACCESS: CPT | Performed by: SURGERY

## 2022-03-02 PROCEDURE — 6360000004 HC RX CONTRAST MEDICATION: Performed by: SURGERY

## 2022-03-02 PROCEDURE — 75716 ARTERY X-RAYS ARMS/LEGS: CPT

## 2022-03-02 PROCEDURE — 99152 MOD SED SAME PHYS/QHP 5/>YRS: CPT

## 2022-03-02 PROCEDURE — C2623 CATH, TRANSLUMIN, DRUG-COAT: HCPCS

## 2022-03-02 PROCEDURE — C1760 CLOSURE DEV, VASC: HCPCS

## 2022-03-02 PROCEDURE — 2709999900 HC NON-CHARGEABLE SUPPLY

## 2022-03-02 PROCEDURE — C1887 CATHETER, GUIDING: HCPCS

## 2022-03-02 PROCEDURE — 75625 CONTRAST EXAM ABDOMINL AORTA: CPT | Performed by: SURGERY

## 2022-03-02 PROCEDURE — C1769 GUIDE WIRE: HCPCS

## 2022-03-02 PROCEDURE — 6370000000 HC RX 637 (ALT 250 FOR IP)

## 2022-03-02 PROCEDURE — 85347 COAGULATION TIME ACTIVATED: CPT

## 2022-03-02 PROCEDURE — 75774 ARTERY X-RAY EACH VESSEL: CPT

## 2022-03-02 PROCEDURE — 6360000002 HC RX W HCPCS

## 2022-03-02 PROCEDURE — 99152 MOD SED SAME PHYS/QHP 5/>YRS: CPT | Performed by: SURGERY

## 2022-03-02 PROCEDURE — 37226 HC FEM POP TERR PLASTY AND STENT: CPT

## 2022-03-02 PROCEDURE — C1757 CATH, THROMBECTOMY/EMBOLECT: HCPCS

## 2022-03-02 PROCEDURE — 99153 MOD SED SAME PHYS/QHP EA: CPT

## 2022-03-02 PROCEDURE — C1894 INTRO/SHEATH, NON-LASER: HCPCS

## 2022-03-02 PROCEDURE — C1725 CATH, TRANSLUMIN NON-LASER: HCPCS

## 2022-03-02 PROCEDURE — 37184 PRIM ART M-THRMBC 1ST VSL: CPT | Performed by: SURGERY

## 2022-03-02 PROCEDURE — 37227 PR REVSC OPN/PRQ FEM/POP W/STNT/ATHRC/ANGIOP SM VSL: CPT | Performed by: SURGERY

## 2022-03-02 PROCEDURE — C1874 STENT, COATED/COV W/DEL SYS: HCPCS

## 2022-03-02 RX ORDER — SODIUM CHLORIDE 0.9 % (FLUSH) 0.9 %
5-40 SYRINGE (ML) INJECTION EVERY 12 HOURS SCHEDULED
Status: DISCONTINUED | OUTPATIENT
Start: 2022-03-02 | End: 2022-03-02 | Stop reason: HOSPADM

## 2022-03-02 RX ORDER — IODIXANOL 320 MG/ML
82 INJECTION, SOLUTION INTRAVASCULAR ONCE
Status: COMPLETED | OUTPATIENT
Start: 2022-03-02 | End: 2022-03-02

## 2022-03-02 RX ORDER — SODIUM CHLORIDE 9 MG/ML
INJECTION, SOLUTION INTRAVENOUS CONTINUOUS
Status: DISCONTINUED | OUTPATIENT
Start: 2022-03-02 | End: 2022-03-02 | Stop reason: HOSPADM

## 2022-03-02 RX ORDER — SODIUM CHLORIDE 0.9 % (FLUSH) 0.9 %
5-40 SYRINGE (ML) INJECTION PRN
Status: DISCONTINUED | OUTPATIENT
Start: 2022-03-02 | End: 2022-03-02 | Stop reason: HOSPADM

## 2022-03-02 RX ORDER — SODIUM CHLORIDE 9 MG/ML
25 INJECTION, SOLUTION INTRAVENOUS PRN
Status: DISCONTINUED | OUTPATIENT
Start: 2022-03-02 | End: 2022-03-02 | Stop reason: HOSPADM

## 2022-03-02 RX ADMIN — IODIXANOL 82 ML: 320 INJECTION, SOLUTION INTRAVASCULAR at 12:09

## 2022-03-02 NOTE — H&P
H&P Update    I have reviewed the history and physical and examined the patient and find no relevant changes. I have reviewed with the patient and/or family the risks, benefits, and alternatives to the procedure. Pre-sedation Assessment    Patient:  Marino Gu   :   1950  Intended Procedure:  Abdominal aortogram with lower extremity evaluation and possible intervention. Baldemar White nurses notes reviewed and agreed. Medications reviewed  Allergies:    Allergies   Allergen Reactions    Ace Inhibitors Other (See Comments)     Cough     Chantix [Varenicline]      depression    Keflex [Cephalexin] Diarrhea    Morphine      Causes mental impairment,  N&V    Niaspan [Niacin Er] Other (See Comments)     Skin irritation    Pletal [Cilostazol]      Headache and diarrhea    Pravastatin          Pre-Procedure Assessment/Plan:  ASA 3 - Patient with moderate systemic disease with functional limitations  Malampati 3    Level of Sedation Plan:Mild sedation    Post Procedure plan: Return to same level of care      Signed:  Hero Vang MD, 3/2/2022, 10:15 AM

## 2022-03-03 NOTE — OP NOTE
[facility]                     [facility], [address]                                OPERATIVE REPORT    PATIENT NAME: Nena Coates                    :        1950  MED REC NO:   [id number]                          ROOM:  ACCOUNT NO:   [de-identified]                           ADMIT DATE: 2022  PROVIDER:     Erica Almaguer MD    DATE OF PROCEDURE:  2022    PREPROCEDURE DIAGNOSIS:  Bilateral lower extremity claudication. POSTPROCEDURE DIAGNOSIS:  Bilateral lower extremity claudication. PROCEDURE:  1. Ultrasound-guided right common femoral artery access. 2.  Abdominal aortogram with bilateral lower extremity runoff. 3.  Left SFA percutaneous mechanical thrombectomy (Penumbra CAT6,  lightning 7). 4.  Left SFA atherectomy (Jetstream 2.4/3.4). 5.  Left SFA PTA (Fernando 5 x 220). 6.  Left SFA stent (Tova 6 x 80). 7.  Left SFA drug-coated PTA ([place] 6 x 150, 6 x 150). SURGEON:  Erica Almaguer MD    ANESTHESIA:  Local/IV. ESTIMATED BLOOD LOSS:  Minimal.    COMPLICATIONS:  None. INDICATIONS:  The patient is a 64-year-old female with a known history  of peripheral vascular disease and previous SFA intervention. She had a  previous stent in her left SFA in 2018 with subsequent balloon  angioplasty in early 2019. She has developed progressive disabling  claudication of her left lower extremity and presents today for repeat  angiographic evaluation. All risks, benefits, and alternatives were  discussed in detail. All questions were answered. PROCEDURE:  After witnessed informed consent was obtained, the patient  was brought to the cath lab where under my supervision Versed and  fentanyl were administered intravenously for moderate sedation. Pulse  oximetry, heart rate and blood pressure were monitored by an independent  trained observer that was present.   I spent 91 minutes of face-to-face  sedation time with the patient. Her right groin was carefully prepped  and draped. Ultrasound was used to identify the right common femoral  artery which was noted to be patent. An image was saved to the  patient's permanent medical record. Under direct visualization, it was  accessed with a 4-[place] micropuncture needle. A Bentson wire was  introduced and a 5-[place]ritrean sheath was placed. An Omni Flush catheter was  inserted to the level of the renal arteries and an abdominal aortogram  was performed. It was pulled back to the level of the aortic  bifurcation and a bilateral lower extremity runoff was performed. The  Omni Flush was then used to hook the aortic bifurcation. A stiff  Glidewire was placed up and over the bifurcation into the left profunda. The Omni Flush and 5-Eritrean sheath were removed and a 7-Eritrean sheath  was placed up and over the bifurcation into the left common femoral  artery. 5000 heparin was given with resultant ACT greater than 250. With this then done, the left SFA was completely occluded with  reconstitution distally. This was able to be traversed with a Glidewire  and Philly Headings catheter and advanced into the anterior tibial artery. There was concern for thrombus within the stent itself. As a result,  Penumbra CAT6 was used to provide percutaneous mechanical thrombectomy  of the entire left SFA stent. This was followed up with a lightning 7  percutaneous mechanical device. Following this, atherectomy was  performed of the entire SFA from the origin to the adductor canal with a  2.4/3.4 Jetstream.  This was followed up with a balloon angioplasty  using a Fernando 5 x 220 with good angiographic results within the stent  and as a result, drug-coated balloon [place] 6 x 150, 6 x 150 provided  angioplasty with drug along the entire stent with excellent angiographic  results. Just distal to the stent at the adductor canal, there was a  stenosis of approximately 60% stenosis.   As a result, an [place] 6 x 80  was placed across this lesion and postdilated with the [place] 6 x 115  with complete resolution. A 6-Frisian/7-Frisian Mynx was placed in the  right groin with excellent hemostasis. The patient was transferred to  the recovery room in stable condition. FINDINGS:  1. Abdominal aortogram:  Right and left renal patent. Infrarenal  abdominal aorta patent. Bilateral common and external iliac arteries  are patent. Bilateral internal iliac arteries are diminutive in size. 2.  Right lower extremity runoff:  Right common femoral and profunda are  patent. There is a long SFA stent with in-stent stenosis of  approximately 40-50%. Popliteal is patent and there is a three-vessel  runoff on the right. 3.  Left lower extremity runoff:  Left common femoral and profunda are  patent. SFA is occluded with reconstitution of the distal SFA proximal  popliteal stent. The remainder of the popliteal is patent. There is a  three-vessel runoff on the left. PLAN:  The patient underwent successful intervention of a recurrent SFA  lesion. She will continue on her current medical regimen.         Arianna Sands MD    D: 03/02/2022 12:12:42       T: 03/02/2022 12:16:48     [email or web address]  Job#: [id number]     Doc#: [id number]    CC:

## 2022-03-08 ENCOUNTER — TELEPHONE (OUTPATIENT)
Dept: FAMILY MEDICINE CLINIC | Age: 72
End: 2022-03-08

## 2022-03-08 NOTE — TELEPHONE ENCOUNTER
Received plan of care/orders from [facility]. Provider signed orders and has been faxed to home care agency.

## 2022-03-22 ENCOUNTER — TELEPHONE (OUTPATIENT)
Dept: CARDIOLOGY CLINIC | Age: 72
End: 2022-03-22
Payer: COMMERCIAL

## 2022-03-22 DIAGNOSIS — R00.2 PALPITATIONS: Primary | ICD-10-CM

## 2022-03-22 NOTE — TELEPHONE ENCOUNTER
Pt returning call to [facility]. She is feeling fine. [place]. Travis Guardado office [place] [facility] Avelina Lefort told her that there were some pauses noted while she is sleeping.

## 2022-03-22 NOTE — TELEPHONE ENCOUNTER
Called patient to see how she is feeling after we received a urgent report from [facility] [employer]. Left message on machine for patient to call us back.

## 2022-03-24 ENCOUNTER — OFFICE VISIT (OUTPATIENT)
Dept: NEUROLOGY | Age: 72
End: 2022-03-24
Payer: COMMERCIAL

## 2022-03-24 VITALS
HEART RATE: 73 BPM | BODY MASS INDEX: 28.71 KG/M2 | WEIGHT: 156 LBS | SYSTOLIC BLOOD PRESSURE: 165 MMHG | HEIGHT: 62 IN | DIASTOLIC BLOOD PRESSURE: 85 MMHG

## 2022-03-24 DIAGNOSIS — R56.9 PARTIAL SEIZURE (HCC): Primary | ICD-10-CM

## 2022-03-24 DIAGNOSIS — G25.0 BENIGN ESSENTIAL TREMOR: ICD-10-CM

## 2022-03-24 DIAGNOSIS — I65.22 LEFT CAROTID ARTERY STENOSIS: ICD-10-CM

## 2022-03-24 PROCEDURE — 99205 OFFICE O/P NEW HI 60 MIN: CPT | Performed by: PSYCHIATRY & NEUROLOGY

## 2022-03-24 NOTE — PROGRESS NOTES
NEUROLOGY CONSULTATION     Chief Complaint   Patient presents with    Tremors       HISTORY OF PRESENT ILLNESS :    Melly Ferreira is a 74 y.o. female who is referred by Dr. Barbra Tyler   History was obtained from patient  Patient history was obtained from her . Patient was referred to me for evaluation of tremors. Patient states that she has had tremors for about 6 to 7 months now. The tremors are mainly in the hand. Stress and anxiety might make the tremors a little worse. No relieving factors. Patient does not drink any alcohol. Patient's sister has Parkinson's disease. Patient's mother had benign essential tremors. Patient's family showed me some video of some \"spells\". I reviewed those videos and clearly patient had episodes in which she has she would have chewing movements in her face followed by staring into space and generalized weakness as well as confusion lasting for about a minute or 2 followed by vomiting and a postictal phase. These clearly look like partial seizures. Patient recently has had episodes of bradycardia with heart rate in the 30s and now has a cardiac monitor.     REVIEW OF SYSTEMS    Constitutional:  [x]   Chills   []  Fatigue   []  Fevers   []  Malaise   []  Weight loss     [] Denies all of the above    Eyes:  []  Double vision   []  Blurry vision     [x] Denies all of the above    Ears, nose, mouth, throat, and face:   [] Hearing loss    []   Hoarseness      []  Snoring    []  Tinnitus       [x] Denies all of the above     Respiratory:   []  Cough    []  Shortness of breath         [] Denies all of the above     Cardiovascular:   []  Chest pain    []  Exertional chest pressure/discomfort           [] Palpitations    [x]  Syncope     [] Denies all of the above    Gastrointestinal:   []  Abdominal pain   []  Constipation    [x]  Diarrhea    []   Dysphagia                      [] Denies all of the above    Genitourinary:      []  Frequency   []  Hematuria     []  Urinary incontinence           [x] Denies all of the above     Hematologic/lymphatic:  []  Bleeding    [x]  Easy bruising   []  Anemia  [] Denies all of the above     Musculoskeletal:   [] Back pain       []  Myalgias    []  Neck pain           [x] Denies all of the above    Neurological: As noted in HPI    Behavioral/Psych:   [] Anxiety    []  Depression     []  Mood swings     [x] Denies all of the above     Endocrine:   []  Temperature intolerance     [] Fatigue      [x] Denies all of the above     Allergic/Immunologic:   [] Hay fever    [x] Denies all of the above     Past Medical History:   Diagnosis Date    CAD (coronary artery disease)     NONOBSTRUCTING    CKD (chronic kidney disease) stage 3, GFR 30-59 ml/min [facility])     Lumbago     Osteoarthrosis, unspecified whether generalized or localized, unspecified site     Other abnormal blood chemistry     PAD (peripheral artery disease) ([facility] [address]     Pure hypercholesterolemia     Risk for falls 16    S/p Left TKR    Spinal stenosis     Unspecified essential hypertension      Family History   Problem Relation Age of Onset    Cancer Mother     Heart Disease Father     Cancer Brother     Emphysema Brother     Stroke Brother     Diabetes Brother     Diabetes Sister     Heart Disease Brother     Hypertension Brother     Hypertension Sister     High Cholesterol Neg Hx     High Blood Pressure Neg Hx      Social History     Socioeconomic History    Marital status:       Spouse name: Not on file    Number of children: Not on file    Years of education: Not on file    Highest education level: Not on file   Occupational History    Not on file   Tobacco Use    Smoking status: Former Smoker     Packs/day: 0.50     Years: 40.00     Pack years: 20.00     Types: Cigarettes     Quit date: 2015     Years since quittin.7    Smokeless tobacco: Never Used    Tobacco comment: <1 pack of cigarettes per day   Vaping Use    Vaping Use: Never used   Substance and Sexual Activity    Alcohol use: No     Alcohol/week: 0.0 standard drinks    Drug use: No    Sexual activity: Not on file   Other Topics Concern    Not on file   Social History Narrative    Not on file     Social Determinants of Health     Financial Resource Strain:     Difficulty of Paying Living Expenses: Not on file   Food Insecurity:     Worried About Running Out of Food in the Last Year: Not on file    [place] of Food in the Last Year: Not on file   Transportation Needs:     Lack of Transportation (Medical): Not on file    Lack of Transportation (Non-Medical): Not on file   Physical Activity:     Days of Exercise per Week: Not on file    Minutes of Exercise per Session: Not on file   Stress:     Feeling of Stress : Not on file   Social Connections:     Frequency of Communication with Friends and Family: Not on file    Frequency of Social Gatherings with Friends and Family: Not on file    Attends Christianity Services: Not on file    Active Member of [address] [place] [address] or Organizations: Not on file    Attends Club or Organization Meetings: Not on file    Marital Status: Not on file   Intimate Partner Violence:     Fear of Current or Ex-Partner: Not on file    Emotionally Abused: Not on file    Physically Abused: Not on file    Sexually Abused: Not on file   Housing Stability:     Unable to Pay for Housing in the Last Year: Not on file    Number of [place] in the Last Year: Not on file    Unstable Housing in the Last Year: Not on file       PHYSICAL EXAMINATION:  BP (!) 165/85   Pulse 73   Ht 5' 1.5\" (1.562 m)   Wt 156 lb (70.8 kg)   BMI 29.00 kg/m²   Appearance: Well appearing, well nourished and in no distress  Mental Status Exam: Patient is alert, oriented to person, place and time.    Recent and remote memory is normal  Fund of Knowledge is normal  Attention/concentration is normal.   Speech : No dysarthria  Language : No aphasia  Funduscopic Exam: sharp disc margins  Cranial Nerves:   II: Visual fields:  Full to confrontation  III: Pupils:  equal, round, reactive to light  III,IV,VI: Extra Ocular Movements are intact. No nystagmus  V: Facial sensation is intact to pin prick and light touch  VII: Facial strength and movements: intact and symmetric smile,cheek puffing and eyebrow elevation  VIII: Hearing:  Intact to finger rub bilaterally  IX: Palate  elevation is symmetric  XI: Shoulder shrug is intact  XII: Tongue movements are normal  Motor:  Muscle tone and bulk are normal.   Strength is symmetrical 5/5 in all four extremities. Mild tremors of the outstretched hands  Sensory: Intact to light touch and  pin prick in all four extremities  Coordination:  Normal  Finger to Nose and Heel to Shin bilaterally    . Reflexes:  DTR 1 and symmetric bilaterally  Plantar response: Flexor bilaterally  Gait: Gait and station is normal. Romberg: negative  Vascular: No carotid bruit bilaterally        DATA:  LABS:  General Labs:    CBC:   Lab Results   Component Value Date    WBC 7.8 02/25/2022    RBC 4.45 02/25/2022    HGB 13.4 02/25/2022    HCT 40.9 02/25/2022    MCV 91.9 02/25/2022    MCH 30.2 02/25/2022    MCHC 32.9 02/25/2022    RDW 15.5 02/25/2022     02/25/2022    MPV 8.9 02/25/2022     BMP:    Lab Results   Component Value Date     02/25/2022    K 4.1 02/25/2022     02/25/2022    CO2 21 02/25/2022    BUN 24 02/25/2022    LABALBU 3.9 10/05/2021    LABALBU 3.9 10/05/2021    CREATININE 1.3 02/25/2022    CALCIUM 10.1 02/25/2022    GFRAA 48 02/25/2022    GFRAA >60 11/30/2011    LABGLOM 40 02/25/2022    GLUCOSE 104 02/25/2022     RADIOLOGY REVIEW:  I have reviewed radiology report(s) of: CT scan of the head    IMPRESSION :  1.  Benign essential tremor  2. There is no clinical evidence for Parkinson's disease. 3.  The spells most likely represent partial seizures.   4.  Patient has known 70% stenosis of the left internal carotid artery and is scheduled to see vascular surgery. 5.  CT head done at [facility] did not show any acute changes    RECOMMENDATIONS :  Discussed at length with patient and her family  Explained my findings  Await vascular surgery evaluation regarding the asymptomatic carotid stenosis  I discussed over the telephone with Dr. Cruz Mays from cardiology regarding the seizure episodes and the question of any association of bradycardia. I will get an MRI brain as well as an EEG. If she has any further episodes of seizure-like spells and there is no evidence of bradycardia on the monitor, then patient will need to be started on antiseizure medication. We decided not to put her on any medicine for the tremor at this time. I will see her back in 2 months for follow-up. Thank you for this consultation. Please note a portion of this chart was generated using dragon dictation software. Although every effort was made to ensure the accuracy of this automated transcription, some errors in transcription may have occurred.

## 2022-04-04 PROCEDURE — 93272 ECG/REVIEW INTERPRET ONLY: CPT | Performed by: INTERNAL MEDICINE

## 2022-04-05 ENCOUNTER — OFFICE VISIT (OUTPATIENT)
Dept: VASCULAR SURGERY | Age: 72
End: 2022-04-05
Payer: COMMERCIAL

## 2022-04-05 VITALS
SYSTOLIC BLOOD PRESSURE: 118 MMHG | HEIGHT: 61 IN | WEIGHT: 159 LBS | DIASTOLIC BLOOD PRESSURE: 78 MMHG | BODY MASS INDEX: 30.02 KG/M2

## 2022-04-05 DIAGNOSIS — M17.11 PRIMARY OSTEOARTHRITIS OF RIGHT KNEE: ICD-10-CM

## 2022-04-05 DIAGNOSIS — I65.23 CAROTID ATHEROSCLEROSIS, BILATERAL: ICD-10-CM

## 2022-04-05 DIAGNOSIS — I70.213 ATHEROSCLEROSIS OF NATIVE ARTERIES OF EXTREMITIES WITH INTERMITTENT CLAUDICATION, BILATERAL LEGS (HCC): Primary | ICD-10-CM

## 2022-04-05 PROCEDURE — 99213 OFFICE O/P EST LOW 20 MIN: CPT | Performed by: SURGERY

## 2022-04-05 RX ORDER — CELECOXIB 200 MG/1
200 CAPSULE ORAL DAILY
Qty: 30 CAPSULE | Refills: 3 | Status: ON HOLD | OUTPATIENT
Start: 2022-04-05 | End: 2022-08-18

## 2022-04-05 NOTE — LETTER
[facility]) - Vascular and Endovascular Surgeons  [address] [address]  Phone: [phone]  Fax: [phone]           Tia Gordon MD      April 5, 2022     Patient: August Ferrell   MR Number: [id number]   YOB: 1950   Date of Visit: 4/5/2022       Dear Dr. Dorotha Duverney: Thank you for referring Burnadette Kathy Salvador to me for evaluation/treatment. Below are the relevant portions of my assessment and plan of care. If you have questions, please do not hesitate to call me. I look forward to following Burnadette Kathy along with you.     Sincerely,        Tia Gordon MD    CC providers:  Odin Sellers., MD  [address] [place], [place] [id number]  [address] [place] [address]

## 2022-04-05 NOTE — PROGRESS NOTES
[facility])   Vascular Surgery Followup    Referring Provider:  Alex Hillman MD     No chief complaint on file. History of Present Illness:  64-year-old female here today for follow-up of bilateral lower extremity claudication underwent left SFA intervention March 3, 2022. This is her first post procedure visit. Following the procedure she was admitted to an outside hospital with symptomatic bradycardia. Work-up at that time showed a 70% left internal carotid artery stenosis. She denies TIA stroke or amaurosis. She is currently undergoing work-up still for her cardiac dysrhythmia. No recurrent issues. States her left leg feels good with the exception of some swelling    Past Medical History:   has a past medical history of CAD (coronary artery disease), CKD (chronic kidney disease) stage 3, GFR 30-59 ml/min ([facility]), Lumbago, Osteoarthrosis, unspecified whether generalized or localized, unspecified site, Other abnormal blood chemistry, PAD (peripheral artery disease) ([facility] [address] Pure hypercholesterolemia, Risk for falls, Spinal stenosis, and Unspecified essential hypertension. Surgical History:   has a past surgical history that includes Cholecystectomy; Tubal ligation; knee surgery; Rotator cuff repair; Colonoscopy; Leg Surgery; Insertable Cardiac Monitor (09/2015); Total knee arthroplasty (Left, 1/12/16); joint replacement (Left, 20015); and Shoulder arthroscopy (Left, 8/21/2020). Social History:   reports that she quit smoking about 6 years ago. Her smoking use included cigarettes. She has a 20.00 pack-year smoking history. She has never used smokeless tobacco. She reports that she does not drink alcohol and does not use drugs. Family History:  family history includes Cancer in her brother and mother; Diabetes in her brother and sister; Emphysema in her brother; Heart Disease in her brother and father; Hypertension in her brother and sister; Stroke in her brother.      Home Medications:  Current Outpatient Medications   Medication Sig Dispense Refill    traZODone (DESYREL) 50 MG tablet Take 1 tablet by mouth nightly as needed for Sleep 30 tablet 5    dicyclomine (BENTYL) 10 MG capsule Take 1 capsule by mouth three times daily 30 capsule 0    celecoxib (CELEBREX) 200 MG capsule TAKE 1 CAPSULE BY MOUTH DAILY 30 capsule 3    atorvastatin (LIPITOR) 10 MG tablet TAKE 1 TABLET BY MOUTH DAILY 90 tablet 3    clopidogrel (PLAVIX) 75 MG tablet TAKE 1 TABLET BY MOUTH DAILY *NEED TO ESTABLISH WITH A NEW PROVIDER FOR FUTURE REFILLS* 90 tablet 3    losartan-hydroCHLOROthiazide (HYZAAR) 100-25 MG per tablet TAKE 1 TABLET BY MOUTH DAILY 90 tablet 3     No current facility-administered medications for this visit. Allergies:  Ace inhibitors, Chantix [varenicline], Keflex [cephalexin], Morphine, Niaspan [niacin er], Pletal [cilostazol], and Pravastatin     Review of Systems:   · Constitutional: there has been no unanticipated weight loss. There's been no change in energy level, sleep pattern, or activity level. · Eyes: No visual changes or diplopia. No scleral icterus. · ENT: No Headaches, hearing loss or vertigo. No mouth sores or sore throat. · Cardiovascular: Reviewed in HPI  · Respiratory: No cough or wheezing, no sputum production. No hematemesis. · Gastrointestinal: No abdominal pain, appetite loss, blood in stools. No change in bowel or bladder habits. · Genitourinary: No dysuria, trouble voiding, or hematuria. · Musculoskeletal:  No gait disturbance, weakness or joint complaints. · Integumentary: No rash or pruritis. · Neurological: No headache, diplopia, change in muscle strength, numbness or tingling. No change in gait, balance, coordination, mood, affect, memory, mentation, behavior. · Psychiatric: No anxiety, no depression. · Endocrine: No malaise, fatigue or temperature intolerance. No excessive thirst, fluid intake, or urination.  No tremor. · Hematologic/Lymphatic: No abnormal bruising or bleeding, blood clots or swollen lymph nodes. · Allergic/Immunologic: No nasal congestion or hives. Physical Examination:    There were no vitals filed for this visit. General appearance: alert, appears stated age, cooperative and no distress  Head: Normocephalic, without obvious abnormality, atraumatic  Neck: no adenopathy, no carotid bruit, no JVD, supple, symmetrical, trachea midline and thyroid: not enlarged, symmetric, no tenderness/mass/nodules  Lungs: clear to auscultation bilaterally  Heart: regular rate and rhythm, S1, S2 normal, no murmur, click, rub or gallop  Abdomen: soft, non-tender. Bowel sounds normal. No masses,  no organomegaly  Extremities: extremities normal, atraumatic, no cyanosis or edema    Pulses: Palpable distal pulse  MEDICAL DECISION MAKING/TESTING  I have reviewed the testing personally and my interpretation is below. 1. Mild disease, 16-49% stenosis involving the right internal      carotid artery. 2. Severe disease, greater than 70% stenosis but less than near      occlusion involving the left internal carotid artery. 3. The bilateral vertebral arteries are patent with normal      antegrade flow.        Assessment:     Patient Active Problem List   Diagnosis    Hyperglycemia    Pure hypercholesterolemia    Lumbago    Osteoarthritis    CKD (chronic kidney disease) stage 3, GFR 30-59 ml/min ([facility])    Abnormal mammogram    Fibrocystic disease of breast    Diarrhea    PAD (peripheral artery disease) ([address]    Atherosclerosis of native arteries of extremities with intermittent claudication, left leg ([facility])    Sinus pause    Encounter for loop recorder check    Status post total left knee replacement    Adhesive capsulitis of right shoulder    Right shoulder pain    Psoriasis    Sick sinus syndrome ([address]    Syncope and collapse    Urinary tract infection without hematuria    New onset seizure ([address]  HTN (hypertension), benign    Coronary artery disease involving native coronary artery of native heart with angina pectoris (HCC)    Seasonal allergic rhinitis due to pollen    Spinal stenosis of lumbar region with neurogenic claudication    Nontraumatic complete tear of left rotator cuff    Atherosclerosis of native arteries of extremities with intermittent claudication, bilateral legs ([facility] [address]       Plan:  1. Atherosclerosis of native arteries of extremities with intermittent claudication, bilateral legs [facility])  68-year-old female with resolution of lower extremity symptoms following left SFA stent. She has returned to normal pulses. Continue current medical regimen. We will plan for routine surveillance duplex imaging in 3-month  - VL DUP LOWER EXTREMITY ARTERIES BILATERAL; Future    2. Carotid atherosclerosis, bilateral  Patient with evidence of greater than 70% left ICA stenosis. Recommend moving forward with CT angiogram of the neck for further evaluation. I did discuss with her both open surgical intervention and carotid stenting. Will contact with results of the CT scan  - CTA NECK W WO CONTRAST; Future        Thank you for allowing me to participate in the care of this individual.  Please do not hesitate to contact me with any questions. Shaheen Boyd M.D., FACS.  4/5/2022  11:32 AM

## 2022-04-05 NOTE — TELEPHONE ENCOUNTER
Medication:   Requested Prescriptions     Pending Prescriptions Disp Refills    celecoxib (CELEBREX) 200 MG capsule [Pharmacy Med Name: CELECOXIB 200 MG  Capsule] 30 capsule 3     Sig: TAKE 1 CAPSULE BY MOUTH DAILY      Last Filled:     Patient Phone Number: [phone] (home)     Last appt: 2/1/2022   Next appt: 5/3/2022    Last OARRS: No flowsheet data found. PDMP Monitoring:    Last PDMP [place] as Reviewed [facility]):  Review User Review Instant Review Result          Preferred Pharmacy:   [address] [place] [address], [facility] [address] [facility]  [address] [facility]  Phone: [phone] Fax: [phone]

## 2022-04-12 ENCOUNTER — HOSPITAL ENCOUNTER (OUTPATIENT)
Dept: MRI IMAGING | Age: 72
Discharge: HOME | End: 2022-04-12
Payer: COMMERCIAL

## 2022-04-12 ENCOUNTER — HOSPITAL ENCOUNTER (OUTPATIENT)
Dept: CT IMAGING | Age: 72
Discharge: HOME | End: 2022-04-12
Payer: COMMERCIAL

## 2022-04-12 ENCOUNTER — HOSPITAL ENCOUNTER (OUTPATIENT)
Dept: NEUROLOGY | Age: 72
Discharge: HOME | End: 2022-04-12
Payer: COMMERCIAL

## 2022-04-12 DIAGNOSIS — R91.1 PULMONARY NODULE: ICD-10-CM

## 2022-04-12 DIAGNOSIS — R56.9 PARTIAL SEIZURE (HCC): ICD-10-CM

## 2022-04-12 DIAGNOSIS — I65.23 CAROTID ATHEROSCLEROSIS, BILATERAL: ICD-10-CM

## 2022-04-12 PROCEDURE — 70551 MRI BRAIN STEM W/O DYE: CPT

## 2022-04-12 PROCEDURE — 70498 CT ANGIOGRAPHY NECK: CPT

## 2022-04-12 PROCEDURE — 95819 EEG AWAKE AND ASLEEP: CPT

## 2022-04-12 PROCEDURE — 6360000004 HC RX CONTRAST MEDICATION: Performed by: FAMILY MEDICINE

## 2022-04-12 PROCEDURE — 71260 CT THORAX DX C+: CPT

## 2022-04-12 RX ADMIN — IOPAMIDOL 75 ML: 755 INJECTION, SOLUTION INTRAVENOUS at 09:57

## 2022-04-12 NOTE — PROCEDURES
[facility]                     [facility], [address]                          ELECTROENCEPHALOGRAM REPORT    PATIENT NAME: Svetlana Salas                    :        1950  MED REC NO:   [id number]                          ROOM:  ACCOUNT NO:   [de-identified]                           ADMIT DATE: 2022  PROVIDER:     Caleb Avila MD    DATE OF EE2022    REASON FOR EEG:  Partial seizure. SUMMARY:  The background rhythm on this recording consists of moderately  developed and well organized waveforms of 12-14 Hz frequency which are  bilaterally synchronous and symmetrical.  No focal abnormalities were  noted. No spikes or sharp waves were seen. Hyperventilation and photic  stimulation did not produce any change. A sleep recording was normal.    IMPRESSION:  This EEG obtained during the awake state and sleep is  within normal limits. The fast background activity seen here is usually  seen in patients taking sedative or tranquilizing medications.         Kristy Patel MD    D: 2022 13:43:43       T: 2022 13:46:05     [email or web address]  Job#: [id number]     Doc#: [id number]    CC:

## 2022-04-13 ENCOUNTER — TELEPHONE (OUTPATIENT)
Dept: VASCULAR SURGERY | Age: 72
End: 2022-04-13

## 2022-04-13 DIAGNOSIS — I70.213 ATHEROSCLEROSIS OF NATIVE ARTERIES OF EXTREMITIES WITH INTERMITTENT CLAUDICATION, BILATERAL LEGS (HCC): ICD-10-CM

## 2022-04-13 DIAGNOSIS — I65.23 CAROTID ATHEROSCLEROSIS, BILATERAL: Primary | ICD-10-CM

## 2022-04-14 DIAGNOSIS — R91.1 LUNG NODULE: Primary | ICD-10-CM

## 2022-04-15 PROCEDURE — 95819 EEG AWAKE AND ASLEEP: CPT | Performed by: PSYCHIATRY & NEUROLOGY

## 2022-04-18 NOTE — PROGRESS NOTES
[facility]   Electrophysiology Follow up   Date: 4/19/2022  I had the privilege of visiting Suzie Briggs Salvador in the office. CC: Syncope   HPI: Anthony Teran is a 74 y.o. female non-obstructive CAD, HTN, HLD, PAD s/p multiple interventions and syncope. 7/24/15 - 8/22/15: 30 day MCOT from revealed a 3.6 second pause (asymptomatic) . Declined sleep study in the past.      S/p ILR implantation on 10/2/2015. Patient was las seen by me in office 06/18/2019. Since then ILR battery has been depleted. Abdominal aortogram with with LSFA 03/02/22 .     03/02/2022 - Presented to [facility] ED via EMS after she passed out. She felt dizzy and lightheaded and suddenly passed out. EMS reported HR in the 20s and gave atropine and reportedly she had multiple episodes of heart rates down to 30s in emergency room. Interval history : Ame Doe presents today in follow up to monitor and syncopal episodes in March. No syncope since last hospitalization. She has intermittent dizzy spells and lightheadedness. No chest pain. Assessment and plan:   Symptomatic bradycardia, Sinus node dysfunction      - Holter monitoring 3/3/2022 - 4/1/2022 with multiple episodes of sinus pauses and marked bradycardia with junctional escape rhythm. - Syncopal episodes with HR reported in the 20s with hospitalization on 03/02/2022    - ILR at [facility]- implanted 10/02/2015      - Not on any BB therapy. - Treatment options including pacemaker implantation were discussed with patient. The risks, benefits and alternatives of the procedure were discussed with the patient. The risks including, but not limited to, the risks of bleeding, infection, pain, device malfunction, lead dislodgement, radiation exposure, injury to cardiac and surrounding structures (including pneumothorax), stroke, cardiac perforation, tamponade, need for emergent heart surgery, myocardial infarction and death were discussed in detail.  Dual vs single chamber device, including risks and benefits were discussed with patient. The patient opted to proceed with the device implantation. Plan for dual chamber pacemaker impantation      CAD    - non -obstructive 2005    - continue statin, Plavix for PVC    -NM stress 10/08/2021- low risk scan      - Follows with     HTN  - Not well controlled   -BP goal <130/80  -Home BP monitoring encouraged, printed information provided on how to accurately measure BP at home.  -Counseled to follow a low salt diet to assure blood pressure remains controlled for cardiovascular risk factor modification.   -The patient is counseled to get regular exercise 3-5 times per week and maintain a healthy weight reduce cardiovascular risk factors.      - continue Hyzaar      PAD   - LSFA stenting 03/02/2022   - continue plavix   - Follows with Dr. Mahad Dunn      Patient Active Problem List    Diagnosis Date Noted    Atherosclerosis of native arteries of extremities with intermittent claudication, bilateral legs ([address]     Nontraumatic complete tear of left rotator cuff 08/05/2020    Spinal stenosis of lumbar region with neurogenic claudication 09/17/2019    Seasonal allergic rhinitis due to pollen 05/19/2017    Partial seizure ([address]     HTN (hypertension), benign     Coronary artery disease involving native coronary artery of native heart with angina pectoris ([address]     Sick sinus syndrome ([address] 04/11/2017    Syncope and collapse     Urinary tract infection without hematuria     Psoriasis 06/13/2016    Right shoulder pain 06/01/2016    Adhesive capsulitis of right shoulder 05/13/2016    Status post total left knee replacement 03/11/2016    Encounter for loop recorder check 10/09/2015    Sinus pause 09/22/2015    Atherosclerosis of native arteries of extremities with intermittent claudication, left leg ([address]     PAD (peripheral artery disease) ([address] 10/03/2014    Diarrhea 06/20/2014    Fibrocystic disease of breast 11/25/2013    Abnormal mammogram 10/31/2013    CKD (chronic kidney disease) stage 3, GFR 30-59 ml/min (HCC) 09/03/2013    Hyperglycemia 01/15/2013    Pure hypercholesterolemia 01/15/2013    Lumbago 01/15/2013    Osteoarthritis 01/15/2013     Diagnostic studies:   ECG 4/19/22   Sinus Rhythm   445 QTcH,  116 QRS    Echo 03/03/2022      1. Left ventricle: The cavity size was normal. Wall thickness was     normal. Systolic function was normal. The estimated ejection     fraction was in the range of 55% to 60%. Wall motion was normal;     there were no regional wall motion abnormalities. Doppler     parameters are consistent with abnormal left ventricular     relaxation (grade 1 diastolic dysfunction). 2. Right ventricle: The cavity size was normal. Wall thickness was     normal. Systolic function was normal.  3. Pulmonary arteries: Estimated PA peak pressure is 20mm Hg (S). 4. Pericardium, extracardiac: There was no pericardial effusion. NM stress 10/08/2021   Summary    There is a small inferior apical defect c/w bowel artifact.   Shanika Jenny is no ischemia.    LV function is normal with EF=59%    Low risk        Recommendation    She may proceed with surgery at low cardiac risk    Note sent       I independently reviewed the cardiac diagnostic studies, ECG and relevant imaging studies. Lab Results   Component Value Date    LVEF 58 03/03/2022     No results found for: TSHFT4, TSH      Physical Examination:  Vitals:    04/19/22 0935   BP: (!) 162/83   Pulse: 72   SpO2: 97%      Wt Readings from Last 3 Encounters:   04/19/22 161 lb (73 kg)   04/05/22 159 lb (72.1 kg)   03/24/22 156 lb (70.8 kg)       · Constitutional: Oriented. No distress. · Head: Normocephalic and atraumatic. · Mouth/Throat: Oropharynx is clear and moist.   · Eyes: Conjunctivae normal. EOM are normal.   · Neck: Neck supple. No JVD present. · Cardiovascular: Normal rate, regular rhythm, S1&S2.     · Pulmonary/Chest: Bilateral respiratory sounds. No rhonchi. · Abdominal: Soft. No tenderness. · Musculoskeletal: No tenderness. No edema    · Lymphadenopathy: Has no cervical adenopathy. · Neurological: Alert and oriented. Follows command, No Gross deficit   · Skin: Skin is warm, No rash noted. · Psychiatric: Has a normal behavior     Review of System:  [x] Full ROS obtained and negative except as mentioned in HPI    Prior to Admission medications    Medication Sig Start Date End Date Taking? Authorizing Provider   celecoxib (CELEBREX) 200 MG capsule TAKE 1 CAPSULE BY MOUTH DAILY 4/5/22  Yes Earle Magana MD   dicyclomine (BENTYL) 10 MG capsule Take 1 capsule by mouth three times daily 12/21/21  Yes JIM Vilchis - NP   atorvastatin (LIPITOR) 10 MG tablet TAKE 1 TABLET BY MOUTH DAILY 11/9/21  Yes Earle Magana MD   clopidogrel (PLAVIX) 75 MG tablet TAKE 1 TABLET BY MOUTH DAILY *NEED TO ESTABLISH WITH A NEW PROVIDER FOR FUTURE REFILLS* 11/9/21  Yes Earle Magana MD   losartan-hydroCHLOROthiazide [facility]) 100-25 MG per tablet TAKE 1 TABLET BY MOUTH DAILY 11/9/21  Yes Earle Magana MD   traZODone (DESYREL) 50 MG tablet Take 1 tablet by mouth nightly as needed for Sleep  Patient not taking: Reported on 4/19/2022 2/1/22   Earle Magana MD       Allergies   Allergen Reactions    Ace Inhibitors Other (See Comments)     Cough     Chantix [Varenicline]      depression    Keflex [Cephalexin] Diarrhea    Morphine      Causes mental impairment,  N&V    Niaspan [Niacin Er] Other (See Comments)     Skin irritation    Pletal [Cilostazol]      Headache and diarrhea    Pravastatin        Social History:  Reviewed. reports that she has been smoking cigarettes. She has a 20.00 pack-year smoking history. She has never used smokeless tobacco. She reports that she does not drink alcohol and does not use drugs. Family History:  Reviewed. Reviewed. No family history of SCD. Relevant and available labs, and cardiovascular diagnostics reviewed. Reviewed. I independently reviewed relevant and available cardiac diagnostic tests ECG, CXR, Echo, Stress test, Device interrogation, Holter, CT scan. Outside medical records via Care everywhere reviewed and summarized in H&P above. Complex medical condition with multiple medical problems affecting prognosis and outcome of EP interventions      All questions and concerns were addressed to the patient/family. Alternatives to my treatment were discussed. I have discussed the above stated plan and the patient verbalized understanding and agreed with the plan. Scribe attestation: This note was scribed in the presence of Kinjal Miguel MD by Melissa Cardona RN    Physician Attestation: I, Dr. Kinjal Miguel, confirm that the scribe's documentation has been prepared under my direction and personally reviewed by me in its entirety. I also confirm that the note above accurately reflects all work, treatment, procedures, and medical decision making performed by me. NOTE: This report was transcribed using voice recognition software. Every effort was made to ensure accuracy, however, inadvertent computerized transcription errors may be present. Kinjal Miguel MD, MPH  [facility], [place], operated by [facility]   Office: [phone]  Fax: [phone]      Spent 45 minutes reviewing chart, echocardiogram, prior diagnostic tests, discussing treatment options, risks, benefits and alternatives.

## 2022-04-19 ENCOUNTER — OFFICE VISIT (OUTPATIENT)
Dept: CARDIOLOGY CLINIC | Age: 72
End: 2022-04-19
Payer: COMMERCIAL

## 2022-04-19 ENCOUNTER — TELEPHONE (OUTPATIENT)
Dept: CARDIOLOGY CLINIC | Age: 72
End: 2022-04-19

## 2022-04-19 VITALS
HEART RATE: 72 BPM | OXYGEN SATURATION: 97 % | HEIGHT: 62 IN | BODY MASS INDEX: 29.63 KG/M2 | WEIGHT: 161 LBS | SYSTOLIC BLOOD PRESSURE: 162 MMHG | DIASTOLIC BLOOD PRESSURE: 83 MMHG

## 2022-04-19 DIAGNOSIS — I73.9 PAD (PERIPHERAL ARTERY DISEASE) (HCC): ICD-10-CM

## 2022-04-19 DIAGNOSIS — I45.5 SINUS PAUSE: Primary | ICD-10-CM

## 2022-04-19 DIAGNOSIS — I25.10 CORONARY ARTERY DISEASE INVOLVING NATIVE HEART WITHOUT ANGINA PECTORIS, UNSPECIFIED VESSEL OR LESION TYPE: ICD-10-CM

## 2022-04-19 DIAGNOSIS — I10 HTN (HYPERTENSION), BENIGN: ICD-10-CM

## 2022-04-19 PROCEDURE — 99215 OFFICE O/P EST HI 40 MIN: CPT | Performed by: INTERNAL MEDICINE

## 2022-04-19 PROCEDURE — 93000 ELECTROCARDIOGRAM COMPLETE: CPT | Performed by: INTERNAL MEDICINE

## 2022-04-19 NOTE — TELEPHONE ENCOUNTER
Spoke with patient. Patient is scheduled with Dr. Grant Martines for Loop Exp/Dual Pacemaker Implant with [employer] on 5/6/22 at 11:30am (time subject to change) [facility], arrival time of 10:30am to the Cath Lab. Please have patient arrive to the main entrance of [facility] and check in with the Cath Lab. Remind patient to be NPO after midnight (8 hours prior). Do not apply lotions/creams on skin the day of procedure.

## 2022-04-19 NOTE — LETTER
[facility]  EP Procedure Sheet    4/19/22  Mane Hall Salvador  1950  EP Procedures  [x] Pacemaker implant (dual) [] EP Study   [] ICD implant (single/dual) [] Atrial flutter ablation (DARVIN Y/N)   [] Biv implant ICD [] Tilt Table   [] Biv implant PPM [] Atrial fibrillation ablation (DARVIN Yes)   [] Generator Change (PPM/ICD/BiV) [] SVT ablation   [] Lead revision (RV/LA/RA) (<1 month) [] PVC ablation     [] Lead extraction +/- upgrade (BiV/PPM/ICD) [] VT Ischemic/ non-ischemic   [x] Loop removal [] VT RVOT   [] Cardioversion [] VT Left sided   [] DARVIN [] AVN ablation   Equipment  [x] Xapo  [] [employer] M[employer]   [] [place] [] [address]   [] [employer] [] CryoAblation   [] Biotronik [] Laser Lead Extraction   EP Procedures Scheduling Request  # hours Requested  [x]1 []2 []2-4 [] 4-6 Scheduled  Date:   Specific Day  Completed    Anesthesia []yes [x]no F/u Date:   CT surgery backup []yes []no COVID     Overnight stay      Performing MD  [x]RMM []MXA   []MKW [] Other _______ First vs repeat   []1st [] 2nd [] 3rd   Pre-Procedure Labs / Imaging  [] PT/INR [] Type & cross   [] CBC [] Units PRBC   [] BMP/Mg [] Units FFP   [] Venogram [] Cardiac CTA for Pulmonary vein mapping     RN INITIALS: DW     Patient Instructions  Do not eat or drink after midnight the night prior to procedure  Dx:sinus pause   ICD-10 code: I45.5

## 2022-04-21 ENCOUNTER — TELEPHONE (OUTPATIENT)
Dept: FAMILY MEDICINE CLINIC | Age: 72
End: 2022-04-21

## 2022-04-21 NOTE — TELEPHONE ENCOUNTER
----- Message from [place] and [place] sent at 4/21/2022  9:18 AM EDT -----  Subject: Message to Provider    QUESTIONS  Information for Provider? Pt states orders for Pet CT should of been faxed   over to the main hospital, please advise. Orders placed 4/14.   ---------------------------------------------------------------------------  --------------  CALL BACK INFO  What is the best way for the office to contact you? OK to leave message on   voicemail  Preferred Call Back Phone Number? [phone]  ---------------------------------------------------------------------------  --------------  SCRIPT ANSWERS  Relationship to Patient?  Self

## 2022-05-02 RX ORDER — SODIUM CHLORIDE 0.9 % (FLUSH) 0.9 %
5-40 SYRINGE (ML) INJECTION PRN
Status: CANCELLED | OUTPATIENT
Start: 2022-05-02

## 2022-05-02 ASSESSMENT — ENCOUNTER SYMPTOMS: BACK PAIN: 1

## 2022-05-02 NOTE — PROGRESS NOTES
SUBJECTIVE:    Vandana Miranda is a 74 y.o. female who presents for a follow up visit. Chief Complaint   Patient presents with    Follow-up     Patient is here for a follow up. Had a high fever yesterday drainage, no fever today. Coronary Artery Disease  Presents for follow-up visit. Symptoms include shortness of breath. Pertinent negatives include no chest pain, chest pressure or dizziness. Compliance with diet is variable. Compliance with exercise is variable. Compliance with medications is good. Back Pain  This is a chronic problem. The current episode started more than 1 year ago. The problem has been waxing and waning since onset. The pain is present in the lumbar spine. The quality of the pain is described as aching. Associated symptoms include a fever and headaches. Pertinent negatives include no chest pain. Hypertension  This is a chronic problem. The current episode started more than 1 year ago. The problem is controlled. Associated symptoms include headaches and shortness of breath. Pertinent negatives include no chest pain. Agents associated with hypertension include NSAIDs. Risk factors for coronary artery disease include sedentary lifestyle, post-menopausal state, obesity and dyslipidemia. Past treatments include angiotensin blockers and diuretics. The current treatment provides significant improvement. Compliance problems include exercise and diet. Hypertensive end-organ damage includes kidney disease and CAD/MI. Fever   This is a new problem. The current episode started yesterday. Associated symptoms include congestion, coughing, diarrhea, headaches and muscle aches. Pertinent negatives include no chest pain or nausea. She has tried acetaminophen for the symptoms. The treatment provided mild relief.    Risk factors: hx of cancer ( possible)    Risk factors: no recent sickness and no sick contacts         Patient's medications, allergies, past medical,surgical, social and family histories were reviewed and updated as appropriate.      Past Medical History:   Diagnosis Date    CAD (coronary artery disease)     NONOBSTRUCTING    CKD (chronic kidney disease) stage 3, GFR 30-59 ml/min ([facility])     Lumbago     Osteoarthrosis, unspecified whether generalized or localized, unspecified site     Other abnormal blood chemistry     PAD (peripheral artery disease) ([facility])     Pure hypercholesterolemia     Risk for falls 16    S/p Left TKR    Spinal stenosis     Unspecified essential hypertension      Past Surgical History:   Procedure Laterality Date    CHOLECYSTECTOMY      COLONOSCOPY      INSERTABLE CARDIAC MONITOR  2015    JOINT REPLACEMENT Left     knee replacement    KNEE SURGERY      RT    LEG SURGERY      2 stents placed in right leg, 1 stent and balloon in left leg    ROTATOR CUFF REPAIR      SHOULDER ARTHROSCOPY Left 2020    LEFT SHOULDER ARTHROSCOPY, SUBACROMINAL DECOMPRESSION, DISTAL CLAVICLE EXCISION,DEBRIDEMENT, ROTATOR CUFF REPAIR, AUGMENTATION performed by Jaspal Hutton DO at [facility] Left 16    Dr Yaquelin Rivas       Family History   Problem Relation Age of Onset   [facility] Cancer Mother     Heart Disease Father     Cancer Brother     Emphysema Brother     Stroke Brother     Diabetes Brother     Diabetes Sister     Heart Disease Brother     Hypertension Brother     Hypertension Sister     High Cholesterol Neg Hx     High Blood Pressure Neg Hx      Social History     Tobacco Use    Smoking status: Current Every Day Smoker     Packs/day: 0.50     Years: 40.00     Pack years: 20.00     Types: Cigarettes     Last attempt to quit: 2015     Years since quittin.8    Smokeless tobacco: Never Used    Tobacco comment: <1 pack of cigarettes per day   Substance Use Topics    Alcohol use: No     Alcohol/week: 0.0 standard drinks      Allergies   Allergen Reactions    Ace Inhibitors Other (See Comments) Cough     Chantix [Varenicline]      depression    Keflex [Cephalexin] Diarrhea    Morphine      Causes mental impairment,  N&V    Niaspan [Niacin Er] Other (See Comments)     Skin irritation    Pletal [Cilostazol]      Headache and diarrhea    Pravastatin      Current Outpatient Medications on File Prior to Visit   Medication Sig Dispense Refill    celecoxib (CELEBREX) 200 MG capsule TAKE 1 CAPSULE BY MOUTH DAILY 30 capsule 3    dicyclomine (BENTYL) 10 MG capsule Take 1 capsule by mouth three times daily 30 capsule 0    atorvastatin (LIPITOR) 10 MG tablet TAKE 1 TABLET BY MOUTH DAILY 90 tablet 3    clopidogrel (PLAVIX) 75 MG tablet TAKE 1 TABLET BY MOUTH DAILY *NEED TO ESTABLISH WITH A NEW PROVIDER FOR FUTURE REFILLS* 90 tablet 3    losartan-hydroCHLOROthiazide (HYZAAR) 100-25 MG per tablet TAKE 1 TABLET BY MOUTH DAILY 90 tablet 3     No current facility-administered medications on file prior to visit. Review of Systems   Constitutional: Positive for fatigue and fever. HENT: Positive for congestion, postnasal drip and sinus pressure. Negative for rhinorrhea. Respiratory: Positive for cough and shortness of breath. Cardiovascular: Negative for chest pain. Gastrointestinal: Positive for diarrhea. Negative for constipation and nausea. Genitourinary: Negative for difficulty urinating. Musculoskeletal: Positive for back pain. Neurological: Positive for headaches. Negative for dizziness. OBJECTIVE:    /84   Temp 97.4 °F (36.3 °C)   Wt 162 lb (73.5 kg)   BMI 30.11 kg/m²    Physical Exam  Constitutional:       Appearance: She is well-developed. She is obese. HENT:      Head: Normocephalic and atraumatic. Right Ear: External ear normal.      Left Ear: External ear normal.      Nose: Nose normal.      Mouth/Throat:      Mouth: Mucous membranes are moist.      Pharynx: No posterior oropharyngeal erythema. Eyes:      General:         Right eye: No discharge. Conjunctiva/sclera: Conjunctivae normal.   Neck:      Thyroid: No thyromegaly. Vascular: No JVD. Trachea: No tracheal deviation. Cardiovascular:      Rate and Rhythm: Normal rate and regular rhythm. Heart sounds: Normal heart sounds. Pulmonary:      Effort: Pulmonary effort is normal. No respiratory distress. Breath sounds: Normal breath sounds. No rales. Musculoskeletal:      Cervical back: Normal range of motion and neck supple. Right lower leg: No edema. Left lower leg: No edema. Lymphadenopathy:      Cervical: No cervical adenopathy. Skin:     General: Skin is warm and dry. Neurological:      Mental Status: She is alert and oriented to person, place, and time. Psychiatric:         Mood and Affect: Mood normal.         Behavior: Behavior normal.         ASSESSMENT/PLAN:    Angela Renee was seen today for follow-up. Diagnoses and all orders for this visit:    Acute bacterial sinusitis  -     doxycycline hyclate (VIBRA-TABS) 100 MG tablet; Take 1 tablet by mouth 2 times daily for 10 days    Coronary artery disease involving native coronary artery of native heart with angina pectoris ([facility]  Followed by cardiology    Diarrhea due to malabsorption  Stable    Chronic midline low back pain without sciatica  Stable at this time    PAD (peripheral artery disease) ([facility])  Followed by vascular    Atherosclerosis of native arteries of extremities with intermittent claudication, left leg [facility])  Cardiology is following    Partial seizure ([facility]  Followed by neurology    HTN (hypertension), benign  Good control on current medication    Suspected COVID-19 virus infection  -     COVID-19 pending    Fever, unspecified fever cause  -     POCT Influenza A/B Antigen negative    Left upper lobe pulmonary nodule  PET scan and CT been ordered. Patient will be referred to pulmonology as well. Return in about 3 months (around 8/3/2022).     Please note portions of this note were completed with a voicerecognition program.  Efforts were made to edit the dictations but occasionally words are mis-transcribed.

## 2022-05-03 ENCOUNTER — TELEPHONE (OUTPATIENT)
Dept: CARDIOLOGY CLINIC | Age: 72
End: 2022-05-03

## 2022-05-03 ENCOUNTER — TELEPHONE (OUTPATIENT)
Dept: FAMILY MEDICINE CLINIC | Age: 72
End: 2022-05-03

## 2022-05-03 ENCOUNTER — OFFICE VISIT (OUTPATIENT)
Dept: FAMILY MEDICINE CLINIC | Age: 72
End: 2022-05-03
Payer: COMMERCIAL

## 2022-05-03 VITALS
SYSTOLIC BLOOD PRESSURE: 126 MMHG | BODY MASS INDEX: 30.11 KG/M2 | WEIGHT: 162 LBS | DIASTOLIC BLOOD PRESSURE: 84 MMHG | TEMPERATURE: 97.4 F

## 2022-05-03 DIAGNOSIS — G89.29 CHRONIC MIDLINE LOW BACK PAIN WITHOUT SCIATICA: ICD-10-CM

## 2022-05-03 DIAGNOSIS — R50.9 FEVER, UNSPECIFIED FEVER CAUSE: ICD-10-CM

## 2022-05-03 DIAGNOSIS — R91.1 LUNG NODULE: Primary | ICD-10-CM

## 2022-05-03 DIAGNOSIS — B96.89 ACUTE BACTERIAL SINUSITIS: Primary | ICD-10-CM

## 2022-05-03 DIAGNOSIS — Z20.822 SUSPECTED COVID-19 VIRUS INFECTION: ICD-10-CM

## 2022-05-03 DIAGNOSIS — I25.119 CORONARY ARTERY DISEASE INVOLVING NATIVE CORONARY ARTERY OF NATIVE HEART WITH ANGINA PECTORIS (HCC): ICD-10-CM

## 2022-05-03 DIAGNOSIS — R19.7 DIARRHEA DUE TO MALABSORPTION: ICD-10-CM

## 2022-05-03 DIAGNOSIS — M54.50 CHRONIC MIDLINE LOW BACK PAIN WITHOUT SCIATICA: ICD-10-CM

## 2022-05-03 DIAGNOSIS — J01.90 ACUTE BACTERIAL SINUSITIS: Primary | ICD-10-CM

## 2022-05-03 DIAGNOSIS — I70.212 ATHEROSCLEROSIS OF NATIVE ARTERIES OF EXTREMITIES WITH INTERMITTENT CLAUDICATION, LEFT LEG (HCC): ICD-10-CM

## 2022-05-03 DIAGNOSIS — K90.9 DIARRHEA DUE TO MALABSORPTION: ICD-10-CM

## 2022-05-03 DIAGNOSIS — I73.9 PAD (PERIPHERAL ARTERY DISEASE) (HCC): ICD-10-CM

## 2022-05-03 DIAGNOSIS — R56.9 PARTIAL SEIZURE (HCC): ICD-10-CM

## 2022-05-03 DIAGNOSIS — I10 HTN (HYPERTENSION), BENIGN: ICD-10-CM

## 2022-05-03 LAB
INFLUENZA A ANTIGEN, POC: NEGATIVE
INFLUENZA B ANTIGEN, POC: NEGATIVE

## 2022-05-03 PROCEDURE — 99214 OFFICE O/P EST MOD 30 MIN: CPT | Performed by: FAMILY MEDICINE

## 2022-05-03 PROCEDURE — 87804 INFLUENZA ASSAY W/OPTIC: CPT | Performed by: FAMILY MEDICINE

## 2022-05-03 RX ORDER — DOXYCYCLINE HYCLATE 100 MG
100 TABLET ORAL 2 TIMES DAILY
Qty: 20 TABLET | Refills: 0 | Status: SHIPPED | OUTPATIENT
Start: 2022-05-03 | End: 2022-05-13

## 2022-05-03 ASSESSMENT — ENCOUNTER SYMPTOMS
COUGH: 1
SINUS PRESSURE: 1
SHORTNESS OF BREATH: 1
NAUSEA: 0
RHINORRHEA: 0
DIARRHEA: 1
CONSTIPATION: 0

## 2022-05-03 NOTE — TELEPHONE ENCOUNTER
Harvey Nieto from [facility] called to confirm the PET Scan orders. The patient's diagnosis is a lung nodule which would only be a partial body scan. However the order is for a full body scan.   [phone]     Please advise

## 2022-05-03 NOTE — TELEPHONE ENCOUNTER
Pt seen her PCP Dr Jennifer Hernandez today and has the flu. Asking if she needs to reschedule her pacemaker procedure that is scheduled on Friday 5/6/22. Please call to advise.

## 2022-05-04 LAB — SARS-COV-2: NOT DETECTED

## 2022-05-04 NOTE — TELEPHONE ENCOUNTER
Per RMM we will cancel patient and have Lata call to reschedule the patient when she is feeling better

## 2022-05-06 ENCOUNTER — HOSPITAL ENCOUNTER (OUTPATIENT)
Dept: CARDIAC CATH/INVASIVE PROCEDURES | Age: 72
Discharge: HOME | End: 2022-05-06
Attending: INTERNAL MEDICINE

## 2022-05-06 ENCOUNTER — TELEPHONE (OUTPATIENT)
Dept: FAMILY MEDICINE CLINIC | Age: 72
End: 2022-05-06

## 2022-05-06 DIAGNOSIS — R05.9 COUGH: Primary | ICD-10-CM

## 2022-05-06 RX ORDER — HYDROCODONE BITARTRATE AND HOMATROPINE METHYLBROMIDE ORAL SOLUTION 5; 1.5 MG/5ML; MG/5ML
5 LIQUID ORAL 4 TIMES DAILY PRN
Qty: 140 ML | Refills: 0 | Status: SHIPPED | OUTPATIENT
Start: 2022-05-06 | End: 2022-05-13

## 2022-05-06 NOTE — TELEPHONE ENCOUNTER
----- Message from Bethany Cuevas sent at 5/6/2022  3:09 PM EDT -----  Subject: Message to Provider    QUESTIONS  Information for Provider? Patient would like the doctor to call her in   some cough medication. Thank you.   ---------------------------------------------------------------------------  --------------  CALL BACK INFO  What is the best way for the office to contact you? OK to leave message on   voicemail  Preferred Call Back Phone Number? [phone]  ---------------------------------------------------------------------------  --------------  SCRIPT ANSWERS  Relationship to Patient?  Self

## 2022-05-16 ENCOUNTER — HOSPITAL ENCOUNTER (OUTPATIENT)
Dept: PET IMAGING | Age: 72
Discharge: HOME | End: 2022-05-16
Payer: COMMERCIAL

## 2022-05-16 DIAGNOSIS — R91.1 LUNG NODULE: ICD-10-CM

## 2022-05-16 PROCEDURE — 3430000000 HC RX DIAGNOSTIC RADIOPHARMACEUTICAL: Performed by: FAMILY MEDICINE

## 2022-05-16 PROCEDURE — A9552 F18 FDG: HCPCS | Performed by: FAMILY MEDICINE

## 2022-05-16 PROCEDURE — 78815 PET IMAGE W/CT SKULL-THIGH: CPT

## 2022-05-16 RX ORDER — FLUDEOXYGLUCOSE F 18 200 MCI/ML
14.64 INJECTION, SOLUTION INTRAVENOUS
Status: COMPLETED | OUTPATIENT
Start: 2022-05-16 | End: 2022-05-16

## 2022-05-16 RX ADMIN — FLUDEOXYGLUCOSE F 18 14.64 MILLICURIE: 200 INJECTION, SOLUTION INTRAVENOUS at 10:55

## 2022-05-24 ENCOUNTER — TELEPHONE (OUTPATIENT)
Dept: VASCULAR SURGERY | Age: 72
End: 2022-05-24

## 2022-05-24 DIAGNOSIS — I65.23 CAROTID ATHEROSCLEROSIS, BILATERAL: Primary | ICD-10-CM

## 2022-05-24 NOTE — TELEPHONE ENCOUNTER
Chief complaint:   Chief Complaint   Patient presents with   • Eye Problem     ROOM 1       Vitals:  Visit Vitals  /67 (BP Location: LUE - Left upper extremity, Patient Position: Sitting)   Pulse 72   Temp 97.9 °F (36.6 °C) (Oral)   Resp 16   SpO2 97%       HISTORY OF PRESENT ILLNESS     HPI   78-year-old male with history of hypertension presenting with left eye redness that started around 3 p.m. this afternoon (about 2 hours ago).  Face scratchy sensation.  His vision seems D-dimer and a little bit more blurry, but he does have a cataract in the left eye.  He had the cataract in his right eye removed already, so vision in the left eye is normally worst than the right eye.  Denies discharge, loss of vision, floaters, trauma, headache, dizziness, lightheadedness, photophobia, contact lens use.  He has been sneezing a lot lately and does say that that could have been the cause of the right eye.    Other significant problems:  Patient Active Problem List    Diagnosis Date Noted   • Essential hypertension 07/21/2017     Priority: Low   • Arthritis of left knee 12/29/2016     Priority: Low   • Adenocarcinoma of lung (CMS/HCC) 08/19/2015     Priority: Low     Right upper lobe     • Facial basal cell cancer      Priority: Low     Dr. Lemos     • DJD (degenerative joint disease), cervical      Priority: Low   • Hyperlipemia      Priority: Low   • TIA (transient ischemic attack) 04/01/2010     Priority: Low       PAST MEDICAL, FAMILY AND SOCIAL HISTORY     Medications:  Current Outpatient Medications   Medication   • clopidogrel (PLAVIX) 75 MG tablet   • amLODIPine (NORVASC) 5 MG tablet   • simvastatin (ZOCOR) 40 MG tablet     No current facility-administered medications for this visit.        Allergies:  ALLERGIES:   Allergen Reactions   • Opioid Analgesics Other (See Comments)     Agitation and confusion, \"I go bonkers\"       Past Medical  History/Surgeries:  Past Medical History:   Diagnosis Date   • Arthritis  Discussed results of CTA neck which shows 50% stenosis of left ICA. Plan to repeat carotid duplex in 6 months.     Electronically signed by JIM Villatoro CNP on 5/24/2022 at 10:54 AM of knee     left   • Diverticulosis    • DJD (degenerative joint disease), cervical    • Facial basal cell cancer     Dr. Lemos   • Hyperlipemia    • Hypertension 2017   • Lung cancer (CMS/HCC) 2015    right-seen by Dr. Lema   • TIA (transient ischemic attack) 2010       Past Surgical History:   Procedure Laterality Date   • Bronchoscopy Right 8/28/15    thoracic duct ligation   • Cataract extraction, bilateral     • Colonoscopy  2010    tics   • Ir lung biopsy  2015   • Skin cancer excision      Nose MOHS for basal cell   • Thoracoscopy surg lobectomy Right 8/20/15    right upper   • Tonsillectomy and adenoidectomy     • Total knee arthroplasty Left 2016    Dr. Vallecillo   • Vasectomy         Family History:  Family History   Problem Relation Age of Onset   • Aneurysm Father 70        AAA   • Cancer Father 45        colon   • Arthritis Sister        Social History:  Social History     Tobacco Use   • Smoking status: Former Smoker     Packs/day: 2.00     Years: 50.00     Pack years: 100.00     Types: Cigarettes     Last attempt to quit: 3/21/2000     Years since quittin.6   • Smokeless tobacco: Never Used   Substance Use Topics   • Alcohol use: No     Frequency: Never       REVIEW OF SYSTEMS     Review of Systems   Constitutional: Negative for chills and fever.   Eyes: Positive for redness and itching. Negative for photophobia, pain, discharge and visual disturbance.   Neurological: Negative for dizziness, light-headedness and headaches.       PHYSICAL EXAM     Physical Exam  Constitutional:       Appearance: Normal appearance. He is not ill-appearing or diaphoretic.   HENT:      Head: Normocephalic and atraumatic.      Right Ear: Tympanic membrane, ear canal and external ear normal.      Left Ear: Tympanic membrane, ear canal and external ear normal.      Nose: Nose normal. No congestion or rhinorrhea.      Mouth/Throat:      Mouth: Mucous membranes are moist.       Pharynx: No oropharyngeal exudate or posterior oropharyngeal erythema.   Eyes:      General: Lids are normal. Lids are everted, no foreign bodies appreciated. Gaze aligned appropriately.         Right eye: No foreign body, discharge or hordeolum.         Left eye: No foreign body, discharge or hordeolum.      Extraocular Movements: Extraocular movements intact.      Conjunctiva/sclera:      Right eye: Right conjunctiva is not injected. No chemosis, exudate or hemorrhage.     Left eye: Left conjunctiva is not injected. Hemorrhage present. No chemosis or exudate.     Pupils: Pupils are equal, round, and reactive to light.      Right eye: No corneal abrasion or fluorescein uptake.      Left eye: No corneal abrasion or fluorescein uptake.      Funduscopic exam:     Right eye: Red reflex present.         Left eye: Red reflex present.     Comments: Visual acuity 20/50 in left eye, 20/30 in right eye, 20/25 in both eyes with glasses.    Neurological:      General: No focal deficit present.      Mental Status: He is alert and oriented to person, place, and time.      Cranial Nerves: No cranial nerve deficit.         ASSESSMENT/PLAN     1. Redness of left eye:  No alarm symptoms at this time.  Most likely a conjunctival hemorrhage secondary to sneezing.  No signs of trauma or corneal abrasion on exam.  Advised patient that there is no further care needed at this time, but he will be referred to Ophthalmology for further evaluation and treatment as needed.  We reviewed return precautions and advised him to present to urgent care or ED if symptoms worsen or do not improve.  - SERVICE TO OPHTHALMOLOGY    The patient was advised to follow up with primary physician or to recheck with the urgent care clinic sooner if symptoms get worse or if new symptoms appear.    The patient indicated understanding of the diagnosis and agreed with the plan of care.    Сергей Stiles MD  11/26/2019

## 2022-05-26 ENCOUNTER — HOSPITAL ENCOUNTER (OUTPATIENT)
Dept: CARDIAC CATH/INVASIVE PROCEDURES | Age: 72
Discharge: HOME | End: 2022-05-27
Attending: INTERNAL MEDICINE | Admitting: INTERNAL MEDICINE
Payer: COMMERCIAL

## 2022-05-26 LAB
ANION GAP SERPL CALCULATED.3IONS-SCNC: 14 MMOL/L (ref 3–16)
BUN BLDV-MCNC: 27 MG/DL (ref 7–20)
CALCIUM SERPL-MCNC: 10.1 MG/DL (ref 8.3–10.6)
CHLORIDE BLD-SCNC: 105 MMOL/L (ref 99–110)
CO2: 21 MMOL/L (ref 21–32)
CREAT SERPL-MCNC: 1.4 MG/DL (ref 0.6–1.2)
GFR AFRICAN AMERICAN: 44
GFR NON-AFRICAN AMERICAN: 37
GLUCOSE BLD-MCNC: 83 MG/DL (ref 70–99)
HCT VFR BLD CALC: 40.2 % (ref 36–48)
HEMOGLOBIN: 13.1 G/DL (ref 12–16)
MCH RBC QN AUTO: 30 PG (ref 26–34)
MCHC RBC AUTO-ENTMCNC: 32.6 G/DL (ref 31–36)
MCV RBC AUTO: 92.2 FL (ref 80–100)
PDW BLD-RTO: 15.5 % (ref 12.4–15.4)
PLATELET # BLD: 202 K/UL (ref 135–450)
PMV BLD AUTO: 9.1 FL (ref 5–10.5)
POTASSIUM SERPL-SCNC: 4 MMOL/L (ref 3.5–5.1)
RBC # BLD: 4.36 M/UL (ref 4–5.2)
SODIUM BLD-SCNC: 140 MMOL/L (ref 136–145)
WBC # BLD: 8.9 K/UL (ref 4–11)

## 2022-05-26 PROCEDURE — 93005 ELECTROCARDIOGRAM TRACING: CPT | Performed by: INTERNAL MEDICINE

## 2022-05-26 PROCEDURE — C1894 INTRO/SHEATH, NON-LASER: HCPCS

## 2022-05-26 PROCEDURE — 36415 COLL VENOUS BLD VENIPUNCTURE: CPT

## 2022-05-26 PROCEDURE — 2580000003 HC RX 258: Performed by: INTERNAL MEDICINE

## 2022-05-26 PROCEDURE — 6360000002 HC RX W HCPCS

## 2022-05-26 PROCEDURE — 6370000000 HC RX 637 (ALT 250 FOR IP): Performed by: NURSE PRACTITIONER

## 2022-05-26 PROCEDURE — 33286 RMVL SUBQ CAR RHYTHM MNTR: CPT | Performed by: INTERNAL MEDICINE

## 2022-05-26 PROCEDURE — 99152 MOD SED SAME PHYS/QHP 5/>YRS: CPT | Performed by: INTERNAL MEDICINE

## 2022-05-26 PROCEDURE — 80048 BASIC METABOLIC PNL TOTAL CA: CPT

## 2022-05-26 PROCEDURE — 85027 COMPLETE CBC AUTOMATED: CPT

## 2022-05-26 PROCEDURE — 99153 MOD SED SAME PHYS/QHP EA: CPT

## 2022-05-26 PROCEDURE — C1785 PMKR, DUAL, RATE-RESP: HCPCS

## 2022-05-26 PROCEDURE — 6370000000 HC RX 637 (ALT 250 FOR IP): Performed by: INTERNAL MEDICINE

## 2022-05-26 PROCEDURE — 99152 MOD SED SAME PHYS/QHP 5/>YRS: CPT

## 2022-05-26 PROCEDURE — C1898 LEAD, PMKR, OTHER THAN TRANS: HCPCS

## 2022-05-26 PROCEDURE — 33208 INSRT HEART PM ATRIAL & VENT: CPT

## 2022-05-26 PROCEDURE — C1889 IMPLANT/INSERT DEVICE, NOC: HCPCS

## 2022-05-26 PROCEDURE — 33286 RMVL SUBQ CAR RHYTHM MNTR: CPT

## 2022-05-26 PROCEDURE — 2580000003 HC RX 258

## 2022-05-26 PROCEDURE — 33208 INSRT HEART PM ATRIAL & VENT: CPT | Performed by: INTERNAL MEDICINE

## 2022-05-26 PROCEDURE — 2500000003 HC RX 250 WO HCPCS

## 2022-05-26 RX ORDER — SODIUM CHLORIDE 0.9 % (FLUSH) 0.9 %
5-40 SYRINGE (ML) INJECTION EVERY 12 HOURS SCHEDULED
Status: DISCONTINUED | OUTPATIENT
Start: 2022-05-26 | End: 2022-05-27 | Stop reason: HOSPADM

## 2022-05-26 RX ORDER — CLOPIDOGREL BISULFATE 75 MG/1
75 TABLET ORAL DAILY
Status: DISCONTINUED | OUTPATIENT
Start: 2022-05-27 | End: 2022-05-27 | Stop reason: HOSPADM

## 2022-05-26 RX ORDER — SODIUM CHLORIDE 0.9 % (FLUSH) 0.9 %
5-40 SYRINGE (ML) INJECTION PRN
Status: DISCONTINUED | OUTPATIENT
Start: 2022-05-26 | End: 2022-05-27 | Stop reason: HOSPADM

## 2022-05-26 RX ORDER — ACETAMINOPHEN 325 MG/1
650 TABLET ORAL EVERY 6 HOURS PRN
Status: DISCONTINUED | OUTPATIENT
Start: 2022-05-26 | End: 2022-05-27 | Stop reason: HOSPADM

## 2022-05-26 RX ORDER — DICYCLOMINE HYDROCHLORIDE 10 MG/1
10 CAPSULE ORAL 3 TIMES DAILY
Status: DISCONTINUED | OUTPATIENT
Start: 2022-05-26 | End: 2022-05-27 | Stop reason: HOSPADM

## 2022-05-26 RX ORDER — SODIUM CHLORIDE 9 MG/ML
INJECTION, SOLUTION INTRAVENOUS PRN
Status: DISCONTINUED | OUTPATIENT
Start: 2022-05-26 | End: 2022-05-27 | Stop reason: HOSPADM

## 2022-05-26 RX ORDER — ATORVASTATIN CALCIUM 10 MG/1
10 TABLET, FILM COATED ORAL DAILY
Status: DISCONTINUED | OUTPATIENT
Start: 2022-05-26 | End: 2022-05-27 | Stop reason: HOSPADM

## 2022-05-26 RX ORDER — LOSARTAN POTASSIUM 100 MG/1
100 TABLET ORAL DAILY
Status: DISCONTINUED | OUTPATIENT
Start: 2022-05-26 | End: 2022-05-27 | Stop reason: HOSPADM

## 2022-05-26 RX ORDER — CELECOXIB 200 MG/1
200 CAPSULE ORAL DAILY
Status: DISCONTINUED | OUTPATIENT
Start: 2022-05-26 | End: 2022-05-27 | Stop reason: HOSPADM

## 2022-05-26 RX ORDER — HYDROCHLOROTHIAZIDE 25 MG/1
25 TABLET ORAL DAILY
Status: DISCONTINUED | OUTPATIENT
Start: 2022-05-26 | End: 2022-05-27 | Stop reason: HOSPADM

## 2022-05-26 RX ORDER — LOSARTAN POTASSIUM AND HYDROCHLOROTHIAZIDE 25; 100 MG/1; MG/1
1 TABLET ORAL DAILY
Status: DISCONTINUED | OUTPATIENT
Start: 2022-05-26 | End: 2022-05-26 | Stop reason: SDUPTHER

## 2022-05-26 RX ADMIN — HYDROCHLOROTHIAZIDE 25 MG: 25 TABLET ORAL at 18:30

## 2022-05-26 RX ADMIN — LOSARTAN POTASSIUM 100 MG: 100 TABLET, FILM COATED ORAL at 18:30

## 2022-05-26 RX ADMIN — Medication 10 ML: at 20:50

## 2022-05-26 RX ADMIN — CELECOXIB 200 MG: 200 CAPSULE ORAL at 18:30

## 2022-05-26 RX ADMIN — ATORVASTATIN CALCIUM 10 MG: 10 TABLET, FILM COATED ORAL at 18:30

## 2022-05-26 RX ADMIN — ACETAMINOPHEN 650 MG: 325 TABLET ORAL at 20:50

## 2022-05-26 ASSESSMENT — PAIN SCALES - GENERAL: PAINLEVEL_OUTOF10: 6

## 2022-05-26 NOTE — PROCEDURES
[address]     Electrophysiology Procedure Note       Date of Procedure: 5/26/2022  Patient's Name: Mandy House  YOB: 1950   Medical Record Number: [id number]  Referring Physician: Yordy Killian MD  Procedure Performed by: Yordy Killian MD    Procedures performed:     · Insertion of MRI compatible right ventricular pacing lead under fluoroscopy. · Insertion of MRI compatible right atrial lead under fluoroscopy  · Insertion of a MRI compatible dual chamber Pacemaker  · IV sedation. · Sedation start time: 15:53 PM  · Sedation stop time: 17:00 PM  · Fentanyl: 275 Mcg  · Versed: 6  Mg  · An independent trained nursing staff gave the medication under my supervision. · Programming and analysis of dual chamber pacemaker. Indication of the procedure: Non-reversible symptomatic bradycardia, sinus node dysfunction, syncope     Details of procedure: The patient was brought to the electrophysiology laboratory in stable condition. The patient was in a fasting and non-sedated state. The risks, benefits and alternatives of the procedure were discussed with the patient. The risks including, but not limited to, the risks of vascular injury, bleeding, infection, device malfunction, lead dislodgement, radiation exposure, injury to cardiac and surrounding structures (including pneumothorax), stroke, myocardial infarction and death were discussed in detail. Patient opted to proceed with the device implantation. Written informed consent was signed and placed in the chart. Prophylactic antibiotic was given. The patient was prepped and draped in a sterile fashion. A timeout protocol was completed to identify the patient and the procedure being performed. IV sedation was provided with IV Versed, Fentanyl. An incision was made in the left pectoral area after administration of lidocaine. Using electrocautery and blunt dissection, a pocket was created.  Central venous access into the left axillary vein was obtained using the modified Seldinger technique. After central venous access was obtained, a sheath was placed in the axillary vein. A right ventricular pacing lead was advanced into position on the septum under fluoroscopic guidance and using a series of curved stylets. The lead was actively fixated. After confirming appropriate function, the sheath was split and removed. The lead was secured to the underlying tissue using suture material. A new sheath was advanced over a second previously placed wire into the vein. The atrial lead was advanced to the right atrial appendage under fluoroscopic guidance. The lead was actively fixated. After confirming appropriate function, the sheath was split and removed. The lead was secured to the underlying tissue using suture. The pocket was irrigated with an antibiotic solution. The leads were then connected to the new pulse generator, dual chamber pacemaker, which was then placed into the cleaned pocket. A dual chamber pacemaker was implanted for non-reversible symptomatic bradycardia due to sinus node dysfunction, the need for atrial pacing. The pulse generator was sutured inside the pocket. The pocket was then closed in three separate subcutaneous layers using 2-0 & 3-0 vicryl and subcuticular layer using 4-0 vicryl. The skin was covered with pressure dressing. We also used TYRX to prevent pocket infection. All sponge and needle counts were reported as correct at the end of the procedure. The patient tolerated the procedure well and there were no complications. Post-sedation evaluation was completed. Patient was transported to the holding area in stable condition. EBL less than 20 ml. Lead and device information:     Plan:   The patient will be observed  and have usual post-implant care, including chest x-ray, and antibiotic therapy and interrogation of the device.

## 2022-05-26 NOTE — FLOWSHEET NOTE
05/26/22 1730   Vital Signs   Temp 97.4 °F (36.3 °C)   Temp Source Oral   Heart Rate 71   Heart Rate Source Monitor   Resp 18   BP (!) 146/76   BP Location Right upper arm   MAP (Calculated) 99.33   Patient Position Semi fowlers   Level of Consciousness Alert (0)   MEWS Score 1   Oxygen Therapy   SpO2 93 %   O2 Device None (Room air)   pt from the cath lab. Site to left chest and mid chest CDI. Pt awake and alert.

## 2022-05-26 NOTE — PROCEDURES
[facility], [place], operated by [facility]     Electrophysiology Procedure Note       Date of Procedure: 5/26/2022  Patient's Name: Yazan Griggs  YOB: 1950   Medical Record Number: [id number]  Referring Physician: Nurys Morales MD  Procedure Performed by: Nurys Morales MD    Procedure performed:  · Removal of implantable loop recorder     Indications for procedure:  ILR Battery depleted  Karen Vargasdix 74 y.o. female with PMH sinus node dysfunction, s/p ILR in the past, with battery depleted. Loop recorder removal:   The patient was in the electrophysiology laboratory in stable condition. The patient was in a fasting and non-sedated state. The risks, benefits and alternatives of the procedure were discussed with the patient. Patient opted to proceed with the device implantation. Written informed consent was signed and placed in the chart. Prophylactic antibiotic was given. The patient was prepped and draped in a sterile fashion. A timeout protocol was completed to identify the patient and the procedure being performed. Local anesthesia was used. Chest was prepped and draped in the usual sterile fashion. After injection of 2% lidocaine, an incision was made over the previous scar and extended to the pocket using electrocautery and blunt dissection. The old loop recorder device was removed. Dermabond was used. The skin was covered with Steri-Strips and pressure dressing. The pocket was then closed in three separate subcutaneous layers using 2-0 & 3-0 Vicryl and subcuticular layer using 4-0 Vicryl. All sponge and needle counts were reported as correct at the end of the procedure. The patient tolerated the procedure well and there were no complications. The patient tolerated the procedure well and there were no complications. Post-sedation evaluation was completed. Patient was transported to the holding area in stable condition.      Conclusion:   Removal of implantable loop recorder

## 2022-05-26 NOTE — H&P
[facility] ([place]   Electrophysiology Follow up   Date: 5/26/2022  I had the privilege of visiting Mane Franco in the office. CC: Syncope   HPI: Gela Ervin is a 74 y.o. female non-obstructive CAD, HTN, HLD, PAD s/p multiple interventions and syncope. 7/24/15 - 8/22/15: 30 day MCOT from revealed a 3.6 second pause (asymptomatic) . Declined sleep study in the past.      S/p ILR implantation on 10/2/2015. Patient was las seen by me in office 06/18/2019. Since then ILR battery has been depleted. Abdominal aortogram with with LSFA 03/02/22 .     03/02/2022 - Presented to [facility] ED via EMS after she passed out. She felt dizzy and lightheaded and suddenly passed out. EMS reported HR in the 20s and gave atropine and reportedly she had multiple episodes of heart rates down to 30s in emergency room. Interval history : Arian Maria has intermittent dizzy spells and lightheadedness. No chest pain. She is here for implantation of pacemaker. Assessment and plan:   Symptomatic bradycardia, Sinus node dysfunction      - Holter monitoring 3/3/2022 - 4/1/2022 with multiple episodes of sinus pauses and marked bradycardia with junctional escape rhythm. - Syncopal episodes with HR reported in the 20s with hospitalization on 03/02/2022    - ILR at [facility]- implanted 10/02/2015      - Not on any BB therapy. - Treatment options including pacemaker implantation were discussed with patient. The risks, benefits and alternatives of the procedure were discussed with the patient. The risks including, but not limited to, the risks of bleeding, infection, pain, device malfunction, lead dislodgement, radiation exposure, injury to cardiac and surrounding structures (including pneumothorax), stroke, cardiac perforation, tamponade, need for emergent heart surgery, myocardial infarction and death were discussed in detail. Dual vs single chamber device, including risks and benefits were discussed with patient. The patient opted to proceed with the device implantation. Plan for dual chamber pacemaker impantation      CAD    - non -obstructive 2005    - continue statin, Plavix for PVC    -NM stress 10/08/2021- low risk scan      - Follows with     HTN  - Not well controlled   -BP goal <130/80  -Home BP monitoring encouraged, printed information provided on how to accurately measure BP at home.  -Counseled to follow a low salt diet to assure blood pressure remains controlled for cardiovascular risk factor modification.   -The patient is counseled to get regular exercise 3-5 times per week and maintain a healthy weight reduce cardiovascular risk factors.      - continue Hyzaar      PAD   - LSFA stenting 03/02/2022   - continue plavix   - Follows with Dr. Bonnie Munguia      Patient Active Problem List    Diagnosis Date Noted    Atherosclerosis of native arteries of extremities with intermittent claudication, bilateral legs ([address]     Nontraumatic complete tear of left rotator cuff 08/05/2020    Spinal stenosis of lumbar region with neurogenic claudication 09/17/2019    Seasonal allergic rhinitis due to pollen 05/19/2017    Partial seizure ([address]     HTN (hypertension), benign     Coronary artery disease involving native coronary artery of native heart with angina pectoris ([address]     Sick sinus syndrome ([address] 04/11/2017    Syncope and collapse     Urinary tract infection without hematuria     Psoriasis 06/13/2016    Right shoulder pain 06/01/2016    Adhesive capsulitis of right shoulder 05/13/2016    Status post total left knee replacement 03/11/2016    Encounter for loop recorder check 10/09/2015    Sinus pause 09/22/2015    Atherosclerosis of native arteries of extremities with intermittent claudication, left leg ([address]     PAD (peripheral artery disease) ([address] 10/03/2014    Diarrhea 06/20/2014    Fibrocystic disease of breast 11/25/2013    Abnormal mammogram 10/31/2013    CKD (chronic kidney disease) stage 3, GFR 30-59 ml/min (HCC) 09/03/2013    Hyperglycemia 01/15/2013    Pure hypercholesterolemia 01/15/2013    Lumbago 01/15/2013    Osteoarthritis 01/15/2013     Diagnostic studies:   ECG 4/19/22   Sinus Rhythm   445 QTcH,  116 QRS    Echo 03/03/2022      1. Left ventricle: The cavity size was normal. Wall thickness was     normal. Systolic function was normal. The estimated ejection     fraction was in the range of 55% to 60%. Wall motion was normal;     there were no regional wall motion abnormalities. Doppler     parameters are consistent with abnormal left ventricular     relaxation (grade 1 diastolic dysfunction). 2. Right ventricle: The cavity size was normal. Wall thickness was     normal. Systolic function was normal.  3. Pulmonary arteries: Estimated PA peak pressure is 20mm Hg (S). 4. Pericardium, extracardiac: There was no pericardial effusion. NM stress 10/08/2021   Summary    There is a small inferior apical defect c/w bowel artifact.   [place] is no ischemia.    LV function is normal with EF=59%    Low risk        Recommendation    She may proceed with surgery at low cardiac risk    Note sent       I independently reviewed the cardiac diagnostic studies, ECG and relevant imaging studies. Lab Results   Component Value Date    LVEF 58 03/03/2022     No results found for: TSHFT4, TSH      Physical Examination:  Vitals:    05/26/22 1245   BP: (!) 144/72   Pulse: 75   Resp: 16   SpO2: 98%      Wt Readings from Last 3 Encounters:   05/26/22 162 lb (73.5 kg)   05/03/22 162 lb (73.5 kg)   04/19/22 161 lb (73 kg)       · Constitutional: Oriented. No distress. · Head: Normocephalic and atraumatic. · Mouth/Throat: Oropharynx is clear and moist.   · Eyes: Conjunctivae normal. EOM are normal.   · Neck: Neck supple. No JVD present. · Cardiovascular: Normal rate, regular rhythm, S1&S2. · Pulmonary/Chest: Bilateral respiratory sounds. No rhonchi. · Abdominal: Soft. No tenderness. · Musculoskeletal: No tenderness. No edema    · Lymphadenopathy: Has no cervical adenopathy. · Neurological: Alert and oriented. Follows command, No Gross deficit   · Skin: Skin is warm, No rash noted. · Psychiatric: Has a normal behavior     Review of System:  [x] Full ROS obtained and negative except as mentioned in HPI    Prior to Admission medications    Medication Sig Start Date End Date Taking? Authorizing Provider   celecoxib (CELEBREX) 200 MG capsule TAKE 1 CAPSULE BY MOUTH DAILY 4/5/22   Magdalena Olguin MD   dicyclomine (BENTYL) 10 MG capsule Take 1 capsule by mouth three times daily 12/21/21   Kathline Counter, APRN - NP   atorvastatin (LIPITOR) 10 MG tablet TAKE 1 TABLET BY MOUTH DAILY 11/9/21   Magdalena Olguin MD   clopidogrel (PLAVIX) 75 MG tablet TAKE 1 TABLET BY MOUTH DAILY *NEED TO ESTABLISH WITH A NEW PROVIDER FOR FUTURE REFILLS* 11/9/21   Magdalena Olguin MD   losartan-hydroCHLOROthiazide [facility]) 100-25 MG per tablet TAKE 1 TABLET BY MOUTH DAILY 11/9/21   Magdalena Olguin MD       Allergies   Allergen Reactions    Ace Inhibitors Other (See Comments)     Cough     Chantix [Varenicline]      depression    Keflex [Cephalexin] Diarrhea    Morphine      Causes mental impairment,  N&V    Niaspan [Niacin Er] Other (See Comments)     Skin irritation    Pletal [Cilostazol]      Headache and diarrhea    Pravastatin        Social History:  Reviewed. reports that she has been smoking cigarettes. She has a 20.00 pack-year smoking history. She has never used smokeless tobacco. She reports that she does not drink alcohol and does not use drugs. Family History:  Reviewed. Reviewed. No family history of SCD. Relevant and available labs, and cardiovascular diagnostics reviewed. Reviewed. I independently reviewed relevant and available cardiac diagnostic tests ECG, CXR, Echo, Stress test, Device interrogation, Holter, CT scan.    Outside medical records via Care everywhere reviewed and summarized in H&P above. Complex medical condition with multiple medical problems affecting prognosis and outcome of EP interventions      All questions and concerns were addressed to the patient/family. Alternatives to my treatment were discussed. I have discussed the above stated plan and the patient verbalized understanding and agreed with the plan. NOTE: This report was transcribed using voice recognition software. Every effort was made to ensure accuracy, however, inadvertent computerized transcription errors may be present. Anderson Sarah MD, MPH  [facility]   Office: [phone]  Fax: [phone]    . H&P Update    I have reviewed the history and physical and examined the patient and updated with relevant changes. Consent: I have discussed with the patient and/or the patient representative the indication, alternatives, and the possible risks and/or complications of the planned procedure and the anesthesia methods. The patient and/or patient representative appear to understand and agree to proceed. Vitals:    05/26/22 1245   BP: (!) 144/72   Pulse: 75   Resp: 16   SpO2: 98%     Prior to Admission medications    Medication Sig Start Date End Date Taking?  Authorizing Provider   celecoxib (CELEBREX) 200 MG capsule TAKE 1 CAPSULE BY MOUTH DAILY 4/5/22   Molly Etienne MD   dicyclomine (BENTYL) 10 MG capsule Take 1 capsule by mouth three times daily 12/21/21   JIM Bartholomew - NP   atorvastatin (LIPITOR) 10 MG tablet TAKE 1 TABLET BY MOUTH DAILY 11/9/21   Rhoda Mata MD   clopidogrel (PLAVIX) 75 MG tablet TAKE 1 TABLET BY MOUTH DAILY *NEED TO ESTABLISH WITH A NEW PROVIDER FOR FUTURE REFILLS* 11/9/21   Rhoda Mata MD   losartan-hydroCHLOROthiazide [facility]) 100-25 MG per tablet TAKE 1 TABLET BY MOUTH DAILY 11/9/21   Rhoda Mata MD     Past Medical History:   Diagnosis Date  CAD (coronary artery disease)     NONOBSTRUCTING    CKD (chronic kidney disease) stage 3, GFR 30-59 ml/min ([facility])     Lumbago     Osteoarthrosis, unspecified whether generalized or localized, unspecified site     Other abnormal blood chemistry     PAD (peripheral artery disease) ([facility])     Pure hypercholesterolemia     Risk for falls 2/2/16    S/p Left TKR    Spinal stenosis     Unspecified essential hypertension      Past Surgical History:   Procedure Laterality Date    CHOLECYSTECTOMY      COLONOSCOPY      INSERTABLE CARDIAC MONITOR  09/2015    JOINT REPLACEMENT Left 20015    knee replacement    KNEE SURGERY      RT    LEG SURGERY      2 stents placed in right leg, 1 stent and balloon in left leg    ROTATOR CUFF REPAIR      SHOULDER ARTHROSCOPY Left 8/21/2020    LEFT SHOULDER ARTHROSCOPY, SUBACROMINAL DECOMPRESSION, DISTAL CLAVICLE EXCISION,DEBRIDEMENT, ROTATOR CUFF REPAIR, AUGMENTATION performed by Sangita Ramirez DO at [facility] OR    TOTAL KNEE ARTHROPLASTY Left 1/12/16    Dr Jaimes Re       Allergies   Allergen Reactions    Ace Inhibitors Other (See Comments)     Cough     Chantix [Varenicline]      depression    Keflex [Cephalexin] Diarrhea    Morphine      Causes mental impairment,  N&V    Niaspan [Niacin Er] Other (See Comments)     Skin irritation    Pletal [Cilostazol]      Headache and diarrhea    Pravastatin        Pre-Sedation Documentation and Exam:   I have personally completed a history, physical exam & review of systems for this patient (see notes).     Mallampati Airway Assessment:  Class II     Prior History of Anesthesia Complications:   None    ASA Classification:  Class 3 - A patient with severe systemic disease that limits activity but is not incapacitating    Sedation/ Anesthesia Plan:   Intravenous sedation    Medications Planned:   Midazolam (Versed) and Fentanyl intravenously    Patient is an appropriate candidate for plan of sedation: Yes    Electronically signed by Americo Person MD on 5/26/2022 at 3:30 PM

## 2022-05-27 ENCOUNTER — NURSE ONLY (OUTPATIENT)
Dept: CARDIOLOGY CLINIC | Age: 72
End: 2022-05-27
Payer: COMMERCIAL

## 2022-05-27 ENCOUNTER — APPOINTMENT (OUTPATIENT)
Dept: GENERAL RADIOLOGY | Age: 72
End: 2022-05-27
Attending: INTERNAL MEDICINE
Payer: COMMERCIAL

## 2022-05-27 VITALS
HEART RATE: 61 BPM | DIASTOLIC BLOOD PRESSURE: 93 MMHG | RESPIRATION RATE: 18 BRPM | WEIGHT: 164 LBS | HEIGHT: 61 IN | BODY MASS INDEX: 30.96 KG/M2 | OXYGEN SATURATION: 98 % | TEMPERATURE: 97.5 F | SYSTOLIC BLOOD PRESSURE: 154 MMHG

## 2022-05-27 DIAGNOSIS — R00.1 SYMPTOMATIC BRADYCARDIA: ICD-10-CM

## 2022-05-27 DIAGNOSIS — Z95.0 PACEMAKER: ICD-10-CM

## 2022-05-27 DIAGNOSIS — R55 SYNCOPE AND COLLAPSE: ICD-10-CM

## 2022-05-27 DIAGNOSIS — I49.5 SINUS NODE DYSFUNCTION (HCC): ICD-10-CM

## 2022-05-27 DIAGNOSIS — I49.5 SICK SINUS SYNDROME (HCC): ICD-10-CM

## 2022-05-27 LAB
EKG ATRIAL RATE: 72 BPM
EKG DIAGNOSIS: NORMAL
EKG P AXIS: 69 DEGREES
EKG P-R INTERVAL: 132 MS
EKG Q-T INTERVAL: 418 MS
EKG QRS DURATION: 110 MS
EKG QTC CALCULATION (BAZETT): 457 MS
EKG R AXIS: -20 DEGREES
EKG T AXIS: 49 DEGREES
EKG VENTRICULAR RATE: 72 BPM

## 2022-05-27 PROCEDURE — 6370000000 HC RX 637 (ALT 250 FOR IP): Performed by: NURSE PRACTITIONER

## 2022-05-27 PROCEDURE — 71046 X-RAY EXAM CHEST 2 VIEWS: CPT

## 2022-05-27 PROCEDURE — 6370000000 HC RX 637 (ALT 250 FOR IP): Performed by: INTERNAL MEDICINE

## 2022-05-27 PROCEDURE — 2580000003 HC RX 258: Performed by: INTERNAL MEDICINE

## 2022-05-27 PROCEDURE — 93010 ELECTROCARDIOGRAM REPORT: CPT | Performed by: INTERNAL MEDICINE

## 2022-05-27 RX ADMIN — HYDROCHLOROTHIAZIDE 25 MG: 25 TABLET ORAL at 08:17

## 2022-05-27 RX ADMIN — ATORVASTATIN CALCIUM 10 MG: 10 TABLET, FILM COATED ORAL at 08:17

## 2022-05-27 RX ADMIN — CELECOXIB 200 MG: 200 CAPSULE ORAL at 08:17

## 2022-05-27 RX ADMIN — CLOPIDOGREL BISULFATE 75 MG: 75 TABLET ORAL at 08:17

## 2022-05-27 RX ADMIN — LOSARTAN POTASSIUM 100 MG: 100 TABLET, FILM COATED ORAL at 08:17

## 2022-05-27 RX ADMIN — Medication 10 ML: at 08:18

## 2022-05-27 RX ADMIN — ACETAMINOPHEN 650 MG: 325 TABLET ORAL at 08:17

## 2022-05-27 ASSESSMENT — PAIN SCALES - GENERAL
PAINLEVEL_OUTOF10: 0
PAINLEVEL_OUTOF10: 4
PAINLEVEL_OUTOF10: 0

## 2022-05-27 ASSESSMENT — PAIN DESCRIPTION - LOCATION: LOCATION: CHEST

## 2022-05-27 ASSESSMENT — PAIN DESCRIPTION - ORIENTATION: ORIENTATION: LEFT

## 2022-05-27 ASSESSMENT — PAIN DESCRIPTION - PAIN TYPE: TYPE: SURGICAL PAIN

## 2022-05-27 NOTE — PROGRESS NOTES
Data- discharge order received, pt verbalized agreement to discharge, disposition to previous residence, no needs for HHC/DME. Action- discharge instructions prepared and given to pt, pt verbalized understanding. Medication information packet given r/t NEW and/or CHANGED prescriptions emphasizing name/purpose/side effects, pt verbalized understanding. Discharge instruction summary: Diet- regular, Activity- per post pacemaker dc instructions, Primary Care Physician as follows: Emilia Rios -[phone] f/u appointment in 1 week, immunizations reviewed and n/a, prescription medications filled n/a. Inpatient surgical procedure precautions reviewed: per post pacemaker dc instructions,CHF Education reviewed. Pt/ Family has had a total of 60 minutes CHF education this admission encounter. 1. WEIGHT: Admit Weight: 162 lb (73.5 kg) (05/26/22 1245)        Today  Weight: 164 lb (74.4 kg) (05/27/22 0017)       2. O2 SAT.: SpO2: 98 % (05/27/22 0800)    Response- Pt belongings gathered, IV removed. Disposition is home (no HHC/DME needs), transported with son, taken to lobby via w/c w/ PCA, no complications.

## 2022-05-27 NOTE — DISCHARGE SUMMARY
[address]    DISCHARGE SUMMARY      Patient ID:  Mirtha Garcia  [id number] 61 y.o. 1950    Admit date: 5/26/2022    Discharge date:  5/27/2022   Admitting Physician: Sydney Manzano MD     Discharge Physician: Sydney Manzano MD     Admission Diagnoses: Sinus node dysfunction     Discharge Diagnoses:   Patient Active Problem List   Diagnosis    Hyperglycemia    Pure hypercholesterolemia    Lumbago    Osteoarthritis    CKD (chronic kidney disease) stage 3, GFR 30-59 ml/min (HCC)    Abnormal mammogram    Fibrocystic disease of breast    Diarrhea    PAD (peripheral artery disease) ([address]    Atherosclerosis of native arteries of extremities with intermittent claudication, left leg ([address]    Syncope    Sinus node dysfunction ([address]    Encounter for loop recorder at end of battery life    Status post total left knee replacement    Adhesive capsulitis of right shoulder    Right shoulder pain    Psoriasis    Sick sinus syndrome ([address]    Syncope and collapse    Urinary tract infection without hematuria    Partial seizure ([address]    HTN (hypertension), benign    Coronary artery disease involving native coronary artery of native heart with angina pectoris ([address]    Seasonal allergic rhinitis due to pollen    Spinal stenosis of lumbar region with neurogenic claudication    Nontraumatic complete tear of left rotator cuff    Atherosclerosis of native arteries of extremities with intermittent claudication, bilateral legs (HCC)    Symptomatic bradycardia        Discharged Condition: good    Hospital Course: Fausto Franco was admitted for implantation of pacemaker. She has history of sinus node dysfunction and symptomatic bradycardia. Underwent dual chamber PPM, Medtronic. CXR and interrogation of device normal after procedure. Follow up in EP office.      Labs:   Lab Results   Component Value Date    CREATININE 1.4 (H) 05/26/2022    BUN 27 (H) 05/26/2022     05/26/2022    K 4.0 05/26/2022     05/26/2022    CO2 21 05/26/2022      Lab Results   Component Value Date    WBC 8.9 05/26/2022    HGB 13.1 05/26/2022    HCT 40.2 05/26/2022    MCV 92.2 05/26/2022     05/26/2022      Lab Results   Component Value Date    INR 1.02 02/25/2022    PROTIME 11.5 02/25/2022    No results found for: BNP      Disposition: home    Patient Instructions:      Medication List      ASK your doctor about these medications    atorvastatin 10 MG tablet  Commonly known as: LIPITOR  TAKE 1 TABLET BY MOUTH DAILY     celecoxib 200 MG capsule  Commonly known as: CELEBREX  TAKE 1 CAPSULE BY MOUTH DAILY     clopidogrel 75 MG tablet  Commonly known as: PLAVIX  TAKE 1 TABLET BY MOUTH DAILY *NEED TO ESTABLISH WITH A NEW PROVIDER FOR FUTURE REFILLS*     dicyclomine 10 MG capsule  Commonly known as: Bentyl  Take 1 capsule by mouth three times daily     losartan-hydroCHLOROthiazide 100-25 MG per tablet  Commonly known as: HYZAAR  TAKE 1 TABLET BY MOUTH DAILY          Current Discharge Medication List      CONTINUE these medications which have NOT CHANGED    Details   celecoxib (CELEBREX) 200 MG capsule TAKE 1 CAPSULE BY MOUTH DAILY  Qty: 30 capsule, Refills: 3    Associated Diagnoses: Primary osteoarthritis of right knee      dicyclomine (BENTYL) 10 MG capsule Take 1 capsule by mouth three times daily  Qty: 30 capsule, Refills: 0    Associated Diagnoses: Diarrhea, unspecified type      atorvastatin (LIPITOR) 10 MG tablet TAKE 1 TABLET BY MOUTH DAILY  Qty: 90 tablet, Refills: 3    Associated Diagnoses: Mixed hyperlipidemia      clopidogrel (PLAVIX) 75 MG tablet TAKE 1 TABLET BY MOUTH DAILY *NEED TO ESTABLISH WITH A NEW PROVIDER FOR FUTURE REFILLS*  Qty: 90 tablet, Refills: 3    Associated Diagnoses: CAD in native artery      losartan-hydroCHLOROthiazide (HYZAAR) 100-25 MG per tablet TAKE 1 TABLET BY MOUTH DAILY  Qty: 90 tablet, Refills: 3             Follow-up with one week in device clinic.      Signed:  Kolby Oliveira MD, 5/27/2022, 9:53 AM

## 2022-05-27 NOTE — CARE COORDINATION
Patient admitted as OPIB with an anticipated short hospitalization length of stay. Chart reviewed and it appears that patient has minimal needs for discharge at this time. Discussed with patients nurse and requested that case management be notified if discharge needs are identified. *Case management will continue to follow progress and update discharge plan as needed.   Ruby Khan RN

## 2022-05-27 NOTE — PROGRESS NOTES
Post procedure site check completed as patient sitting on side of bed drinking morning coffee. Swath in place waiting to go for chest xray and for medtronic post check. Surgical sites left subclavien pacer insertion site and mid chest loop removal site are within normal limits and appear to be free of complications. All concerns were reviewed and questions answered. Patient verbalized understanding.

## 2022-05-31 DIAGNOSIS — R00.1 SYMPTOMATIC BRADYCARDIA: ICD-10-CM

## 2022-05-31 DIAGNOSIS — R55 SYNCOPE AND COLLAPSE: ICD-10-CM

## 2022-05-31 DIAGNOSIS — Z95.0 PACEMAKER: Primary | ICD-10-CM

## 2022-05-31 DIAGNOSIS — I49.5 SINUS NODE DYSFUNCTION (HCC): ICD-10-CM

## 2022-05-31 NOTE — PROGRESS NOTES
[facility] Post Op Implant/PDE Device Check  Rep Checked Device   Device shows normal function  No parameters have been changed during the current session.

## 2022-06-02 ENCOUNTER — OFFICE VISIT (OUTPATIENT)
Dept: FAMILY MEDICINE CLINIC | Age: 72
End: 2022-06-02
Payer: COMMERCIAL

## 2022-06-02 ENCOUNTER — NURSE ONLY (OUTPATIENT)
Dept: CARDIOLOGY CLINIC | Age: 72
End: 2022-06-02
Payer: COMMERCIAL

## 2022-06-02 ENCOUNTER — OFFICE VISIT (OUTPATIENT)
Dept: PULMONOLOGY | Age: 72
End: 2022-06-02
Payer: COMMERCIAL

## 2022-06-02 ENCOUNTER — TELEPHONE (OUTPATIENT)
Dept: PULMONOLOGY | Age: 72
End: 2022-06-02

## 2022-06-02 VITALS
HEART RATE: 77 BPM | SYSTOLIC BLOOD PRESSURE: 146 MMHG | OXYGEN SATURATION: 97 % | TEMPERATURE: 97.1 F | BODY MASS INDEX: 30.23 KG/M2 | WEIGHT: 160 LBS | DIASTOLIC BLOOD PRESSURE: 78 MMHG

## 2022-06-02 VITALS — BODY MASS INDEX: 30.23 KG/M2 | OXYGEN SATURATION: 100 % | HEART RATE: 82 BPM | WEIGHT: 160 LBS

## 2022-06-02 DIAGNOSIS — Z95.0 PACEMAKER: ICD-10-CM

## 2022-06-02 DIAGNOSIS — R00.1 SYMPTOMATIC BRADYCARDIA: ICD-10-CM

## 2022-06-02 DIAGNOSIS — I10 HTN (HYPERTENSION), BENIGN: ICD-10-CM

## 2022-06-02 DIAGNOSIS — I70.213 ATHEROSCLEROSIS OF NATIVE ARTERIES OF EXTREMITIES WITH INTERMITTENT CLAUDICATION, BILATERAL LEGS (HCC): ICD-10-CM

## 2022-06-02 DIAGNOSIS — N18.32 STAGE 3B CHRONIC KIDNEY DISEASE (HCC): ICD-10-CM

## 2022-06-02 DIAGNOSIS — I73.9 PAD (PERIPHERAL ARTERY DISEASE) (HCC): ICD-10-CM

## 2022-06-02 DIAGNOSIS — E78.00 PURE HYPERCHOLESTEROLEMIA: Primary | ICD-10-CM

## 2022-06-02 DIAGNOSIS — I49.5 SINUS NODE DYSFUNCTION (HCC): ICD-10-CM

## 2022-06-02 DIAGNOSIS — K11.8 NODULE OF PAROTID GLAND: ICD-10-CM

## 2022-06-02 DIAGNOSIS — I49.5 SICK SINUS SYNDROME (HCC): ICD-10-CM

## 2022-06-02 DIAGNOSIS — B37.31 CANDIDA VAGINITIS: ICD-10-CM

## 2022-06-02 DIAGNOSIS — R55 SYNCOPE AND COLLAPSE: ICD-10-CM

## 2022-06-02 DIAGNOSIS — K11.20 PAROTITIS: ICD-10-CM

## 2022-06-02 DIAGNOSIS — R91.1 NODULE OF UPPER LOBE OF LEFT LUNG: Primary | ICD-10-CM

## 2022-06-02 DIAGNOSIS — F17.200 CURRENT EVERY DAY SMOKER: ICD-10-CM

## 2022-06-02 DIAGNOSIS — M48.062 SPINAL STENOSIS OF LUMBAR REGION WITH NEUROGENIC CLAUDICATION: ICD-10-CM

## 2022-06-02 DIAGNOSIS — L55.9 SUNBURN: ICD-10-CM

## 2022-06-02 DIAGNOSIS — R91.1 PULMONARY NODULE: ICD-10-CM

## 2022-06-02 PROCEDURE — 99204 OFFICE O/P NEW MOD 45 MIN: CPT | Performed by: INTERNAL MEDICINE

## 2022-06-02 PROCEDURE — 1123F ACP DISCUSS/DSCN MKR DOCD: CPT | Performed by: INTERNAL MEDICINE

## 2022-06-02 PROCEDURE — 99214 OFFICE O/P EST MOD 30 MIN: CPT | Performed by: FAMILY MEDICINE

## 2022-06-02 PROCEDURE — 1123F ACP DISCUSS/DSCN MKR DOCD: CPT | Performed by: FAMILY MEDICINE

## 2022-06-02 PROCEDURE — 93280 PM DEVICE PROGR EVAL DUAL: CPT | Performed by: INTERNAL MEDICINE

## 2022-06-02 RX ORDER — NICOTINE 21 MG/24HR
1 PATCH, TRANSDERMAL 24 HOURS TRANSDERMAL DAILY
Qty: 42 PATCH | Refills: 0 | Status: SHIPPED | OUTPATIENT
Start: 2022-06-02 | End: 2022-07-29

## 2022-06-02 RX ORDER — FLUCONAZOLE 150 MG/1
150 TABLET ORAL
Qty: 2 TABLET | Refills: 0 | Status: SHIPPED | OUTPATIENT
Start: 2022-06-02 | End: 2022-10-13

## 2022-06-02 ASSESSMENT — PATIENT HEALTH QUESTIONNAIRE - PHQ9
SUM OF ALL RESPONSES TO PHQ9 QUESTIONS 1 & 2: 0
1. LITTLE INTEREST OR PLEASURE IN DOING THINGS: 0
SUM OF ALL RESPONSES TO PHQ QUESTIONS 1-9: 0
2. FEELING DOWN, DEPRESSED OR HOPELESS: 0

## 2022-06-02 ASSESSMENT — ENCOUNTER SYMPTOMS
CONSTIPATION: 0
CHEST TIGHTNESS: 0
SHORTNESS OF BREATH: 0
BACK PAIN: 1
DIARRHEA: 1
RHINORRHEA: 0

## 2022-06-02 NOTE — TELEPHONE ENCOUNTER
Called Dr Raoul Arvizu office to make sure it would be ok for pt to hold Plavix prior to upcoming biopsy Told it would be fine because it has been > then 30 days of initial start up of the medication Informed Dr Willie Lira and Radiology Dept of this.

## 2022-06-02 NOTE — PROGRESS NOTES
Patient comes in for programming evaluation for her pacemaker. All sensing and pacing parameters are within normal range. S/p mdtlinq explant and mdtdcppm on 5/26/2022  Battery life 14.9 years  AP 8.5%.  <0.1%. No episodes noted. Patients incisions is healing nicely. Out side bandages removed today. Wounds clean, dry and intact. Patient educated on wound care. All questions answered. Patient to call the office immediately with any signs on infection. No changes need to be made at this time. Please see interrogation for more detail. Optivol is within normal range. Patient will follow up in 3 months in office or remotely. Home Monitor connected and transmitting.

## 2022-06-02 NOTE — PROGRESS NOTES
SUBJECTIVE:    Gela Ervin is a 74 y.o. female who presents for a follow up visit. Chief Complaint   Patient presents with    Follow-up      Unable to do mammogram currently due to recent pacemaker placement. Complaint of sunburn to the left foot. Finished recent course of antibiotics and has vaginal itching and would like something for yeast infection. Hypertension  This is a chronic problem. The current episode started more than 1 year ago. The problem is controlled. Pertinent negatives include no chest pain, palpitations or shortness of breath. Risk factors for coronary artery disease include obesity, sedentary lifestyle, post-menopausal state and dyslipidemia. Past treatments include angiotensin blockers and diuretics. The current treatment provides significant improvement. Compliance problems include exercise and diet. Hypertensive end-organ damage includes CAD/MI. Coronary Artery Disease  Presents for follow-up visit. Pertinent negatives include no chest pain, chest pressure, chest tightness, dizziness, palpitations or shortness of breath. Risk factors include hyperlipidemia and obesity. The symptoms have been resolved. Compliance with diet is good. Compliance with exercise is variable. Compliance with medications is good. Hyperlipidemia  This is a chronic problem. The current episode started more than 1 year ago. The problem is controlled. Exacerbating diseases include obesity. Factors aggravating her hyperlipidemia include thiazides. Pertinent negatives include no chest pain or shortness of breath. Current antihyperlipidemic treatment includes statins. The current treatment provides significant improvement of lipids. Compliance problems include adherence to exercise and adherence to diet. Risk factors for coronary artery disease include dyslipidemia, obesity, a sedentary lifestyle and post-menopausal.   Back Pain  This is a chronic problem.  The current episode started more than 1 year ago. The pain is present in the lumbar spine. The quality of the pain is described as aching. The pain does not radiate. The pain is moderate. Pertinent negatives include no chest pain. Risk factors include obesity and sedentary lifestyle. She has tried NSAIDs for the symptoms. The treatment provided mild relief. Sick sinus syndrome  This is a recent problem. Patient recently had a pacemaker placed and is doing well status post pacemaker. Lung lesion  Patient pulmonary nodule noted and PET scan ordered. It appeared the left upper lobe nodule was increasing in size and was hypermetabolic consistent with possible cancer. She was referred to pulmonology and has an appointment with her pulmonologist this afternoon. Of note the PET scan also revealed lesion within the parotid gland that is suspicious. She was diagnosed with parotitis approximately 1 year ago and hopefully this explains the uptake in the parotid. Patient's medications, allergies, past medical,surgical, social and family histories were reviewed and updated as appropriate.      Past Medical History:   Diagnosis Date    CAD (coronary artery disease)     NONOBSTRUCTING    CKD (chronic kidney disease) stage 3, GFR 30-59 ml/min ([facility])     Lumbago     Osteoarthrosis, unspecified whether generalized or localized, unspecified site     Other abnormal blood chemistry     PAD (peripheral artery disease) ([facility])     Pure hypercholesterolemia     Risk for falls 2/2/16    S/p Left TKR    Spinal stenosis     Unspecified essential hypertension      Past Surgical History:   Procedure Laterality Date    CHOLECYSTECTOMY      COLONOSCOPY      INSERTABLE CARDIAC MONITOR  09/2015    JOINT REPLACEMENT Left 20015    knee replacement    KNEE SURGERY      RT    LEG SURGERY      2 stents placed in right leg, 1 stent and balloon in left leg    ROTATOR CUFF REPAIR      SHOULDER ARTHROSCOPY Left 8/21/2020    LEFT SHOULDER ARTHROSCOPY, SUBACROMINAL DECOMPRESSION, DISTAL CLAVICLE EXCISION,DEBRIDEMENT, ROTATOR CUFF REPAIR, AUGMENTATION performed by Kip Hassan DO at [facility] Left 16    Dr Go Leal History   Problem Relation Age of Onset   Cande Liu Cancer Mother     Heart Disease Father     Cancer Brother     Emphysema Brother     Stroke Brother     Diabetes Brother     Diabetes Sister     Heart Disease Brother     Hypertension Brother     Hypertension Sister     High Cholesterol Neg Hx     High Blood Pressure Neg Hx      Social History     Tobacco Use    Smoking status: Current Every Day Smoker     Packs/day: 0.50     Years: 40.00     Pack years: 20.00     Types: Cigarettes     Last attempt to quit: 2015     Years since quittin.9    Smokeless tobacco: Never Used    Tobacco comment: <1 pack of cigarettes per day   Substance Use Topics    Alcohol use: No     Alcohol/week: 0.0 standard drinks      Allergies   Allergen Reactions    Ace Inhibitors Other (See Comments)     Cough     Chantix [Varenicline]      depression    Keflex [Cephalexin] Diarrhea    Morphine      Causes mental impairment,  N&V    Niaspan [Niacin Er] Other (See Comments)     Skin irritation    Pletal [Cilostazol]      Headache and diarrhea    Pravastatin      Current Outpatient Medications on File Prior to Visit   Medication Sig Dispense Refill    celecoxib (CELEBREX) 200 MG capsule TAKE 1 CAPSULE BY MOUTH DAILY 30 capsule 3    dicyclomine (BENTYL) 10 MG capsule Take 1 capsule by mouth three times daily 30 capsule 0    atorvastatin (LIPITOR) 10 MG tablet TAKE 1 TABLET BY MOUTH DAILY 90 tablet 3    clopidogrel (PLAVIX) 75 MG tablet TAKE 1 TABLET BY MOUTH DAILY *NEED TO ESTABLISH WITH A NEW PROVIDER FOR FUTURE REFILLS* 90 tablet 3    losartan-hydroCHLOROthiazide (HYZAAR) 100-25 MG per tablet TAKE 1 TABLET BY MOUTH DAILY 90 tablet 3     No current facility-administered medications on file prior to visit. Review of Systems   Constitutional: Negative for activity change. HENT: Negative for congestion, postnasal drip and rhinorrhea. Respiratory: Negative for chest tightness and shortness of breath. Cardiovascular: Negative for chest pain and palpitations. Gastrointestinal: Positive for diarrhea. Negative for constipation. Genitourinary: Negative for difficulty urinating. Musculoskeletal: Positive for arthralgias and back pain ( LBP). Neurological: Negative for dizziness and light-headedness. Psychiatric/Behavioral: Negative for dysphoric mood and sleep disturbance. The patient is not nervous/anxious. OBJECTIVE:    BP (!) 146/78   Pulse 77   Temp 97.1 °F (36.2 °C)   Wt 160 lb (72.6 kg)   SpO2 97%   BMI 30.23 kg/m²    Vitals:    06/02/22 0923 06/02/22 0956   BP: (!) 144/80 (!) 146/78   Pulse: 77    Temp: 97.1 °F (36.2 °C)    SpO2: 97%    Weight: 160 lb (72.6 kg)        Physical Exam  Constitutional:       Appearance: She is well-developed. She is obese. HENT:      Head: Normocephalic and atraumatic. Right Ear: Tympanic membrane and external ear normal.      Left Ear: Tympanic membrane and external ear normal.      Nose: Nose normal.      Mouth/Throat:      Mouth: Mucous membranes are moist.      Pharynx: No posterior oropharyngeal erythema. Eyes:      General:         Right eye: No discharge. Conjunctiva/sclera: Conjunctivae normal.   Neck:      Thyroid: No thyromegaly. Vascular: No JVD. Trachea: No tracheal deviation. Cardiovascular:      Rate and Rhythm: Normal rate and regular rhythm. Heart sounds: Normal heart sounds. Pulmonary:      Effort: Pulmonary effort is normal. No respiratory distress. Breath sounds: Normal breath sounds. No rales. Musculoskeletal:      Cervical back: Normal range of motion and neck supple. Right lower leg: No edema. Left lower leg: No edema. Lymphadenopathy:      Cervical: No cervical adenopathy. Skin:     General: Skin is warm and dry. Neurological:      Mental Status: She is alert and oriented to person, place, and time. Psychiatric:         Mood and Affect: Mood normal.         Behavior: Behavior normal.         ASSESSMENT/PLAN:    Gerri Peck was seen today for follow-up. Diagnoses and all orders for this visit:      Stage 3b chronic kidney disease ([facility]  The most recent creatinine was 1.4. This appears stable. PAD (peripheral artery disease) ([facility])  Followed by Dr. Andrei Lowe    HTN (hypertension), benign  Blood pressure is elevated in the office today. Atherosclerosis of native arteries of extremities with intermittent claudication, bilateral legs ([facility])  Followed by Dr. Andrei Lowe    Sophia vaginitis  -     fluconazole (DIFLUCAN) 150 MG tablet; Take 1 tablet by mouth every 72 hours for 6 days    Sunburn  Patient had sun exposure while on an antibiotic and sustained superficial burns to the top of her feet. She is encouraged to use aloe or other moisturizing cream.    Sick sinus syndrome ([facility]  Followed by cardiology    Pulmonary nodule  Referred to pulmonology    [facility]  -     [facility] [address] [facility]        Return for regularly scheduled follow-up. Please note portions of this note were completed with a voicerecognition program.  Efforts were made to edit the dictations but occasionally words are mis-transcribed.

## 2022-06-02 NOTE — PATIENT INSTRUCTIONS
Patient Education        Back Pain: Care Instructions  Your Care Instructions     Back pain has many possible causes. It is often related to problems with muscles and ligaments of the back. It may also be related to problems with the nerves, discs, or bones of the back. Moving, lifting, standing, sitting, or sleeping in an awkward way can strain the back. Sometimes you don't notice theinjury until later. Arthritis is another common cause of back pain. Although it may hurt a lot, back pain usually improves on its own within several weeks. Most people recover in 12 weeks or less. Using good home treatment and being careful not to stress your back can help you feel bettersooner. Follow-up care is a key part of your treatment and safety. Be sure to make and go to all appointments, and call your doctor if you are having problems. It's also a good idea to know your test results and keep alist of the medicines you take. How can you care for yourself at home?  Sit or lie in positions that are most comfortable and reduce your pain. Try one of these positions when you lie down:  ? Lie on your back with your knees bent and supported by large pillows. ? Lie on the floor with your legs on the seat of a sofa or chair. ? Lie on your side with your knees and hips bent and a pillow between your legs. ? Lie on your stomach if it does not make pain worse.  Do not sit up in bed, and avoid soft couches and twisted positions. Bed rest can help relieve pain at first, but it delays healing. Avoid bed rest after the first day of back pain.  Change positions every 30 minutes. If you must sit for long periods of time, take breaks from sitting. Get up and walk around, or lie in a comfortable position.  Try using a heating pad on a low or medium setting for 15 to 20 minutes every 2 or 3 hours. Try a warm shower in place of one session with the heating pad.  You can also try an ice pack for 10 to 15 minutes every 2 to 3 hours. Put a thin cloth between the ice pack and your skin.  Take pain medicines exactly as directed. ? If the doctor gave you a prescription medicine for pain, take it as prescribed. ? If you are not taking a prescription pain medicine, ask your doctor if you can take an over-the-counter medicine.  Take short walks several times a day. You can start with 5 to 10 minutes, 3 or 4 times a day, and work up to longer walks. Walk on level surfaces and avoid hills and stairs until your back is better.  Return to work and other activities as soon as you can. Continued rest without activity is usually not good for your back.  To prevent future back pain, do exercises to stretch and strengthen your back and stomach. Learn how to use good posture, safe lifting techniques, and proper body mechanics. When should you call for help? Call your doctor now or seek immediate medical care if:     You have new or worsening numbness in your legs.      You have new or worsening weakness in your legs. (This could make it hard to stand up.)      You lose control of your bladder or bowels. Watch closely for changes in your health, and be sure to contact your doctor if:     You have a fever, lose weight, or don't feel well.      You do not get better as expected. Where can you learn more? Go to https://Rentelligence.[employer]. org and sign in to your [employer] account. Enter I560 in the [employer] box to learn more about \"Back Pain: Care Instructions. \"     If you do not have an account, please click on the \"Sign Up Now\" link. Current as of: July 1, 2021               Content Version: 13.2  © 2006-2022 Healthwise, Incorporated. Care instructions adapted under license by [facility] ([facility]). If you have questions about a medical condition or this instruction, always ask your healthcare professional. [place] [address] any warranty or liability for your use of this information.          Patient Education        Back Stretches: Exercises  Introduction  Here are some examples of exercises for stretching your back. Start eachexercise slowly. Ease off the exercise if you start to have pain. Your doctor or physical therapist will tell you when you can start theseexercises and which ones will work best for you. How to do the exercises  Overhead stretch    1. Stand comfortably with your feet shoulder-width apart. 2. Looking straight ahead, raise both arms over your head and reach toward the ceiling. Do not allow your head to tilt back. 3. Hold for 15 to 30 seconds, then lower your arms to your sides. 4. Repeat 2 to 4 times. Side stretch    1. Stand comfortably with your feet shoulder-width apart. 2. Raise one arm over your head, and then lean to the other side. 3. Slide your hand down your leg as you let the weight of your arm gently stretch your side muscles. Hold for 15 to 30 seconds. 4. Repeat 2 to 4 times on each side. Press-up    1. Lie on your stomach, supporting your body with your forearms. 2. Press your elbows down into the floor to raise your upper back. As you do this, relax your stomach muscles and allow your back to arch without using your back muscles. As your press up, do not let your hips or pelvis come off the floor. 3. Hold for 15 to 30 seconds, then relax. 4. Repeat 2 to 4 times. Relax and rest    1. Lie on your back with a rolled towel under your neck and a pillow under your knees. Extend your arms comfortably to your sides. 2. Relax and breathe normally. 3. Remain in this position for about 10 minutes. 4. If you can, do this 2 or 3 times each day. Follow-up care is a key part of your treatment and safety. Be sure to make and go to all appointments, and call your doctor if you are having problems. It's also a good idea to know your test results and keep alist of the medicines you take. Where can you learn more? Go to https://ernestina.healthU[employer]EA [email or web address] org and sign in to your [employer] account. Enter D134 in the [facility] box to learn more about \"Back Stretches: Exercises. \"     If you do not have an account, please click on the \"Sign Up Now\" link. Current as of: July 1, 2021               Content Version: 13.2  © 5442-6779 Healthwise, Incorporated. Care instructions adapted under license by [facility] ([facility]). If you have questions about a medical condition or this instruction, always ask your healthcare professional. [address] any warranty or liability for your use of this information.

## 2022-06-02 NOTE — PROGRESS NOTES
PULMONARY OFFICE NEW PATIENT VISIT    CONSULTING PHYSICIAN:      REASON FOR VISIT:   Chief Complaint   Patient presents with    Pulmonary Nodule       DATE OF VISIT: 6/2/2022    HISTORY OF PRESENT ILLNESS: 74y.o. year old female current everyday smoker, with past medical history of CAD, peripheral artery disease, hyperlipidemia, sick sinus syndrome status post permanent pacemaker who is here for evaluation of abnormal CT. Patient had CT chest performed on 4/12/2022 that showed 1.5 cm left upper lobe pulmonary nodule. This nodule is peripheral and abutting the pleura. This nodule had increased in size compared to 8-9 mm in October 2021. No hilar or mediastinal lymphadenopathy noted. This was followed by PET/CT on 5/16/2022 that showed that left upper lobe lung nodule is highly PET avid with SUV of 18. PET scan also showed right parotid gland hypermetabolic nodule with SUV of 6. I personally viewed and interpreted all images. Patient denies any shortness of breath. She has smoker's cough. She denies any chest pain or wheezing or hemoptysis. She denies any weight loss or fever or night sweats. She has extensive smoking history of 55 pack years. She continues to smoke up to a pack a day. REVIEW OF SYSTEMS:   CONSTITUTIONAL SYMPTOMS: The patient denies fever, fatigue, night sweats, weight loss or weight gain. HEENT: No vision changes. No tinnitus, Denies sinus pain. No hoarseness, or dysphagia. NECK: Patient denies swelling in the neck. CARDIOVASCULAR: Denies chest pain, palpitation, syncope. RESPIRATORY: see above. GASTROINTESTINAL: Denies nausea, abdominal pain or change in bowel function. GENITOURINARY: Denies obstructive symptoms. No history of incontinence. BREASTS: No masses or lumps in the breasts. SKIN: No rashes or itching. MUSCULOSKELETAL: Denies weakness or bone pain. NEUROLOGICAL: No headaches or seizures. PSYCHIATRIC: Denies mood swings or depression. ENDOCRINE: Denies heat or cold intolerance or excessive thirst.  HEMATOLOGIC/LYMPHATIC: Denies easy bruising or lymph node swelling. ALLERGIC/IMMUNOLOGIC: No environmental allergies.     PAST MEDICAL HISTORY:   Past Medical History:   Diagnosis Date    CAD (coronary artery disease)     NONOBSTRUCTING    CKD (chronic kidney disease) stage 3, GFR 30-59 ml/min ([facility]     Lumbago     Osteoarthrosis, unspecified whether generalized or localized, unspecified site     Other abnormal blood chemistry     PAD (peripheral artery disease) ([facility]     Pure hypercholesterolemia     Risk for falls 16    S/p Left TKR    Spinal stenosis     Unspecified essential hypertension        PAST SURGICAL HISTORY:   Past Surgical History:   Procedure Laterality Date    CHOLECYSTECTOMY      COLONOSCOPY      INSERTABLE CARDIAC MONITOR  2015    JOINT REPLACEMENT Left     knee replacement    KNEE SURGERY      RT    LEG SURGERY      2 stents placed in right leg, 1 stent and balloon in left leg    ROTATOR CUFF REPAIR      SHOULDER ARTHROSCOPY Left 2020    LEFT SHOULDER ARTHROSCOPY, SUBACROMINAL DECOMPRESSION, DISTAL CLAVICLE EXCISION,DEBRIDEMENT, ROTATOR CUFF REPAIR, AUGMENTATION performed by Manuela Cheney DO at [facility] Left 16    Dr Sylvia Correia TUBAL LIGATION          SOCIAL HISTORY:   Social History     Tobacco Use    Smoking status: Current Every Day Smoker     Packs/day: 0.50     Years: 40.00     Pack years: 20.00     Types: Cigarettes     Last attempt to quit: 2015     Years since quittin.9    Smokeless tobacco: Never Used    Tobacco comment: <1 pack of cigarettes per day   Vaping Use    Vaping Use: Never used   Substance Use Topics    Alcohol use: No     Alcohol/week: 0.0 standard drinks    Drug use: No       FAMILY HISTORY:   Family History   Problem Relation Age of Onset    Cancer Mother     Heart Disease Father     Cancer Brother     Emphysema Brother  Stroke Brother     Diabetes Brother     Diabetes Sister     Heart Disease Brother     Hypertension Brother     Hypertension Sister     High Cholesterol Neg Hx     High Blood Pressure Neg Hx        MEDICATIONS:     Current Outpatient Medications on File Prior to Visit   Medication Sig Dispense Refill    fluconazole (DIFLUCAN) 150 MG tablet Take 1 tablet by mouth every 72 hours for 6 days 2 tablet 0    celecoxib (CELEBREX) 200 MG capsule TAKE 1 CAPSULE BY MOUTH DAILY 30 capsule 3    dicyclomine (BENTYL) 10 MG capsule Take 1 capsule by mouth three times daily 30 capsule 0    atorvastatin (LIPITOR) 10 MG tablet TAKE 1 TABLET BY MOUTH DAILY 90 tablet 3    clopidogrel (PLAVIX) 75 MG tablet TAKE 1 TABLET BY MOUTH DAILY *NEED TO ESTABLISH WITH A NEW PROVIDER FOR FUTURE REFILLS* 90 tablet 3    losartan-hydroCHLOROthiazide (HYZAAR) 100-25 MG per tablet TAKE 1 TABLET BY MOUTH DAILY 90 tablet 3     No current facility-administered medications on file prior to visit. ALLERGIES:   Allergies as of 06/02/2022 - Fully Reviewed 06/02/2022   Allergen Reaction Noted    Ace inhibitors Other (See Comments) 01/15/2013    Chantix [varenicline]  10/03/2014    Keflex [cephalexin] Diarrhea 04/10/2017    Morphine  01/22/2013    Niaspan [niacin er] Other (See Comments) 01/15/2013    Pletal [cilostazol]  11/14/2014    Pravastatin  10/19/2015      OBJECTIVE:   weight is 160 lb (72.6 kg). Her pulse is 82. Her oxygen saturation is 100%. PHYSICAL EXAM:    CONSTITUTIONAL: She is a 74y.o.-year-old who appears her stated age. She is alert and oriented x 3 and in no acute distress. HEENT: PERRL. No scleral icterus. No thrush, atraumatic, normocephalic. NECK: Supple, without cervical or supraclavicular lymphadenopathy:  CARDIOVASCULAR: S1 S2 RRR. Without murmer  RESPIRATORY & CHEST: Lungs are clear to auscultation and percussion. No wheezing, no crackles.  Good air movement  GASTROINTESTINAL & ABDOMEN: Soft, nontender, positive bowel sounds in all quadrants, non-distended, without hepatosplenomegaly. GENITOURINARY: Deferred. MUSCULOSKELETAL: No tenderness to palpation of the axial skeleton. There is no clubbing. No cyanosis. No edema of the lower extremities. SKIN OF BODY: No rash or jaundice. PSYCHIATRIC EVALUATION: Normal affect. Patient answers questions appropriately. HEMATOLOGIC/LYMPHATIC/ IMMUNOLOGIC: No palpable lymphadenopathy. NEUROLOGIC: Alert and oriented x 3. Groslly non-focal. Motor strength is 5+/5 in all muscle groups. The patient has a normal sensorium globally. LABS:  Lab Results   Component Value Date    WBC 8.9 05/26/2022    HGB 13.1 05/26/2022    HCT 40.2 05/26/2022     05/26/2022    CHOL 144 12/18/2018    TRIG 141 12/18/2018    HDL 31 (L) 01/12/2021    ALT 11 10/05/2021    AST 13 10/05/2021     05/26/2022    K 4.0 05/26/2022     05/26/2022    CREATININE 1.4 (H) 05/26/2022    BUN 27 (H) 05/26/2022    CO2 21 05/26/2022    INR 1.02 02/25/2022    GLUF 86 01/12/2021       Lab Results   Component Value Date    GLUCOSE 83 05/26/2022    CALCIUM 10.1 05/26/2022     05/26/2022    K 4.0 05/26/2022    CO2 21 05/26/2022     05/26/2022    BUN 27 (H) 05/26/2022    CREATININE 1.4 (H) 05/26/2022           ASSESSMENT AND PLAN:     1. Nodule of upper lobe of left lung  1.5 cm left upper lobe PET positive pulmonary nodule abutting the pleura. Nodule has increased in size compared to October 2021 CT when it was 8-9 mm in size. No mediastinal or hilar lymphadenopathy noted on pet scan. This is highly suggestive of primary lung carcinoma. The nodule is not approachable by bronchoscopy. It is abutting the pleura. She would benefit from CT-guided percutaneous biopsy. I will also obtain PFT to see if patient will be a surgical candidate. PET scan also showed PET positive right parotid gland nodule. She would also need biopsy of parotid gland nodule.   Most likely the parotid nodule and lung nodule are not related. Patient would also need to hold Plavix for 5 days before the biopsy. She had stent placed in her right leg by Dr Jennifer Boyd 3 months ago for PAD. My staff will reach out to Dr. Jennifer Boyd office to make sure that he is aware that Plavix to be held for 5 days before the biopsy. - IR NEEDLE BIOPSY LUNG/MEDIASTINUM PERC; Future  - Full PFT Study With Bronchodilator; Future      2. Current every day smoker  Patient counselled on the dangers of tobacco use and urged to quit. Also discussed the importance of a supportive environment and helped identify them. Discussed possibility of various Nicotine replacement therapies if experiencing prolonged craving or withdrawal symptoms. Discussed the possibility of negative mood or depression after quitting. Reassured that some weight gain after smoking is common and dietary, exercise, or lifestyle changes will be able to control it. Time spent counseling was >10mins. - nicotine (NICODERM CQ) 21 MG/24HR; Place 1 patch onto the skin daily  Dispense: 42 patch; Refill: 0  - nicotine polacrilex (NICORETTE) 4 MG gum; Take 1 each by mouth as needed for Smoking cessation  Dispense: 110 each; Refill: 3      Return in about 8 weeks (around 7/28/2022). Fred Alvarado MD  Pulmonary Critical Care and Sleep Medicine  Electronically signed by Fred Alvarado MD on 6/2/2022 at 3:12 PM     This note was completed using dragon medical speech recognition software. Grammatical errors, random word insertions, pronoun errors and incomplete sentences are occasional consequences of this technology due to software limitations. If there are questions or concerns about the content of this note of information contained within the body of this dictation they should be addressed with the provider for clarification.

## 2022-06-17 ENCOUNTER — HOSPITAL ENCOUNTER (OUTPATIENT)
Dept: GENERAL RADIOLOGY | Age: 72
Discharge: HOME | End: 2022-06-17
Payer: COMMERCIAL

## 2022-06-17 ENCOUNTER — HOSPITAL ENCOUNTER (OUTPATIENT)
Dept: CT IMAGING | Age: 72
Discharge: HOME | End: 2022-06-17
Payer: COMMERCIAL

## 2022-06-17 ENCOUNTER — HOSPITAL ENCOUNTER (OUTPATIENT)
Dept: ULTRASOUND IMAGING | Age: 72
Discharge: HOME | End: 2022-06-17
Payer: COMMERCIAL

## 2022-06-17 VITALS
DIASTOLIC BLOOD PRESSURE: 64 MMHG | HEIGHT: 61 IN | SYSTOLIC BLOOD PRESSURE: 147 MMHG | OXYGEN SATURATION: 100 % | BODY MASS INDEX: 30.21 KG/M2 | RESPIRATION RATE: 16 BRPM | TEMPERATURE: 97.4 F | WEIGHT: 160 LBS | HEART RATE: 71 BPM

## 2022-06-17 DIAGNOSIS — R91.1 NODULE OF UPPER LOBE OF LEFT LUNG: ICD-10-CM

## 2022-06-17 DIAGNOSIS — K11.8 NODULE OF PAROTID GLAND: ICD-10-CM

## 2022-06-17 LAB
ANION GAP SERPL CALCULATED.3IONS-SCNC: 10 MMOL/L (ref 3–16)
APTT: 39.8 SEC (ref 23–34.3)
BUN BLDV-MCNC: 34 MG/DL (ref 7–20)
CALCIUM SERPL-MCNC: 9.9 MG/DL (ref 8.3–10.6)
CHLORIDE BLD-SCNC: 109 MMOL/L (ref 99–110)
CO2: 20 MMOL/L (ref 21–32)
CREAT SERPL-MCNC: 1.8 MG/DL (ref 0.6–1.2)
GFR AFRICAN AMERICAN: 33
GFR NON-AFRICAN AMERICAN: 27
GLUCOSE BLD-MCNC: 96 MG/DL (ref 70–99)
HCT VFR BLD CALC: 40.5 % (ref 36–48)
HEMOGLOBIN: 13.3 G/DL (ref 12–16)
INR BLD: 1.1 (ref 0.87–1.14)
MCH RBC QN AUTO: 30.6 PG (ref 26–34)
MCHC RBC AUTO-ENTMCNC: 32.9 G/DL (ref 31–36)
MCV RBC AUTO: 92.9 FL (ref 80–100)
PDW BLD-RTO: 15.5 % (ref 12.4–15.4)
PLATELET # BLD: 175 K/UL (ref 135–450)
PMV BLD AUTO: 9 FL (ref 5–10.5)
POTASSIUM SERPL-SCNC: 4.3 MMOL/L (ref 3.5–5.1)
PROTHROMBIN TIME: 14.1 SEC (ref 11.7–14.5)
RBC # BLD: 4.36 M/UL (ref 4–5.2)
SODIUM BLD-SCNC: 139 MMOL/L (ref 136–145)
WBC # BLD: 7.3 K/UL (ref 4–11)

## 2022-06-17 PROCEDURE — 88305 TISSUE EXAM BY PATHOLOGIST: CPT

## 2022-06-17 PROCEDURE — 99151 MOD SED SAME PHYS/QHP <5 YRS: CPT

## 2022-06-17 PROCEDURE — 7100000010 HC PHASE II RECOVERY - FIRST 15 MIN

## 2022-06-17 PROCEDURE — 85610 PROTHROMBIN TIME: CPT

## 2022-06-17 PROCEDURE — 71045 X-RAY EXAM CHEST 1 VIEW: CPT

## 2022-06-17 PROCEDURE — 77012 CT SCAN FOR NEEDLE BIOPSY: CPT

## 2022-06-17 PROCEDURE — 85027 COMPLETE CBC AUTOMATED: CPT

## 2022-06-17 PROCEDURE — 36415 COLL VENOUS BLD VENIPUNCTURE: CPT

## 2022-06-17 PROCEDURE — 80048 BASIC METABOLIC PNL TOTAL CA: CPT

## 2022-06-17 PROCEDURE — 88342 IMHCHEM/IMCYTCHM 1ST ANTB: CPT

## 2022-06-17 PROCEDURE — 2709999900 US BIOPSY OF SALIVARY GLAND

## 2022-06-17 PROCEDURE — 7100000011 HC PHASE II RECOVERY - ADDTL 15 MIN

## 2022-06-17 PROCEDURE — 88173 CYTOPATH EVAL FNA REPORT: CPT

## 2022-06-17 PROCEDURE — 6360000002 HC RX W HCPCS: Performed by: RADIOLOGY

## 2022-06-17 PROCEDURE — 85730 THROMBOPLASTIN TIME PARTIAL: CPT

## 2022-06-17 PROCEDURE — 88341 IMHCHEM/IMCYTCHM EA ADD ANTB: CPT

## 2022-06-17 PROCEDURE — 88360 TUMOR IMMUNOHISTOCHEM/MANUAL: CPT

## 2022-06-17 PROCEDURE — 2500000003 HC RX 250 WO HCPCS: Performed by: FAMILY MEDICINE

## 2022-06-17 RX ORDER — ACETAMINOPHEN 325 MG/1
650 TABLET ORAL EVERY 4 HOURS PRN
Status: DISCONTINUED | OUTPATIENT
Start: 2022-06-17 | End: 2022-06-18 | Stop reason: HOSPADM

## 2022-06-17 RX ORDER — FENTANYL CITRATE 50 UG/ML
INJECTION, SOLUTION INTRAMUSCULAR; INTRAVENOUS
Status: COMPLETED | OUTPATIENT
Start: 2022-06-17 | End: 2022-06-17

## 2022-06-17 RX ORDER — MIDAZOLAM HYDROCHLORIDE 2 MG/2ML
INJECTION, SOLUTION INTRAMUSCULAR; INTRAVENOUS
Status: COMPLETED | OUTPATIENT
Start: 2022-06-17 | End: 2022-06-17

## 2022-06-17 RX ORDER — LIDOCAINE HYDROCHLORIDE 10 MG/ML
5 INJECTION, SOLUTION EPIDURAL; INFILTRATION; INTRACAUDAL; PERINEURAL ONCE
Status: COMPLETED | OUTPATIENT
Start: 2022-06-17 | End: 2022-06-17

## 2022-06-17 RX ADMIN — LIDOCAINE HYDROCHLORIDE 5 ML: 10 INJECTION, SOLUTION EPIDURAL; INFILTRATION; INTRACAUDAL; PERINEURAL at 10:09

## 2022-06-17 RX ADMIN — MIDAZOLAM HYDROCHLORIDE 1 MG: 1 INJECTION, SOLUTION INTRAMUSCULAR; INTRAVENOUS at 11:03

## 2022-06-17 RX ADMIN — MIDAZOLAM HYDROCHLORIDE 1 MG: 1 INJECTION, SOLUTION INTRAMUSCULAR; INTRAVENOUS at 10:49

## 2022-06-17 RX ADMIN — FENTANYL CITRATE 50 MCG: 50 INJECTION, SOLUTION INTRAMUSCULAR; INTRAVENOUS at 10:46

## 2022-06-17 RX ADMIN — MIDAZOLAM HYDROCHLORIDE 1 MG: 1 INJECTION, SOLUTION INTRAMUSCULAR; INTRAVENOUS at 10:46

## 2022-06-17 RX ADMIN — FENTANYL CITRATE 50 MCG: 50 INJECTION, SOLUTION INTRAMUSCULAR; INTRAVENOUS at 10:49

## 2022-06-17 NOTE — BRIEF OP NOTE
Brief Postoperative Note    Lily Gandhi  YOB: 1950  [id number]    Pre-operative Diagnosis: Left lung nodule    Post-operative Diagnosis: Same    Procedure: CT guided left lung biopsy    Anesthesia: Moderate Sedation    Surgeons/Assistants: Phalen     Estimated Blood Loss: less than 50     Complications: None    Specimens: Was Obtained: 18g x2    Findings: Approach:  Left anterolateral to left upper lobe 2 cm nodule.     Electronically signed by Primo Mccall MD on 6/17/2022 at 11:17 AM

## 2022-06-17 NOTE — PROGRESS NOTES
Patient/report received from Radiology, vss, drsg to lung bx site x 2 with scant amount of shadow drainage noted, drsg to R neck c/d/i,family at bedside, call light in reach, will monitor

## 2022-06-17 NOTE — PROGRESS NOTES
Lung bx sites remain CDI. Pt denying any pain. VSS for patient. Discharge instructions reviewed with patient. All questions answered.

## 2022-06-17 NOTE — PRE SEDATION
Sedation Pre-Procedure Note    Patient Name: Rachid Franco   YOB: 1950  Room/Bed: Room/bed info not found  Medical Record Number: [id number]  Date: 6/17/2022   Time: 9:39 AM       Indication:  LAKESHA pulmonary nodule    Consent: I have discussed with the patient and/or the patient representative the indication, alternatives, and the possible risks and/or complications of the planned procedure and the anesthesia methods. The patient and/or patient representative appear to understand and agree to proceed. Vital Signs: There were no vitals filed for this visit. Past Medical History:   has a past medical history of CAD (coronary artery disease), CKD (chronic kidney disease) stage 3, GFR 30-59 ml/min ([facility]), Lumbago, Osteoarthrosis, unspecified whether generalized or localized, unspecified site, Other abnormal blood chemistry, PAD (peripheral artery disease) ([facility] [address] Pure hypercholesterolemia, Risk for falls, Spinal stenosis, and Unspecified essential hypertension. Past Surgical History:   has a past surgical history that includes Cholecystectomy; Tubal ligation; knee surgery; Rotator cuff repair; Colonoscopy; Leg Surgery; Insertable Cardiac Monitor (09/2015); Total knee arthroplasty (Left, 1/12/16); joint replacement (Left, 20015); and Shoulder arthroscopy (Left, 8/21/2020). Medications:   Scheduled Meds:   Continuous Infusions:   PRN Meds:   Home Meds:   Prior to Admission medications    Medication Sig Start Date End Date Taking?  Authorizing Provider   nicotine (NICODERM CQ) 21 MG/24HR Place 1 patch onto the skin daily 6/2/22 7/14/22  Kaia Conde MD   nicotine polacrilex (NICORETTE) 4 MG gum Take 1 each by mouth as needed for Smoking cessation 6/2/22   Kaia Conde MD   celecoxib (CELEBREX) 200 MG capsule TAKE 1 CAPSULE BY MOUTH DAILY 4/5/22   Hailey Andrea MD   dicyclomine (BENTYL) 10 MG capsule Take 1 capsule by mouth three times daily 12/21/21   Cisco Crews APRN - NP   atorvastatin (LIPITOR) 10 MG tablet TAKE 1 TABLET BY MOUTH DAILY 11/9/21   Darron Schofield MD   clopidogrel (PLAVIX) 75 MG tablet TAKE 1 TABLET BY MOUTH DAILY *NEED TO ESTABLISH WITH A NEW PROVIDER FOR FUTURE REFILLS* 11/9/21   Darron Schofield MD   losartan-hydroCHLOROthiazide [facility]) 100-25 MG per tablet TAKE 1 TABLET BY MOUTH DAILY 11/9/21   Darron Schofield MD     Coumadin Use Last 7 Days:  no  Antiplatelet drug therapy use last 7 days: no  Other anticoagulant use last 7 days: no  Additional Medication Information:  Pt having ultrasound guided right submandibular biopsy with local anesthetic in u/s department immediately before CT guided lung biopsy. Pre-Sedation Documentation and Exam:   I have reviewed the patient's history and review of systems.     Mallampati Airway Assessment:  Mallampati Class II - (soft palate, fauces & uvula are visible)    Prior History of Anesthesia Complications:   none    ASA Classification:  Class 2 - A normal healthy patient with mild systemic disease    Sedation/ Anesthesia Plan:   intravenous sedation    Medications Planned:   midazolam (Versed) intravenously and fentanyl intravenously    Patient is an appropriate candidate for plan of sedation: yes    Electronically signed by Severiano Lund, MD on 6/17/2022 at 9:39 AM

## 2022-06-20 ENCOUNTER — TELEPHONE (OUTPATIENT)
Dept: INTERVENTIONAL RADIOLOGY/VASCULAR | Age: 72
End: 2022-06-20

## 2022-06-23 ENCOUNTER — OFFICE VISIT (OUTPATIENT)
Dept: PULMONOLOGY | Age: 72
End: 2022-06-23
Payer: COMMERCIAL

## 2022-06-23 VITALS
HEIGHT: 61 IN | HEART RATE: 105 BPM | SYSTOLIC BLOOD PRESSURE: 120 MMHG | DIASTOLIC BLOOD PRESSURE: 76 MMHG | WEIGHT: 164 LBS | BODY MASS INDEX: 30.96 KG/M2 | OXYGEN SATURATION: 99 %

## 2022-06-23 DIAGNOSIS — F17.200 CURRENT EVERY DAY SMOKER: ICD-10-CM

## 2022-06-23 DIAGNOSIS — C34.92 NSCLC OF LEFT LUNG (HCC): Primary | ICD-10-CM

## 2022-06-23 DIAGNOSIS — K11.8 NODULE OF PAROTID GLAND: ICD-10-CM

## 2022-06-23 PROCEDURE — 1123F ACP DISCUSS/DSCN MKR DOCD: CPT | Performed by: INTERNAL MEDICINE

## 2022-06-23 PROCEDURE — 99214 OFFICE O/P EST MOD 30 MIN: CPT | Performed by: INTERNAL MEDICINE

## 2022-06-23 ASSESSMENT — ENCOUNTER SYMPTOMS
DIARRHEA: 0
CONSTIPATION: 0
SINUS PRESSURE: 0
COUGH: 0
RHINORRHEA: 0
STRIDOR: 0
CHOKING: 0
SORE THROAT: 0
APNEA: 0
ABDOMINAL PAIN: 0
CHEST TIGHTNESS: 0
ABDOMINAL DISTENTION: 0
SHORTNESS OF BREATH: 0
VOICE CHANGE: 0
ANAL BLEEDING: 0
WHEEZING: 0
BLOOD IN STOOL: 0
BACK PAIN: 0

## 2022-06-23 NOTE — PROGRESS NOTES
Mane Franco    YOB: 1950     Date of Service:  6/23/2022     Chief Complaint   Patient presents with    Results     lung biopsy          HPI patient follows with Dr. Caryn Woods. Patient has been accompanied by her son to our office today. Wants to know the results of CT-guided lung biopsy performed on 6/17. Patient also underwent FNA of right parotid nodule, noted to be PET positive.     Allergies   Allergen Reactions    Ace Inhibitors Other (See Comments)     Cough     Chantix [Varenicline]      depression    Keflex [Cephalexin] Diarrhea    Morphine      Causes mental impairment,  N&V    Niaspan [Niacin Er] Other (See Comments)     Skin irritation    Pletal [Cilostazol]      Headache and diarrhea    Pravastatin      Outpatient Medications Marked as Taking for the 6/23/22 encounter (Office Visit) with Laura Brandon MD   Medication Sig Dispense Refill    nicotine (NICODERM CQ) 21 MG/24HR Place 1 patch onto the skin daily 42 patch 0    nicotine polacrilex (NICORETTE) 4 MG gum Take 1 each by mouth as needed for Smoking cessation 110 each 3    celecoxib (CELEBREX) 200 MG capsule TAKE 1 CAPSULE BY MOUTH DAILY 30 capsule 3    dicyclomine (BENTYL) 10 MG capsule Take 1 capsule by mouth three times daily 30 capsule 0    atorvastatin (LIPITOR) 10 MG tablet TAKE 1 TABLET BY MOUTH DAILY 90 tablet 3    clopidogrel (PLAVIX) 75 MG tablet TAKE 1 TABLET BY MOUTH DAILY *NEED TO ESTABLISH WITH A NEW PROVIDER FOR FUTURE REFILLS* 90 tablet 3    losartan-hydroCHLOROthiazide (HYZAAR) 100-25 MG per tablet TAKE 1 TABLET BY MOUTH DAILY 90 tablet 3       Immunization History   Administered Date(s) Administered    Influenza, High Dose (Fluzone 65 yrs and older) 11/20/2017, 09/07/2018, 10/11/2019    Influenza, Quadv, adjuvanted, 65 yrs +, IM, PF (Fluad) 11/06/2020, 09/30/2021    Pneumococcal Conjugate 13-valent ([id number]) 10/19/2015    Pneumococcal Polysaccharide ([id number]) 01/22/2013, 09/17/2019    Td, unspecified formulation 12/28/2015    Tdap (Boostrix, Adacel) 11/06/2020       Past Medical History:   Diagnosis Date    CAD (coronary artery disease)     NONOBSTRUCTING    CKD (chronic kidney disease) stage 3, GFR 30-59 ml/min ([facility])     Lumbago     Osteoarthrosis, unspecified whether generalized or localized, unspecified site     Other abnormal blood chemistry     PAD (peripheral artery disease) ([facility] [address]     Pure hypercholesterolemia     Risk for falls 2/2/16    S/p Left TKR    Spinal stenosis     Unspecified essential hypertension      Past Surgical History:   Procedure Laterality Date    CHOLECYSTECTOMY      COLONOSCOPY      CT NEEDLE BIOPSY LUNG PERCUTANEOUS  6/17/2022    CT NEEDLE BIOPSY LUNG PERCUTANEOUS 6/17/2022 F CT SCAN    INSERTABLE CARDIAC MONITOR  09/2015    JOINT REPLACEMENT Left 20015    knee replacement    KNEE SURGERY      RT    LEG SURGERY      2 stents placed in right leg, 1 stent and balloon in left leg    ROTATOR CUFF REPAIR      SHOULDER ARTHROSCOPY Left 8/21/2020    LEFT SHOULDER ARTHROSCOPY, SUBACROMINAL DECOMPRESSION, DISTAL CLAVICLE EXCISION,DEBRIDEMENT, ROTATOR CUFF REPAIR, AUGMENTATION performed by Frantz Alvarez DO at [facility] Left 1/12/16    Dr Prema Yung SALIVARY GLAND  6/17/2022    US BIOPSY OF SALIVARY GLAND 6/17/2022 [facility] ULTRASOUND     Family History   Problem Relation Age of Onset   [facility] Cancer Mother     Heart Disease Father     Cancer Brother     Emphysema Brother     Stroke Brother     Diabetes Brother     Diabetes Sister     Heart Disease Brother     Hypertension Brother     Hypertension Sister     High Cholesterol Neg Hx     High Blood Pressure Neg Hx        Review of Systems:  Review of Systems   Constitutional: Negative for activity change, appetite change, fatigue and fever.    HENT: Negative for congestion, ear discharge, ear pain, postnasal drip, rhinorrhea, sinus pressure, sneezing, sore throat, tinnitus and voice change. Respiratory: Negative for apnea, cough, choking, chest tightness, shortness of breath, wheezing and stridor. Cardiovascular: Negative for chest pain, palpitations and leg swelling. Gastrointestinal: Negative for abdominal distention, abdominal pain, anal bleeding, blood in stool, constipation and diarrhea. Musculoskeletal: Negative for arthralgias, back pain and gait problem. Skin: Negative for pallor and rash. Allergic/Immunologic: Negative for environmental allergies. Neurological: Negative for dizziness, tremors, seizures, syncope, speech difficulty, weakness, light-headedness, numbness and headaches. Hematological: Negative for adenopathy. Does not bruise/bleed easily. Psychiatric/Behavioral: Negative for sleep disturbance. Vitals:    06/23/22 1436   BP: 120/76   Site: Left Upper Arm   Position: Sitting   Cuff Size: Large Adult   Pulse: (!) 105   SpO2: 99%   Weight: 164 lb (74.4 kg)   Height: 5' 1\" (1.549 m)     No flowsheet data found. Body mass index is 30.99 kg/m². Wt Readings from Last 3 Encounters:   06/23/22 164 lb (74.4 kg)   06/17/22 160 lb (72.6 kg)   06/02/22 160 lb (72.6 kg)     BP Readings from Last 3 Encounters:   06/23/22 120/76   06/17/22 (!) 147/64   06/02/22 (!) 146/78         Physical Exam  Constitutional:       General: She is not in acute distress. Appearance: She is well-developed. She is not diaphoretic. HENT:      Mouth/Throat:      Pharynx: No oropharyngeal exudate. Cardiovascular:      Rate and Rhythm: Normal rate and regular rhythm. Heart sounds: Normal heart sounds. No murmur heard. Pulmonary:      Effort: No respiratory distress. Breath sounds: Normal breath sounds. No wheezing, rhonchi or rales. Chest:      Chest wall: No tenderness. Abdominal:      General: There is no distension. Palpations: There is no mass. Tenderness:  There is no abdominal tenderness. There is no guarding or rebound. Musculoskeletal:         General: No swelling, tenderness or deformity. Skin:     Coloration: Skin is not pale. Findings: No erythema or rash. Neurological:      Mental Status: She is alert and oriented to person, place, and time. Cranial Nerves: No cranial nerve deficit. Motor: No abnormal muscle tone. Coordination: Coordination normal.      Deep Tendon Reflexes: Reflexes normal.             Health Maintenance   Topic Date Due    Annual Wellness Visit (AWV)  Never done    COVID-19 Vaccine (1) Never done    Hepatitis C screen  Never done    Shingles vaccine (1 of 2) Never done    Low dose CT lung screening  Never done    Breast cancer screen  06/13/2018    Lipids  01/12/2022    A1C test (Diabetic or Prediabetic)  10/05/2022    Colorectal Cancer Screen  02/03/2023    Depression Screen  06/02/2023    DTaP/Tdap/Td vaccine (2 - Td or Tdap) 11/06/2030    Flu vaccine  Completed    Pneumococcal 65+ years Vaccine  Completed    DEXA (modify frequency per FRAX score)  Addressed    Hepatitis A vaccine  Aged Out    Hepatitis B vaccine  Aged Out    Hib vaccine  Aged Out    Meningococcal (ACWY) vaccine  Aged Out          Assessment/Plan:    Enlarging 1.5 cm left upper lobe PET positive pulmonary nodule, status post CT-guided lung biopsy performed on 6/17. Biopsy was suggestive of atypical cells, highly suspicious for malignancy. Positive for keratin and synaptic ficin, suggestive of a possible mixed picture of neuroendocrine neoplasm and non-small cell cancer. Recent PET scan from 5/16 revealed no clear evidence of metastatic disease. 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 Unfortunately, PFT study has not been performed yet and this will be scheduled for early next week. Former smoker.   If PFTs are acceptable, then patient would be a surgical candidate for left upper lobectomy and this has been discussed with the patient. She is willing to undergo surgery. A final decision will be made once PFT study is available. I spoke with Dr. Keven Santiago, who agrees with the above plan-referral will be made for oncology evaluation. Follow-up with Dr. Slaughter Must in 1 month.

## 2022-06-27 ENCOUNTER — NURSE ONLY (OUTPATIENT)
Dept: CARDIOLOGY CLINIC | Age: 72
End: 2022-06-27
Payer: COMMERCIAL

## 2022-06-27 ENCOUNTER — HOSPITAL ENCOUNTER (OUTPATIENT)
Dept: PULMONOLOGY | Age: 72
Discharge: HOME | End: 2022-06-27
Payer: COMMERCIAL

## 2022-06-27 VITALS — RESPIRATION RATE: 16 BRPM | HEART RATE: 86 BPM | OXYGEN SATURATION: 98 %

## 2022-06-27 DIAGNOSIS — R00.1 SYMPTOMATIC BRADYCARDIA: ICD-10-CM

## 2022-06-27 DIAGNOSIS — R91.1 NODULE OF UPPER LOBE OF LEFT LUNG: ICD-10-CM

## 2022-06-27 DIAGNOSIS — Z95.0 PACEMAKER: ICD-10-CM

## 2022-06-27 DIAGNOSIS — I49.5 SINUS NODE DYSFUNCTION (HCC): ICD-10-CM

## 2022-06-27 DIAGNOSIS — R55 SYNCOPE AND COLLAPSE: ICD-10-CM

## 2022-06-27 DIAGNOSIS — I49.5 SICK SINUS SYNDROME (HCC): ICD-10-CM

## 2022-06-27 PROCEDURE — 93296 REM INTERROG EVL PM/IDS: CPT | Performed by: INTERNAL MEDICINE

## 2022-06-27 PROCEDURE — 94760 N-INVAS EAR/PLS OXIMETRY 1: CPT

## 2022-06-27 PROCEDURE — 93294 REM INTERROG EVL PM/LDLS PM: CPT | Performed by: INTERNAL MEDICINE

## 2022-06-27 PROCEDURE — 94200 LUNG FUNCTION TEST (MBC/MVV): CPT

## 2022-06-27 PROCEDURE — 94726 PLETHYSMOGRAPHY LUNG VOLUMES: CPT

## 2022-06-27 PROCEDURE — 94729 DIFFUSING CAPACITY: CPT

## 2022-06-27 PROCEDURE — 94010 BREATHING CAPACITY TEST: CPT

## 2022-06-27 RX ORDER — ALBUTEROL SULFATE 90 UG/1
4 AEROSOL, METERED RESPIRATORY (INHALATION) ONCE
Status: CANCELLED | OUTPATIENT
Start: 2022-06-27

## 2022-06-27 NOTE — PROGRESS NOTES
We received remote transmission from patient's monitor at home. Transmission shows normal sensing and pacing function. EP physician will review. See interrogation under cardiology tab in the [address] [facility] [place] [address] field for more details. End of 91-day monitoring period 6-27-22.

## 2022-06-28 ENCOUNTER — TELEPHONE (OUTPATIENT)
Dept: PULMONOLOGY | Age: 72
End: 2022-06-28

## 2022-06-28 DIAGNOSIS — R91.1 LUNG NODULE: Primary | ICD-10-CM

## 2022-06-28 LAB
DLCO %PRED: 50 %
DLCO PRED: NORMAL
DLCO/VA %PRED: NORMAL
DLCO/VA PRED: NORMAL
DLCO/VA: NORMAL
DLCO: NORMAL
EXPIRATORY TIME: NORMAL
FEF 25-75% %PRED-PRE: NORMAL
FEF 25-75% PRED: NORMAL
FEF 25-75%-PRE: NORMAL
FEV1 %PRED-PRE: 93 %
FEV1 PRED: NORMAL
FEV1/FVC %PRED-PRE: NORMAL
FEV1/FVC PRED: NORMAL
FEV1/FVC: 102 %
FEV1: NORMAL
FVC %PRED-PRE: NORMAL
FVC PRED: NORMAL
FVC: NORMAL
GAW %PRED: NORMAL
GAW PRED: NORMAL
GAW: NORMAL
IC %PRED: NORMAL
IC PRED: NORMAL
IC: NORMAL
MVV %PRED-PRE: NORMAL
MVV PRED: NORMAL
MVV-PRE: NORMAL
PEF %PRED-PRE: NORMAL
PEF PRED: NORMAL
PEF-PRE: NORMAL
RAW %PRED: NORMAL
RAW PRED: NORMAL
RAW: NORMAL
RV %PRED: NORMAL
RV PRED: NORMAL
RV: NORMAL
SVC %PRED: NORMAL
SVC PRED: NORMAL
SVC: NORMAL
TLC %PRED: 104 %
TLC PRED: NORMAL
TLC: NORMAL
VA %PRED: NORMAL
VA PRED: NORMAL
VA: NORMAL
VTG %PRED: NORMAL
VTG PRED: NORMAL
VTG: NORMAL

## 2022-06-28 ASSESSMENT — PULMONARY FUNCTION TESTS
FEV1/FVC: 102
FEV1_PERCENT_PREDICTED_PRE: 93

## 2022-06-28 NOTE — PROCEDURES
[facility]                     [facility], [address]                               PULMONARY FUNCTION    PATIENT NAME: Yasmine eJrnigan                    :        1950  MED REC NO:   [id number]                          ROOM:  ACCOUNT NO:   [de-identified]                           ADMIT DATE: 2022  PROVIDER:     Lavell Luna MD    DATE OF PROCEDURE:  2022    Spirometry reveals normal FVC and FEV1.  FEV1/FVC ratio is normal.   Expiratory flow rates are normal.  Lung volumes reveal normal total lung  capacity, vital capacity and residual volume. Diffusion capacity is  mildly reduced at 50% predicted. IMPRESSION:  Normal pulmonary function testing with the exception of  mild reduction in diffusion capacity.         Larry Cotton MD    D: 2022 11:07:53       T: 2022 15:06:17     [id number]  Job#: [id number]     Doc#: [id number]    CC:

## 2022-06-28 NOTE — TELEPHONE ENCOUNTER
Let pt know that her PFT study done yesterday showed normal lung function. As discussed in office, please refer her to Dr Radha Vee from thoracic surgery to discuss about lung surgery for lung cancer. Please make sure she has an appt soon, in the next 1-2 weeks.

## 2022-06-29 ENCOUNTER — TELEPHONE (OUTPATIENT)
Dept: PULMONOLOGY | Age: 72
End: 2022-06-29

## 2022-06-29 NOTE — TELEPHONE ENCOUNTER
Called patient to follow up to see if she was able to discuss with her son about taking her to an appt with Dr. Alfreda Prieto on Friday 07/01/22. Patient states she has decided to see the oncologist first then be referred to CTS if needed at that time. Spoke with Jeannine Lombard at Dr. Raphael Harris office to let her know that pt does not want to keep appt on Fri.

## 2022-06-30 ENCOUNTER — TELEPHONE (OUTPATIENT)
Dept: CARDIOTHORACIC SURGERY | Age: 72
End: 2022-06-30

## 2022-06-30 NOTE — TELEPHONE ENCOUNTER
Aware that pt is delaying her appt for CT evaluation. Will see oncology first. Will sched when appropriate.

## 2022-07-12 ENCOUNTER — PROCEDURE VISIT (OUTPATIENT)
Dept: VASCULAR SURGERY | Age: 72
End: 2022-07-12

## 2022-07-12 DIAGNOSIS — I70.213 ATHEROSCLEROSIS OF NATIVE ARTERIES OF EXTREMITIES WITH INTERMITTENT CLAUDICATION, BILATERAL LEGS (HCC): ICD-10-CM

## 2022-07-14 ENCOUNTER — OFFICE VISIT (OUTPATIENT)
Dept: NEUROLOGY | Age: 72
End: 2022-07-14
Payer: COMMERCIAL

## 2022-07-14 VITALS
SYSTOLIC BLOOD PRESSURE: 138 MMHG | HEIGHT: 61 IN | WEIGHT: 164 LBS | BODY MASS INDEX: 30.96 KG/M2 | DIASTOLIC BLOOD PRESSURE: 89 MMHG | HEART RATE: 77 BPM

## 2022-07-14 DIAGNOSIS — G25.0 BENIGN ESSENTIAL TREMOR: Primary | ICD-10-CM

## 2022-07-14 DIAGNOSIS — R56.9 SEIZURE-LIKE ACTIVITY (HCC): ICD-10-CM

## 2022-07-14 PROCEDURE — 99214 OFFICE O/P EST MOD 30 MIN: CPT | Performed by: PSYCHIATRY & NEUROLOGY

## 2022-07-14 PROCEDURE — 1123F ACP DISCUSS/DSCN MKR DOCD: CPT | Performed by: PSYCHIATRY & NEUROLOGY

## 2022-07-14 NOTE — PROGRESS NOTES
None    Highest education level: None   Occupational History    None   Tobacco Use    Smoking status: Current Every Day Smoker     Packs/day: 0.50     Years: 40.00     Pack years: 20.00     Types: Cigarettes     Last attempt to quit: 2015     Years since quittin.0    Smokeless tobacco: Never Used    Tobacco comment: <1 pack of cigarettes per day   Vaping Use    Vaping Use: Never used   Substance and Sexual Activity    Alcohol use: No     Alcohol/week: 0.0 standard drinks    Drug use: No    Sexual activity: None   Other Topics Concern    None   Social History Narrative    None     Social Determinants of Health     Financial Resource Strain:     Difficulty of Paying Living Expenses: Not on file   Food Insecurity:     Worried About Running Out of Food in the Last Year: Not on file    [place] of Food in the Last Year: Not on file   Transportation Needs:     Lack of Transportation (Medical): Not on file    Lack of Transportation (Non-Medical):  Not on file   Physical Activity:     Days of Exercise per Week: Not on file    Minutes of Exercise per Session: Not on file   Stress:     Feeling of Stress : Not on file   Social Connections:     Frequency of Communication with Friends and Family: Not on file    Frequency of Social Gatherings with Friends and Family: Not on file    Attends Christian Services: Not on file    Active Member of [address] [place] [address] [employer] or Organizations: Not on file    Attends Club or Organization Meetings: Not on file    Marital Status: Not on file   Intimate Partner Violence:     Fear of Current or Ex-Partner: Not on file    Emotionally Abused: Not on file    Physically Abused: Not on file    Sexually Abused: Not on file   Housing Stability:     Unable to Pay for Housing in the Last Year: Not on file    Number of [place] in the Last Year: Not on file    Unstable Housing in the Last Year: Not on file        Objective:  Exam:  /89   Pulse 77   Ht 5' 1\" (1.549 m)   Wt 164 lb (74.4 kg)   BMI 30.99 kg/m²   This is a well-nourished patient in no acute distress  Patient is awake, alert and oriented x3. Speech is normal.  Pupils are equal round reacting to light. Extraocular movements intact. Face symmetrical. Tongue midline. Motor exam shows normal symmetrical strength. Deep tendon reflexes normal. Plantar reflexes downgoing. Sensory exam normal. Coordination normal. Gait normal. No carotid bruit. No neck stiffness.       Data :  LABS:  General Labs:    CBC:   Lab Results   Component Value Date/Time    WBC 7.3 06/17/2022 08:44 AM    RBC 4.36 06/17/2022 08:44 AM    HGB 13.3 06/17/2022 08:44 AM    HCT 40.5 06/17/2022 08:44 AM    MCV 92.9 06/17/2022 08:44 AM    MCH 30.6 06/17/2022 08:44 AM    MCHC 32.9 06/17/2022 08:44 AM    RDW 15.5 06/17/2022 08:44 AM     06/17/2022 08:44 AM    MPV 9.0 06/17/2022 08:44 AM     BMP:    Lab Results   Component Value Date/Time     06/17/2022 08:44 AM    K 4.3 06/17/2022 08:44 AM     06/17/2022 08:44 AM    CO2 20 06/17/2022 08:44 AM    BUN 34 06/17/2022 08:44 AM    LABALBU 3.9 10/05/2021 12:20 PM    LABALBU 3.9 10/05/2021 12:20 PM    CREATININE 1.8 06/17/2022 08:44 AM    CALCIUM 9.9 06/17/2022 08:44 AM    GFRAA 33 06/17/2022 08:44 AM    GFRAA >60 11/30/2011 10:30 AM    LABGLOM 27 06/17/2022 08:44 AM    GLUCOSE 96 06/17/2022 08:44 AM     RADIOLOGY REVIEW:  I have reviewed radiology image(s) and reports(s) of: MRI brain    Impression :  Benign essential tremor  No clinical evidence of Parkinson's disease  Seizure-like episodes probably due to significant bradycardia, now resolved after pacemaker insertion  MRI brain images were reviewed and were normal  EEG was normal    Plan :  Discussed with patient  Reviewed MRI brain and EEG results with her  We discussed about medications for the tremors  Since her tremors are reasonably mild we decided not to put her on any medication at this time  I will see her back in 6 months for follow-up and to make sure that there is no progression into Parkinson's disease type symptoms. Please note a portion of  this chart was generated using dragon dictation software. Although every effort was made to ensure the accuracy of this automated transcription, some errors in transcription may have occurred.

## 2022-07-19 ENCOUNTER — TELEPHONE (OUTPATIENT)
Dept: VASCULAR SURGERY | Age: 72
End: 2022-07-19

## 2022-07-19 DIAGNOSIS — I70.213 ATHEROSCLEROSIS OF NATIVE ARTERIES OF EXTREMITIES WITH INTERMITTENT CLAUDICATION, BILATERAL LEGS (HCC): Primary | ICD-10-CM

## 2022-07-19 NOTE — TELEPHONE ENCOUNTER
Discussed results of BLE arterial duplex with patient which shows mild arterial disease in BLE. She reports her legs are doing well. Will plan to repeat duplex for continued surveillance in 4 months, can coordinate with carotid scan and follow up at that time. Call with any worsening symptoms.     Electronically signed by JIM Mendiola CNP on 7/19/2022 at 1:07 PM

## 2022-07-20 ENCOUNTER — TELEPHONE (OUTPATIENT)
Dept: VASCULAR SURGERY | Age: 72
End: 2022-07-20

## 2022-07-20 NOTE — TELEPHONE ENCOUNTER
Patient scheduled for both test 11/30 and follow up on 12/6 with Dr. Ramon Irene. LM on  to advise patient.

## 2022-07-28 NOTE — PROGRESS NOTES
Consultation H&P    Date of Consultation:  7/29/2022    PCP:  Reuben Gold MD      Onc: Arvid Eaton: Theo Bulls  Neuro: Kit Min  Cards: Sanjeev Alegria  EP: Elio Marie  Vasc: Kristine Brown    Chief Complaint: LAKESHA nodule    History of Present Illness: We are asked to see this patient in consultation by Dr. Lurdes Thomas regarding LAKESHA nodule. Enrique Davidson is a 74 y.o. female smoker with mild-mod emphysema. CTPA 10/25/21, no PE, LAKESHA 9mm nodule. Follow up CT chest 4/12/22, LAKESHA nodule 1.5cm.  5/16 PET submandibular gland nodule 1.2cm SUV 6.03. LAKESHA nodule SUV 18.84  6/17 U/s bx R submandibular gland 1.9cm nodule, benign parotid tissue. 6/17/22 CT guided bx LAKESHA nodule atypical cells    Denies dyspnea, can take flight of stairs without issue. Past Medical History:  Past Medical History:   Diagnosis Date    CAD (coronary artery disease)     NONOBSTRUCTING    CKD (chronic kidney disease) stage 3, GFR 30-59 ml/min (HCC)     Lumbago     Osteoarthrosis, unspecified whether generalized or localized, unspecified site     Other abnormal blood chemistry     PAD (peripheral artery disease) (HCC)     has stents in lower legs bilaterally implanted by Dr. Kristine Brown    Pure hypercholesterolemia     Risk for falls 02/02/2016    S/p Left TKR    Spinal stenosis     Tremor     Unspecified essential hypertension        Past Surgical History:  Past Surgical History:   Procedure Laterality Date    CHOLECYSTECTOMY      COLONOSCOPY      CT NEEDLE BIOPSY LUNG PERCUTANEOUS  06/17/2022    CT NEEDLE BIOPSY LUNG PERCUTANEOUS 6/17/2022 F CT SCAN    INSERTABLE CARDIAC MONITOR  09/2015    ILR at [facility]- implanted 10/02/2015    JOINT REPLACEMENT Left 20015    knee replacement    KNEE SURGERY      RT    LEG SURGERY      2 stents placed in right leg, 1 stent and balloon in left leg.  LSFA stenting 03/02/2022    PACEMAKER INSERTION  05/27/2022    biV ([employer])    ROTATOR CUFF REPAIR      SHOULDER ARTHROSCOPY Left 08/21/2020    LEFT SHOULDER ARTHROSCOPY, Types: Cigarettes     Last attempt to quit: 2015     Years since quittin.0    Smokeless tobacco: Never    Tobacco comments:     <1 pack of cigarettes per day     Started at age 21   Vaping Use    Vaping Use: Never used   Substance and Sexual Activity    Alcohol use: No     Comment: glass of wine once a month    Drug use: No    Sexual activity: Not on file   Other Topics Concern    Not on file   Social History Narrative    Not on file     Social Determinants of Health     Financial Resource Strain: Not on file   Food Insecurity: Not on file   Transportation Needs: Not on file   Physical Activity: Not on file   Stress: Not on file   Social Connections: Not on file   Intimate Partner Violence: Not on file   Housing Stability: Not on file       Family History:      Problem Relation Age of Onset    Cancer Mother         cancer of [place]    Heart Disease Father          of MI age 71 - smoker,     Diabetes Sister     Hypertension Sister     Cancer Brother     Emphysema Brother     Stroke Brother     Diabetes Brother     Heart Disease Brother     Hypertension Brother     High Cholesterol Neg Hx     High Blood Pressure Neg Hx        Review of Systems:  Reviewed, negative unless noted  Constitutional: weight change, weakness, impairment of ADLs  Eyes: eyestrain, redness, discharges  ENMT: post nasal drip, sinus pain, discharge   Cardiovascular: faintness, vertigo, color changes in fingers/toes  Respiratory: cough, sputum, hemoptysis  GI: excessive thirst, changes in bowel habits, abdominal swelling  : painful urination, pyuria, incontinence  Musculoskeletal: cramps, swelling, limitation of motor activity  Integumentary: cyanosis, changes in skin, dryness  Neurological: paralysis, tingling, tremors  Psychiatric: restlessness, irritability, mood swings  Endocrine: heat/cold intolerance, excessive sweating, hair loss  Hematologic/lymphatic: swollen glands, anemia, easy bruising/bleeding      Physical Examination:    BP (!) 166/80 (Site: Right Upper Arm, Position: Sitting, Cuff Size: Large Adult)   Pulse 82   Temp (!) 96.2 °F (35.7 °C) (Temporal)   Ht 5' 1.5\" (1.562 m)   Wt 165 lb (74.8 kg)   SpO2 97%   BMI 30.67 kg/m²      BP RUE: as above  BP LUE: 160/80   Weight: 165 lb (74.8 kg)   General appearance: NAD, well nourished  Eyes: anicteric, PERRLA  ENMT: no scars or lesions, no nasal deformity, normal dentition, no cyanosis of oral mucosa  Neck: no masses, no thyroid enlargement, no JVD. Respiratory: effort is unlabored, symmetric, no crackles, wheezes or rubs. No palpable/percussable abnormalities. Cardiovascular: regular, no murmur. PMI normal, no thrill. N+ right carotid bruit. No edema or varicosities. Abdominal aorta cannot be appreciated given body habitus. Pulses:    carotid brachial radial femoral popliteal DP PT   RIGHT   2+       LEFT   2+       GI: abdomen soft, nondistended, no organomegaly. No masses. Lymphatic: no cervical/supraclavicular adenopathy  Musculoskeletal: strength and tone normal. Full ROM. No scoliosis. Extremities: warm and pink. No clubbing or petechiae. Skin: no dermatitis or ulceration. No nodularity or induration. Neuro: CN grossly intact. Sensation and motor function grossly intact. Psychiatric: oriented, appropriate mood/affect. MEDICAL DECISION MAKING/TESTING  Studies personally reviewed. Cath: 1/13/05 ([address])  Nonobstructive  (Cannot see report)    Echo: 3/2/22  1. Left ventricle: The cavity size was normal. Wall thickness was      normal. Systolic function was normal. The estimated ejection      fraction was in the range of 55% to 60%. Wall motion was normal;      there were no regional wall motion abnormalities. Doppler      parameters are consistent with abnormal left ventricular      relaxation (grade 1 diastolic dysfunction). 2. Right ventricle:  The cavity size was normal. Wall thickness was      normal. Systolic function was normal.   3. Pulmonary arteries: Estimated PA peak pressure is 20mm Hg (S). 4. Pericardium, extracardiac: There was no pericardial effusion. CXR: 6/17/22  There is a left subclavian cardiac pacemaker, with 2 lead wires. Cardiac   silhouette is normal.  There is moderate thoracic aortic calcification. Hilar contours are normal.  Known left upper lobe pulmonary nodule is   obscured on the series. There is no pneumothorax or pleural effusion. CT chest:   10/25/21  PULMONARY ARTERIES:  Enhance normally without filling defect or other evidence of pulmonary    embolism   LUNGS/AIRWAYS:  Bibasilar atelectasis. Pleural-based nodule within the left upper lobe    measuring approximately 9 mm. Bilateral emphysema   PLEURA: Unremarkable. No pleural effusion or pneumothorax   MEDIASTINUM/LISA:  Calcified right hilar lymph nodes compatible with old granulomatous disease   HEART/PERICARDIUM:  Unremarkable   VESSELS:  Scattered calcified and noncalcified atherosclerotic plaque   CHEST WALL/LOWER NECK:  Unremarkable   UPPER ABDOMEN:  Unremarkable   BONES:  No acute osseous findings. Multilevel thoracic degenerative changes     4/12/22  Lungs/pleura: The tracheobronchial tree is patent. There is no pneumothorax   or pleural effusion. Mild to moderate emphysema involves the bilateral   lungs. There is bibasilar scarring. Within the left upper lobe, there is an enlarging 1.2 x 1.5 cm solid   pulmonary nodule, previously measuring 8 mm. PET:   5/16/22  HEAD/NECK: Focal solid nodule is seen in the flooded gland on the right,   measuring 1.2 cm. This is hypermetabolic maximum SUV measuring 6.03       No hypermetabolic adenopathy is seen within the neck. CHEST: Solid nodule seen in left upper lobe on recent conventional CT scan is   hypermetabolic, maximum SUV measuring 18.84       No hypermetabolic nodules noted in the right lung.        There is mild underlying emphysema       No focal hypermetabolic mediastinal nodes are noted., with activity greater   than background. .  Coronary artery calcification is seen. ABDOMEN/PELVIS: No hypermetabolic adrenal nodules noted. No hypermetabolic   retroperitoneal adenopathy. Atherosclerotic change seen in abdominal aorta. No hypermetabolic   retroperitoneal adenopathy       Moderate stool seen in the colon. Scattered colonic diverticula are seen       BONES/SOFT TISSUE: Scattered areas of degenerative change are seen     ABIs: 7/12/22  R 0.79, L 0.86  Right common and superficial femoral artery stenoses measuring greater than    75%. Left SFA stenosis of greater than 50%. CTA neck: 4/12/22  1. Noncalcified atherosclerotic plaque is seen along the carotid bulbs   bilaterally, greater on the left. 2. Approximately 50% focal stenosis in the left proximal ICA, approximately 6   mm distal to the origin. 3. No significant stenosis in the right carotid or bilateral vertebral   arteries. MRI brain: 4/12/22  Mild chronic small vessel ischemic changes. No acute brain parenchymal   abnormality. Mild opacification of the mastoid air cells bilaterally.      PFT: 6/27/22  FVC 91, FEV1 93, DLCO 50% predicted      Labs:   CBC:   Lab Results   Component Value Date/Time    WBC 7.3 06/17/2022 08:44 AM    HGB 13.3 06/17/2022 08:44 AM    HCT 40.5 06/17/2022 08:44 AM    MCV 92.9 06/17/2022 08:44 AM     06/17/2022 08:44 AM     BMP:   Lab Results   Component Value Date/Time     06/17/2022 08:44 AM    K 4.3 06/17/2022 08:44 AM     06/17/2022 08:44 AM    CO2 20 06/17/2022 08:44 AM    PHOS 3.9 10/05/2021 12:20 PM    BUN 34 06/17/2022 08:44 AM    CREATININE 1.8 06/17/2022 08:44 AM    CALCIUM 9.9 06/17/2022 08:44 AM    MG 2.0 10/05/2021 12:20 PM     Cardiac Enzymes:   Lab Results   Component Value Date/Time    TROPONINI <0.01 04/10/2017 03:50 PM     PT/INR:   Lab Results   Component Value Date/Time    PROTIME 14.1 06/17/2022 08:44 AM    PROTIME 25.2 01/21/2016 12:15 PM    INR 1.10 06/17/2022 08:44 AM     APTT:   Lab Results   Component Value Date/Time    APTT 39.8 06/17/2022 08:44 AM     Liver Profile:  Lab Results   Component Value Date/Time    AST 13 10/05/2021 12:20 PM    ALT 11 10/05/2021 12:20 PM    BILITOT 0.3 10/05/2021 12:20 PM    ALKPHOS 84 10/05/2021 12:20 PM    LABALBU 3.9 10/05/2021 12:20 PM    LABALBU 3.9 10/05/2021 12:20 PM     Lab Results   Component Value Date/Time    CHOL 144 12/18/2018 10:02 AM    HDL 31 01/12/2021 12:47 PM    HDL 35 11/30/2011 10:30 AM    TRIG 141 12/18/2018 10:02 AM     TSH:  No results found for: TSH  HgbA1c:  Lab Results   Component Value Date/Time    LABA1C 5.8 10/05/2021 12:20 PM     UA:   Lab Results   Component Value Date/Time    COLORU YELLOW 04/10/2017 05:35 PM    PHUR 5.5 04/10/2017 05:35 PM    WBCUA 9 04/10/2017 05:35 PM    RBCUA 1 04/10/2017 05:35 PM    BACTERIA 1+ 04/10/2017 05:35 PM    CLARITYU Clear 04/10/2017 05:35 PM    SPECGRAV 1.006 04/10/2017 05:35 PM    LEUKOCYTESUR TRACE 04/10/2017 05:35 PM    UROBILINOGEN 0.2 04/10/2017 05:35 PM    BILIRUBINUR Negative 04/10/2017 05:35 PM    BLOODU Negative 04/10/2017 05:35 PM    GLUCOSEU Negative 04/10/2017 05:35 PM       History obtained: chart, pt    Risk factors: smoking    Diagnosis: LAKESHA nodule     Plan:   - LAKESHA nodule  First noted 10/2021 9mm, progressive, 4/2022 1.7cm. PET avid. Possible infectious, inflammatory and neoplastic etiologies, but most concerning for the latter given hx smoking and needle bx result. Proposed L VATS/poss thoracotomy approach to resection for definitive diagnosis. Nodule is peripheral and 1.7cm, should tolerate sublobar/lobar resection as indicated by frozen section. Typical periop/postop course reviewed including initial limitations on driving/heavy lifting. Risks, benefits and postoperative complications discussed including bleeding (plavix), infection, postop pulmonary and renal issues. Pt and son in agreement to proceed.   Will schedule PAT at [facility] since closer to home, surgery at [facility], [facility].      Margo Fournier MD  7/28/2022 3:33 PM - 4:07 PM chart review    Margo Fournier MD  7/29/2022  1:54 PM

## 2022-07-29 ENCOUNTER — OFFICE VISIT (OUTPATIENT)
Dept: CARDIOTHORACIC SURGERY | Age: 72
End: 2022-07-29
Payer: COMMERCIAL

## 2022-07-29 VITALS
HEIGHT: 62 IN | TEMPERATURE: 96.2 F | HEART RATE: 82 BPM | BODY MASS INDEX: 30.36 KG/M2 | DIASTOLIC BLOOD PRESSURE: 80 MMHG | OXYGEN SATURATION: 97 % | WEIGHT: 165 LBS | SYSTOLIC BLOOD PRESSURE: 166 MMHG

## 2022-07-29 DIAGNOSIS — R91.1 PULMONARY NODULE: Primary | ICD-10-CM

## 2022-07-29 DIAGNOSIS — N18.4 CKD (CHRONIC KIDNEY DISEASE) STAGE 4, GFR 15-29 ML/MIN (HCC): ICD-10-CM

## 2022-07-29 PROCEDURE — 1123F ACP DISCUSS/DSCN MKR DOCD: CPT | Performed by: THORACIC SURGERY (CARDIOTHORACIC VASCULAR SURGERY)

## 2022-07-29 PROCEDURE — 99204 OFFICE O/P NEW MOD 45 MIN: CPT | Performed by: THORACIC SURGERY (CARDIOTHORACIC VASCULAR SURGERY)

## 2022-07-29 NOTE — LETTER
07/29/22        [facility]  [address]  [address] [place] [address]        RE:    Josee Morfin,   [birth year]    Dear Dr. Sondra Mariscal,    It was my pleasure to see your patient, Josee Morfin, in the office today with regard to LAKESHA nodule. I have attached a copy of the office evaluation. Thank you for allowing me to participate in the care of this patient. Sincerely,     Dread Logan MD, MD    CC: Francie Rodriguez MD  [address]ia [address] [address]  V[address] [facility]  [address]  [address] [phone]  Via In [address], [place]  [address] [facility]  [address] [facility] [address]  Via In [facility], [address] [facility], Suite  [facility] [phone]  Via In [place], [place]  [address]  [address] [facility] [phone]  Via In [place]  [address] [facility].  [address] [facility] In [place]  [address] [facility], [address] [place] [id number]  Via In [place]

## 2022-08-01 ASSESSMENT — ENCOUNTER SYMPTOMS
DIFFICULTY BREATHING: 1
WHEEZING: 1
CHEST TIGHTNESS: 0

## 2022-08-01 ASSESSMENT — COPD QUESTIONNAIRES: COPD: 1

## 2022-08-01 NOTE — PROGRESS NOTES
SUBJECTIVE:    Bimal Gerber is a 74 y.o. female who presents for a follow up visit. Chief Complaint   Patient presents with    Follow-up     Patient is here for a follow up. C/o nasal drainage, would like  Claritin refill. No lab. Hyperlipidemia  This is a chronic problem. The current episode started more than 1 year ago. The problem is controlled. Exacerbating diseases include obesity. Factors aggravating her hyperlipidemia include thiazides. Pertinent negatives include no chest pain. Current antihyperlipidemic treatment includes statins. The current treatment provides significant improvement of lipids. Compliance problems include adherence to exercise and adherence to diet. Risk factors for coronary artery disease include dyslipidemia, obesity, a sedentary lifestyle and post-menopausal.   Hypertension  This is a chronic problem. The current episode started more than 1 year ago. The problem is controlled. Pertinent negatives include no chest pain or headaches. Agents associated with hypertension include NSAIDs. Risk factors for coronary artery disease include obesity, sedentary lifestyle, dyslipidemia and post-menopausal state. Past treatments include diuretics and angiotensin blockers. The current treatment provides significant improvement. Compliance problems include exercise and diet. Hypertensive end-organ damage includes CAD/MI and PVD. COPD  She complains of difficulty breathing and wheezing. This is a chronic problem. The current episode started more than 1 year ago. The problem occurs intermittently. The problem has been waxing and waning. Associated symptoms include nasal congestion, rhinorrhea and sneezing. Pertinent negatives include no chest pain or headaches. Her symptoms are aggravated by exercise, change in weather and URI. Risk factors for lung disease include smoking/tobacco exposure. Her past medical history is significant for emphysema.    Coronary Artery Disease  Presents for follow-up visit. Pertinent negatives include no chest pain, chest pressure, chest tightness, dizziness or leg swelling. Risk factors include hyperlipidemia and obesity. The symptoms have been stable. Compliance with diet is variable. Compliance with exercise is poor. Compliance with medications is good. Sinus Problem  This is a recurrent problem. The current episode started more than 1 year ago. The problem has been waxing and waning since onset. There has been no fever. The pain is mild. Associated symptoms include congestion and sneezing. Pertinent negatives include no headaches. Treatments tried: Claritin. The treatment provided moderate relief. Patient's medications, allergies, past medical,surgical, social and family histories were reviewed and updated as appropriate. Past Medical History:   Diagnosis Date    CAD (coronary artery disease)     NONOBSTRUCTING    CKD (chronic kidney disease) stage 3, GFR 30-59 ml/min ([facility])     Lumbago     Osteoarthrosis, unspecified whether generalized or localized, unspecified site     Other abnormal blood chemistry     PAD (peripheral artery disease) ([facility])     has stents in lower legs bilaterally implanted by Dr. Melissa Amato    Pure hypercholesterolemia     Risk for falls 02/02/2016    S/p Left TKR    Spinal stenosis     Tremor     Unspecified essential hypertension      Past Surgical History:   Procedure Laterality Date    CHOLECYSTECTOMY      COLONOSCOPY      CT NEEDLE BIOPSY LUNG PERCUTANEOUS  06/17/2022    CT NEEDLE BIOPSY LUNG PERCUTANEOUS 6/17/2022 [facility] CT SCAN    INSERTABLE CARDIAC MONITOR  09/2015    ILR at [facility]- implanted 10/02/2015    JOINT REPLACEMENT Left 20015    knee replacement    KNEE SURGERY      RT    LEG SURGERY      2 stents placed in right leg, 1 stent and balloon in left leg.  LSFA stenting 03/02/2022    PACEMAKER INSERTION  05/27/2022    biV ([employer])    ROTATOR CUFF REPAIR      SHOULDER ARTHROSCOPY Left 08/21/2020    LEFT SHOULDER ARTHROSCOPY, SUBACROMINAL DECOMPRESSION, DISTAL CLAVICLE [address], ROTATOR CUFF REPAIR, AUGMENTATION performed by Damari Allen DO at [address] Left 2016    Dr Slade Manual BIOPSY OF SALIVARY GLAND  2022     BIOPSY OF SALIVARY GLAND 2022 [facility] ULTRASOUND     Family History   Problem Relation Age of Onset    Cancer Mother         cancer of [place]    Heart Disease Father          of MI age 71 - smoker,     Diabetes Sister     Hypertension Sister     Cancer Brother     Emphysema Brother     Stroke Brother     Diabetes Brother     Heart Disease Brother     Hypertension Brother     High Cholesterol Neg Hx     High Blood Pressure Neg Hx      Social History     Tobacco Use    Smoking status: Every Day     Packs/day: 0.50     Years: 40.00     Pack years: 20.00     Types: Cigarettes     Last attempt to quit: 2015     Years since quittin.0    Smokeless tobacco: Never    Tobacco comments:     <1 pack of cigarettes per day     Started at age 21   Substance Use Topics    Alcohol use: No     Comment: glass of wine once a month      Allergies   Allergen Reactions    Ace Inhibitors Other (See Comments)     Cough     Chantix [Varenicline]      depression    Keflex [Cephalexin] Diarrhea    Morphine      Causes mental impairment,  N&V    Niaspan [Niacin Er] Other (See Comments)     Skin irritation    Pletal [Cilostazol]      Headache and diarrhea    Pravastatin      Current Outpatient Medications on File Prior to Visit   Medication Sig Dispense Refill    celecoxib (CELEBREX) 200 MG capsule TAKE 1 CAPSULE BY MOUTH DAILY 30 capsule 3    atorvastatin (LIPITOR) 10 MG tablet TAKE 1 TABLET BY MOUTH DAILY 90 tablet 3    clopidogrel (PLAVIX) 75 MG tablet TAKE 1 TABLET BY MOUTH DAILY *NEED TO ESTABLISH WITH A NEW PROVIDER FOR FUTURE REFILLS* 90 tablet 3    losartan-hydroCHLOROthiazide (HYZAAR) 100-25 MG per tablet TAKE 1 TABLET BY MOUTH DAILY 90 tablet 3     No current facility-administered medications on file prior to visit. Review of Systems   Constitutional:  Negative for activity change and fatigue. HENT:  Positive for congestion, rhinorrhea and sneezing. Eyes:  Positive for itching. Respiratory:  Positive for wheezing. Negative for chest tightness. Cardiovascular:  Negative for chest pain and leg swelling. Gastrointestinal:  Positive for diarrhea. Negative for constipation. Genitourinary:  Negative for difficulty urinating. Musculoskeletal:  Negative for arthralgias and back pain. Neurological:  Negative for dizziness, light-headedness and headaches. Psychiatric/Behavioral:  Negative for dysphoric mood and sleep disturbance. The patient is not nervous/anxious. OBJECTIVE:    /84   Temp 97.3 °F (36.3 °C)   Wt 162 lb (73.5 kg)   BMI 30.11 kg/m²    Physical Exam  Constitutional:       Appearance: She is well-developed. HENT:      Head: Normocephalic and atraumatic. Right Ear: External ear normal. A middle ear effusion is present. Tympanic membrane is scarred. Left Ear: External ear normal. A middle ear effusion is present. Tympanic membrane is scarred. Nose: Nose normal.   Eyes:      General:         Right eye: No discharge. Conjunctiva/sclera: Conjunctivae normal.   Neck:      Thyroid: No thyromegaly. Vascular: No JVD. Trachea: No tracheal deviation. Cardiovascular:      Rate and Rhythm: Normal rate and regular rhythm. Heart sounds: Normal heart sounds. Pulmonary:      Effort: Pulmonary effort is normal. No respiratory distress. Breath sounds: Normal breath sounds. No rales. Musculoskeletal:      Cervical back: Normal range of motion and neck supple. Lymphadenopathy:      Cervical: No cervical adenopathy. Skin:     General: Skin is warm and dry. Neurological:      Mental Status: She is alert and oriented to person, place, and time.    Psychiatric:         Mood and Affect: Mood normal.         Behavior: Behavior normal.       ASSESSMENT/PLAN:    Nagi was seen today for follow-up. Diagnoses and all orders for this visit:    Seasonal allergic rhinitis due to pollen  Symptoms and control. Patient is encouraged to stop smoking.  -     loratadine (CLARITIN) 10 MG tablet; Take 1 tablet by mouth in the morning. Diarrhea, unspecified type  Patient with a history of IBS. Symptoms under fairly good control with the use of dicyclomine as needed. -     dicyclomine (BENTYL) 10 MG capsule; Take 1 capsule by mouth in the morning. Pure hypercholesterolemia  -     Comprehensive Metabolic Panel, Fasting; Future  -     CBC with Auto Differential; Future  -     Lipid, Fasting; Future  -     TSH with Reflex; Future    Pulmonary nodule  Patient has a left upper lung nodule measuring 9 mm. PET scan was abnormal.  With her history of smoking this is concerning. She also had a concerning needle biopsy done. With this in mind she is scheduled for upper lobe breast section to be done by Dora Mcmillan. Return in about 6 months (around 2/3/2023). Please note portions of this note were completed with a voicerecognition program.  Efforts were made to edit the dictations but occasionally words are mis-transcribed.

## 2022-08-02 ENCOUNTER — PREP FOR PROCEDURE (OUTPATIENT)
Dept: CARDIOTHORACIC SURGERY | Age: 72
End: 2022-08-02

## 2022-08-02 DIAGNOSIS — Z01.818 PRE-OP EXAM: Primary | ICD-10-CM

## 2022-08-02 RX ORDER — SODIUM CHLORIDE 9 MG/ML
INJECTION, SOLUTION INTRAVENOUS CONTINUOUS
Status: CANCELLED | OUTPATIENT
Start: 2022-08-02

## 2022-08-02 RX ORDER — SODIUM CHLORIDE 0.9 % (FLUSH) 0.9 %
5-40 SYRINGE (ML) INJECTION PRN
Status: CANCELLED | OUTPATIENT
Start: 2022-08-02

## 2022-08-02 RX ORDER — CHLORHEXIDINE GLUCONATE 4 G/100ML
SOLUTION TOPICAL ONCE
Status: CANCELLED | OUTPATIENT
Start: 2022-08-02 | End: 2022-08-02

## 2022-08-02 RX ORDER — SODIUM CHLORIDE 9 MG/ML
INJECTION, SOLUTION INTRAVENOUS PRN
Status: CANCELLED | OUTPATIENT
Start: 2022-08-02

## 2022-08-02 RX ORDER — SODIUM CHLORIDE 0.9 % (FLUSH) 0.9 %
5-40 SYRINGE (ML) INJECTION EVERY 12 HOURS SCHEDULED
Status: CANCELLED | OUTPATIENT
Start: 2022-08-02

## 2022-08-02 RX ORDER — CHLORHEXIDINE GLUCONATE 0.12 MG/ML
15 RINSE ORAL ONCE
Status: CANCELLED | OUTPATIENT
Start: 2022-08-02 | End: 2022-08-02

## 2022-08-03 ENCOUNTER — OFFICE VISIT (OUTPATIENT)
Dept: FAMILY MEDICINE CLINIC | Age: 72
End: 2022-08-03
Payer: COMMERCIAL

## 2022-08-03 VITALS
WEIGHT: 162 LBS | TEMPERATURE: 97.3 F | SYSTOLIC BLOOD PRESSURE: 118 MMHG | BODY MASS INDEX: 30.11 KG/M2 | DIASTOLIC BLOOD PRESSURE: 84 MMHG

## 2022-08-03 DIAGNOSIS — R19.7 DIARRHEA, UNSPECIFIED TYPE: ICD-10-CM

## 2022-08-03 DIAGNOSIS — J30.1 SEASONAL ALLERGIC RHINITIS DUE TO POLLEN: Primary | ICD-10-CM

## 2022-08-03 DIAGNOSIS — R91.1 PULMONARY NODULE: ICD-10-CM

## 2022-08-03 DIAGNOSIS — E78.00 PURE HYPERCHOLESTEROLEMIA: ICD-10-CM

## 2022-08-03 PROCEDURE — 1123F ACP DISCUSS/DSCN MKR DOCD: CPT | Performed by: FAMILY MEDICINE

## 2022-08-03 PROCEDURE — 99214 OFFICE O/P EST MOD 30 MIN: CPT | Performed by: FAMILY MEDICINE

## 2022-08-03 RX ORDER — DICYCLOMINE HYDROCHLORIDE 10 MG/1
10 CAPSULE ORAL DAILY
Qty: 90 CAPSULE | Refills: 3 | Status: SHIPPED | OUTPATIENT
Start: 2022-08-03

## 2022-08-03 RX ORDER — LORATADINE 10 MG/1
10 TABLET ORAL DAILY
Qty: 90 TABLET | Refills: 3 | Status: SHIPPED | OUTPATIENT
Start: 2022-08-03

## 2022-08-03 SDOH — ECONOMIC STABILITY: FOOD INSECURITY: WITHIN THE PAST 12 MONTHS, YOU WORRIED THAT YOUR FOOD WOULD RUN OUT BEFORE YOU GOT MONEY TO BUY MORE.: SOMETIMES TRUE

## 2022-08-03 SDOH — ECONOMIC STABILITY: FOOD INSECURITY: WITHIN THE PAST 12 MONTHS, THE FOOD YOU BOUGHT JUST DIDN'T LAST AND YOU DIDN'T HAVE MONEY TO GET MORE.: SOMETIMES TRUE

## 2022-08-03 ASSESSMENT — SOCIAL DETERMINANTS OF HEALTH (SDOH): HOW HARD IS IT FOR YOU TO PAY FOR THE VERY BASICS LIKE FOOD, HOUSING, MEDICAL CARE, AND HEATING?: NOT HARD AT ALL

## 2022-08-03 ASSESSMENT — ENCOUNTER SYMPTOMS
CONSTIPATION: 0
RHINORRHEA: 1
SINUS COMPLAINT: 1
BACK PAIN: 0
DIARRHEA: 1
EYE ITCHING: 1

## 2022-08-03 NOTE — PROGRESS NOTES
Office notified PAT Dept to state that patient is getting Pre-Admission Testing done in [facility].  PAT needs to do a phone interview on patient per Dr. Nano Mello office

## 2022-08-11 NOTE — PROGRESS NOTES
Kristine Trinidad 8 Drug therapy change request filled out and faxed on 7/18/22. Scanned to encounter.

## 2022-08-12 NOTE — PROGRESS NOTES
[facility] CARDIOVASCULAR AND THORACIC SURGEONS    PREOPERATIVE INSTRUCTIONS    Arrival time ___1030_________        Surgery time_____1230_______    Patricia Meadows do not need to see your primary care physician (PCP) for a history and physical prior to your surgery. If you are having heart surgery, stop taking your ACE (e.g. lisinopril, enalapril, ramipril, benazepril) or ARB (e.g. candesartan, losartan, olmesartan, valsartan) 2 days (48 hours) prior to your surgery. If you are having heart or lung surgery, stop taking your Aspirin 1 day (24 hours) prior to your surgery. You will need to stop blood thinners such as: clopidogrel (Plavix), ticagrelor (Brilinta), prasugrel (Effient), warfarin (Coumadin), dabigatran (Pradaxa), rivaroxaban (Xarelto), apixaban (Eliquis), edoxaban (Savaysa), and enoxaparin (Lovenox). If you have not been given instructions, please contact the cardiac surgeons' office at [phone]. Stop all over the counter blood thinners such as: ibuprofen, Advil, naproxen 1 day (24 hours) prior to your surgery. We also ask that you stop any over the counter medications such as fish oil, vitamin E, glucosamine, and garlic 1 day (24 hours). You may use your inhaler and take any seizure medication the morning (day) of your surgery. If you take sildenafil (Viagra, Revatio), tadalafil (Cialis, Adcirca), vardenafil (Levitra, Staxyn), or avanafil Sherleen Bucco), please contact the cardiac surgeons' office at [phone] to discuss when these will need to be stopped prior to surgery. If you are diabetic and take long-acting insulin, take half of your usual dose the night or evening prior to your surgery. Take no insulin the morning of your surgery. If you are diabetic and take oral medication, do not take these medications the evening prior to or the morning of your surgery.      Take only the following medications the morning of surgery with a sip of water: Beta Blocker (e.g.  metoprolol, carvedilol or atenolol) and/or rate or rhythm controlling heart medications (e.g. diltiazem and amiodarone). Do not eat or drink anything after 12:00 midnight prior to your surgery. This includes water, chewing gum, mints and ice chips. You may brush your teeth and gargle the morning of your surgery, but do not swallow the water. Please do not smoke 24 hours prior to surgery    Please do not drink any alcoholic beverages or chamomile tea 24 hours prior to surgery    For your comfort, please wear simple loose fitting clothing to the hospital.  Please do not bring valuables. Do not wear any make-up or nail polish on your fingers or toes    For your safety, please do not wear any jewelry or body piercings on the day of surgery. All jewelry must be removed. If you have dentures, they will be removed before going to operating room. For your convenience, we will provide you with a container. If you wear contact lenses or glasses, they will be removed, please bring a case for them. If you have a living will and a durable power of  for healthcare, please bring in a copy. As part of our patient safety program to minimize surgical site infections, we ask you to do the following:    Please notify your surgeon if you develop any illness between now and the day of your surgery. This includes a cough, cold, fever, sore throat, nausea, or vomiting, and diarrhea, etc.  Please notify your surgeon if you experience dizziness, shortness of breath or blurred vision between now and the time of your surgery. Do not shave your chest, legs, or arms for 96 hours prior to surgery. An electric razor may be used on your neck and face for up to 48 hours prior to surgery. Shower the night before surgery with the bottle of Hibiclens that has been provided.      You will need to bring a photo ID and insurance card    If you use a C-pap machine, please bring your C-pap machine with you to the hospital the day of surgery. Please contact the surgeon's office at [phone] if you have any further questions regarding your surgery.

## 2022-08-15 ENCOUNTER — HOSPITAL ENCOUNTER (OUTPATIENT)
Age: 72
Discharge: HOME | End: 2022-08-15
Payer: COMMERCIAL

## 2022-08-15 DIAGNOSIS — Z01.818 PRE-OP TESTING: Primary | ICD-10-CM

## 2022-08-15 DIAGNOSIS — Z01.818 PRE-OP TESTING: ICD-10-CM

## 2022-08-15 LAB
ABO/RH: NORMAL
ALBUMIN SERPL-MCNC: 4 G/DL (ref 3.4–5)
ANION GAP SERPL CALCULATED.3IONS-SCNC: 14 MMOL/L (ref 3–16)
ANTIBODY SCREEN: NORMAL
APTT: 40.1 SEC (ref 23–34.3)
BACTERIA: ABNORMAL /HPF
BASE EXCESS ARTERIAL: -3.3 MMOL/L (ref -3–3)
BASOPHILS ABSOLUTE: 0.1 K/UL (ref 0–0.2)
BASOPHILS RELATIVE PERCENT: 1.2 %
BILIRUBIN URINE: NEGATIVE
BLOOD, URINE: NEGATIVE
BUN BLDV-MCNC: 35 MG/DL (ref 7–20)
CALCIUM SERPL-MCNC: 9.6 MG/DL (ref 8.3–10.6)
CARBOXYHEMOGLOBIN ARTERIAL: 5.9 % (ref 0–1.5)
CHLORIDE BLD-SCNC: 109 MMOL/L (ref 99–110)
CLARITY: CLEAR
CO2: 21 MMOL/L (ref 21–32)
COLOR: YELLOW
CREAT SERPL-MCNC: 1.3 MG/DL (ref 0.6–1.2)
EOSINOPHILS ABSOLUTE: 0.2 K/UL (ref 0–0.6)
EOSINOPHILS RELATIVE PERCENT: 3.6 %
EPITHELIAL CELLS, UA: 2 /HPF (ref 0–5)
GFR AFRICAN AMERICAN: 48
GFR NON-AFRICAN AMERICAN: 40
GLUCOSE BLD-MCNC: 88 MG/DL (ref 70–99)
GLUCOSE URINE: NEGATIVE MG/DL
HCO3 ARTERIAL: 21.2 MMOL/L (ref 21–29)
HCT VFR BLD CALC: 39.4 % (ref 36–48)
HEMOGLOBIN, ART, EXTENDED: 13.4 G/DL (ref 12–16)
HEMOGLOBIN: 13.3 G/DL (ref 12–16)
HYALINE CASTS: 0 /LPF (ref 0–8)
INR BLD: 1.07 (ref 0.87–1.14)
KETONES, URINE: NEGATIVE MG/DL
LEUKOCYTE ESTERASE, URINE: ABNORMAL
LYMPHOCYTES ABSOLUTE: 1.7 K/UL (ref 1–5.1)
LYMPHOCYTES RELATIVE PERCENT: 25.4 %
MCH RBC QN AUTO: 31.5 PG (ref 26–34)
MCHC RBC AUTO-ENTMCNC: 33.7 G/DL (ref 31–36)
MCV RBC AUTO: 93.5 FL (ref 80–100)
METHEMOGLOBIN ARTERIAL: 0.4 %
MICROSCOPIC EXAMINATION: YES
MONOCYTES ABSOLUTE: 0.5 K/UL (ref 0–1.3)
MONOCYTES RELATIVE PERCENT: 7.6 %
NEUTROPHILS ABSOLUTE: 4.2 K/UL (ref 1.7–7.7)
NEUTROPHILS RELATIVE PERCENT: 62.2 %
NITRITE, URINE: NEGATIVE
O2 SAT, ARTERIAL: 98.2 %
O2 THERAPY: ABNORMAL
PCO2 ARTERIAL: 35.9 MMHG (ref 35–45)
PDW BLD-RTO: 14.6 % (ref 12.4–15.4)
PH ARTERIAL: 7.38 (ref 7.35–7.45)
PH UA: 6 (ref 5–8)
PHOSPHORUS: 3.4 MG/DL (ref 2.5–4.9)
PLATELET # BLD: 205 K/UL (ref 135–450)
PMV BLD AUTO: 9.6 FL (ref 5–10.5)
PO2 ARTERIAL: 103 MMHG (ref 75–108)
POTASSIUM SERPL-SCNC: 4.6 MMOL/L (ref 3.5–5.1)
PROTEIN UA: NEGATIVE MG/DL
PROTHROMBIN TIME: 13.9 SEC (ref 11.7–14.5)
RBC # BLD: 4.22 M/UL (ref 4–5.2)
RBC UA: 0 /HPF (ref 0–4)
SODIUM BLD-SCNC: 144 MMOL/L (ref 136–145)
SPECIFIC GRAVITY UA: 1.01 (ref 1–1.03)
TCO2 ARTERIAL: 50 MMOL/L
URINE REFLEX TO CULTURE: ABNORMAL
URINE TYPE: ABNORMAL
UROBILINOGEN, URINE: 0.2 E.U./DL
WBC # BLD: 6.7 K/UL (ref 4–11)
WBC UA: 6 /HPF (ref 0–5)

## 2022-08-15 PROCEDURE — 36600 WITHDRAWAL OF ARTERIAL BLOOD: CPT

## 2022-08-15 PROCEDURE — 81001 URINALYSIS AUTO W/SCOPE: CPT

## 2022-08-15 PROCEDURE — 86850 RBC ANTIBODY SCREEN: CPT

## 2022-08-15 PROCEDURE — 82803 BLOOD GASES ANY COMBINATION: CPT

## 2022-08-15 PROCEDURE — 86900 BLOOD TYPING SEROLOGIC ABO: CPT

## 2022-08-15 PROCEDURE — 85025 COMPLETE CBC W/AUTO DIFF WBC: CPT

## 2022-08-15 PROCEDURE — 80069 RENAL FUNCTION PANEL: CPT

## 2022-08-15 PROCEDURE — 86901 BLOOD TYPING SEROLOGIC RH(D): CPT

## 2022-08-15 PROCEDURE — 85730 THROMBOPLASTIN TIME PARTIAL: CPT

## 2022-08-15 PROCEDURE — 36415 COLL VENOUS BLD VENIPUNCTURE: CPT

## 2022-08-15 PROCEDURE — 85610 PROTHROMBIN TIME: CPT

## 2022-08-15 RX ORDER — SODIUM CHLORIDE 0.9 % (FLUSH) 0.9 %
5-40 SYRINGE (ML) INJECTION EVERY 12 HOURS SCHEDULED
Status: CANCELLED | OUTPATIENT
Start: 2022-08-15

## 2022-08-15 RX ORDER — SODIUM CHLORIDE 9 MG/ML
INJECTION, SOLUTION INTRAVENOUS PRN
Status: CANCELLED | OUTPATIENT
Start: 2022-08-15

## 2022-08-15 RX ORDER — SODIUM CHLORIDE 0.9 % (FLUSH) 0.9 %
5-40 SYRINGE (ML) INJECTION PRN
Status: CANCELLED | OUTPATIENT
Start: 2022-08-15

## 2022-08-15 RX ORDER — SODIUM CHLORIDE 9 MG/ML
INJECTION, SOLUTION INTRAVENOUS CONTINUOUS
Status: CANCELLED | OUTPATIENT
Start: 2022-08-15

## 2022-08-15 RX ORDER — CHLORHEXIDINE GLUCONATE 4 G/100ML
SOLUTION TOPICAL ONCE
Status: CANCELLED | OUTPATIENT
Start: 2022-08-15 | End: 2022-08-15

## 2022-08-15 RX ORDER — CHLORHEXIDINE GLUCONATE 0.12 MG/ML
15 RINSE ORAL ONCE
Status: CANCELLED | OUTPATIENT
Start: 2022-08-15 | End: 2022-08-15

## 2022-08-15 RX ORDER — VANCOMYCIN HYDROCHLORIDE 500 MG/10ML
1000 INJECTION, POWDER, LYOPHILIZED, FOR SOLUTION INTRAVENOUS ONCE
Status: CANCELLED | OUTPATIENT
Start: 2022-08-15 | End: 2022-08-15

## 2022-08-15 NOTE — PROGRESS NOTES
Prep for procedure orders pended and awaiting signature. Pt at hospital to have testing completed. Dr. Turner De La Vega remains in surgery this am. Orders placed to allow pt to get testing completed.

## 2022-08-15 NOTE — PROGRESS NOTES
ABG drawn x  1 attempt(s) from Right Radial. Patient had positive modified Evelio's Test with good collateral circulation. Patient was on room air at time of puncture. Pressure held for 5 minutes. No bleeding or bruising noted at puncture site.   Patient tolerated procedure well

## 2022-08-16 ENCOUNTER — PREP FOR PROCEDURE (OUTPATIENT)
Dept: CARDIOTHORACIC SURGERY | Age: 72
End: 2022-08-16

## 2022-08-17 ENCOUNTER — APPOINTMENT (OUTPATIENT)
Dept: GENERAL RADIOLOGY | Age: 72
DRG: 164 | End: 2022-08-17
Attending: THORACIC SURGERY (CARDIOTHORACIC VASCULAR SURGERY)
Payer: COMMERCIAL

## 2022-08-17 ENCOUNTER — ANESTHESIA (OUTPATIENT)
Dept: OPERATING ROOM | Age: 72
DRG: 164 | End: 2022-08-17
Payer: COMMERCIAL

## 2022-08-17 ENCOUNTER — ANESTHESIA EVENT (OUTPATIENT)
Dept: OPERATING ROOM | Age: 72
DRG: 164 | End: 2022-08-17
Payer: COMMERCIAL

## 2022-08-17 ENCOUNTER — HOSPITAL ENCOUNTER (INPATIENT)
Age: 72
LOS: 5 days | Discharge: HOME | DRG: 164 | End: 2022-08-22
Attending: THORACIC SURGERY (CARDIOTHORACIC VASCULAR SURGERY) | Admitting: THORACIC SURGERY (CARDIOTHORACIC VASCULAR SURGERY)
Payer: COMMERCIAL

## 2022-08-17 DIAGNOSIS — R91.1 NODULE OF UPPER LOBE OF LEFT LUNG: ICD-10-CM

## 2022-08-17 DIAGNOSIS — Z90.2 S/P LOBECTOMY OF LUNG: Primary | ICD-10-CM

## 2022-08-17 LAB
ABO/RH: NORMAL
ALBUMIN SERPL-MCNC: 3.5 G/DL (ref 3.4–5)
ALP BLD-CCNC: 74 U/L (ref 40–129)
ALT SERPL-CCNC: 9 U/L (ref 10–40)
ANION GAP SERPL CALCULATED.3IONS-SCNC: 14 MMOL/L (ref 3–16)
ANION GAP SERPL CALCULATED.3IONS-SCNC: 15 MMOL/L (ref 3–16)
ANTIBODY SCREEN: NORMAL
APTT: 39.6 SEC (ref 23–34.3)
AST SERPL-CCNC: 14 U/L (ref 15–37)
BASOPHILS ABSOLUTE: 0 K/UL (ref 0–0.2)
BASOPHILS RELATIVE PERCENT: 0.5 %
BILIRUB SERPL-MCNC: 0.3 MG/DL (ref 0–1)
BILIRUBIN DIRECT: <0.2 MG/DL (ref 0–0.3)
BILIRUBIN, INDIRECT: ABNORMAL MG/DL (ref 0–1)
BUN BLDV-MCNC: 24 MG/DL (ref 7–20)
BUN BLDV-MCNC: 29 MG/DL (ref 7–20)
CALCIUM SERPL-MCNC: 8.7 MG/DL (ref 8.3–10.6)
CALCIUM SERPL-MCNC: 9.4 MG/DL (ref 8.3–10.6)
CHLORIDE BLD-SCNC: 107 MMOL/L (ref 99–110)
CHLORIDE BLD-SCNC: 111 MMOL/L (ref 99–110)
CO2: 17 MMOL/L (ref 21–32)
CO2: 19 MMOL/L (ref 21–32)
CREAT SERPL-MCNC: 1.3 MG/DL (ref 0.6–1.2)
CREAT SERPL-MCNC: 1.3 MG/DL (ref 0.6–1.2)
EOSINOPHILS ABSOLUTE: 0.3 K/UL (ref 0–0.6)
EOSINOPHILS RELATIVE PERCENT: 4.7 %
GFR AFRICAN AMERICAN: 48
GFR AFRICAN AMERICAN: 48
GFR NON-AFRICAN AMERICAN: 40
GFR NON-AFRICAN AMERICAN: 40
GLUCOSE BLD-MCNC: 157 MG/DL (ref 70–99)
GLUCOSE BLD-MCNC: 96 MG/DL (ref 70–99)
HCT VFR BLD CALC: 38.4 % (ref 36–48)
HCT VFR BLD CALC: 39.2 % (ref 36–48)
HEMOGLOBIN: 12.8 G/DL (ref 12–16)
HEMOGLOBIN: 12.8 G/DL (ref 12–16)
INR BLD: 1.07 (ref 0.87–1.14)
INR BLD: 1.14 (ref 0.87–1.14)
LYMPHOCYTES ABSOLUTE: 1.7 K/UL (ref 1–5.1)
LYMPHOCYTES RELATIVE PERCENT: 23.9 %
MAGNESIUM: 1.9 MG/DL (ref 1.8–2.4)
MCH RBC QN AUTO: 31 PG (ref 26–34)
MCH RBC QN AUTO: 31.2 PG (ref 26–34)
MCHC RBC AUTO-ENTMCNC: 32.7 G/DL (ref 31–36)
MCHC RBC AUTO-ENTMCNC: 33.3 G/DL (ref 31–36)
MCV RBC AUTO: 93.6 FL (ref 80–100)
MCV RBC AUTO: 94.8 FL (ref 80–100)
MONOCYTES ABSOLUTE: 0.5 K/UL (ref 0–1.3)
MONOCYTES RELATIVE PERCENT: 7.6 %
NEUTROPHILS ABSOLUTE: 4.5 K/UL (ref 1.7–7.7)
NEUTROPHILS RELATIVE PERCENT: 63.3 %
PDW BLD-RTO: 14.7 % (ref 12.4–15.4)
PDW BLD-RTO: 14.9 % (ref 12.4–15.4)
PLATELET # BLD: 186 K/UL (ref 135–450)
PLATELET # BLD: 197 K/UL (ref 135–450)
PMV BLD AUTO: 8.7 FL (ref 5–10.5)
PMV BLD AUTO: 9.3 FL (ref 5–10.5)
POTASSIUM SERPL-SCNC: 3.9 MMOL/L (ref 3.5–5.1)
POTASSIUM SERPL-SCNC: 4.4 MMOL/L (ref 3.5–5.1)
PROTHROMBIN TIME: 13.9 SEC (ref 11.7–14.5)
PROTHROMBIN TIME: 14.5 SEC (ref 11.7–14.5)
RBC # BLD: 4.1 M/UL (ref 4–5.2)
RBC # BLD: 4.14 M/UL (ref 4–5.2)
SODIUM BLD-SCNC: 140 MMOL/L (ref 136–145)
SODIUM BLD-SCNC: 143 MMOL/L (ref 136–145)
TOTAL PROTEIN: 7.2 G/DL (ref 6.4–8.2)
WBC # BLD: 15.8 K/UL (ref 4–11)
WBC # BLD: 7.2 K/UL (ref 4–11)

## 2022-08-17 PROCEDURE — 38746 REMOVE THORACIC LYMPH NODES: CPT | Performed by: THORACIC SURGERY (CARDIOTHORACIC VASCULAR SURGERY)

## 2022-08-17 PROCEDURE — 3E0T3BZ INTRODUCTION OF ANESTHETIC AGENT INTO PERIPHERAL NERVES AND PLEXI, PERCUTANEOUS APPROACH: ICD-10-PCS | Performed by: THORACIC SURGERY (CARDIOTHORACIC VASCULAR SURGERY)

## 2022-08-17 PROCEDURE — 85027 COMPLETE CBC AUTOMATED: CPT

## 2022-08-17 PROCEDURE — 36415 COLL VENOUS BLD VENIPUNCTURE: CPT

## 2022-08-17 PROCEDURE — 7100000001 HC PACU RECOVERY - ADDTL 15 MIN

## 2022-08-17 PROCEDURE — 88305 TISSUE EXAM BY PATHOLOGIST: CPT

## 2022-08-17 PROCEDURE — 3600000005 HC SURGERY LEVEL 5 BASE: Performed by: THORACIC SURGERY (CARDIOTHORACIC VASCULAR SURGERY)

## 2022-08-17 PROCEDURE — 80076 HEPATIC FUNCTION PANEL: CPT

## 2022-08-17 PROCEDURE — 6360000002 HC RX W HCPCS: Performed by: NURSE ANESTHETIST, CERTIFIED REGISTERED

## 2022-08-17 PROCEDURE — 85025 COMPLETE CBC W/AUTO DIFF WBC: CPT

## 2022-08-17 PROCEDURE — 07B74ZX EXCISION OF THORAX LYMPHATIC, PERCUTANEOUS ENDOSCOPIC APPROACH, DIAGNOSTIC: ICD-10-PCS | Performed by: THORACIC SURGERY (CARDIOTHORACIC VASCULAR SURGERY)

## 2022-08-17 PROCEDURE — 7100000000 HC PACU RECOVERY - FIRST 15 MIN

## 2022-08-17 PROCEDURE — 88342 IMHCHEM/IMCYTCHM 1ST ANTB: CPT

## 2022-08-17 PROCEDURE — 6360000002 HC RX W HCPCS: Performed by: ANESTHESIOLOGY

## 2022-08-17 PROCEDURE — 2500000003 HC RX 250 WO HCPCS: Performed by: THORACIC SURGERY (CARDIOTHORACIC VASCULAR SURGERY)

## 2022-08-17 PROCEDURE — 71045 X-RAY EXAM CHEST 1 VIEW: CPT

## 2022-08-17 PROCEDURE — 88307 TISSUE EXAM BY PATHOLOGIST: CPT

## 2022-08-17 PROCEDURE — 83735 ASSAY OF MAGNESIUM: CPT

## 2022-08-17 PROCEDURE — C1713 ANCHOR/SCREW BN/BN,TIS/BN: HCPCS | Performed by: THORACIC SURGERY (CARDIOTHORACIC VASCULAR SURGERY)

## 2022-08-17 PROCEDURE — 6370000000 HC RX 637 (ALT 250 FOR IP): Performed by: NURSE PRACTITIONER

## 2022-08-17 PROCEDURE — 32507 WEDGE RESECT OF LUNG DIAG: CPT | Performed by: THORACIC SURGERY (CARDIOTHORACIC VASCULAR SURGERY)

## 2022-08-17 PROCEDURE — C1729 CATH, DRAINAGE: HCPCS | Performed by: THORACIC SURGERY (CARDIOTHORACIC VASCULAR SURGERY)

## 2022-08-17 PROCEDURE — 2500000003 HC RX 250 WO HCPCS: Performed by: NURSE PRACTITIONER

## 2022-08-17 PROCEDURE — 86901 BLOOD TYPING SEROLOGIC RH(D): CPT

## 2022-08-17 PROCEDURE — 88331 PATH CONSLTJ SURG 1 BLK 1SPC: CPT

## 2022-08-17 PROCEDURE — 2709999900 HC NON-CHARGEABLE SUPPLY: Performed by: THORACIC SURGERY (CARDIOTHORACIC VASCULAR SURGERY)

## 2022-08-17 PROCEDURE — 86850 RBC ANTIBODY SCREEN: CPT

## 2022-08-17 PROCEDURE — 0BTG4ZZ RESECTION OF LEFT UPPER LUNG LOBE, PERCUTANEOUS ENDOSCOPIC APPROACH: ICD-10-PCS | Performed by: THORACIC SURGERY (CARDIOTHORACIC VASCULAR SURGERY)

## 2022-08-17 PROCEDURE — 6360000002 HC RX W HCPCS: Performed by: THORACIC SURGERY (CARDIOTHORACIC VASCULAR SURGERY)

## 2022-08-17 PROCEDURE — 85610 PROTHROMBIN TIME: CPT

## 2022-08-17 PROCEDURE — 2720000010 HC SURG SUPPLY STERILE: Performed by: THORACIC SURGERY (CARDIOTHORACIC VASCULAR SURGERY)

## 2022-08-17 PROCEDURE — 3700000000 HC ANESTHESIA ATTENDED CARE: Performed by: THORACIC SURGERY (CARDIOTHORACIC VASCULAR SURGERY)

## 2022-08-17 PROCEDURE — 2580000003 HC RX 258: Performed by: NURSE PRACTITIONER

## 2022-08-17 PROCEDURE — 2100000000 HC CCU R&B

## 2022-08-17 PROCEDURE — 2580000003 HC RX 258: Performed by: THORACIC SURGERY (CARDIOTHORACIC VASCULAR SURGERY)

## 2022-08-17 PROCEDURE — 2500000003 HC RX 250 WO HCPCS: Performed by: NURSE ANESTHETIST, CERTIFIED REGISTERED

## 2022-08-17 PROCEDURE — 80048 BASIC METABOLIC PNL TOTAL CA: CPT

## 2022-08-17 PROCEDURE — 3600000015 HC SURGERY LEVEL 5 ADDTL 15MIN: Performed by: THORACIC SURGERY (CARDIOTHORACIC VASCULAR SURGERY)

## 2022-08-17 PROCEDURE — 94761 N-INVAS EAR/PLS OXIMETRY MLT: CPT

## 2022-08-17 PROCEDURE — C2618 PROBE/NEEDLE, CRYO: HCPCS | Performed by: THORACIC SURGERY (CARDIOTHORACIC VASCULAR SURGERY)

## 2022-08-17 PROCEDURE — 88341 IMHCHEM/IMCYTCHM EA ADD ANTB: CPT

## 2022-08-17 PROCEDURE — 0BBG4ZZ EXCISION OF LEFT UPPER LUNG LOBE, PERCUTANEOUS ENDOSCOPIC APPROACH: ICD-10-PCS | Performed by: THORACIC SURGERY (CARDIOTHORACIC VASCULAR SURGERY)

## 2022-08-17 PROCEDURE — 3700000001 HC ADD 15 MINUTES (ANESTHESIA): Performed by: THORACIC SURGERY (CARDIOTHORACIC VASCULAR SURGERY)

## 2022-08-17 PROCEDURE — 32480 PARTIAL REMOVAL OF LUNG: CPT | Performed by: THORACIC SURGERY (CARDIOTHORACIC VASCULAR SURGERY)

## 2022-08-17 PROCEDURE — 86900 BLOOD TYPING SEROLOGIC ABO: CPT

## 2022-08-17 PROCEDURE — 2700000000 HC OXYGEN THERAPY PER DAY

## 2022-08-17 PROCEDURE — 6370000000 HC RX 637 (ALT 250 FOR IP): Performed by: ANESTHESIOLOGY

## 2022-08-17 PROCEDURE — 6370000000 HC RX 637 (ALT 250 FOR IP): Performed by: THORACIC SURGERY (CARDIOTHORACIC VASCULAR SURGERY)

## 2022-08-17 PROCEDURE — 85730 THROMBOPLASTIN TIME PARTIAL: CPT

## 2022-08-17 RX ORDER — SUCCINYLCHOLINE CHLORIDE 20 MG/ML
INJECTION INTRAMUSCULAR; INTRAVENOUS PRN
Status: DISCONTINUED | OUTPATIENT
Start: 2022-08-17 | End: 2022-08-17 | Stop reason: SDUPTHER

## 2022-08-17 RX ORDER — DIPHENHYDRAMINE HYDROCHLORIDE 50 MG/ML
25 INJECTION INTRAMUSCULAR; INTRAVENOUS EVERY 6 HOURS PRN
Status: DISCONTINUED | OUTPATIENT
Start: 2022-08-17 | End: 2022-08-18

## 2022-08-17 RX ORDER — CHLORHEXIDINE GLUCONATE 0.12 MG/ML
15 RINSE ORAL ONCE
Status: COMPLETED | OUTPATIENT
Start: 2022-08-17 | End: 2022-08-17

## 2022-08-17 RX ORDER — FENTANYL CITRATE 50 UG/ML
INJECTION, SOLUTION INTRAMUSCULAR; INTRAVENOUS PRN
Status: DISCONTINUED | OUTPATIENT
Start: 2022-08-17 | End: 2022-08-17 | Stop reason: SDUPTHER

## 2022-08-17 RX ORDER — PHENYLEPHRINE HCL IN 0.9% NACL 1 MG/10 ML
SYRINGE (ML) INTRAVENOUS PRN
Status: DISCONTINUED | OUTPATIENT
Start: 2022-08-17 | End: 2022-08-17 | Stop reason: SDUPTHER

## 2022-08-17 RX ORDER — ROCURONIUM BROMIDE 10 MG/ML
INJECTION, SOLUTION INTRAVENOUS PRN
Status: DISCONTINUED | OUTPATIENT
Start: 2022-08-17 | End: 2022-08-17 | Stop reason: SDUPTHER

## 2022-08-17 RX ORDER — POLYETHYLENE GLYCOL 3350 17 G/17G
17 POWDER, FOR SOLUTION ORAL DAILY PRN
Status: DISCONTINUED | OUTPATIENT
Start: 2022-08-17 | End: 2022-08-18

## 2022-08-17 RX ORDER — POLYETHYLENE GLYCOL 3350 17 G/17G
17 POWDER, FOR SOLUTION ORAL DAILY PRN
Status: DISCONTINUED | OUTPATIENT
Start: 2022-08-17 | End: 2022-08-17

## 2022-08-17 RX ORDER — ONDANSETRON 2 MG/ML
INJECTION INTRAMUSCULAR; INTRAVENOUS PRN
Status: DISCONTINUED | OUTPATIENT
Start: 2022-08-17 | End: 2022-08-17 | Stop reason: SDUPTHER

## 2022-08-17 RX ORDER — CHLORHEXIDINE GLUCONATE 4 G/100ML
SOLUTION TOPICAL SEE ADMIN INSTRUCTIONS
Status: DISCONTINUED | OUTPATIENT
Start: 2022-08-17 | End: 2022-08-17

## 2022-08-17 RX ORDER — SODIUM CHLORIDE 9 MG/ML
INJECTION, SOLUTION INTRAVENOUS PRN
Status: DISCONTINUED | OUTPATIENT
Start: 2022-08-17 | End: 2022-08-22 | Stop reason: HOSPADM

## 2022-08-17 RX ORDER — ONDANSETRON 4 MG/1
4 TABLET, FILM COATED ORAL ONCE
Status: COMPLETED | OUTPATIENT
Start: 2022-08-17 | End: 2022-08-17

## 2022-08-17 RX ORDER — METHOCARBAMOL 750 MG/1
750 TABLET, FILM COATED ORAL 4 TIMES DAILY
Status: DISCONTINUED | OUTPATIENT
Start: 2022-08-17 | End: 2022-08-22 | Stop reason: HOSPADM

## 2022-08-17 RX ORDER — NALOXONE HYDROCHLORIDE 0.4 MG/ML
INJECTION, SOLUTION INTRAMUSCULAR; INTRAVENOUS; SUBCUTANEOUS PRN
Status: DISCONTINUED | OUTPATIENT
Start: 2022-08-17 | End: 2022-08-17

## 2022-08-17 RX ORDER — ONDANSETRON 2 MG/ML
4 INJECTION INTRAMUSCULAR; INTRAVENOUS
Status: COMPLETED | OUTPATIENT
Start: 2022-08-17 | End: 2022-08-17

## 2022-08-17 RX ORDER — HYDRALAZINE HYDROCHLORIDE 20 MG/ML
5 INJECTION INTRAMUSCULAR; INTRAVENOUS
Status: DISCONTINUED | OUTPATIENT
Start: 2022-08-17 | End: 2022-08-19

## 2022-08-17 RX ORDER — ACETAMINOPHEN 500 MG
1000 TABLET ORAL EVERY 6 HOURS
Status: DISCONTINUED | OUTPATIENT
Start: 2022-08-17 | End: 2022-08-22 | Stop reason: HOSPADM

## 2022-08-17 RX ORDER — ATORVASTATIN CALCIUM 10 MG/1
10 TABLET, FILM COATED ORAL DAILY
Status: DISCONTINUED | OUTPATIENT
Start: 2022-08-18 | End: 2022-08-22 | Stop reason: HOSPADM

## 2022-08-17 RX ORDER — SODIUM CHLORIDE 9 MG/ML
INJECTION, SOLUTION INTRAVENOUS CONTINUOUS
Status: DISCONTINUED | OUTPATIENT
Start: 2022-08-17 | End: 2022-08-18

## 2022-08-17 RX ORDER — CLINDAMYCIN PHOSPHATE 900 MG/50ML
900 INJECTION INTRAVENOUS
Status: COMPLETED | OUTPATIENT
Start: 2022-08-17 | End: 2022-08-17

## 2022-08-17 RX ORDER — MORPHINE SULFATE/0.9% NACL/PF 1 MG/ML
SYRINGE (ML) INJECTION CONTINUOUS
Status: DISCONTINUED | OUTPATIENT
Start: 2022-08-17 | End: 2022-08-17

## 2022-08-17 RX ORDER — SODIUM CHLORIDE 0.9 % (FLUSH) 0.9 %
5-40 SYRINGE (ML) INJECTION EVERY 12 HOURS SCHEDULED
Status: DISCONTINUED | OUTPATIENT
Start: 2022-08-17 | End: 2022-08-22 | Stop reason: HOSPADM

## 2022-08-17 RX ORDER — MIDAZOLAM HYDROCHLORIDE 1 MG/ML
INJECTION INTRAMUSCULAR; INTRAVENOUS PRN
Status: DISCONTINUED | OUTPATIENT
Start: 2022-08-17 | End: 2022-08-17 | Stop reason: SDUPTHER

## 2022-08-17 RX ORDER — LOSARTAN POTASSIUM AND HYDROCHLOROTHIAZIDE 12.5; 5 MG/1; MG/1
2 TABLET ORAL DAILY
Status: DISCONTINUED | OUTPATIENT
Start: 2022-08-18 | End: 2022-08-22 | Stop reason: HOSPADM

## 2022-08-17 RX ORDER — SODIUM CHLORIDE 0.9 % (FLUSH) 0.9 %
5-40 SYRINGE (ML) INJECTION PRN
Status: DISCONTINUED | OUTPATIENT
Start: 2022-08-17 | End: 2022-08-22 | Stop reason: HOSPADM

## 2022-08-17 RX ORDER — SODIUM CHLORIDE 0.9 % (FLUSH) 0.9 %
5-40 SYRINGE (ML) INJECTION EVERY 12 HOURS SCHEDULED
Status: DISCONTINUED | OUTPATIENT
Start: 2022-08-17 | End: 2022-08-18

## 2022-08-17 RX ORDER — GABAPENTIN 100 MG/1
100 CAPSULE ORAL 3 TIMES DAILY
Status: DISCONTINUED | OUTPATIENT
Start: 2022-08-17 | End: 2022-08-22 | Stop reason: HOSPADM

## 2022-08-17 RX ORDER — OXYCODONE HYDROCHLORIDE 10 MG/1
10 TABLET ORAL EVERY 4 HOURS PRN
Status: DISCONTINUED | OUTPATIENT
Start: 2022-08-18 | End: 2022-08-18

## 2022-08-17 RX ORDER — FAMOTIDINE 20 MG/1
20 TABLET, FILM COATED ORAL NIGHTLY
Status: DISCONTINUED | OUTPATIENT
Start: 2022-08-17 | End: 2022-08-22 | Stop reason: HOSPADM

## 2022-08-17 RX ORDER — ONDANSETRON 2 MG/ML
4 INJECTION INTRAMUSCULAR; INTRAVENOUS EVERY 6 HOURS PRN
Status: DISCONTINUED | OUTPATIENT
Start: 2022-08-17 | End: 2022-08-22 | Stop reason: HOSPADM

## 2022-08-17 RX ORDER — FENTANYL CITRATE 50 UG/ML
25 INJECTION, SOLUTION INTRAMUSCULAR; INTRAVENOUS EVERY 5 MIN PRN
Status: DISCONTINUED | OUTPATIENT
Start: 2022-08-17 | End: 2022-08-18

## 2022-08-17 RX ORDER — PROPOFOL 10 MG/ML
INJECTION, EMULSION INTRAVENOUS PRN
Status: DISCONTINUED | OUTPATIENT
Start: 2022-08-17 | End: 2022-08-17 | Stop reason: SDUPTHER

## 2022-08-17 RX ORDER — DICYCLOMINE HYDROCHLORIDE 10 MG/1
10 CAPSULE ORAL DAILY
Status: DISCONTINUED | OUTPATIENT
Start: 2022-08-18 | End: 2022-08-22 | Stop reason: HOSPADM

## 2022-08-17 RX ORDER — LIDOCAINE HYDROCHLORIDE 20 MG/ML
INJECTION, SOLUTION EPIDURAL; INFILTRATION; INTRACAUDAL; PERINEURAL PRN
Status: DISCONTINUED | OUTPATIENT
Start: 2022-08-17 | End: 2022-08-17 | Stop reason: SDUPTHER

## 2022-08-17 RX ORDER — BUPIVACAINE HYDROCHLORIDE 5 MG/ML
INJECTION, SOLUTION EPIDURAL; INTRACAUDAL
Status: COMPLETED | OUTPATIENT
Start: 2022-08-17 | End: 2022-08-17

## 2022-08-17 RX ORDER — SODIUM CHLORIDE 0.9 % (FLUSH) 0.9 %
5-40 SYRINGE (ML) INJECTION PRN
Status: DISCONTINUED | OUTPATIENT
Start: 2022-08-17 | End: 2022-08-18

## 2022-08-17 RX ORDER — FAMOTIDINE 20 MG/1
20 TABLET, FILM COATED ORAL 2 TIMES DAILY
Status: DISCONTINUED | OUTPATIENT
Start: 2022-08-17 | End: 2022-08-17

## 2022-08-17 RX ORDER — OXYCODONE HYDROCHLORIDE 5 MG/1
5 TABLET ORAL EVERY 4 HOURS PRN
Status: DISCONTINUED | OUTPATIENT
Start: 2022-08-18 | End: 2022-08-18

## 2022-08-17 RX ORDER — METHOCARBAMOL 100 MG/ML
INJECTION, SOLUTION INTRAMUSCULAR; INTRAVENOUS PRN
Status: DISCONTINUED | OUTPATIENT
Start: 2022-08-17 | End: 2022-08-17 | Stop reason: SDUPTHER

## 2022-08-17 RX ORDER — NALOXONE HYDROCHLORIDE 0.4 MG/ML
INJECTION, SOLUTION INTRAMUSCULAR; INTRAVENOUS; SUBCUTANEOUS PRN
Status: DISCONTINUED | OUTPATIENT
Start: 2022-08-17 | End: 2022-08-18

## 2022-08-17 RX ORDER — DIPHENHYDRAMINE HYDROCHLORIDE 50 MG/ML
25 INJECTION INTRAMUSCULAR; INTRAVENOUS EVERY 6 HOURS PRN
Status: DISCONTINUED | OUTPATIENT
Start: 2022-08-17 | End: 2022-08-17

## 2022-08-17 RX ADMIN — Medication: at 18:05

## 2022-08-17 RX ADMIN — SUCCINYLCHOLINE CHLORIDE 120 MG: 20 INJECTION, SOLUTION INTRAMUSCULAR; INTRAVENOUS at 13:23

## 2022-08-17 RX ADMIN — SODIUM CHLORIDE: 9 INJECTION, SOLUTION INTRAVENOUS at 13:02

## 2022-08-17 RX ADMIN — Medication 100 MCG: at 14:11

## 2022-08-17 RX ADMIN — Medication 300 MCG: at 14:05

## 2022-08-17 RX ADMIN — HYDROMORPHONE HYDROCHLORIDE 0.25 MG: 1 INJECTION, SOLUTION INTRAMUSCULAR; INTRAVENOUS; SUBCUTANEOUS at 14:47

## 2022-08-17 RX ADMIN — ONDANSETRON 4 MG: 2 INJECTION INTRAMUSCULAR; INTRAVENOUS at 18:21

## 2022-08-17 RX ADMIN — SUGAMMADEX 50 MG: 100 INJECTION, SOLUTION INTRAVENOUS at 16:28

## 2022-08-17 RX ADMIN — CHLORHEXIDINE GLUCONATE 15 ML: 0.12 RINSE ORAL at 12:39

## 2022-08-17 RX ADMIN — PROPOFOL 100 MG: 10 INJECTION, EMULSION INTRAVENOUS at 13:22

## 2022-08-17 RX ADMIN — ROCURONIUM BROMIDE 10 MG: 50 INJECTION, SOLUTION INTRAVENOUS at 14:27

## 2022-08-17 RX ADMIN — CLINDAMYCIN PHOSPHATE 900 MG: 900 INJECTION, SOLUTION INTRAVENOUS at 13:16

## 2022-08-17 RX ADMIN — ACETAMINOPHEN 1000 MG: 500 TABLET ORAL at 22:16

## 2022-08-17 RX ADMIN — ROCURONIUM BROMIDE 45 MG: 50 INJECTION, SOLUTION INTRAVENOUS at 13:35

## 2022-08-17 RX ADMIN — METHOCARBAMOL TABLETS 750 MG: 750 TABLET, COATED ORAL at 20:23

## 2022-08-17 RX ADMIN — ROCURONIUM BROMIDE 10 MG: 50 INJECTION, SOLUTION INTRAVENOUS at 15:28

## 2022-08-17 RX ADMIN — SODIUM CHLORIDE: 9 INJECTION, SOLUTION INTRAVENOUS at 14:33

## 2022-08-17 RX ADMIN — FAMOTIDINE 20 MG: 20 TABLET, FILM COATED ORAL at 20:24

## 2022-08-17 RX ADMIN — ONDANSETRON 4 MG: 2 INJECTION INTRAMUSCULAR; INTRAVENOUS at 16:11

## 2022-08-17 RX ADMIN — HYDROMORPHONE HYDROCHLORIDE 0.25 MG: 1 INJECTION, SOLUTION INTRAMUSCULAR; INTRAVENOUS; SUBCUTANEOUS at 15:16

## 2022-08-17 RX ADMIN — HYDROMORPHONE HYDROCHLORIDE 0.25 MG: 1 INJECTION, SOLUTION INTRAMUSCULAR; INTRAVENOUS; SUBCUTANEOUS at 16:01

## 2022-08-17 RX ADMIN — HYDRALAZINE HYDROCHLORIDE 5 MG: 20 INJECTION INTRAMUSCULAR; INTRAVENOUS at 18:56

## 2022-08-17 RX ADMIN — SUGAMMADEX 50 MG: 100 INJECTION, SOLUTION INTRAVENOUS at 16:27

## 2022-08-17 RX ADMIN — HYDROMORPHONE HYDROCHLORIDE 0.5 MG: 1 INJECTION, SOLUTION INTRAMUSCULAR; INTRAVENOUS; SUBCUTANEOUS at 18:01

## 2022-08-17 RX ADMIN — SUGAMMADEX 25 MG: 100 INJECTION, SOLUTION INTRAVENOUS at 16:20

## 2022-08-17 RX ADMIN — HYDROMORPHONE HYDROCHLORIDE 0.25 MG: 1 INJECTION, SOLUTION INTRAMUSCULAR; INTRAVENOUS; SUBCUTANEOUS at 14:21

## 2022-08-17 RX ADMIN — Medication 100 MCG: at 14:12

## 2022-08-17 RX ADMIN — LIDOCAINE HYDROCHLORIDE 80 MG: 20 INJECTION, SOLUTION EPIDURAL; INFILTRATION; INTRACAUDAL; PERINEURAL at 13:22

## 2022-08-17 RX ADMIN — MIDAZOLAM 2 MG: 1 INJECTION INTRAMUSCULAR; INTRAVENOUS at 11:00

## 2022-08-17 RX ADMIN — GABAPENTIN 100 MG: 100 CAPSULE ORAL at 20:30

## 2022-08-17 RX ADMIN — MUPIROCIN: 20 OINTMENT TOPICAL at 12:39

## 2022-08-17 RX ADMIN — SUGAMMADEX 25 MG: 100 INJECTION, SOLUTION INTRAVENOUS at 16:16

## 2022-08-17 RX ADMIN — SODIUM CHLORIDE: 9 INJECTION, SOLUTION INTRAVENOUS at 16:59

## 2022-08-17 RX ADMIN — HYDRALAZINE HYDROCHLORIDE 5 MG: 20 INJECTION INTRAMUSCULAR; INTRAVENOUS at 22:16

## 2022-08-17 RX ADMIN — METHOCARBAMOL 750 MG: 100 INJECTION INTRAMUSCULAR; INTRAVENOUS at 14:05

## 2022-08-17 RX ADMIN — FENTANYL CITRATE 100 MCG: 50 INJECTION INTRAMUSCULAR; INTRAVENOUS at 13:21

## 2022-08-17 RX ADMIN — Medication 100 MCG: at 14:55

## 2022-08-17 RX ADMIN — ONDANSETRON HYDROCHLORIDE 4 MG: 4 TABLET, FILM COATED ORAL at 17:56

## 2022-08-17 RX ADMIN — SUGAMMADEX 50 MG: 100 INJECTION, SOLUTION INTRAVENOUS at 16:24

## 2022-08-17 RX ADMIN — ROCURONIUM BROMIDE 5 MG: 50 INJECTION, SOLUTION INTRAVENOUS at 13:22

## 2022-08-17 ASSESSMENT — PAIN SCALES - GENERAL
PAINLEVEL_OUTOF10: 8
PAINLEVEL_OUTOF10: 0
PAINLEVEL_OUTOF10: 9

## 2022-08-17 ASSESSMENT — ENCOUNTER SYMPTOMS: SHORTNESS OF BREATH: 0

## 2022-08-17 ASSESSMENT — PAIN DESCRIPTION - PAIN TYPE
TYPE: SURGICAL PAIN
TYPE: SURGICAL PAIN

## 2022-08-17 ASSESSMENT — PAIN DESCRIPTION - LOCATION
LOCATION: RIB CAGE
LOCATION: RIB CAGE
LOCATION: SHOULDER;RIB CAGE

## 2022-08-17 ASSESSMENT — PAIN DESCRIPTION - DESCRIPTORS
DESCRIPTORS: ACHING
DESCRIPTORS: DULL
DESCRIPTORS: ACHING

## 2022-08-17 ASSESSMENT — PAIN DESCRIPTION - ONSET
ONSET: ON-GOING
ONSET: ON-GOING

## 2022-08-17 ASSESSMENT — PAIN DESCRIPTION - ORIENTATION
ORIENTATION: LEFT

## 2022-08-17 ASSESSMENT — LIFESTYLE VARIABLES
HOW MANY STANDARD DRINKS CONTAINING ALCOHOL DO YOU HAVE ON A TYPICAL DAY: PATIENT DOES NOT DRINK
HOW OFTEN DO YOU HAVE A DRINK CONTAINING ALCOHOL: NEVER

## 2022-08-17 ASSESSMENT — PAIN DESCRIPTION - FREQUENCY
FREQUENCY: CONTINUOUS
FREQUENCY: CONTINUOUS

## 2022-08-17 NOTE — H&P
PCP:  Vika Deshpande MD             Onc: Vladislav Joellejewel: Ya Client  Neuro: Elicia Keenan  Cards: Jacob Juárez  EP: Latisha Huerta  Vasc: Jennifer Mora    LAKESHA nodule -  Previous H+P on chart 7/29/22:  Kylie Franco is a 74 y.o. female smoker with mild-mod emphysema. CTPA 10/25/21, no PE, LAKESHA 9mm nodule. Follow up CT chest 4/12/22, LKAESHA nodule 1.5cm.  5/16 PET submandibular gland nodule 1.2cm SUV 6.03. LAKESHA nodule SUV 18.84  6/17 U/s bx R submandibular gland 1.9cm nodule, benign parotid tissue. 6/17/22 CT guided bx LAKESHA nodule atypical cells   Denies dyspnea, can take flight of stairs without issue. \"  No changes    Past Medical History:  Past Medical History:   Diagnosis Date    CAD (coronary artery disease)     NONOBSTRUCTING    CKD (chronic kidney disease) stage 3, GFR 30-59 ml/min ([facility])     IBS (irritable bowel syndrome)     Lumbago     Osteoarthrosis, unspecified whether generalized or localized, unspecified site     Other abnormal blood chemistry     PAD (peripheral artery disease) ([facility])     has stents in lower legs bilaterally implanted by Dr. Jennifer Mroa    Pure hypercholesterolemia     Risk for falls 02/02/2016    S/p Left TKR    Spinal stenosis     Tremor     Unspecified essential hypertension     Wears glasses        Past Surgical History:  Past Surgical History:   Procedure Laterality Date    CHOLECYSTECTOMY      COLONOSCOPY      CT NEEDLE BIOPSY LUNG PERCUTANEOUS  06/17/2022    CT NEEDLE BIOPSY LUNG PERCUTANEOUS 6/17/2022 [facility] CT SCAN    INSERTABLE CARDIAC MONITOR  09/2015    ILR at [facility]- implanted 10/02/2015    KNEE SURGERY      RIGHT need replacement    LEG SURGERY      2 stents placed in right leg, 1 stent and balloon in left leg.  LSFA stenting 03/02/2022    PACEMAKER INSERTION  05/27/2022    biV ([employer])    ROTATOR CUFF REPAIR Bilateral     SHOULDER ARTHROSCOPY Left 08/21/2020    LEFT SHOULDER ARTHROSCOPY, SUBACROMINAL DECOMPRESSION, DISTAL CLAVICLE EXCISION,DEBRIDEMENT, ROTATOR CUFF REPAIR, AUGMENTATION performed by Irma Castellano Marlee, DO at [address] Left 01/12/2016    Dr Hans Schmidt [facility]  06/17/2022    US BIOPSY OF SALIVARY GLAND 6/17/2022 [facility] ULTRASOUND       Home Medications:   Prior to Admission medications    Medication Sig Start Date End Date Taking? Authorizing Provider   dicyclomine (BENTYL) 10 MG capsule Take 1 capsule by mouth in the morning. 8/3/22   Radha Heck MD   loratadine (CLARITIN) 10 MG tablet Take 1 tablet by mouth in the morning. 8/3/22   Radha Heck MD   celecoxib (CELEBREX) 200 MG capsule TAKE 1 CAPSULE BY MOUTH DAILY 4/5/22   Radha Heck MD   atorvastatin (LIPITOR) 10 MG tablet TAKE 1 TABLET BY MOUTH DAILY 11/9/21   Radha Heck MD   clopidogrel (PLAVIX) 75 MG tablet TAKE 1 TABLET BY MOUTH DAILY *NEED TO ESTABLISH WITH A NEW PROVIDER FOR FUTURE REFILLS* 11/9/21   Radha Heck MD   losartan-hydroCHLOROthiazide [facility]) 100-25 MG per tablet TAKE 1 TABLET BY MOUTH DAILY 11/9/21   Radha Heck MD        Facility Administered Medications:    mupirocin   Nasal BID    chlorhexidine   Topical See Admin Instructions    clindamycin (CLEOCIN) IV  900 mg IntraVENous On Call to OR    vancomycin (VANCOCIN) IV  1,000 mg IntraVENous On Call to OR       Allergies: Allergies   Allergen Reactions    Morphine Nausea And Vomiting     Causes mental impairment    Pletal [Cilostazol] Diarrhea and Headaches    Ace Inhibitors Other (See Comments)     Cough     Chantix [Varenicline] Other (See Comments)     Depression, mood swings, nightmares    Keflex [Cephalexin] Diarrhea    Niaspan [Niacin Er] Other (See Comments)     Skin irritation    Pravastatin Other (See Comments)     Pt unable to recall reaction        Pt examined.   Vital Signs:                                                 BP (!) 170/103   Pulse 63   Temp 97.9 °F (36.6 °C) (Oral)   Resp 17   Ht 5' 1.5\" (1.562 m)   Wt 161 lb 13.1 oz (73.4 kg)   SpO2 (!) 89%   BMI 30.08 kg/m²  O2 Flow Rate (L/min): 2 L/min     Admission Weight: Weight: 162 lb (73.5 kg)      CV: reg  Pulm: decreased throughout  Abd: soft  Ext: warm    CBC:   Lab Results   Component Value Date/Time    WBC 7.2 08/17/2022 11:24 AM    HGB 12.8 08/17/2022 11:24 AM    HCT 38.4 08/17/2022 11:24 AM    MCV 93.6 08/17/2022 11:24 AM     08/17/2022 11:24 AM     BMP:   Lab Results   Component Value Date/Time     08/17/2022 11:41 AM    K 3.9 08/17/2022 11:41 AM     08/17/2022 11:41 AM    CO2 19 08/17/2022 11:41 AM    PHOS 3.4 08/15/2022 01:14 PM    BUN 29 08/17/2022 11:41 AM    CREATININE 1.3 08/17/2022 11:41 AM    CALCIUM 9.4 08/17/2022 11:41 AM    MG 1.90 08/17/2022 11:41 AM     Cardiac Enzymes:   Lab Results   Component Value Date/Time    TROPONINI <0.01 04/10/2017 03:50 PM     PT/INR:   Lab Results   Component Value Date/Time    PROTIME 13.9 08/15/2022 01:14 PM    PROTIME 25.2 01/21/2016 12:15 PM    INR 1.07 08/15/2022 01:14 PM     APTT:   Lab Results   Component Value Date/Time    APTT 40.1 08/15/2022 01:14 PM     Liver Profile:  Lab Results   Component Value Date/Time    AST 14 08/17/2022 11:41 AM    ALT 9 08/17/2022 11:41 AM    BILIDIR <0.2 08/17/2022 11:41 AM    BILITOT 0.3 08/17/2022 11:41 AM    ALKPHOS 74 08/17/2022 11:41 AM    LABALBU 3.5 08/17/2022 11:41 AM     Lab Results   Component Value Date/Time    CHOL 144 12/18/2018 10:02 AM    HDL 31 01/12/2021 12:47 PM    HDL 35 11/30/2011 10:30 AM    TRIG 141 12/18/2018 10:02 AM     HgbA1c:  Lab Results   Component Value Date/Time    LABA1C 5.8 10/05/2021 12:20 PM     UA:   Lab Results   Component Value Date/Time    COLORU Yellow 08/15/2022 01:10 PM    PHUR 6.0 08/15/2022 01:10 PM    WBCUA 6 08/15/2022 01:10 PM    RBCUA 0 08/15/2022 01:10 PM    BACTERIA Rare 08/15/2022 01:10 PM    CLARITYU Clear 08/15/2022 01:10 PM    SPECGRAV 1.015 08/15/2022 01:10 PM    LEUKOCYTESUR TRACE 08/15/2022 01:10 PM    UROBILINOGEN 0.2 08/15/2022 01:10 PM    BILIRUBINUR Negative 08/15/2022 01:10 PM    BLOODU Negative 08/15/2022 01:10 PM    GLUCOSEU Negative 08/15/2022 01:10 PM       Reviewed above, reason for surgery, diagnosis and treatment plan including risks, benefits and alternatives. Will proceed with resection LAKESHA nodule for definitive diagnosis. Marshall Kelley MD  8/17/2022  12:56 PM      I have discussed with the patient the rationale for blood component transfusion; its benefits in treating or preventing fatigue, organ damage, or death; and its risk which includes mild transfusion reactions, rare risk of blood borne infection, or more serious but rare reactions. I have discussed the alternatives to transfusion, including the risk and consequences of not receiving transfusion. The patient had an opportunity to ask questions and had agreed to proceed with transfusion of blood components.

## 2022-08-17 NOTE — ANESTHESIA PRE PROCEDURE
[facility] Department of Anesthesiology  Pre-Anesthesia Evaluation/Consultation       Name:  Aida Peacock  : 1950  Age:  74 y.o.                                            MRN:  [id number]  Date: 2022           Surgeon: Surgeon(s):  Rangel Kim MD    Procedure: Procedure(s):  LEFT VIDEO ASSISTED THORACOSCOPIC SURGERY, POSSIBLE THORACOTOMY WITH WEDGE EXCISION OF LEFT UPPER LOBE LUNG NODULE, POSSIBLE LOBECTOMY     Allergies   Allergen Reactions    Morphine Nausea And Vomiting     Causes mental impairment    Pletal [Cilostazol] Diarrhea and Headaches    Ace Inhibitors Other (See Comments)     Cough     Chantix [Varenicline] Other (See Comments)     Depression, mood swings, nightmares    Keflex [Cephalexin] Diarrhea    Niaspan [Niacin Er] Other (See Comments)     Skin irritation    Pravastatin Other (See Comments)     Pt unable to recall reaction     Patient Active Problem List   Diagnosis    Hyperglycemia    Pure hypercholesterolemia    Lumbago    Osteoarthritis    CKD (chronic kidney disease) stage 3, GFR 30-59 ml/min (HCC)    Abnormal mammogram    Fibrocystic disease of breast    Diarrhea    PAD (peripheral artery disease) ([address]    Atherosclerosis of native arteries of extremities with intermittent claudication, left leg (HCC)    Syncope    Sinus node dysfunction ([address]    Encounter for loop recorder at end of battery life    Status post total left knee replacement    Adhesive capsulitis of right shoulder    Right shoulder pain    Psoriasis    Sick sinus syndrome ([address]    Syncope and collapse    Urinary tract infection without hematuria    Partial seizure ([address]    HTN (hypertension), benign    Coronary artery disease involving native coronary artery of native heart with angina pectoris (HCC)    Seasonal allergic rhinitis due to pollen    Spinal stenosis of lumbar region with neurogenic claudication    Nontraumatic complete tear of left rotator cuff    Atherosclerosis of native arteries of extremities with intermittent claudication, bilateral legs (HCC)    Symptomatic bradycardia    Pacemaker    Sunburn    CKD (chronic kidney disease) stage 4, GFR 15-29 ml/min (HCC)    Pulmonary nodule     Past Medical History:   Diagnosis Date    CAD (coronary artery disease)     NONOBSTRUCTING    CKD (chronic kidney disease) stage 3, GFR 30-59 ml/min (HCC)     IBS (irritable bowel syndrome)     Lumbago     Osteoarthrosis, unspecified whether generalized or localized, unspecified site     Other abnormal blood chemistry     [facility]AD [facility]peripheral artery disease) ([facility])     has stents in lower legs bilaterally implanted by Dr. Isaiah Boston Pure hypercholesterolemia     Risk for falls 02/02/2016    S/p Left TKR    Spinal stenosis     Tremor     Unspecified essential hypertension     Wears glasses      Past Surgical History:   Procedure Laterality Date    CHOLECYSTECTOMY      COLONOSCOPY      CT NEEDLE BIOPSY LUNG PERCUTANEOUS  06/17/2022    CT NEEDLE BIOPSY LUNG PERCUTANEOUS 6/17/2022 [facility] CT SCAN    INSERTABLE CARDIAC MONITOR  09/2015    ILR at [facility]- implanted 10/02/2015    KNEE SURGERY      RIGHT need replacement    LEG SURGERY      2 stents placed in right leg, 1 stent and balloon in left leg.  LSFA stenting 03/02/2022    PACEMAKER INSERTION  05/27/2022    biV ([employer])    ROTATOR CUFF REPAIR Bilateral     SHOULDER ARTHROSCOPY Left 08/21/2020    LEFT SHOULDER ARTHROSCOPY, SUBACROMINAL DECOMPRESSION, DISTAL CLAVICLE EXCISION,DEBRIDEMENT, ROTATOR CUFF REPAIR, AUGMENTATION performed by Damari Allen DO at [facility] 01/12/2016    Dr Deysi Velazquez SALIVARY GLAND  06/17/2022    US BIOPSY OF SALIVARY GLAND 6/17/2022 [facility] ULTRASOUND     Social History     Tobacco Use    Smoking status: Every Day     Packs/day: 0.50     Years: 40.00     Pack years: 20.00     Types: Cigarettes    Smokeless tobacco: Never    Tobacco comments:     <1 pack of cigarettes per day     Started at age 21   Vaping Use    Vaping Use: Never used   Substance Use Topics    Alcohol use: Yes     Comment: glass of wine once a month    Drug use: Never     Medications  No current facility-administered medications on file prior to encounter. Current Outpatient Medications on File Prior to Encounter   Medication Sig Dispense Refill    dicyclomine (BENTYL) 10 MG capsule Take 1 capsule by mouth in the morning. 90 capsule 3    loratadine (CLARITIN) 10 MG tablet Take 1 tablet by mouth in the morning.  90 tablet 3    celecoxib (CELEBREX) 200 MG capsule TAKE 1 CAPSULE BY MOUTH DAILY 30 capsule 3    atorvastatin (LIPITOR) 10 MG tablet TAKE 1 TABLET BY MOUTH DAILY 90 tablet 3    clopidogrel (PLAVIX) 75 MG tablet TAKE 1 TABLET BY MOUTH DAILY *NEED TO ESTABLISH WITH A NEW PROVIDER FOR FUTURE REFILLS* 90 tablet 3    losartan-hydroCHLOROthiazide (HYZAAR) 100-25 MG per tablet TAKE 1 TABLET BY MOUTH DAILY 90 tablet 3     Current Facility-Administered Medications   Medication Dose Route Frequency Provider Last Rate Last Admin    mupirocin (BACTROBAN) 2 % ointment   Nasal BID JIM Mcgill CNP        chlorhexidine (PERIDEX) 0.12 % solution 15 mL  15 mL Mouth/Throat Once JIM Mcgill CNP        chlorhexidine (HIBICLENS) 4 % liquid   Topical See Admin Instructions JIM Mcgill CNP        0.9 % sodium chloride infusion   IntraVENous Continuous JIM Mcgill CNP        clindamycin (CLEOCIN) 900 mg in dextrose 5 % 50 mL IVPB  900 mg IntraVENous On Call to [address], APRN - CNP        vancomycin 1000 mg IVPB in 250 mL D5W addavial  1,000 mg IntraVENous On Call to [address], APRN - CNP         Vital Signs (Current)   Vitals:    08/17/22 1155 08/17/22 1200 08/17/22 1205 08/17/22 1210   BP:  (!) 170/103     Pulse: 68 84 62 72   Resp: 21 21 15 14   Temp:       TempSrc:       SpO2: 100% 98% 100%    Weight: Height:                                                Vital Signs Statistics (for past 48 hrs)     Temp  Av.9 °F (36.6 °C)  Min: 97.9 °F (36.6 °C)   Min taken time: 22 1105  Max: 97.9 °F (36.6 °C)   Max taken time: 22 1105  Pulse  Av.8  Min: 58   Min taken time: 22 1205  Max: 84   Max taken time: 22 1200  Resp  Av.2  Min: 15   Min taken time: 22 1210  Max: 21   Max taken time: 22 1200  BP  Min: 156/72   Min taken time: 22 1134  Max: 170/103   Max taken time: 22 1200  ABP (Arterial line BP)  Min: 179/75   Min taken time: 22 1210  Max: 179/75   Max taken time: 22 1210  MAP (mmHg)  Av.5  Min: 99   Min taken time: 22 1134  Max: 122   Max taken time: 22 1200  SpO2  Av.5 %  Min: 98 %   Min taken time: 22 1200  Max: 100 %   Max taken time: 22 1205  BP Readings from Last 3 Encounters:   22 (!) 170/103   22 118/84   22 (!) 166/80       BMI  Body mass index is 30.08 kg/m². Estimated body mass index is 30.08 kg/m² as calculated from the following:    Height as of this encounter: 5' 1.5\" (1.562 m). Weight as of this encounter: 161 lb 13.1 oz (73.4 kg).     CBC   Lab Results   Component Value Date/Time    WBC 7.2 2022 11:24 AM    RBC 4.10 2022 11:24 AM    HGB 12.8 2022 11:24 AM    HCT 38.4 2022 11:24 AM    MCV 93.6 2022 11:24 AM    RDW 14.7 2022 11:24 AM     2022 11:24 AM     CMP    Lab Results   Component Value Date/Time     2022 11:41 AM    K 3.9 2022 11:41 AM     2022 11:41 AM    CO2 19 2022 11:41 AM    BUN 29 2022 11:41 AM    CREATININE 1.3 2022 11:41 AM    GFRAA 48 2022 11:41 AM    GFRAA >60 2011 10:30 AM    AGRATIO 1.1 2021 12:47 PM    LABGLOM 40 2022 11:41 AM    GLUCOSE 96 2022 11:41 AM    PROT 7.2 2022 11:41 AM    PROT 8.0 2011 10:30 AM    CALCIUM 9.4 08/17/2022 11:41 AM    BILITOT 0.3 08/17/2022 11:41 AM    ALKPHOS 74 08/17/2022 11:41 AM    AST 14 08/17/2022 11:41 AM    ALT 9 08/17/2022 11:41 AM     BMP    Lab Results   Component Value Date/Time     08/17/2022 11:41 AM    K 3.9 08/17/2022 11:41 AM     08/17/2022 11:41 AM    CO2 19 08/17/2022 11:41 AM    BUN 29 08/17/2022 11:41 AM    CREATININE 1.3 08/17/2022 11:41 AM    CALCIUM 9.4 08/17/2022 11:41 AM    GFRAA 48 08/17/2022 11:41 AM    GFRAA >60 11/30/2011 10:30 AM    LABGLOM 40 08/17/2022 11:41 AM    GLUCOSE 96 08/17/2022 11:41 AM     POCGlucose  Recent Labs     08/15/22  1314 08/17/22  1141   GLUCOSE 88 96      Coags    Lab Results   Component Value Date/Time    PROTIME 13.9 08/15/2022 01:14 PM    PROTIME 25.2 01/21/2016 12:15 PM    INR 1.07 08/15/2022 01:14 PM    APTT 40.1 08/15/2022 01:14 PM     HCG (If Applicable) No results found for: PREGTESTUR, PREGSERUM, HCG, HCGQUANT   ABGs   Lab Results   Component Value Date/Time    PHART 7.380 08/15/2022 01:20 PM    PO2ART 103.0 08/15/2022 01:20 PM    UPK2FTN 35.9 08/15/2022 01:20 PM    BEQ6GEL 21.2 08/15/2022 01:20 PM    BEART -3.3 08/15/2022 01:20 PM    S0YOVIAP 98.2 08/15/2022 01:20 PM      Type & Screen (If Applicable)  No results found for: LABABO, LABRH                         BMI: Wt Readings from Last 3 Encounters:       NPO Status:   Date of last liquid consumption: 08/16/22   Time of last liquid consumption: 0900   Date of last solid food consumption: 08/16/22      Time of last solid consumption: 2200       Anesthesia Evaluation  Patient summary reviewed and Nursing notes reviewed  Airway: Mallampati: II  TM distance: >3 FB   Neck ROM: full  Mouth opening: > = 3 FB   Dental:    (+) edentulous      Pulmonary:       (-) COPD, asthma and shortness of breath                           Cardiovascular:    (+) hypertension:, angina:, pacemaker: pacemaker, CAD: non-obstructive, hyperlipidemia    (-) valvular problems/murmurs, past MI, CABG/stent and dysrhythmias    ECG reviewed      Echocardiogram reviewed                  Neuro/Psych:   (+) neuromuscular disease:,    (-) seizures, TIA and CVA           GI/Hepatic/Renal:        (-) GERD, PUD, liver disease and no renal disease       Endo/Other:    (+) : arthritis:., .    (-) diabetes mellitus               Abdominal:             Vascular: negative vascular ROS.  + PVD, aortic or cerebral, . Other Findings:           Anesthesia Plan      general     ASA 3     (I discussed with the patient the risks and benefits of PIV, general anesthesia, IV Narcotics, PACU. All questions were answered the patient agrees with the plan.)  Induction: intravenous. arterial line  MIPS: Postoperative opioids intended and Prophylactic antiemetics administered. Anesthetic plan and risks discussed with patient and child/children. Plan discussed with CRNA. This pre-anesthesia assessment may be used as a history and physical.    DOS STAFF ADDENDUM:    Pt seen and examined, chart reviewed (including anesthesia, drug and allergy history). No interval changes to history and physical examination. Anesthetic plan, risks, benefits, alternatives, and personnel involved discussed with patient. Patient verbalized an understanding and agrees to proceed.       Nicolle Cameron MD  August 17, 2022  12:34 PM

## 2022-08-17 NOTE — PROGRESS NOTES
Pt verified information regarding surgery, name, birth date, surgeon, procedure and allergies: Morphine, Pletal, Ace Inhibitors, Chantix, Keflex, Niaspan, Pravastatin. Consent and labs reviewed. Patient transferred to  preop for surgery. Appropriate antibiotics ordered:. Clindamycin 900mg on call to OR Beta blocker: Not indiacted . DVT prophyaxis:Plavix 75 mg  stopped at 8/10/22. pt washed with 2% chlorhexidine gluconate skin prep Patient and family educated about surgery and pain management. Arterial line placed in right radial by Dr. Je Mireles. VSS. Patient to surgery via bed.

## 2022-08-17 NOTE — ANESTHESIA POSTPROCEDURE EVALUATION
Department of Anesthesiology  Postprocedure Note    Patient: Ruth Wood  MRN: [id number]  YOB: 1950  Date of evaluation: 8/17/2022      Procedure Summary     Date: 08/17/22 Room / Location: [address]    Anesthesia Start: 1316 Anesthesia Stop: 1640    Procedure: LEFT VIDEO ASSISTED THORACOSCOPIC SURGERY. THORACOTOMY WITH WEDGE EXCISION OF LEFT UPPER LOBE LUNG NODULE. LOBECTOMY. INTERCOSTAL CRYO BLOCK. (Left) Diagnosis:       Nodule of upper lobe of left lung      (LEFT UPPER LOBE LUNG NODULE)    Surgeons: Ruth Corrigan MD Responsible Provider: Ramírez Green MD    Anesthesia Type: general ASA Status: 3          Anesthesia Type: No value filed.     Nola Phase I:      Nola Phase II:        Anesthesia Post Evaluation    Patient location during evaluation: ICU  Patient participation: complete - patient participated  Level of consciousness: awake and alert  Airway patency: patent  Nausea & Vomiting: no nausea and no vomiting  Complications: no  Cardiovascular status: hemodynamically stable  Respiratory status: acceptable  Hydration status: stable

## 2022-08-17 NOTE — PROGRESS NOTES
Pt arrived to room 1302 from cvor. Patient arrived on oral airway, NC oxygen at 4l. Pt sedate from surgey. RR WNL. Pt alert to voice. Pt provided with IS, aerobika and lung pillow. A-line intact and zeroed. wnl chest tubes intact. No crepitus noted. Navas catheter intact. Patient reporting pain 8/10. Orientation questions asked to patient, wnl. Patient able to move all extremities freely. After 30 mins of recovery, pt able to answer orientation questions, wnl. PCA pump in use and explained to patient on how to use. Nola 8/10. Bedside handoff performed with Babs Wilcox RN to assume care.

## 2022-08-17 NOTE — PROGRESS NOTES
Chart reviewed by this RN in anticipated admission to CVICU.     Electronically signed by Jeff Mcgowan RN on 8/17/2022 at 3:02 PM

## 2022-08-17 NOTE — BRIEF OP NOTE
Date: 8/17/2022  Patient:  Tacos Franco        Brief Operative Note       Pre-op Diagnosis:  LAKESHA nodule    Post-op Diagnosis:  same    Procedure:  L VATS, lateral muscle sparing thoracotomy, wedge excision LAKESHA nodule, completion LAKESHA lobectomy, mediastinal LN sampling, IC cryo nerve block ([employer])    Surgeon:  Concepcion Cintron MD    Assistant(s):  Patricia    Anesthesia:  General    EBL: 100cc    Specimens:    LAKESHA nodule wedge  Completion LAKESHA  Mediastinal LN    Findings:  adenoCa    Drains:  L pleural x2 (#1 apical, #2 diaphragmatic)    Drips:  none    Complications:  none    Disposition: To CVU in stable condition    Post-Op Note:  Kaley Lobo MD  8/17/2022  4:23 PM

## 2022-08-18 DIAGNOSIS — M17.11 PRIMARY OSTEOARTHRITIS OF RIGHT KNEE: ICD-10-CM

## 2022-08-18 LAB
ANION GAP SERPL CALCULATED.3IONS-SCNC: 19 MMOL/L (ref 3–16)
BUN BLDV-MCNC: 27 MG/DL (ref 7–20)
CALCIUM SERPL-MCNC: 9.1 MG/DL (ref 8.3–10.6)
CHLORIDE BLD-SCNC: 105 MMOL/L (ref 99–110)
CO2: 13 MMOL/L (ref 21–32)
CREAT SERPL-MCNC: 1.2 MG/DL (ref 0.6–1.2)
GFR AFRICAN AMERICAN: 53
GFR NON-AFRICAN AMERICAN: 44
GLUCOSE BLD-MCNC: 140 MG/DL (ref 70–99)
HCT VFR BLD CALC: 42.7 % (ref 36–48)
HEMOGLOBIN: 13.7 G/DL (ref 12–16)
MCH RBC QN AUTO: 31.2 PG (ref 26–34)
MCHC RBC AUTO-ENTMCNC: 32.2 G/DL (ref 31–36)
MCV RBC AUTO: 96.9 FL (ref 80–100)
PDW BLD-RTO: 15 % (ref 12.4–15.4)
PLATELET # BLD: 191 K/UL (ref 135–450)
PMV BLD AUTO: 9.1 FL (ref 5–10.5)
POTASSIUM SERPL-SCNC: 4.8 MMOL/L (ref 3.5–5.1)
RBC # BLD: 4.41 M/UL (ref 4–5.2)
SODIUM BLD-SCNC: 137 MMOL/L (ref 136–145)
WBC # BLD: 13.8 K/UL (ref 4–11)

## 2022-08-18 PROCEDURE — 6370000000 HC RX 637 (ALT 250 FOR IP): Performed by: NURSE PRACTITIONER

## 2022-08-18 PROCEDURE — 2580000003 HC RX 258: Performed by: ANESTHESIOLOGY

## 2022-08-18 PROCEDURE — 99024 POSTOP FOLLOW-UP VISIT: CPT | Performed by: THORACIC SURGERY (CARDIOTHORACIC VASCULAR SURGERY)

## 2022-08-18 PROCEDURE — 97530 THERAPEUTIC ACTIVITIES: CPT

## 2022-08-18 PROCEDURE — 97162 PT EVAL MOD COMPLEX 30 MIN: CPT

## 2022-08-18 PROCEDURE — 94760 N-INVAS EAR/PLS OXIMETRY 1: CPT

## 2022-08-18 PROCEDURE — 85027 COMPLETE CBC AUTOMATED: CPT

## 2022-08-18 PROCEDURE — 97110 THERAPEUTIC EXERCISES: CPT

## 2022-08-18 PROCEDURE — 36415 COLL VENOUS BLD VENIPUNCTURE: CPT

## 2022-08-18 PROCEDURE — 6370000000 HC RX 637 (ALT 250 FOR IP): Performed by: THORACIC SURGERY (CARDIOTHORACIC VASCULAR SURGERY)

## 2022-08-18 PROCEDURE — 80048 BASIC METABOLIC PNL TOTAL CA: CPT

## 2022-08-18 PROCEDURE — 6360000002 HC RX W HCPCS: Performed by: THORACIC SURGERY (CARDIOTHORACIC VASCULAR SURGERY)

## 2022-08-18 PROCEDURE — 2100000000 HC CCU R&B

## 2022-08-18 RX ORDER — OXYCODONE HYDROCHLORIDE 10 MG/1
10 TABLET ORAL EVERY 4 HOURS PRN
Status: DISCONTINUED | OUTPATIENT
Start: 2022-08-18 | End: 2022-08-22 | Stop reason: HOSPADM

## 2022-08-18 RX ORDER — OXYCODONE HYDROCHLORIDE 5 MG/1
5 TABLET ORAL EVERY 4 HOURS PRN
Status: DISCONTINUED | OUTPATIENT
Start: 2022-08-18 | End: 2022-08-22 | Stop reason: HOSPADM

## 2022-08-18 RX ORDER — CELECOXIB 200 MG/1
200 CAPSULE ORAL DAILY
Qty: 30 CAPSULE | Refills: 3 | Status: SHIPPED | OUTPATIENT
Start: 2022-08-18

## 2022-08-18 RX ORDER — LIDOCAINE HYDROCHLORIDE 10 MG/ML
INJECTION, SOLUTION EPIDURAL; INFILTRATION; INTRACAUDAL; PERINEURAL
Status: DISPENSED
Start: 2022-08-18 | End: 2022-08-19

## 2022-08-18 RX ADMIN — DICYCLOMINE HYDROCHLORIDE 10 MG: 10 CAPSULE ORAL at 09:35

## 2022-08-18 RX ADMIN — GABAPENTIN 100 MG: 100 CAPSULE ORAL at 09:35

## 2022-08-18 RX ADMIN — OXYCODONE HYDROCHLORIDE 10 MG: 10 TABLET ORAL at 22:06

## 2022-08-18 RX ADMIN — Medication 10 ML: at 09:42

## 2022-08-18 RX ADMIN — METHOCARBAMOL TABLETS 750 MG: 750 TABLET, COATED ORAL at 09:35

## 2022-08-18 RX ADMIN — LOSARTAN POTASSIUM AND HYDROCHLOROTHIAZIDE 2 TABLET: 50; 12.5 TABLET, FILM COATED ORAL at 09:43

## 2022-08-18 RX ADMIN — GABAPENTIN 100 MG: 100 CAPSULE ORAL at 14:16

## 2022-08-18 RX ADMIN — FAMOTIDINE 20 MG: 20 TABLET, FILM COATED ORAL at 19:31

## 2022-08-18 RX ADMIN — ACETAMINOPHEN 1000 MG: 500 TABLET ORAL at 22:09

## 2022-08-18 RX ADMIN — ACETAMINOPHEN 1000 MG: 500 TABLET ORAL at 05:26

## 2022-08-18 RX ADMIN — ACETAMINOPHEN 1000 MG: 500 TABLET ORAL at 09:35

## 2022-08-18 RX ADMIN — METHOCARBAMOL TABLETS 750 MG: 750 TABLET, COATED ORAL at 19:32

## 2022-08-18 RX ADMIN — ACETAMINOPHEN 1000 MG: 500 TABLET ORAL at 17:04

## 2022-08-18 RX ADMIN — GABAPENTIN 100 MG: 100 CAPSULE ORAL at 20:32

## 2022-08-18 RX ADMIN — HYDRALAZINE HYDROCHLORIDE 5 MG: 20 INJECTION INTRAMUSCULAR; INTRAVENOUS at 01:42

## 2022-08-18 RX ADMIN — Medication 10 ML: at 19:40

## 2022-08-18 RX ADMIN — HYDRALAZINE HYDROCHLORIDE 5 MG: 20 INJECTION INTRAMUSCULAR; INTRAVENOUS at 19:09

## 2022-08-18 RX ADMIN — METHOCARBAMOL TABLETS 750 MG: 750 TABLET, COATED ORAL at 12:52

## 2022-08-18 RX ADMIN — OXYCODONE HYDROCHLORIDE 10 MG: 10 TABLET ORAL at 10:49

## 2022-08-18 RX ADMIN — ATORVASTATIN CALCIUM 10 MG: 10 TABLET, FILM COATED ORAL at 09:35

## 2022-08-18 RX ADMIN — METHOCARBAMOL TABLETS 750 MG: 750 TABLET, COATED ORAL at 17:04

## 2022-08-18 ASSESSMENT — PAIN DESCRIPTION - ORIENTATION
ORIENTATION: LEFT
ORIENTATION: LEFT

## 2022-08-18 ASSESSMENT — PAIN SCALES - GENERAL
PAINLEVEL_OUTOF10: 2
PAINLEVEL_OUTOF10: 2
PAINLEVEL_OUTOF10: 0
PAINLEVEL_OUTOF10: 0
PAINLEVEL_OUTOF10: 7

## 2022-08-18 ASSESSMENT — PAIN DESCRIPTION - LOCATION
LOCATION: FLANK
LOCATION: FLANK
LOCATION: ABDOMEN

## 2022-08-18 NOTE — PROGRESS NOTES
Wasting 29 mg of Dilaudid from PCA pump with David Duran.     Electronically signed by Enrique Merritt RN on 8/18/2022 at 3:02 PM     Electronically signed by María Bajwa RN on 8/18/2022 at 3:20 PM

## 2022-08-18 NOTE — CARE COORDINATION
Chart Reviewed. MEt with patient to introduce  role, initiate discussion regarding DC planning and to complete ACP. Referral sent to Spiritual Care team for creation of advanced directive. INITIAL CASE MANAGEMENT ASSESSMENT    Reviewed chart, met with patient to assess possible discharge needs. Explained Case Management role/services. Living Situation: pt lives with son, sydney, two grandchildren aged 23and 12years old. She reports she is very active; likes to swim in the backyard pool and likes to walk. She is totally independent with all aspects of personal care needs and does not use any dme. ADLs: total independent     DME: [facility] [employer]    PT/OT Recs: TBD     Active Services: none     Transportation: family     Medications: [address] [facility]    PCP: Dr Petra Purvis      HD/PD: none    PLAN/COMMENTS: Goal is to return home. No concerns for DC at this time.     [employer], [place]     Case Management   [phone]    8/18/2022  3:52 PM

## 2022-08-18 NOTE — PROGRESS NOTES
Medication Reconciliation    List of medications patient is currently taking is complete. Source of information: 1.  RN Interview                                      2. Dispense history

## 2022-08-18 NOTE — PROGRESS NOTES
Progress Note    S/P LAKESHA nodule wedge, completion LAKESHA, IC cryo nerve block 8/17/22    Vital Signs:                                                 /66   Pulse 87   Temp 97.6 °F (36.4 °C) (Oral)   Resp 22   Ht 5' 1.5\" (1.562 m)   Wt 165 lb 5.5 oz (75 kg)   SpO2 100%   BMI 30.74 kg/m²  O2 Flow Rate (L/min): 3 L/min (turned to 2)   SR   Admission Weight: 162 lb (73.5 kg)      Drips:  pca    I/O:    Intake/Output Summary (Last 24 hours) at 8/18/2022 0758  Last data filed at 8/18/2022 0748  Gross per 24 hour   Intake 2104.93 ml   Output 2182 ml   Net -77.07 ml     Chest Tube:   Apic 59, 1  Diaph 170, 100    CV: reg  Pulm: decreased, wound c/d/i  Abd: soft  Ext: warm, no edema    Data Review:  CBC:   Recent Labs     08/17/22  1124 08/17/22  1655 08/18/22  0430   WBC 7.2 15.8* 13.8*   HGB 12.8 12.8 13.7   HCT 38.4 39.2 42.7   MCV 93.6 94.8 96.9    197 191     BMP:   Recent Labs     08/15/22  1314 08/17/22  1141 08/17/22  1655 08/18/22  0430    140 143 137   K 4.6 3.9 4.4 4.8    107 111* 105   CO2 21 19* 17* 13*   PHOS 3.4  --   --   --    BUN 35* 29* 24* 27*   CREATININE 1.3* 1.3* 1.3* 1.2   CALCIUM 9.6 9.4 8.7 9.1   MG  --  1.90  --   --      Cardiac Enzymes: No results for input(s): CKTOTAL, CKMB, CKMBINDEX, TROPONINI in the last 72 hours. PT/INR:   Recent Labs     08/15/22  1314 08/17/22  1124 08/17/22  1655   PROTIME 13.9 14.5 13.9   INR 1.07 1.14 1.07     APTT:   Recent Labs     08/15/22  1314 08/17/22  1655   APTT 40.1* 39.6*       Assessment/Plan:  CV - stable in SR. Remove art line. pulm - smoker. Pulm toilet. - no air leak but too much drainage to remove tubes yet. Consider clamping apical tube overnight. Renal - baseline Cr 1.3   - remove Navas  Heme - stable   - scds  Pain - nerve block. Acet, robax, morris. Oxy prn. D/c pca.     Don Barnard MD  8/18/2022  7:58 AM

## 2022-08-18 NOTE — ACP (ADVANCE CARE PLANNING)
Advance Care Planning     Advance Care Planning Activator (Inpatient)  Conversation Note      Date of ACP Conversation: 8/18/2022     Conversation Conducted with: Daryle Manna with Decision Making Capacity    ACP Activator: [id number] Decision Maker:     Current Designated Health Care Decision Maker:     Primary Decision Maker: Dolores Pandey - Child - [phone]    Secondary Decision Maker: cely huang - Child - [phone]  \")      Care Preferences    Ventilation: \"If you were in your present state of health and suddenly became very ill and were unable to breathe on your own, what would your preference be about the use of a ventilator (breathing machine) if it were available to you? \"      Would the patient desire the use of ventilator (breathing machine)?: yes    \"If your health worsens and it becomes clear that your chance of recovery is unlikely, what would your preference be about the use of a ventilator (breathing machine) if it were available to you? \"     Would the patient desire the use of ventilator (breathing machine)?: No      Resuscitation  \"CPR works best to restart the heart when there is a sudden event, like a heart attack, in someone who is otherwise healthy. Unfortunately, CPR does not typically restart the heart for people who have serious health conditions or who are very sick. \"    \"In the event your heart stopped as a result of an underlying serious health condition, would you want attempts to be made to restart your heart (answer \"yes\" for attempt to resuscitate) or would you prefer a natural death (answer \"no\" for do not attempt to resuscitate)? \" yes       [] Yes   [] No   Educated Patient / Sabina Zamarripa regarding differences between Advance Directives and portable DNR orders.     Length of ACP Conversation in minutes:  2 minutes    Conversation Outcomes:  [x] ACP discussion completed  [] Existing advance directive reviewed with patient; no changes to patient's previously recorded wishes  [] New Advance Directive completed  [] Portable Do Not Rescitate prepared for Provider review and signature  [] POLST/POST/MOLST/MOST prepared for Provider review and signature      Follow-up plan:    [x] Schedule follow-up conversation to continue planning  [] Referred individual to Provider for additional questions/concerns   [] Advised patient/agent/surrogate to review completed ACP document and update if needed with changes in condition, patient preferences or care setting    [x] This note routed to one or more involved healthcare providers    Patient interested in creating Adv Directives during Hospital Stay. Referral made to Spiritual Care Team, spoke with [employer]. Routed to PCP.   [place], [place]     Case Management   [phone]    8/18/2022  3:49 PM

## 2022-08-18 NOTE — OP NOTE
[address] Annamaria Michaud                                 OPERATIVE REPORT    PATIENT NAME: Aries Harvey                    :        1950  MED REC NO:   [id number]                          ROOM:       [id number]  ACCOUNT NO:   [de-identified]                           ADMIT DATE: 2022  PROVIDER:     Alma Christopher MD    DATE OF PROCEDURE:  2022    PREOPERATIVE DIAGNOSIS:  Left upper lobe nodule. POSTOPERATIVE DIAGNOSIS:  Left upper lobe nodule. OPERATIONS PERFORMED:  Left video-assisted thoracoscopy, left  muscle-sparing lateral thoracotomy, wedge excision of left upper lobe  nodule, completion left upper lobectomy, mediastinal lymph node  sampling, intercostal cryo nerve block levels 3 through 5 and 8  ([facility]). SURGEON:  Alma Christopher MD    ASSISTANT:  Corbin Isidro SA    ANESTHESIA:  General.    INDICATIONS:  The patient is a 75-year-old female, smoker, who had a CT  of the chest to rule out PE in 10/2021, which incidentally noted a left  upper lobe 9-mm nodule. Followup imaging in 2022 showed an increase  in size of the nodule to 1.5 cm. By PET scan, there was associated  hypermetabolism with an SUV of 18.8. A CT-guided biopsy on 2022  demonstrated atypical cells. The patient presents today for resection  for definitive diagnosis. She and her  are aware of the risks  and possible complications of the procedure and wish to proceed. OPERATIVE FINDINGS:  The mass was visible subpleurally with puckering  and overlying anthracosis. There was no pleural effusion and no  parietal pleural changes. The fissure was complete anteriorly and  incomplete posteriorly. Anthracotic lymph nodes were sampled from the  AP window, inferior pulmonary ligament, hilar and lobar positions.     OPERATIVE PROCEDURE:  The patient was brought to the operating room and  placed supine on the operating table.  General anesthesia was induced  without complication. The patient was placed in the right lateral  decubitus position and the left chest prepped and draped in the usual  sterile fashion. After assuring that preoperative antibiotics had been given,  thoracoscopic port was placed in the eighth intercostal space in the  anterior axillary line. The thoracoscope was introduced and initial  surveillance made. A limited lateral muscle-sparing thoracotomy was  created in the fourth intercostal space. The nodule was localized and  removed as a wedge specimen using an [place] Flex 45-mm stapler with  green tissue loads. This was sent for frozen section, revealing  adenocarcinoma. The pleura was opened around the hilum. The superior pulmonary vein  branches were isolated and divided with a vascular stapler. The  pulmonary artery branches in the fissure were also divided with a  vascular stapler. The lingular bronchus was divided with thick tissue  loads of the [place] Flex stapler. The remaining bronchus and artery to  the upper lobe were then isolated and divided. The residual posterior  fissure was then divided. The inferior pulmonary ligament was released. Lymph nodes were sampled from the inferior pulmonary ligament, AP  window, hilar and lobar positions. An intercostal cryo nerve block was performed for levels 3, 4, 5, and 8  at the posterior aspect of the incisions. The chest was irrigated with  sterile water and antibiotic solution and the bronchial stump checked. There was no air leak noted. ProGEL was applied to the staple lines. Apical and diaphragmatic chest tubes were placed. The two incisions  were infiltrated with 0.5% Marcaine. The lower lobe was noted to  inflate well. The ribs were approximated with #2 pericostal stitches,  followed by a 0 Vicryl muscular closure, 2-0 Vicryl subcutaneous tissue  closure, and 4-0 Monocryl running subcuticular closure.   Dermabond was  applied to the thoracotomy site and a sterile dressing at the chest tube  site. The patient tolerated the procedure well without any complications. Estimated blood loss was 100 mL. The patient was transferred extubated  in stable condition on no drips to the cardiovascular unit. Sponge and  needle count was correct at the end of the case.         Melida Britton MD    D: 08/18/2022 10:06:15       T: 08/18/2022 11:23:21     [email or web address]  Job#: [id number]     Doc#: [id number]    CC:

## 2022-08-18 NOTE — PROGRESS NOTES
Patient eating breakfast.  Instructed that once she is finished, we will remove her dai catheter and ambulate in the halls. Patient agreeable to this plan. Will return.

## 2022-08-18 NOTE — PROGRESS NOTES
Late entry due to patient care: Arterial line removed from patient's right wrist and pressure held for 5 minutes. Patient tolerated well. Patient disconnected from IV infusing medications. Patient also now on room air. Will monitor.

## 2022-08-18 NOTE — PROGRESS NOTES
Functional mobility training, Transfer training, Gait training, Stair training, Safety education & training, Patient/Caregiver education & training  Safety Devices  Type of Devices: Call light within reach, Left in chair, Nurse notified     Restrictions  Restrictions/Precautions  Restrictions/Precautions: Fall Risk  Position Activity Restriction  Other position/activity restrictions: L Chest Tube x 2 (Apical, Diaphragmatic)(Suction). O2 3L via NC.  PCA. Arterial Line. Subjective   General  Chart Reviewed: Yes  Patient assessed for rehabilitation services?: Yes  Additional Pertinent Hx: 73 y/o female admit 8/17/2022 with L Upper Lobe Lung Nodule. 8/17/2022 S/P L VATS, Lat Muscle Sparing Thoracotomy, Wedge Excision L UL Nodule, Completion L UL Lobectomy, Mediastianal Lymph Node Sampling, Intercostal Cryo Nerve Block. PMH as noted including CKD, PAD, SSS, Bradycardia, Pacemaker (5/2022), B Shld Surg, L TKR, Spinal Stenosis. Family / Caregiver Present: No  Referring Practitioner: Dr. Colby Guerrero: Within Functional Limits  Subjective  Subjective: Pt agreeable to PT Eval/Rx. Social/Functional History  Social/Functional History  Lives With: Family (Son, D-I-L, 2 Grand dtrs.)  Type of Home: House  Home Layout: Two level (2 story, no basement.)  Home Access: Stairs to enter without rails (1 step to enter.)  Bathroom Shower/Tub: Tub only, Walk-in shower (Main level : walk-in shower. 2nd floor : tub only.)  Bathroom Toilet: Standard (Main level : comfort height.   2nd floor : standard.)  Bathroom Equipment: Shower chair (Not using shower chair at this time.)  Bathroom Accessibility: Accessible  Home Equipment: Trula Dry, rolling  ADL Assistance: Independent  Homemaking Assistance:  (Shared with family.)  Ambulation Assistance: Independent (Without assist device pta.)  Transfer Assistance: Independent  Active : Yes  Occupation: Retired  Additional Comments: Pt reports son/family moved in with pt several years ago; has adequate assist/support for d/c home. Vision/Hearing  Vision  Vision: Within Functional Limits  Hearing  Hearing: Within functional limits    Cognition   Orientation  Overall Orientation Status: Within Functional Limits  Cognition  Overall Cognitive Status: WFL     Objective           Gross Assessment  AROM: Within functional limits  Strength: Generally decreased, functional             Bed mobility  Supine to Sit: Minimal assistance (HOB elevated.)  Transfers  Sit to Stand: Minimal Assistance  Stand to sit: Minimal Assistance  Ambulation  Surface: level tile  Distance: 4-5 steps bed to chair with Min assist.  No LE buckling/giving way. Additional 4-5 steps forward/back (following brief seated rest). Slow/guarded although no specific LOB noted. A/AROM Exercises: Glut Sets, Quad Sets, Knee Ext, Ankle Pumps. AM-PAC Score  AM-PAC Inpatient Mobility Raw Score : 18 (08/18/22 0851)  AM-PAC Inpatient T-Scale Score : 43.63 (08/18/22 0851)  Mobility Inpatient CMS 0-100% Score: 46.58 (08/18/22 0851)  Mobility Inpatient CMS G-Code Modifier : CK (08/18/22 0851)            Goals  Short Term Goals  Time Frame for Short term goals: Upon d/c acute care setting. Short term goal 1: Bed Mob Independent. Short term goal 2: Transfers with/without assist device Supervision. Short term goal 3: Amb with/without assist device 150' SBA/Supervision. Short term goal 4: Stair Negotiation as approp. Patient Goals   Patient goals : Return home with family.        Education  Patient Education  Education Given To: Patient  Education Provided Comments: Role of PT, POC, Need to call for assist.  Education Method: Verbal  Barriers to Learning: None  Education Outcome: Verbalized understanding      Therapy Time   Individual Concurrent Group Co-treatment   Time In 0630         Time Out 0710         Minutes [address] [facility],

## 2022-08-19 LAB
ANION GAP SERPL CALCULATED.3IONS-SCNC: 13 MMOL/L (ref 3–16)
BUN BLDV-MCNC: 31 MG/DL (ref 7–20)
CALCIUM SERPL-MCNC: 9.7 MG/DL (ref 8.3–10.6)
CHLORIDE BLD-SCNC: 107 MMOL/L (ref 99–110)
CO2: 19 MMOL/L (ref 21–32)
CREAT SERPL-MCNC: 1.2 MG/DL (ref 0.6–1.2)
GFR AFRICAN AMERICAN: 53
GFR NON-AFRICAN AMERICAN: 44
GLUCOSE BLD-MCNC: 119 MG/DL (ref 70–99)
HCT VFR BLD CALC: 40.1 % (ref 36–48)
HEMOGLOBIN: 13 G/DL (ref 12–16)
INR BLD: 1.02 (ref 0.87–1.14)
MCH RBC QN AUTO: 31.2 PG (ref 26–34)
MCHC RBC AUTO-ENTMCNC: 32.5 G/DL (ref 31–36)
MCV RBC AUTO: 96 FL (ref 80–100)
PDW BLD-RTO: 15.2 % (ref 12.4–15.4)
PLATELET # BLD: 209 K/UL (ref 135–450)
PMV BLD AUTO: 9.4 FL (ref 5–10.5)
POTASSIUM SERPL-SCNC: 4.7 MMOL/L (ref 3.5–5.1)
PROTHROMBIN TIME: 13.3 SEC (ref 11.7–14.5)
RBC # BLD: 4.17 M/UL (ref 4–5.2)
SODIUM BLD-SCNC: 139 MMOL/L (ref 136–145)
WBC # BLD: 15.2 K/UL (ref 4–11)

## 2022-08-19 PROCEDURE — 6370000000 HC RX 637 (ALT 250 FOR IP): Performed by: THORACIC SURGERY (CARDIOTHORACIC VASCULAR SURGERY)

## 2022-08-19 PROCEDURE — 2580000003 HC RX 258: Performed by: ANESTHESIOLOGY

## 2022-08-19 PROCEDURE — 85027 COMPLETE CBC AUTOMATED: CPT

## 2022-08-19 PROCEDURE — 97116 GAIT TRAINING THERAPY: CPT

## 2022-08-19 PROCEDURE — 2100000000 HC CCU R&B

## 2022-08-19 PROCEDURE — 6370000000 HC RX 637 (ALT 250 FOR IP): Performed by: NURSE PRACTITIONER

## 2022-08-19 PROCEDURE — 36415 COLL VENOUS BLD VENIPUNCTURE: CPT

## 2022-08-19 PROCEDURE — 6360000002 HC RX W HCPCS: Performed by: THORACIC SURGERY (CARDIOTHORACIC VASCULAR SURGERY)

## 2022-08-19 PROCEDURE — 85610 PROTHROMBIN TIME: CPT

## 2022-08-19 PROCEDURE — 94760 N-INVAS EAR/PLS OXIMETRY 1: CPT

## 2022-08-19 PROCEDURE — 97530 THERAPEUTIC ACTIVITIES: CPT

## 2022-08-19 PROCEDURE — 99024 POSTOP FOLLOW-UP VISIT: CPT | Performed by: THORACIC SURGERY (CARDIOTHORACIC VASCULAR SURGERY)

## 2022-08-19 PROCEDURE — 80048 BASIC METABOLIC PNL TOTAL CA: CPT

## 2022-08-19 RX ORDER — HYDRALAZINE HYDROCHLORIDE 20 MG/ML
5 INJECTION INTRAMUSCULAR; INTRAVENOUS
Status: DISCONTINUED | OUTPATIENT
Start: 2022-08-19 | End: 2022-08-22 | Stop reason: HOSPADM

## 2022-08-19 RX ORDER — SENNA AND DOCUSATE SODIUM 50; 8.6 MG/1; MG/1
2 TABLET, FILM COATED ORAL DAILY PRN
Status: DISCONTINUED | OUTPATIENT
Start: 2022-08-19 | End: 2022-08-22 | Stop reason: HOSPADM

## 2022-08-19 RX ADMIN — GABAPENTIN 100 MG: 100 CAPSULE ORAL at 21:25

## 2022-08-19 RX ADMIN — FAMOTIDINE 20 MG: 20 TABLET, FILM COATED ORAL at 21:25

## 2022-08-19 RX ADMIN — METHOCARBAMOL TABLETS 750 MG: 750 TABLET, COATED ORAL at 21:25

## 2022-08-19 RX ADMIN — GABAPENTIN 100 MG: 100 CAPSULE ORAL at 14:50

## 2022-08-19 RX ADMIN — METHOCARBAMOL TABLETS 750 MG: 750 TABLET, COATED ORAL at 12:35

## 2022-08-19 RX ADMIN — HYDRALAZINE HYDROCHLORIDE 5 MG: 20 INJECTION INTRAMUSCULAR; INTRAVENOUS at 13:05

## 2022-08-19 RX ADMIN — ATORVASTATIN CALCIUM 10 MG: 10 TABLET, FILM COATED ORAL at 08:41

## 2022-08-19 RX ADMIN — SENNOSIDES AND DOCUSATE SODIUM 2 TABLET: 50; 8.6 TABLET ORAL at 16:10

## 2022-08-19 RX ADMIN — HYDRALAZINE HYDROCHLORIDE 5 MG: 20 INJECTION INTRAMUSCULAR; INTRAVENOUS at 05:04

## 2022-08-19 RX ADMIN — ACETAMINOPHEN 1000 MG: 500 TABLET ORAL at 11:22

## 2022-08-19 RX ADMIN — METHOCARBAMOL TABLETS 750 MG: 750 TABLET, COATED ORAL at 17:59

## 2022-08-19 RX ADMIN — DICYCLOMINE HYDROCHLORIDE 10 MG: 10 CAPSULE ORAL at 08:41

## 2022-08-19 RX ADMIN — OXYCODONE 5 MG: 5 TABLET ORAL at 09:40

## 2022-08-19 RX ADMIN — GABAPENTIN 100 MG: 100 CAPSULE ORAL at 08:40

## 2022-08-19 RX ADMIN — ACETAMINOPHEN 1000 MG: 500 TABLET ORAL at 05:37

## 2022-08-19 RX ADMIN — METHOCARBAMOL TABLETS 750 MG: 750 TABLET, COATED ORAL at 08:41

## 2022-08-19 RX ADMIN — Medication 10 ML: at 08:41

## 2022-08-19 RX ADMIN — ACETAMINOPHEN 1000 MG: 500 TABLET ORAL at 17:59

## 2022-08-19 RX ADMIN — LOSARTAN POTASSIUM AND HYDROCHLOROTHIAZIDE 2 TABLET: 50; 12.5 TABLET, FILM COATED ORAL at 08:40

## 2022-08-19 ASSESSMENT — PAIN SCALES - GENERAL
PAINLEVEL_OUTOF10: 0
PAINLEVEL_OUTOF10: 5
PAINLEVEL_OUTOF10: 4

## 2022-08-19 ASSESSMENT — PAIN DESCRIPTION - LOCATION: LOCATION: FLANK

## 2022-08-19 ASSESSMENT — PAIN DESCRIPTION - ORIENTATION: ORIENTATION: LEFT

## 2022-08-19 ASSESSMENT — PAIN DESCRIPTION - DESCRIPTORS: DESCRIPTORS: ACHING

## 2022-08-19 NOTE — PLAN OF CARE
Problem: Discharge Planning  Goal: Discharge to home or other facility with appropriate resources  Outcome: Progressing  Flowsheets (Taken 8/19/2022 1827)  Discharge to home or other facility with appropriate resources:   Identify barriers to discharge with patient and caregiver   Arrange for needed discharge resources and transportation as appropriate   Identify discharge learning needs (meds, wound care, etc)   Refer to discharge planning if patient needs post-hospital services based on physician order or complex needs related to functional status, cognitive ability or social support system     Problem: Pain  Goal: Verbalizes/displays adequate comfort level or baseline comfort level  Outcome: Progressing  Flowsheets (Taken 8/19/2022 1827)  Verbalizes/displays adequate comfort level or baseline comfort level:   Encourage patient to monitor pain and request assistance   Assess pain using appropriate pain scale   Administer analgesics based on type and severity of pain and evaluate response   Implement non-pharmacological measures as appropriate and evaluate response   Notify Licensed Independent Practitioner if interventions unsuccessful or patient reports new pain   Consider cultural and social influences on pain and pain management     Problem: Safety - Adult  Goal: Free from fall injury  Outcome: Progressing  Flowsheets (Taken 8/19/2022 1827)  Free From Fall Injury: Instruct family/caregiver on patient safety     Problem: ABCDS Injury Assessment  Goal: Absence of physical injury  Outcome: Progressing  Flowsheets (Taken 8/19/2022 1827)  Absence of Physical Injury: Implement safety measures based on patient assessment     Problem: Respiratory - Adult  Goal: Achieves optimal ventilation and oxygenation  Outcome: Progressing  Flowsheets (Taken 8/19/2022 1827)  Achieves optimal ventilation and oxygenation:   Assess for changes in respiratory status   Assess for changes in mentation and behavior   Position to facilitate oxygenation and minimize respiratory effort   Initiate smoking cessation protocol as indicated   Encourage broncho-pulmonary hygiene including cough, deep breathe, incentive spirometry   Assess and instruct to report shortness of breath or any respiratory difficulty     Problem: Skin/Tissue Integrity - Adult  Goal: Incisions, wounds, or drain sites healing without S/S of infection  Outcome: Progressing  Flowsheets (Taken 8/19/2022 1827)  Incisions, Wounds, or Drain Sites Healing Without Sign and Symptoms of Infection:   ADMISSION and DAILY: Assess and document risk factors for pressure ulcer development   TWICE DAILY: Assess and document skin integrity   TWICE DAILY: Assess and document dressing/incision, wound bed, drain sites and surrounding tissue   Implement wound care per orders     Problem: Genitourinary - Adult  Goal: Absence of urinary retention  Outcome: Progressing  Flowsheets (Taken 8/19/2022 1827)  Absence of urinary retention:   Assess patients ability to void and empty bladder   Monitor intake/output and perform bladder scan as needed     Problem: Infection - Adult  Goal: Absence of infection at discharge  Outcome: Progressing  Flowsheets (Taken 8/19/2022 1827)  Absence of infection at discharge:   Assess and monitor for signs and symptoms of infection   Monitor lab/diagnostic results   Instruct and encourage patient and family to use good hand hygiene technique   Monitor all insertion sites i.e., indwelling lines, tubes and drains   Administer medications as ordered  Goal: Absence of infection during hospitalization  Outcome: Progressing  Flowsheets (Taken 8/19/2022 1827)  Absence of infection during hospitalization:   Assess and monitor for signs and symptoms of infection   Monitor lab/diagnostic results   Monitor all insertion sites i.e., indwelling lines, tubes and drains   Administer medications as ordered   Instruct and encourage patient and family to use good hand hygiene technique or without assistive devices   Assist with transfers and ambulation using safe patient handling equipment as needed   Ensure adequate protection for wounds/incisions during mobilization   Obtain physical therapy/occupational therapy consults as needed   Apply continuous passive motion per provider or physical therapy orders to increase flexion toward goal   Instruct patient/family in ordered activity level  Goal: Maintain proper alignment of affected body part  Outcome: Progressing  Flowsheets (Taken 8/19/2022 1827)  Maintain proper alignment of affected body part:   Support and protect limb and body alignment per provider's orders   Instruct and reinforce with patient and family use of appropriate assistive device and precautions (e.g. spinal or hip dislocation precautions)  Goal: Return ADL status to a safe level of function  Outcome: Progressing  Flowsheets (Taken 8/19/2022 1827)  Return ADL Status to a Safe Level of Function:   Administer medication as ordered   Assess activities of daily living deficits and provide assistive devices as needed   Obtain physical therapy/occupational therapy consults as needed   Assist and instruct patient to increase activity and self care as tolerated     Problem: Metabolic/Fluid and Electrolytes - Adult  Goal: Glucose maintained within prescribed range  Outcome: Completed

## 2022-08-19 NOTE — PROGRESS NOTES
Physical Therapy  Facility/Department: [address] CV[facility]  Physical Therapy Treatment Note    Name: Bimal Gerber  : 1950  MRN: [id number]  Date of Service: 2022    Discharge Recommendations:  Continue to assess pending progress, Home with assist PRN   PT Equipment Recommendations  Other: Will monitor for potential equipt needs. Assessment   Body Structures, Functions, Activity Limitations Requiring Skilled Therapeutic Intervention: Decreased functional mobility ; Decreased endurance  Assessment: 75 y/o female admit 2022 with L Upper Lobe Lung Nodule. 2022 S/P L VATS, Lat Muscle Sparing Thoracotomy, Wedge Excision L UL Nodule, Completion L UL Lobectomy, Mediastianal Lymph Node Sampling, Intercostal Cryo Nerve Block. PMH as noted including CKD, PAD, SSS, Bradycardia, Pacemaker (2022), B Shld Surg, L TKR, Spinal Stenosis. PTA pt living with family (son/family) in 2 story home with 1 step to enter and 2nd floor bed/bath; independent daily care and functional mobility. Pt reports adequate assist/support for d/c home. Do not anticipate need cont PT upon d/c. Will cont to monitor pt's progress. Therapy Prognosis: Good  History: 75 y/o female admit 2022 with L Upper Lobe Lung Nodule. 2022 S/P L VATS, Lat Muscle Sparing Thoracotomy, Wedge Excision L UL Nodule, Completion L UL Lobectomy, Mediastianal Lymph Node Sampling, Intercostal Cryo Nerve Block. PMH as noted including CKD, PAD, SSS, Bradycardia, Pacemaker (2022), B Shld Surg, L TKR, Spinal Stenosis. Exam: See above. Clinical Presentation: See above. Barriers to Learning: None.   Requires PT Follow-Up: Yes  Activity Tolerance  Activity Tolerance: Patient tolerated treatment well     Plan   Plan  Plan: 3-5 times per week  Current Treatment Recommendations: Functional mobility training, Transfer training, Gait training, Stair training, Safety education & training, Patient/Caregiver education & training  Safety Devices  Type of Devices: Call light within reach, Left in chair, Nurse notified     Restrictions  Restrictions/Precautions  Restrictions/Precautions: Up as Tolerated  Position Activity Restriction  Other position/activity restrictions: L Chest Tube x 2 (Apical, Diaphragmatic)(Suction). Subjective   General  Chart Reviewed: Yes  Patient assessed for rehabilitation services?: Yes  Additional Pertinent Hx: 75 y/o female admit 8/17/2022 with L Upper Lobe Lung Nodule. 8/17/2022 S/P L VATS, Lat Muscle Sparing Thoracotomy, Wedge Excision L UL Nodule, Completion L UL Lobectomy, Mediastianal Lymph Node Sampling, Intercostal Cryo Nerve Block. PMH as noted including CKD, PAD, SSS, Bradycardia, Pacemaker (5/2022), B Shld Surg, L TKR, Spinal Stenosis. Family / Caregiver Present: No  Referring Practitioner: Dr. Maricel Clark: Within Functional Limits  Subjective  Subjective: Pt agreeable to PT Rx. Social/Functional History  Social/Functional History  Lives With: Family (Son, D-I-L, 2 Grand dtrs.)  Type of Home: House  Home Layout: Two level (2 story, no basement.)  Home Access: Stairs to enter without rails (1 step to enter.)  Bathroom Shower/Tub: Tub only, Walk-in shower (Main level : walk-in shower. 2nd floor : tub only.)  Bathroom Toilet: Standard (Main level : comfort height. 2nd floor : standard.)  Bathroom Equipment: Shower chair (Not using shower chair at this time.)  Bathroom Accessibility: Accessible  Home Equipment: Sisto Mejia, rolling  ADL Assistance: Independent  Homemaking Assistance:  (Shared with family.)  Ambulation Assistance: Independent (Without assist device pta.)  Transfer Assistance: Independent  Active : Yes  Occupation: Retired  Additional Comments: Pt reports son/family moved in with pt several years ago; has adequate assist/support for d/c home.   Vision/Hearing  Vision  Vision: Within Functional Limits  Hearing  Hearing: Within functional limits    Cognition Orientation  Overall Orientation Status: Within Functional Limits  Cognition  Overall Cognitive Status: WFL     Objective                Bed mobility  Supine to Sit: Contact guard assistance (HOB elevated.)  Transfers  Sit to Stand: Stand by assistance (With Ruiz Dessert.)  Stand to sit: Stand by assistance (With Ruiz Dessert.)  Ambulation  Surface: level tile  Device: Rolling Walker  Distance: Pt amb 100' x 2 with Walker SBA. No LE buckling/giving way. Slightly guarded although improving as distance progressed. Gait steady. AM-PAC Score  AM-PAC Inpatient Mobility Raw Score : 20 (08/19/22 0936)  AM-PAC Inpatient T-Scale Score : 47.67 (08/19/22 0936)  Mobility Inpatient CMS 0-100% Score: 35.83 (08/19/22 0936)  Mobility Inpatient CMS G-Code Modifier : CJ (08/19/22 7126)          Goals  Short Term Goals  Time Frame for Short term goals: Upon d/c acute care setting. Short term goal 1: Bed Mob Independent. Short term goal 2: Transfers with/without assist device Supervision. Short term goal 3: Amb with/without assist device 150' SBA/Supervision. Short term goal 4: Stair Negotiation as approp. Patient Goals   Patient goals : Return home with family. Education  Patient Education  Education Given To: Patient  Education Provided Comments: Role of PT, POC, Need to call for assist, Safe use of Walker.   Education Method: Verbal  Education Outcome: Verbalized understanding             Jay Zamorano PT

## 2022-08-19 NOTE — FLOWSHEET NOTE
08/19/22 1400   Treatment Team Notification   Reason for Communication Review case   Team Member Name Dr. Flaquito Guo Team Role Attending Provider   Method of Communication Call   Notification Time 1400     Dr. Christiano Terrazas updated, chest tube drainage reviewed. Chest tubes to remain in place and will reassess tomorrow.

## 2022-08-19 NOTE — PROGRESS NOTES
0543 called lab r/t STAT orders. Spoke with [employer] Stated that phlebotomist would be on their way.

## 2022-08-19 NOTE — PROGRESS NOTES
Progress Note    S/P LAKESHA nodule wedge, completion LAKESHA, IC cryo nerve block 8/17/22    Vital Signs:                                                 /67   Pulse 78   Temp 97.8 °F (36.6 °C) (Oral)   Resp 18   Ht 5' 1.5\" (1.562 m)   Wt 166 lb 10.7 oz (75.6 kg)   SpO2 96%   BMI 30.98 kg/m²  O2 Flow Rate (L/min): 0 L/min   SR   Admission Weight: 162 lb (73.5 kg)      I/O:    Intake/Output Summary (Last 24 hours) at 8/19/2022 0747  Last data filed at 8/19/2022 0600  Gross per 24 hour   Intake 1200 ml   Output 2510 ml   Net -1310 ml       Chest Tube:   Apic 10, 10, 50  Diaph 100, 70, 60    CV: reg  Pulm: decreased, wound c/d/i  Abd: soft  Ext: warm, no edema    Data Review:  CBC:   Recent Labs     08/17/22  1655 08/18/22  0430 08/19/22  0708   WBC 15.8* 13.8* 15.2*   HGB 12.8 13.7 13.0   HCT 39.2 42.7 40.1   MCV 94.8 96.9 96.0    191 209       BMP:   Recent Labs     08/17/22  1141 08/17/22  1655 08/18/22  0430 08/19/22  0707    143 137 139   K 3.9 4.4 4.8 4.7    111* 105 107   CO2 19* 17* 13* 19*   BUN 29* 24* 27* 31*   CREATININE 1.3* 1.3* 1.2 1.2   CALCIUM 9.4 8.7 9.1 9.7   MG 1.90  --   --   --        Cardiac Enzymes: No results for input(s): CKTOTAL, CKMB, CKMBINDEX, TROPONINI in the last 72 hours. PT/INR:   Recent Labs     08/17/22  1124 08/17/22  1655 08/19/22  0708   PROTIME 14.5 13.9 13.3   INR 1.14 1.07 1.02       APTT:   Recent Labs     08/17/22 1655   APTT 39.6*       Assessment/Plan:  CV - stable in SR  pulm - smoker. Pulm toilet. Off O2   - small air leak from apical tube, leave to suction   - follow drainage from diaph tube, potentially remove this afternoon  Renal - baseline Cr 1.3  Heme - stable   - scds  Pain - nerve block. Acet, latricia, morris. Oxy prn.     Tigist Maciel MD  8/19/2022  7:47 AM

## 2022-08-19 NOTE — PROGRESS NOTES
End of Shift     Uneventful shift. Pt ambulates to bathroom/santiago with an assist x1 with walker. 1 PRN oxy and 1 PRN hydralazine given.

## 2022-08-19 NOTE — ACP (ADVANCE CARE PLANNING)
Advance Care Planning     Advance Care Planning Inpatient Note  Spiritual Care Department    Today's Date: 8/19/2022  Unit: [facility]    Received request from IDT Member. Upon review of chart and communication with care team, patient's decision making abilities are not in question. . Patient was/were present in the room during visit. Goals of ACP Conversation:  Discuss advance care planning documents    Health Care Decision Makers:       Primary Decision Maker: Jen Mejia - Child - [phone]    Secondary Decision Maker: cely huang - Child - [phone]  Summary:  Documented Next of Kin, per patient report    Advance Care Planning Documents (Patient Wishes):  None     Assessment:  Patient awake and alert, sitting up in bed. Patient is a  and has lost one child to cancer. Her remaining next of kin decision makers are her son and daughter. Patient is affiliated with [facility]. She will review advance directives and contact spiritual care if she wants assistance. Interventions:  Provided copies of advance care directives for patient to review  Discussed next of kin healthcare decision makers  Gave patient phone number for spiritual care if she has questions.     Care Preferences Communicated:   No    Outcomes/Plan:  ACP Discussion: Completed    Electronically signed by Celeste Borja, [facility] on 8/19/2022 at 11:49 AM

## 2022-08-20 PROCEDURE — 99024 POSTOP FOLLOW-UP VISIT: CPT | Performed by: THORACIC SURGERY (CARDIOTHORACIC VASCULAR SURGERY)

## 2022-08-20 PROCEDURE — 94761 N-INVAS EAR/PLS OXIMETRY MLT: CPT

## 2022-08-20 PROCEDURE — 2100000000 HC CCU R&B

## 2022-08-20 PROCEDURE — 94640 AIRWAY INHALATION TREATMENT: CPT

## 2022-08-20 PROCEDURE — 2580000003 HC RX 258: Performed by: THORACIC SURGERY (CARDIOTHORACIC VASCULAR SURGERY)

## 2022-08-20 PROCEDURE — 6370000000 HC RX 637 (ALT 250 FOR IP): Performed by: THORACIC SURGERY (CARDIOTHORACIC VASCULAR SURGERY)

## 2022-08-20 PROCEDURE — 6370000000 HC RX 637 (ALT 250 FOR IP): Performed by: NURSE PRACTITIONER

## 2022-08-20 PROCEDURE — 2580000003 HC RX 258: Performed by: ANESTHESIOLOGY

## 2022-08-20 PROCEDURE — 6360000002 HC RX W HCPCS: Performed by: THORACIC SURGERY (CARDIOTHORACIC VASCULAR SURGERY)

## 2022-08-20 RX ORDER — ALBUTEROL SULFATE 2.5 MG/3ML
2.5 SOLUTION RESPIRATORY (INHALATION) EVERY 6 HOURS PRN
Status: DISCONTINUED | OUTPATIENT
Start: 2022-08-20 | End: 2022-08-22 | Stop reason: HOSPADM

## 2022-08-20 RX ORDER — SODIUM CHLORIDE FOR INHALATION 3 %
4 VIAL, NEBULIZER (ML) INHALATION 4 TIMES DAILY
Status: DISCONTINUED | OUTPATIENT
Start: 2022-08-20 | End: 2022-08-22

## 2022-08-20 RX ADMIN — SODIUM CHLORIDE SOLN NEBU 3% 4 ML: 3 NEBU SOLN at 19:50

## 2022-08-20 RX ADMIN — SODIUM CHLORIDE SOLN NEBU 3% 4 ML: 3 NEBU SOLN at 15:13

## 2022-08-20 RX ADMIN — ALBUTEROL SULFATE 2.5 MG: 2.5 SOLUTION RESPIRATORY (INHALATION) at 19:50

## 2022-08-20 RX ADMIN — ACETAMINOPHEN 1000 MG: 500 TABLET ORAL at 23:44

## 2022-08-20 RX ADMIN — Medication 10 ML: at 21:13

## 2022-08-20 RX ADMIN — METHOCARBAMOL TABLETS 750 MG: 750 TABLET, COATED ORAL at 17:01

## 2022-08-20 RX ADMIN — METHOCARBAMOL TABLETS 750 MG: 750 TABLET, COATED ORAL at 21:13

## 2022-08-20 RX ADMIN — OXYCODONE 5 MG: 5 TABLET ORAL at 15:11

## 2022-08-20 RX ADMIN — DICYCLOMINE HYDROCHLORIDE 10 MG: 10 CAPSULE ORAL at 08:25

## 2022-08-20 RX ADMIN — FAMOTIDINE 20 MG: 20 TABLET, FILM COATED ORAL at 21:13

## 2022-08-20 RX ADMIN — ACETAMINOPHEN 1000 MG: 500 TABLET ORAL at 11:19

## 2022-08-20 RX ADMIN — METHOCARBAMOL TABLETS 750 MG: 750 TABLET, COATED ORAL at 08:25

## 2022-08-20 RX ADMIN — ATORVASTATIN CALCIUM 10 MG: 10 TABLET, FILM COATED ORAL at 08:25

## 2022-08-20 RX ADMIN — SODIUM CHLORIDE SOLN NEBU 3% 4 ML: 3 NEBU SOLN at 12:39

## 2022-08-20 RX ADMIN — GABAPENTIN 100 MG: 100 CAPSULE ORAL at 13:31

## 2022-08-20 RX ADMIN — GABAPENTIN 100 MG: 100 CAPSULE ORAL at 08:25

## 2022-08-20 RX ADMIN — OXYCODONE 5 MG: 5 TABLET ORAL at 03:20

## 2022-08-20 RX ADMIN — METHOCARBAMOL TABLETS 750 MG: 750 TABLET, COATED ORAL at 13:31

## 2022-08-20 RX ADMIN — Medication 10 ML: at 08:37

## 2022-08-20 RX ADMIN — GABAPENTIN 100 MG: 100 CAPSULE ORAL at 21:13

## 2022-08-20 RX ADMIN — OXYCODONE 5 MG: 5 TABLET ORAL at 08:25

## 2022-08-20 RX ADMIN — ACETAMINOPHEN 1000 MG: 500 TABLET ORAL at 17:01

## 2022-08-20 ASSESSMENT — PAIN SCALES - GENERAL
PAINLEVEL_OUTOF10: 3
PAINLEVEL_OUTOF10: 6
PAINLEVEL_OUTOF10: 7
PAINLEVEL_OUTOF10: 2

## 2022-08-20 ASSESSMENT — PAIN DESCRIPTION - DESCRIPTORS
DESCRIPTORS: BURNING
DESCRIPTORS: BURNING
DESCRIPTORS: ACHING

## 2022-08-20 ASSESSMENT — PAIN DESCRIPTION - ORIENTATION
ORIENTATION: LEFT

## 2022-08-20 ASSESSMENT — PAIN DESCRIPTION - LOCATION
LOCATION: INCISION

## 2022-08-20 ASSESSMENT — PAIN DESCRIPTION - PAIN TYPE
TYPE: SURGICAL PAIN

## 2022-08-20 ASSESSMENT — PAIN DESCRIPTION - ONSET
ONSET: ON-GOING
ONSET: PROGRESSIVE

## 2022-08-20 ASSESSMENT — PAIN DESCRIPTION - FREQUENCY
FREQUENCY: INTERMITTENT
FREQUENCY: CONTINUOUS

## 2022-08-20 ASSESSMENT — PAIN - FUNCTIONAL ASSESSMENT
PAIN_FUNCTIONAL_ASSESSMENT: ACTIVITIES ARE NOT PREVENTED
PAIN_FUNCTIONAL_ASSESSMENT: ACTIVITIES ARE NOT PREVENTED

## 2022-08-20 NOTE — PROGRESS NOTES
Progress Note    S/P LAKESHA nodule wedge, completion LAKESHA, IC cryo nerve block 8/17/22    Vital Signs:                                                 /86   Pulse 84   Temp 97.9 °F (36.6 °C) (Oral)   Resp 16   Ht 5' 1.5\" (1.562 m)   Wt 165 lb 5.5 oz (75 kg)   SpO2 94%   BMI 30.74 kg/m²  O2 Flow Rate (L/min): 0 L/min   SR   Admission Weight: 162 lb (73.5 kg)      I/O:    Intake/Output Summary (Last 24 hours) at 8/20/2022 0930  Last data filed at 8/20/2022 0900  Gross per 24 hour   Intake 840 ml   Output 1292 ml   Net -452 ml       Chest Tube:   Apic 10, 10, 50  Diaph 100, 70, 60    CV: reg  Pulm: decreased, wound c/d/i  Abd: soft  Ext: warm, no edema    Data Review:  CBC:   Recent Labs     08/17/22  1655 08/18/22  0430 08/19/22  0708   WBC 15.8* 13.8* 15.2*   HGB 12.8 13.7 13.0   HCT 39.2 42.7 40.1   MCV 94.8 96.9 96.0    191 209       BMP:   Recent Labs     08/17/22  1141 08/17/22  1655 08/18/22  0430 08/19/22  0707    143 137 139   K 3.9 4.4 4.8 4.7    111* 105 107   CO2 19* 17* 13* 19*   BUN 29* 24* 27* 31*   CREATININE 1.3* 1.3* 1.2 1.2   CALCIUM 9.4 8.7 9.1 9.7   MG 1.90  --   --   --        Cardiac Enzymes: No results for input(s): CKTOTAL, CKMB, CKMBINDEX, TROPONINI in the last 72 hours.   PT/INR:   Recent Labs     08/17/22  1124 08/17/22  1655 08/19/22  0708   PROTIME 14.5 13.9 13.3   INR 1.14 1.07 1.02       APTT:   Recent Labs     08/17/22 1655   APTT 39.6*       Assessment/Plan:  DC chest tube #2  Out of bed as tolerated  Walking 3 times daily  Gwendolyn Escobar MD  8/20/2022  9:30 AM

## 2022-08-20 NOTE — PROGRESS NOTES
Chest tubes meet criteria to remove per order. Pt instructed on procedure. Pt premedicated with Oxy IR. Dressings removed. Incision within normal limits. Site cleansed and prepped per protocol. Chest tubes X 1 removed without difficulty. Vaseline gauze and dry sterile dressing applied. Bilateral breath sounds audible. O2Sats 99% on room air. Patient tolerated well.

## 2022-08-21 ENCOUNTER — APPOINTMENT (OUTPATIENT)
Dept: GENERAL RADIOLOGY | Age: 72
DRG: 164 | End: 2022-08-21
Attending: THORACIC SURGERY (CARDIOTHORACIC VASCULAR SURGERY)
Payer: COMMERCIAL

## 2022-08-21 PROCEDURE — 94760 N-INVAS EAR/PLS OXIMETRY 1: CPT

## 2022-08-21 PROCEDURE — 94640 AIRWAY INHALATION TREATMENT: CPT

## 2022-08-21 PROCEDURE — 2580000003 HC RX 258: Performed by: THORACIC SURGERY (CARDIOTHORACIC VASCULAR SURGERY)

## 2022-08-21 PROCEDURE — 2100000000 HC CCU R&B

## 2022-08-21 PROCEDURE — 6370000000 HC RX 637 (ALT 250 FOR IP): Performed by: NURSE PRACTITIONER

## 2022-08-21 PROCEDURE — 99024 POSTOP FOLLOW-UP VISIT: CPT | Performed by: THORACIC SURGERY (CARDIOTHORACIC VASCULAR SURGERY)

## 2022-08-21 PROCEDURE — 6360000002 HC RX W HCPCS: Performed by: THORACIC SURGERY (CARDIOTHORACIC VASCULAR SURGERY)

## 2022-08-21 PROCEDURE — 6370000000 HC RX 637 (ALT 250 FOR IP): Performed by: THORACIC SURGERY (CARDIOTHORACIC VASCULAR SURGERY)

## 2022-08-21 PROCEDURE — 71045 X-RAY EXAM CHEST 1 VIEW: CPT

## 2022-08-21 PROCEDURE — 2580000003 HC RX 258: Performed by: ANESTHESIOLOGY

## 2022-08-21 RX ADMIN — METHOCARBAMOL TABLETS 750 MG: 750 TABLET, COATED ORAL at 13:16

## 2022-08-21 RX ADMIN — ACETAMINOPHEN 1000 MG: 500 TABLET ORAL at 23:48

## 2022-08-21 RX ADMIN — SODIUM CHLORIDE SOLN NEBU 3% 4 ML: 3 NEBU SOLN at 19:52

## 2022-08-21 RX ADMIN — SODIUM CHLORIDE SOLN NEBU 3% 4 ML: 3 NEBU SOLN at 07:51

## 2022-08-21 RX ADMIN — METHOCARBAMOL TABLETS 750 MG: 750 TABLET, COATED ORAL at 08:34

## 2022-08-21 RX ADMIN — Medication 10 ML: at 21:10

## 2022-08-21 RX ADMIN — SODIUM CHLORIDE SOLN NEBU 3% 4 ML: 3 NEBU SOLN at 11:47

## 2022-08-21 RX ADMIN — ALBUTEROL SULFATE 2.5 MG: 2.5 SOLUTION RESPIRATORY (INHALATION) at 19:52

## 2022-08-21 RX ADMIN — FAMOTIDINE 20 MG: 20 TABLET, FILM COATED ORAL at 21:10

## 2022-08-21 RX ADMIN — METHOCARBAMOL TABLETS 750 MG: 750 TABLET, COATED ORAL at 16:49

## 2022-08-21 RX ADMIN — OXYCODONE 5 MG: 5 TABLET ORAL at 10:31

## 2022-08-21 RX ADMIN — METHOCARBAMOL TABLETS 750 MG: 750 TABLET, COATED ORAL at 21:10

## 2022-08-21 RX ADMIN — ACETAMINOPHEN 1000 MG: 500 TABLET ORAL at 16:49

## 2022-08-21 RX ADMIN — ACETAMINOPHEN 1000 MG: 500 TABLET ORAL at 10:31

## 2022-08-21 RX ADMIN — GABAPENTIN 100 MG: 100 CAPSULE ORAL at 21:10

## 2022-08-21 RX ADMIN — ATORVASTATIN CALCIUM 10 MG: 10 TABLET, FILM COATED ORAL at 08:34

## 2022-08-21 RX ADMIN — ACETAMINOPHEN 1000 MG: 500 TABLET ORAL at 05:50

## 2022-08-21 RX ADMIN — GABAPENTIN 100 MG: 100 CAPSULE ORAL at 13:16

## 2022-08-21 RX ADMIN — DICYCLOMINE HYDROCHLORIDE 10 MG: 10 CAPSULE ORAL at 08:34

## 2022-08-21 RX ADMIN — GABAPENTIN 100 MG: 100 CAPSULE ORAL at 08:34

## 2022-08-21 RX ADMIN — SODIUM CHLORIDE SOLN NEBU 3% 4 ML: 3 NEBU SOLN at 15:41

## 2022-08-21 RX ADMIN — OXYCODONE 5 MG: 5 TABLET ORAL at 23:37

## 2022-08-21 RX ADMIN — LOSARTAN POTASSIUM AND HYDROCHLOROTHIAZIDE 2 TABLET: 50; 12.5 TABLET, FILM COATED ORAL at 08:34

## 2022-08-21 ASSESSMENT — PAIN SCALES - GENERAL
PAINLEVEL_OUTOF10: 0
PAINLEVEL_OUTOF10: 4
PAINLEVEL_OUTOF10: 5
PAINLEVEL_OUTOF10: 4
PAINLEVEL_OUTOF10: 2

## 2022-08-21 ASSESSMENT — PAIN DESCRIPTION - FREQUENCY: FREQUENCY: INTERMITTENT

## 2022-08-21 NOTE — PROGRESS NOTES
Progress Note    S/P LAKESHA nodule wedge, completion LAKESHA, IC cryo nerve block 8/17/22    Vital Signs:                                                 BP (!) 147/86   Pulse 83   Temp 97.6 °F (36.4 °C) (Oral)   Resp 18   Ht 5' 1.5\" (1.562 m)   Wt 161 lb 13.1 oz (73.4 kg)   SpO2 95%   BMI 30.08 kg/m²  O2 Flow Rate (L/min): 0 L/min   SR   Admission Weight: 162 lb (73.5 kg)      I/O:    Intake/Output Summary (Last 24 hours) at 8/21/2022 0934  Last data filed at 8/21/2022 0400  Gross per 24 hour   Intake 840 ml   Output 680 ml   Net 160 ml       Chest Tube:   Diaph tube was unclamped there is expiratory force air leak after the third cough. CV: reg  Pulm: decreased, wound c/d/i  Abd: soft  Ext: warm, no edema    Data Review:  CBC:   Recent Labs     08/19/22  0708   WBC 15.2*   HGB 13.0   HCT 40.1   MCV 96.0          BMP:   Recent Labs     08/19/22  0707      K 4.7      CO2 19*   BUN 31*   CREATININE 1.2   CALCIUM 9.7       Cardiac Enzymes: No results for input(s): CKTOTAL, CKMB, CKMBINDEX, TROPONINI in the last 72 hours. PT/INR:   Recent Labs     08/19/22  0708   PROTIME 13.3   INR 1.02       APTT:   No results for input(s): APTT in the last 72 hours.     Assessment/Plan:  Chest tube to waterseal  We will continue with neb treatment  Walk as tolerated  Possible discharge Tuesday    [address], MD  8/21/2022  9:34 AM

## 2022-08-22 ENCOUNTER — APPOINTMENT (OUTPATIENT)
Dept: GENERAL RADIOLOGY | Age: 72
DRG: 164 | End: 2022-08-22
Attending: THORACIC SURGERY (CARDIOTHORACIC VASCULAR SURGERY)
Payer: COMMERCIAL

## 2022-08-22 ENCOUNTER — HOSPITAL ENCOUNTER (EMERGENCY)
Age: 72
Discharge: HOME | End: 2022-08-22
Attending: EMERGENCY MEDICINE
Payer: COMMERCIAL

## 2022-08-22 VITALS
SYSTOLIC BLOOD PRESSURE: 156 MMHG | HEIGHT: 61 IN | OXYGEN SATURATION: 99 % | DIASTOLIC BLOOD PRESSURE: 68 MMHG | WEIGHT: 163 LBS | RESPIRATION RATE: 21 BRPM | BODY MASS INDEX: 30.78 KG/M2 | HEART RATE: 77 BPM | TEMPERATURE: 98.1 F

## 2022-08-22 VITALS
TEMPERATURE: 98.1 F | WEIGHT: 161.16 LBS | OXYGEN SATURATION: 93 % | HEIGHT: 62 IN | SYSTOLIC BLOOD PRESSURE: 135 MMHG | BODY MASS INDEX: 29.66 KG/M2 | DIASTOLIC BLOOD PRESSURE: 90 MMHG | RESPIRATION RATE: 18 BRPM | HEART RATE: 80 BPM

## 2022-08-22 DIAGNOSIS — R42 LIGHTHEADEDNESS: Primary | ICD-10-CM

## 2022-08-22 PROCEDURE — 2580000003 HC RX 258: Performed by: ANESTHESIOLOGY

## 2022-08-22 PROCEDURE — 97116 GAIT TRAINING THERAPY: CPT

## 2022-08-22 PROCEDURE — 6370000000 HC RX 637 (ALT 250 FOR IP): Performed by: THORACIC SURGERY (CARDIOTHORACIC VASCULAR SURGERY)

## 2022-08-22 PROCEDURE — 99024 POSTOP FOLLOW-UP VISIT: CPT | Performed by: THORACIC SURGERY (CARDIOTHORACIC VASCULAR SURGERY)

## 2022-08-22 PROCEDURE — 99283 EMERGENCY DEPT VISIT LOW MDM: CPT

## 2022-08-22 PROCEDURE — 6360000002 HC RX W HCPCS: Performed by: NURSE PRACTITIONER

## 2022-08-22 PROCEDURE — 71045 X-RAY EXAM CHEST 1 VIEW: CPT

## 2022-08-22 PROCEDURE — 97530 THERAPEUTIC ACTIVITIES: CPT

## 2022-08-22 RX ORDER — FAMOTIDINE 20 MG/1
20 TABLET, FILM COATED ORAL NIGHTLY
Qty: 30 TABLET | Refills: 0 | Status: SHIPPED | OUTPATIENT
Start: 2022-08-22 | End: 2022-09-23 | Stop reason: ALTCHOICE

## 2022-08-22 RX ORDER — SENNA AND DOCUSATE SODIUM 50; 8.6 MG/1; MG/1
2 TABLET, FILM COATED ORAL DAILY PRN
Qty: 7 TABLET | Refills: 0 | Status: SHIPPED | OUTPATIENT
Start: 2022-08-22 | End: 2022-08-29

## 2022-08-22 RX ORDER — SODIUM CHLORIDE FOR INHALATION 3 %
4 VIAL, NEBULIZER (ML) INHALATION 4 TIMES DAILY PRN
Status: DISCONTINUED | OUTPATIENT
Start: 2022-08-22 | End: 2022-08-22 | Stop reason: HOSPADM

## 2022-08-22 RX ORDER — OXYCODONE HYDROCHLORIDE 5 MG/1
5 TABLET ORAL EVERY 4 HOURS PRN
Qty: 18 TABLET | Refills: 0 | Status: SHIPPED | OUTPATIENT
Start: 2022-08-22 | End: 2022-08-25

## 2022-08-22 RX ORDER — GABAPENTIN 100 MG/1
100 CAPSULE ORAL 3 TIMES DAILY
Qty: 30 CAPSULE | Refills: 0 | Status: SHIPPED | OUTPATIENT
Start: 2022-08-22 | End: 2022-08-31

## 2022-08-22 RX ORDER — METHOCARBAMOL 750 MG/1
750 TABLET, FILM COATED ORAL 4 TIMES DAILY
Qty: 40 TABLET | Refills: 0 | Status: SHIPPED | OUTPATIENT
Start: 2022-08-22 | End: 2022-08-31

## 2022-08-22 RX ADMIN — LOSARTAN POTASSIUM AND HYDROCHLOROTHIAZIDE 2 TABLET: 50; 12.5 TABLET, FILM COATED ORAL at 08:13

## 2022-08-22 RX ADMIN — DICYCLOMINE HYDROCHLORIDE 10 MG: 10 CAPSULE ORAL at 08:11

## 2022-08-22 RX ADMIN — METHOCARBAMOL TABLETS 750 MG: 750 TABLET, COATED ORAL at 08:11

## 2022-08-22 RX ADMIN — METHOCARBAMOL TABLETS 750 MG: 750 TABLET, COATED ORAL at 12:08

## 2022-08-22 RX ADMIN — GABAPENTIN 100 MG: 100 CAPSULE ORAL at 14:33

## 2022-08-22 RX ADMIN — ACETAMINOPHEN 1000 MG: 500 TABLET ORAL at 12:09

## 2022-08-22 RX ADMIN — HYDRALAZINE HYDROCHLORIDE 5 MG: 20 INJECTION INTRAMUSCULAR; INTRAVENOUS at 00:09

## 2022-08-22 RX ADMIN — Medication 10 ML: at 08:11

## 2022-08-22 RX ADMIN — ACETAMINOPHEN 1000 MG: 500 TABLET ORAL at 06:00

## 2022-08-22 RX ADMIN — GABAPENTIN 100 MG: 100 CAPSULE ORAL at 08:11

## 2022-08-22 RX ADMIN — ATORVASTATIN CALCIUM 10 MG: 10 TABLET, FILM COATED ORAL at 08:11

## 2022-08-22 ASSESSMENT — PAIN DESCRIPTION - LOCATION
LOCATION: INCISION
LOCATION: INCISION

## 2022-08-22 ASSESSMENT — PAIN DESCRIPTION - PAIN TYPE
TYPE: SURGICAL PAIN
TYPE: SURGICAL PAIN

## 2022-08-22 ASSESSMENT — PAIN SCALES - GENERAL
PAINLEVEL_OUTOF10: 2
PAINLEVEL_OUTOF10: 0
PAINLEVEL_OUTOF10: 4
PAINLEVEL_OUTOF10: 2

## 2022-08-22 ASSESSMENT — PAIN DESCRIPTION - DESCRIPTORS
DESCRIPTORS: ACHING
DESCRIPTORS: ACHING

## 2022-08-22 ASSESSMENT — PAIN - FUNCTIONAL ASSESSMENT
PAIN_FUNCTIONAL_ASSESSMENT: ACTIVITIES ARE NOT PREVENTED
PAIN_FUNCTIONAL_ASSESSMENT: NONE - DENIES PAIN
PAIN_FUNCTIONAL_ASSESSMENT: ACTIVITIES ARE NOT PREVENTED

## 2022-08-22 ASSESSMENT — PAIN DESCRIPTION - FREQUENCY
FREQUENCY: CONTINUOUS
FREQUENCY: INTERMITTENT

## 2022-08-22 ASSESSMENT — PAIN DESCRIPTION - ONSET
ONSET: ON-GOING
ONSET: ON-GOING

## 2022-08-22 ASSESSMENT — PAIN DESCRIPTION - ORIENTATION
ORIENTATION: LEFT
ORIENTATION: LEFT

## 2022-08-22 NOTE — CARE COORDINATION
SOCIAL WORK DISCHARGE SUMMARY        DATE OF DISCHARGE:   Monday, 8-      LOCATION:    Home      NO HOME CARE ORDERS       TIME:     son        PHARMACY:   [facility]     Case Management   [phone]    8/22/2022  3:54 PM

## 2022-08-22 NOTE — CARE COORDINATION
Met with patient to review possible DC today. IMM letter presented to her. She states her son will transport her home. [facility]   [place], [place]     Case Management   [phone]    8/22/2022  1:01 PM

## 2022-08-22 NOTE — CARE COORDINATION
08/22/22 1300   IMM Letter   IMM Letter given to Patient/Family/Significant other/Guardian/POA/by: IMM letter presentd to her by mouna Brooks   Initial IMM not given to her   IMM Letter date given: 08/22/22   IMM Letter time given: 3501

## 2022-08-22 NOTE — DISCHARGE INSTRUCTIONS
Video-Assisted Thoracoscopic Surgery (VATS): What to Expect at [address]  Video-assisted thoracoscopic surgery (VATS) is a way to do surgery inside the chest using several small cuts (incisions) instead of one larger incision (open surgery). VATS also is different from open surgery because it does not require the doctor to cut through the ribs or breastbone (sternum). The doctor may have used VATS to find and treat problems with the lungs, heart, or spine. Or VATSmay have been done to operate on other organs in your chest.  Your chest may be sore where the doctor made the incisions and put in thesurgical tools. This usually gets better after 2 to 3 weeks. You will have stitches or staples in the incisions. Your doctor will take theseout 1 to 2 weeks after your surgery. The amount of time you will need to recover depends on the surgery you had. Butyou probably will need to take it easy at home for at least 1 to 2 weeks. This care sheet gives you a general idea about how long it will take for you to recover. But each person recovers at a different pace. Follow the steps belowto get better as quickly as possible. How can you care for yourself at home? Activity    Rest when you feel tired. Getting enough sleep will help you recover. Try to walk each day. Start by walking a little more than you did the day before. Bit by bit, increase the amount you walk. Walking boosts blood flow and helps prevent pneumonia and constipation. Avoid strenuous activities, such as bicycle riding, jogging, weight lifting, or aerobic exercise, until your doctor says it is okay. Avoid lifting anything that would make you strain. This may include a child, heavy grocery bags and milk containers, a heavy briefcase or backpack, cat litter or dog food bags, or a vacuum . Do breathing exercises at home if instructed by your doctor. This will help prevent pneumonia.      Ask your doctor when it is safe to you to drive or fly. You will probably need to take at least 1 to 2 weeks off from work. It depends on the type of work you do and the surgery you had. You may take showers. Do not take a bath for the first 2 weeks, or until your doctor tells you it is okay. Diet    You can eat your normal diet. If your stomach is upset, try bland, low-fat foods like plain rice, broiled chicken, toast, and yogurt. Drink plenty of fluids (unless your doctor tells you not to). You may notice that your bowel movements are not regular right after your surgery. This is common. Try to avoid constipation and straining with bowel movements. You may want to take a fiber supplement every day. If you have not had a bowel movement after a couple of days, ask your doctor about taking a mild laxative. Medicines    Your doctor will tell you if and when you can restart your medicines. He or she will also give you instructions about taking any new medicines. If you take aspirin or some other blood thinner, ask your doctor if and when to start taking it again. Make sure that you understand exactly what your doctor wants you to do. Take pain medicines exactly as directed. If the doctor gave you a prescription medicine for pain, take it as prescribed. If you are not taking a prescription pain medicine, ask your doctor if you can take an over-the-counter medicine. If you think your pain medicine is making you sick to your stomach: Take your medicine after meals (unless your doctor has told you not to). Ask your doctor for a different pain medicine. If your doctor prescribed antibiotics, take them as directed. Do not stop taking them just because you feel better. You need to take the full course of antibiotics. Incision care    If you have strips of tape on the incisions, leave the tape on for a week or until it falls off. Wash the area daily with warm, soapy water, and pat it dry.  Don't use hydrogen peroxide or alcohol, which can slow healing. You may cover the area with a gauze bandage if it weeps or rubs against clothing. Change the bandage every day. Keep the area clean and dry. Follow-up care is a key part of your treatment and safety. Be sure to make and go to all appointments, and call your doctor if you are having problems. It's also a good idea to know your test results and keep alist of the medicines you take. When should you call for help? Call 911 anytime you think you may need emergency care. For example, call if:    You passed out (lost consciousness). You have severe trouble breathing. You have sudden chest pain and shortness of breath, or you cough up blood. Call your doctor now or seek immediate medical care if:    You are sick to your stomach or cannot keep fluids down. You have pain that does not get better after you take pain medicine. You have a fever over 100°F.     You have signs of infection, such as: Increased pain, swelling, warmth, or redness. Red streaks leading from the incision. Pus draining from the incision. Swollen lymph nodes in your neck, armpits, or groin. A fever. You have loose stitches, or your incisions come open. Bright red blood has soaked through the bandage over your incision. Watch closely for changes in your health, and be sure to contact your doctor ifyou have any problems. Where can you learn more? Go to https://[employer] OutsourcingpeperryewShowMe.MessageBunker. org and sign in to your [employer] account. Enter E992 in the [facility] box to learn more about \"Video-Assisted Thoracoscopic Surgery (VATS): What to Expect at Home. \"     If you do not have an account, please click on the \"Sign Up Now\" link. Current as of: March 9, 2022               Content Version: 13.3  © 3747-8909 Healthwise, Incorporated. Care instructions adapted under license by [facility] ([facility]).  If you have questions about a medical condition or this instruction, always ask your healthcare professional. [place] [address] any warranty or liability for your use of this information. Follow up with Dr. Fouzia Muñoz in the office on 9/2/2022 at 9:00 am.  Call the office at [phone] with any questions or concerns.

## 2022-08-22 NOTE — DISCHARGE SUMMARY
DISCHARGE SUMMARY    Admission Date:  8/17/2022 10:43 AM  Discharge Date:  8/22/2022    PCP:  Gisella Milligan MD      Cards:  Faith Pattie: Claude Countess: Sheronmelissa Chao  Neuro: Lady Perez  EP: Lillie Cleary  Vasc: Glenis Diego    Principle Diagnosis:  Left upper lobe nodule    Past Medical History:  Past Medical History:   Diagnosis Date    CAD (coronary artery disease)     NONOBSTRUCTING    CKD (chronic kidney disease) stage 3, GFR 30-59 ml/min (HCC)     IBS (irritable bowel syndrome)     Lumbago     Malignant neoplasm of upper lobe of left lung ([address] 08/2022    LAKESHA adenoCa 1.7cm    Osteoarthrosis, unspecified whether generalized or localized, unspecified site     Other abnormal blood chemistry     PAD (peripheral artery disease) ([facility]     has stents in lower legs bilaterally implanted by Dr. Glenis iDego    Pure hypercholesterolemia     Risk for falls 02/02/2016    S/p Left TKR    Spinal stenosis     Tremor     Unspecified essential hypertension     Wears glasses        Past Surgical History:  Past Surgical History:   Procedure Laterality Date    CHOLECYSTECTOMY      COLONOSCOPY      CT NEEDLE BIOPSY LUNG PERCUTANEOUS  06/17/2022    CT NEEDLE BIOPSY LUNG PERCUTANEOUS 6/17/2022 [facility] CT SCAN    INSERTABLE CARDIAC MONITOR  09/2015    ILR at [facility]- implanted 10/02/2015    KNEE SURGERY      RIGHT need replacement    LEG SURGERY      2 stents placed in right leg, 1 stent and balloon in left leg. LSFA stenting 03/02/2022    LOBECTOMY Left 08/17/2022    LAKESHA wedge/completion lobectomy, LN sampling, IC cryo nerve block    PACEMAKER INSERTION  05/27/2022    biV ([employer])    ROTATOR CUFF REPAIR Bilateral     SHOULDER ARTHROSCOPY Left 08/21/2020    LEFT SHOULDER ARTHROSCOPY, SUBACROMINAL DECOMPRESSION, DISTAL CLAVICLE EXCISION,DEBRIDEMENT, ROTATOR CUFF REPAIR, AUGMENTATION [facility]    LEFT VIDEO ASSISTED THORACOSCOPIC SURGERY.  THORACOTOMY WITH WEDGE EXCISION OF LEFT UPPER LOBE LUNG NODULE. LOBECTOMY. INTERCOSTAL CRYO BLOCK. performed by Francis Kuo MD at [address] Left 01/12/2016    Dr Hernandez Ghlayne SALIVARY GLAND  06/17/2022    US BIOPSY OF SALIVARY GLAND 6/17/2022 [facility] ULTRASOUND       Procedure:  Left video-assisted thoracoscopy, left muscle-sparing lateral thoracotomy, wedge excision of left upper lobe nodule, completion left upper lobectomy, mediastinal lymph node sampling, intercostal cryo nerve block levels 3 through 5 and 8 ([employer]) 8/17/2022. History:  The patient is a 74 y.o. female, smoker, who had a CT of the chest to rule out PE in 10/2021, which incidentally noted a left upper lobe 9-mm nodule. Follow up imaging in 4/2022 showed an increase in size of the nodule to 1.5 cm. By PET scan, there wan associated hypermetabolism with an SUV of 18.8. A CT-guided biopsy on 6/17/2022 demonstrated atypical cells. The patient presented for resection for definitive diagnosis. Hospital Course: The patient underwent left video-assisted thoracoscopy, left muscle-sparing lateral thoracotomy, wedge excision of left upper lobe nodule, completion left upper lobectomy, mediastinal lymph node sampling, intercostal cryo nerve block levels 3 through 5 and 8 ([employer]) on 8/17/2022. CV       - stable in SR  pulm    - smoker. Pulm toilet. Off O2  Renal  - baseline Cr 1.3  Heme  - stable              - scds  Pain    - nerve block. Acet, robax, morris. Oxy prn. Dispo  - home with follow up    Weight:   Admission Weight: 162 lb (73.5 kg)  Day of surgery: 161 lb 13.1 oz (73.4 kg)  Day of discharge Weight: 161 lb 2.5 oz (73.1 kg)    No results for input(s): WBC, HGB, HCT, MCV, PLT in the last 72 hours. No results for input(s): NA, K, CL, CO2, PHOS, BUN, CREATININE, CALCIUM, MG in the last 72 hours. Invalid input(s): KRFLXMG  No results for input(s): PROTIME, INR in the last 72 hours.     Surgical pathology  8/17/2022     FINAL LIPITOR  TAKE 1 TABLET BY MOUTH DAILY     celecoxib 200 MG capsule  Commonly known as: CELEBREX  TAKE 1 CAPSULE BY MOUTH DAILY     clopidogrel 75 MG tablet  Commonly known as: PLAVIX  TAKE 1 TABLET BY MOUTH DAILY *NEED TO ESTABLISH WITH A NEW PROVIDER FOR FUTURE REFILLS*     dicyclomine 10 MG capsule  Commonly known as: Bentyl  Take 1 capsule by mouth in the morning. loratadine 10 MG tablet  Commonly known as: Claritin  Take 1 tablet by mouth in the morning. losartan-hydroCHLOROthiazide 100-25 MG per tablet  Commonly known as: HYZAAR  TAKE 1 TABLET BY MOUTH DAILY               Where to Get Your Medications        These medications were sent to [address] [place] [address], [facility] [address] [facility] [address], [address] [address]      Phone: [phone]   celecoxib 200 MG capsule  famotidine 20 MG tablet  gabapentin 100 MG capsule  methocarbamol 750 MG tablet  oxyCODONE 5 MG immediate release tablet  sennosides-docusate sodium 8.6-50 MG tablet       Activity:  No heavy lifting (over 5 lbs) or driving until seen in the office  Diet: regular  Wound Care: none    Follow up with Dr. Bird Claros in the office on 9/2/2022 at 9:00 am.  Please call the office at [phone] with any questions or concerns.        JIM Estes - CNP  8/22/2022  1:01 PM      Kvng Curran MD  8/23/2022  3:58 PM

## 2022-08-22 NOTE — PROGRESS NOTES
Pt discharged via wheelchair to private car. Pt verbalized understanding of DC instructions. IV site dc'd. No complications, dressing applied.

## 2022-08-22 NOTE — PROGRESS NOTES
Progress Note    S/P LAKESHA nodule wedge, completion LAKESHA, IC cryo nerve block 8/17/22    Vital Signs:                                                 /64   Pulse 75   Temp 97.6 °F (36.4 °C) (Axillary)   Resp 16   Ht 5' 1.5\" (1.562 m)   Wt 161 lb 2.5 oz (73.1 kg)   SpO2 97%   BMI 29.96 kg/m²  O2 Flow Rate (L/min): 0 L/min   SR   Admission Weight: 162 lb (73.5 kg)      I/O:    Intake/Output Summary (Last 24 hours) at 8/22/2022 0624  Last data filed at 8/22/2022 0400  Gross per 24 hour   Intake 1140 ml   Output 2520 ml   Net -1380 ml     Chest Tube:   Apic 30, 30, 10    CV: reg  Pulm: decreased, wound c/d/i  Abd: soft  Ext: warm, no edema    Data Review:  CBC:   Recent Labs     08/19/22  0708   WBC 15.2*   HGB 13.0   HCT 40.1   MCV 96.0          BMP:   Recent Labs     08/19/22  0707      K 4.7      CO2 19*   BUN 31*   CREATININE 1.2   CALCIUM 9.7       Cardiac Enzymes: No results for input(s): CKTOTAL, CKMB, CKMBINDEX, TROPONINI in the last 72 hours. PT/INR:   Recent Labs     08/19/22  0708   PROTIME 13.3   INR 1.02       APTT:   No results for input(s): APTT in the last 72 hours. Assessment/Plan:  CV - stable in SR  pulm - smoker. Pulm toilet. Off O2   - no air leak with cough. Tube clamped. Cxr midday  Renal - baseline Cr 1.3  Heme - stable   - scds  Pain - nerve block. Acet, robax, morris. Oxy prn.   Dispo - if tube able to be removed, potential d/c home later today    Lois Beckman MD  8/22/2022  6:24 AM

## 2022-08-22 NOTE — PROGRESS NOTES
Physical Therapy  Facility/Department: [facility] 6Y CVICU  Physical Therapy Treatment Note/Discharge Summary. Name: Ben Dior  : 1950  MRN: [id number]  Date of Service: 2022    Discharge Recommendations:  Home with assist PRN   PT Equipment Recommendations  Equipment Needed: No  Other: Pt has access to [address] [place] [address] and [place]. Chris Franco scored a 22/24 on the AM-PAC short mobility form. At this time, no further PT is recommended upon discharge. Assessment   Body Structures, Functions, Activity Limitations Requiring Skilled Therapeutic Intervention: Decreased functional mobility ; Decreased endurance  Assessment: 73 y/o female admit 2022 with L Upper Lobe Lung Nodule. 2022 S/P L VATS, Lat Muscle Sparing Thoracotomy, Wedge Excision L UL Nodule, Completion L UL Lobectomy, Mediastianal Lymph Node Sampling, Intercostal Cryo Nerve Block. PMH as noted including CKD, PAD, SSS, Bradycardia, Pacemaker (2022), B Shld Surg, L TKR, Spinal Stenosis. PTA pt living with family (son/family) in 2 story home with 1 step to enter and 2nd floor bed/bath; independent daily care and functional mobility. Pt reports adequate assist/support for d/c home. Pt has access to [address] [place] [address]. Progressed well; all PT goals met. No further PT indicated at this time. Therapy Prognosis: Good  History: 73 y/o female admit 2022 with L Upper Lobe Lung Nodule. 2022 S/P L VATS, Lat Muscle Sparing Thoracotomy, Wedge Excision L UL Nodule, Completion L UL Lobectomy, Mediastianal Lymph Node Sampling, Intercostal Cryo Nerve Block. PMH as noted including CKD, PAD, SSS, Bradycardia, Pacemaker (2022), B Shld Surg, L TKR, Spinal Stenosis. Exam: See above. Clinical Presentation: See above. Barriers to Learning: None.   Requires PT Follow-Up: No  Activity Tolerance  Activity Tolerance: Patient tolerated treatment well     Plan   Plan  Plan: Discharge  Current Treatment Recommendations: Functional mobility training, Transfer training, Gait training, Stair training, Safety education & training, Patient/Caregiver education & training  Safety Devices  Type of Devices: Call light within reach, Left in bed, Nurse notified     Restrictions  Restrictions/Precautions  Restrictions/Precautions: Up as Tolerated  Position Activity Restriction  Other position/activity restrictions: L Chest Tube x 1 (Clamped). Subjective   General  Chart Reviewed: Yes  Patient assessed for rehabilitation services?: Yes  Additional Pertinent Hx: 73 y/o female admit 8/17/2022 with L Upper Lobe Lung Nodule. 8/17/2022 S/P L VATS, Lat Muscle Sparing Thoracotomy, Wedge Excision L UL Nodule, Completion L UL Lobectomy, Mediastianal Lymph Node Sampling, Intercostal Cryo Nerve Block. PMH as noted including CKD, PAD, SSS, Bradycardia, Pacemaker (5/2022), B Shld Surg, L TKR, Spinal Stenosis. Family / Caregiver Present: No  Referring Practitioner: Dr. Berny Isabel: Within Functional Limits  Subjective  Subjective: Pt agreeable to PT Rx. Feeling better; hopeful to go home soon. Social/Functional History  Social/Functional History  Lives With: Family (Son, D-I-L, 2 Grand dtrs.)  Type of Home: House  Home Layout: Two level (2 story, no basement.)  Home Access: Stairs to enter without rails (1 step to enter.)  Bathroom Shower/Tub: Tub only, Walk-in shower (Main level : walk-in shower. 2nd floor : tub only.)  Bathroom Toilet: Standard (Main level : comfort height.   2nd floor : standard.)  Bathroom Equipment: Shower chair (Not using shower chair at this time.)  Bathroom Accessibility: Accessible  Home Equipment: darius Lilly  ADL Assistance: Independent  Homemaking Assistance:  (Shared with family.)  Ambulation Assistance: Independent (Without assist device pta.)  Transfer Assistance: Independent  Active : Yes  Occupation: Retired  Additional Comments: Pt reports son/family moved in with pt several years ago; has adequate assist/support for d/c home. Vision/Hearing  Vision  Vision: Within Functional Limits  Hearing  Hearing: Within functional limits    Cognition   Orientation  Overall Orientation Status: Within Functional Limits  Cognition  Overall Cognitive Status: WFL     Objective                      Bed mobility  Supine to Sit: Supervision  Sit to Supine: Supervision  Bed Mobility Comments: Supervision to monitor Chest Tube only. Transfers  Sit to Stand: Supervision (With Will Klippel.)  Stand to sit: Supervision (With Will Klippel.)  Ambulation  Surface: level tile  Distance: Pt amb 150' x 2 with Will Klippel Supervision only. No LE buckling/giving way. Gait steady. No LOB noted. Stairs/Curb  Stairs?: Yes  Stairs  # Steps : 4  Rails: Left ascending  Assistance: Supervision             AM-PAC Score  AM-PAC Inpatient Mobility Raw Score : 22 (08/22/22 0814)  AM-PAC Inpatient T-Scale Score : 53.28 (08/22/22 0814)  Mobility Inpatient CMS 0-100% Score: 20.91 (08/22/22 0814)  Mobility Inpatient CMS G-Code Modifier : CJ (08/22/22 4393)          Goals  Short Term Goals  Time Frame for Short term goals: Upon d/c acute care setting. Short term goal 1: Bed Mob Independent. 8/22 Supervision only to manage Chest Tube. Short term goal 2: Transfers with/without assist device Supervision. 8/22 Goal met. Short term goal 3: Amb with/without assist device 150' SBA/Supervision. 8/22 Goal met. Short term goal 4: Stair Negotiation as approp. 8/22 Goal met. Patient Goals   Patient goals : Return home with family. Education  Patient Education  Education Given To: Patient  Education Provided Comments: Role of PT, POC, Need to call for assist, Safe use of Walker.   Education Method: Verbal  Barriers to Learning: None  Education Outcome: Verbalized understanding      Therapy Time   Individual Concurrent Group Co-treatment   Time In 0630         Time Out 0700         Minutes 1200 [address] Sai Sands, PT

## 2022-08-23 ENCOUNTER — CARE COORDINATION (OUTPATIENT)
Dept: CASE MANAGEMENT | Age: 72
End: 2022-08-23

## 2022-08-23 DIAGNOSIS — R91.1 NODULE OF UPPER LOBE OF LEFT LUNG: Primary | ICD-10-CM

## 2022-08-23 PROCEDURE — 1111F DSCHRG MED/CURRENT MED MERGE: CPT | Performed by: FAMILY MEDICINE

## 2022-08-23 NOTE — CARE COORDINATION
Lata 45 Transitions Initial Follow Up Call    Call within 2 business days of discharge: Yes    Patient: Leatha Staples Patient : 1950   MRN: [id number]  Reason for Admission: resection of the left upper lobe for a nodule   Discharge Date: 22 RARS: Readmission Risk Score: 7.6      Last Discharge [facility]       Date Complaint Diagnosis Description Type Department Provider    22 Dizziness Lightheadedness ED (DISCHARGE) [facility] ED Owen Mejia MD             Spoke with: Cristel Franco who reports that she is not doing so good. She reports that she returned to the ED yesterday for c/o unable to concentrate, lightheadedness and dizziness. She denies any lightheadedness and dizziness but still unbalanced and unable to concentrate. She states that she do have someone there at home to care for her. She states that they (doctors in the ED) thinks it is side effect of the new medication gabapentin. Patient states that she has since stop taking the gabapentin and she feels that her symptoms are improving. She states that she will be calling PCP office to schedule HFU visit she declined writer's assistance. Patient reports that incision is cover with a dressing which is C/D/I. Patient denies any s/s of wound infection. Patient denies cp, sob, cough, dizziness, headache, n/v, diarrhea, abdominal pains, fever, or chills. Patient report that appetite and fluid intake is good and denies any problems with bowel or bladder. Patient is taking all other medications as ordered. Writer and patient did a complete medication review and 1111f was completed. Denies any needs at this time. Patient instructed to continue to monitor s/s, reporting any that may present to MD immediately for early intervention. Reminded of COVID 19 precautions. Agreeable to f/u calls. [facility] provided contact information for future needs. Writer will   route message to PCP pool to facilitate scheduling.     Facility: [facility]    Non-face-to-face services provided:  Obtained and reviewed discharge summary and/or continuity of care documents  Education of patient/family/caregiver/guardian to support self-management-s/s of Covid and wound infection and  when to contact the doctor  Assessment and support for treatment adherence and medication management-.    Transitions of Care Initial Call    Was this an external facility discharge? No Discharge Facility: [facility] to be reviewed by the provider   Additional needs identified to be addressed with provider: Yes  HFU visit             Method of communication with provider : chart routing    Advance Care Planning:   Does patient have an Advance Directive:  Patient states that she has a Living Will and will get a copy to her PCP to add to her chart. and not on file; education provided. Advance Care Planning   Healthcare Decision Maker:    Primary Decision Maker: Archana Schaffer - [phone]    Secondary Decision Maker: cely huang - [phone]       LPN Care Coordinator contacted the patient by telephone to perform post hospital discharge assessment. Verified name and  with patient as identifiers. Provided introduction to self, and explanation of the LPN CC role. LPN CC reviewed discharge instructions, medical action plan and red flags with patient who verbalized understanding. Patient given an opportunity to ask questions and does not have any further questions or concerns at this time. Were discharge instructions available to patient? Yes. Reviewed appropriate site of care based on symptoms and resources available to patient including: PCP  Specialist  When to call [address] The patient agrees to contact the PCP office for questions related to their healthcare. Medication reconciliation was performed with patient, who verbalizes understanding of administration of home medications.  Advised obtaining a 90-day supply of all daily and as-needed medications. Was patient discharged with a pulse oximeter? no    LPN CC provided contact information. Plan for follow-up call in 5-7 days based on severity of symptoms and risk factors.   Plan for next call: symptom management-,  follow up appointment-,  medication management-.        Care Transitions 24 Hour Call    Do you have a copy of your discharge instructions?: Yes  Do you have all of your prescriptions and are they filled?: Yes  Have you been contacted by a [facility] Eisenmenger Pharmacist?: No  Have you scheduled your follow up appointment?: Yes  Do you feel like you have everything you need to keep you well at home?: Yes  Care Transitions Interventions  No Identified Needs         Follow Up  Future Appointments   Date Time Provider Herberth Schaefer   9/2/2022  9:00 AM Rangel Kim MD [facility] S MMA   9/7/2022 10:00 AM SCHEDULE, [place] DEVICE CHECK FF Cardio MMA   9/7/2022 10:30 AM JIM Wheat - CNP FF Cardio MMA   9/26/2022  7:45 AM SCHEDULE, [place] REMOTE TRANSMISSION FF Cardio MMA   11/30/2022  9:45 AM [facility] FF VASC & ENDO, VASCULAR LAB SCHED FF VASC/ENDO MMA   12/6/2022 10:15 AM Jareth Gong MD FF VASC/ENDO MMA   12/26/2022  7:45 AM SCHEDULE, [place] REMOTE TRANSMISSION FF Cardio MMA   1/19/2023  9:20 AM Caralee Spurling, MD FF NEURO MMA   2/3/2023 10:00 AM MD Anne Marie Mccarthy LPN

## 2022-08-23 NOTE — ED NOTES
Pt refused to change in a gown. Pt agreed to change if admitted.      Nicolette Michaud RN  08/22/22 3957

## 2022-08-23 NOTE — DISCHARGE INSTRUCTIONS
Stop your gabapentin to see if this will help.   Lightheadedness and follow-up with your doctor or return as needed

## 2022-08-23 NOTE — ED PROVIDER NOTES
[address] [facility]  eMERGENCY dEPARTMENT eNCOUnter        Pt Name: Enrique Davidson  MRN: [id number]  Tarik 1950  Date of evaluation: 8/22/2022  Provider: Stefanie Allison MD  PCP: Reuben Gold MD      CHIEF COMPLAINT       Chief Complaint   Patient presents with    Dizziness     Pt arrived via [facility] from home w c/o weakness and dizziness that started Madeline Heller couple of days ago\". Pt had LAKESHA removed on 08/17/2022. Pt stated feeling dizzy after taking Gabapentin. HISTORY OFPRESENT ILLNESS   (Location/Symptom, Timing/Onset, Context/Setting, Quality, Duration, Modifying Factors,Severity)  Note limiting factors. Micheal Franco is a 74 y.o. female patient was discharged from [facility] today she had a resection of the left upper lobe for a nodule states she has been lightheaded worse if she is drunk for the last several days even before she left the hospital and the only thing different she believes is the gabapentin that she is taking patient went home and felt a little lightheaded again it does not matter whether she is sitting or standing patient has been waiting to be seen and at this point now does not want any further testing and wants to go home    Nursing Notes were all reviewed and agreed with or any disagreements were addressed  in the HPI. REVIEW OF SYSTEMS    (2-9 systems for level 4, 10 or more for level 5)       REVIEW OF SYSTEMS    Constitutional:  Denies fever, chills, or weakness   Eyes:  Denies vision changes  HENT:  Denies sore throat or neck pain   Respiratory:  Denies cough or shortness of breath   Cardiovascular:  Denies chest pain  GI:  Denies abdominal pain, nausea, vomiting, or diarrhea   Musculoskeletal:  Denies back pain   Skin: no rash or vesicles   Neurologic:  no headache weakness focal    Lymphatic:  no swollen  nodes   Psychiatric: no si or hs thoughts     All systems negative except as marked.      Positives and Pertinent negatives as per HPI. Except as noted above in the ROS, all other systems were reviewed andnegative. PASTMEDICAL HISTORY     Past Medical History:   Diagnosis Date    CAD (coronary artery disease)     NONOBSTRUCTING    CKD (chronic kidney disease) stage 3, GFR 30-59 ml/min (HCC)     IBS (irritable bowel syndrome)     Lumbago     Malignant neoplasm of upper lobe of left lung ([facility] [address] 08/2022    LAKESHA adenoCa 1.7cm    Osteoarthrosis, unspecified whether generalized or localized, unspecified site     Other abnormal blood chemistry     PAD (peripheral artery disease) ([facility] [address]     has stents in lower legs bilaterally implanted by Dr. Glenis Diego    Pure hypercholesterolemia     Risk for falls 02/02/2016    S/p Left TKR    Spinal stenosis     Tremor     Unspecified essential hypertension     Wears glasses          SURGICAL HISTORY       Past Surgical History:   Procedure Laterality Date    CHOLECYSTECTOMY      COLONOSCOPY      CT NEEDLE BIOPSY LUNG PERCUTANEOUS  06/17/2022    CT NEEDLE BIOPSY LUNG PERCUTANEOUS 6/17/2022 [facility] CT SCAN    INSERTABLE CARDIAC MONITOR  09/2015    ILR at [facility]- implanted 10/02/2015    KNEE SURGERY      RIGHT need replacement    LEG SURGERY      2 stents placed in right leg, 1 stent and balloon in left leg. LSFA stenting 03/02/2022    LOBECTOMY Left 08/17/2022    LAKESHA wedge/completion lobectomy, LN sampling, IC cryo nerve block    PACEMAKER INSERTION  05/27/2022    biV ([employer])    ROTATOR CUFF REPAIR Bilateral     SHOULDER ARTHROSCOPY Left 08/21/2020    LEFT SHOULDER ARTHROSCOPY, SUBACROMINAL DECOMPRESSION, DISTAL CLAVICLE EXCISION,DEBRIDEMENT, ROTATOR CUFF REPAIR, AUGMENTATION performed by Heydi Kim DO at [address] Left 8/17/2022    LEFT VIDEO ASSISTED THORACOSCOPIC SURGERY. THORACOTOMY WITH WEDGE EXCISION OF LEFT UPPER LOBE LUNG NODULE. LOBECTOMY.  INTERCOSTAL CRYO BLOCK. performed by Aly Woodward MD at [address] Left 01/12/2016    Dr Britnty Espinoza LIGATION      US BIOPSY OF SALIVARY GLAND  2022    US BIOPSY OF SALIVARY GLAND 2022 [facility] ULTRASOUND         CURRENT MEDICATIONS       Previous Medications    ATORVASTATIN (LIPITOR) 10 MG TABLET    TAKE 1 TABLET BY MOUTH DAILY    CELECOXIB (CELEBREX) 200 MG CAPSULE    TAKE 1 CAPSULE BY MOUTH DAILY    CLOPIDOGREL (PLAVIX) 75 MG TABLET    TAKE 1 TABLET BY MOUTH DAILY *NEED TO ESTABLISH WITH A NEW PROVIDER FOR FUTURE REFILLS*    DICYCLOMINE (BENTYL) 10 MG CAPSULE    Take 1 capsule by mouth in the morning. FAMOTIDINE (PEPCID) 20 MG TABLET    Take 1 tablet by mouth at bedtime    GABAPENTIN (NEURONTIN) 100 MG CAPSULE    Take 1 capsule by mouth 3 times daily for 10 days. LORATADINE (CLARITIN) 10 MG TABLET    Take 1 tablet by mouth in the morning. LOSARTAN-HYDROCHLOROTHIAZIDE (HYZAAR) 100-25 MG PER TABLET    TAKE 1 TABLET BY MOUTH DAILY    METHOCARBAMOL (ROBAXIN) 750 MG TABLET    Take 1 tablet by mouth 4 times daily for 10 days    OXYCODONE (ROXICODONE) 5 MG IMMEDIATE RELEASE TABLET    Take 1 tablet by mouth every 4 hours as needed for Pain for up to 3 days. SENNOSIDES-DOCUSATE SODIUM (SENOKOT-S) 8.6-50 MG TABLET    Take 2 tablets by mouth daily as needed for Constipation       ALLERGIES     Morphine, Pletal [cilostazol], Ace inhibitors, Chantix [varenicline], Keflex [cephalexin], Niaspan [niacin er], and Pravastatin    FAMILY HISTORY       Family History   Problem Relation Age of Onset    Cancer Mother         cancer of [place]    Heart Disease Father          of MI age 71 - smoker,     Diabetes Sister     Hypertension Sister     Cancer Brother     Emphysema Brother     Stroke Brother     Diabetes Brother     Heart Disease Brother     Hypertension Brother     High Cholesterol Neg Hx     High Blood Pressure Neg Hx           SOCIAL HISTORY       Social History     Socioeconomic History    Marital status:       Spouse name: None    Number of children: None    Years of education: None Highest education level: None   Tobacco Use    Smoking status: Every Day     Packs/day: 0.50     Years: 40.00     Pack years: 20.00     Types: Cigarettes    Smokeless tobacco: Never    Tobacco comments:     <1 pack of cigarettes per day     Started at age 21   Vaping Use    Vaping Use: Never used   Substance and Sexual Activity    Alcohol use: Yes     Comment: glass of wine once a month    Drug use: Never    Sexual activity: Not Currently     Social Determinants of Health     Financial Resource Strain: Low Risk     Difficulty of Paying Living Expenses: Not hard at all   Food Insecurity: Food Insecurity Present    Worried About Speek in the Last Year: Sometimes true    Ran Out of Food in the Last Year: Sometimes true       SCREENINGS    Zoila Coma Scale  Eye Opening: Spontaneous  Best Verbal Response: Oriented  Best Motor Response: Obeys commands  Zoila Coma Scale Score: 15        PHYSICAL EXAM    (up to 7 for level 4, 8 or more for level 5)     ED Triage Vitals [08/22/22 2003]   BP Temp Temp Source Heart Rate Resp SpO2 Height Weight   (!) 153/85 98.1 °F (36.7 °C) Oral 76 16 97 % 5' 1\" (1.549 m) 163 lb (73.9 kg)           General Appearance:  Alert, cooperative, no distress, appears stated age. Head:  Normocephalic, without obvious abnormality, atraumatic. Eyes:  conjunctiva/corneas clear, EOM's intact. Sclera anicteric. ENT: Mucous membranes moist.   Neck: Supple, symmetrical, trachea midline, no adenopathy. No jugular venous distention. Lungs:   No Respiratory Distress. no rales  rhonchi rub   Chest Wall:  Nontender  no deformity   Heart:  Rsr no murmer gallop    Abdomen:   Soft nontender no organomegally    Extremities:  Full range of motion. no deformity   Pulses: Equal  upper and lower    Skin:  No rashes or lesions to exposed skin. Neurologic: Alert and oriented X 3. Motor grossly normal.  Speech clear.  Cr n 2-12 intact       DIAGNOSTIC RESULTS   LABS:    Labs Reviewed - No data to display    All other labs were within normal range or not returned as of thisdictation. EKG: All EKG's are interpreted by the Emergency Department Physician who either signs or Co-signs this chart in the absence of a cardiologist.        RADIOLOGY:   Non-plain film images such as CT, Ultrasound and MRI are read by the radiologist. Melina Olson images are visualized and preliminarily interpreted by the  ED Provider with the belowfindings:        Interpretation per the Radiologist below, if available at the time of this note:    No orders to display         PROCEDURES   Unless otherwise noted below, none     Procedures    CRITICAL CARE TIME   N/A      CONSULTS:  None    EMERGENCY DEPARTMENT COURSE and DIFFERENTIAL DIAGNOSIS/MDM:   Vitals:    Vitals:    08/22/22 2003   BP: (!) 153/85   Pulse: 76   Resp: 16   Temp: 98.1 °F (36.7 °C)   TempSrc: Oral   SpO2: 97%   Weight: 163 lb (73.9 kg)   Height: 5' 1\" (1.549 m)       Patient was given the following medications:  Medications - No data to display        Is this patient to be included in the SEP-1 Core Measure due to severe sepsis or septic shock? No   Exclusion criteria - the patient is NOT to be included for SEP-1 Core Measure due to: Infection is not suspected  Patient does not want any further testing done at this time including blood work her labs were normal back on 819 her vital signs are stable patient will be discharged to discontinue her gabapentin to see if this will help her symptoms of lightheadedness which she states makes her feel as if she is drunk there is no vertigo or syncope    The patient tolerated their visit well. Thepatient and / or the family were informed of the results of any tests, a time was given to answer questions. FINAL IMPRESSION      1.  Lightheadedness        DISPOSITION/PLAN   DISPOSITION Decision To Discharge 08/22/2022 10:02:12 PM      PATIENT REFERRED TO:  Lizz Borrego MD  [address]  New Prescriptions    No medications on file       DISCONTINUED MEDICATIONS:  Discontinued Medications    No medications on file              (Please note that portions of this note were completed with a voice recognition program.  Efforts were made to edit the dictations but occasionally words aremis-transcribed.)    Shante Francois MD (electronically signed)          Shante Francois MD  08/22/22 [id number]

## 2022-08-30 ENCOUNTER — CARE COORDINATION (OUTPATIENT)
Dept: CASE MANAGEMENT | Age: 72
End: 2022-08-30

## 2022-08-30 NOTE — CARE COORDINATION
Lata 45 Transitions Follow Up Call    2022    Patient: Aida Peacock  Patient : 1950   MRN: [id number]  Reason for Admission: resection of the left upper lobe for a nodule Discharge Date: 22 RARS: Readmission Risk Score: 7.6         Spoke with: Roland Franco who reports that thing are going real good now. Patient denies cp, sob, cough, dizziness, headache, n/v, diarrhea, abdominal pains, fever, or chills. Patient report that appetite and fluid intake is good and denies any problems with bowel or bladder. Patient is taking all medications as ordered. Patient states that since stopping the gabapentin she now feels normal and no issues now. Patient reports that she has a HFU with PCP tomorrow 2022. She reports that her incision is healing well and no s/s of infection note. Reports that the doctor will take out 2 stitches. Incision covered with Tegaderm. Denies any needs at this time. Patient instructed to continue to monitor s/s, reporting any that may present to MD immediately for early intervention. Agreeable to f/u calls. [facility] provided contact information for future needs. Care Transitions Subsequent and Final Call    Subsequent and Final Calls  Care Transitions Interventions  Other Interventions:              Follow Up  Future Appointments   Date Time Provider Herberth Mari   2022 11:45 AM Laura Patel MD [address]   2022  9:00 AM Rangel Kim MD [facility] S MMA   2022 10:00 AM SCHEDULE, [place] DEVICE CHECK FF Cardio MMA   2022 10:30 AM JIM Wheat - CNP FF Cardio MMA   2022  7:45 AM SCHEDULE, [place] REMOTE TRANSMISSION FF Cardio MMA   2022  9:45 AM [facility] FF VASC & ENDO, VASCULAR LAB SCHED FF VASC/ENDO MMA   2022 10:15 AM Jareth Gong MD FF VASC/ENDO MMA   2022  7:45 AM SCHEDULE, [place] REMOTE TRANSMISSION FF Cardio MMA   2023  9:20 AM Caralee Spurling, MD FF NEURO MMA   2/3/2023 10:00 AM Tiffanie Solano.MD Caren LPN

## 2022-08-31 ENCOUNTER — OFFICE VISIT (OUTPATIENT)
Dept: FAMILY MEDICINE CLINIC | Age: 72
End: 2022-08-31
Payer: COMMERCIAL

## 2022-08-31 VITALS
BODY MASS INDEX: 30.61 KG/M2 | SYSTOLIC BLOOD PRESSURE: 140 MMHG | DIASTOLIC BLOOD PRESSURE: 88 MMHG | WEIGHT: 162 LBS | OXYGEN SATURATION: 99 % | HEART RATE: 71 BPM

## 2022-08-31 DIAGNOSIS — E78.00 PURE HYPERCHOLESTEROLEMIA: ICD-10-CM

## 2022-08-31 DIAGNOSIS — B37.2 CANDIDIASIS, CUTANEOUS: ICD-10-CM

## 2022-08-31 DIAGNOSIS — I25.119 CORONARY ARTERY DISEASE INVOLVING NATIVE CORONARY ARTERY OF NATIVE HEART WITH ANGINA PECTORIS (HCC): ICD-10-CM

## 2022-08-31 DIAGNOSIS — M17.11 PRIMARY OSTEOARTHRITIS OF RIGHT KNEE: ICD-10-CM

## 2022-08-31 DIAGNOSIS — Z95.0 PACEMAKER: ICD-10-CM

## 2022-08-31 DIAGNOSIS — I10 HTN (HYPERTENSION), BENIGN: ICD-10-CM

## 2022-08-31 DIAGNOSIS — I73.9 PAD (PERIPHERAL ARTERY DISEASE) (HCC): ICD-10-CM

## 2022-08-31 DIAGNOSIS — Z09 HOSPITAL DISCHARGE FOLLOW-UP: ICD-10-CM

## 2022-08-31 DIAGNOSIS — M48.062 SPINAL STENOSIS OF LUMBAR REGION WITH NEUROGENIC CLAUDICATION: ICD-10-CM

## 2022-08-31 DIAGNOSIS — N18.32 STAGE 3B CHRONIC KIDNEY DISEASE (HCC): ICD-10-CM

## 2022-08-31 DIAGNOSIS — Z90.2 S/P LOBECTOMY OF LUNG: Primary | ICD-10-CM

## 2022-08-31 DIAGNOSIS — I70.213 ATHEROSCLEROSIS OF NATIVE ARTERIES OF EXTREMITIES WITH INTERMITTENT CLAUDICATION, BILATERAL LEGS (HCC): ICD-10-CM

## 2022-08-31 PROCEDURE — 99495 TRANSJ CARE MGMT MOD F2F 14D: CPT | Performed by: FAMILY MEDICINE

## 2022-08-31 PROCEDURE — 1111F DSCHRG MED/CURRENT MED MERGE: CPT | Performed by: FAMILY MEDICINE

## 2022-08-31 RX ORDER — CLOTRIMAZOLE AND BETAMETHASONE DIPROPIONATE 10; .64 MG/G; MG/G
CREAM TOPICAL
Qty: 45 G | Refills: 0 | Status: SHIPPED | OUTPATIENT
Start: 2022-08-31 | End: 2022-09-02

## 2022-08-31 RX ORDER — ACETAMINOPHEN AND CODEINE PHOSPHATE 300; 30 MG/1; MG/1
1 TABLET ORAL EVERY 4 HOURS PRN
COMMUNITY
End: 2022-10-18

## 2022-08-31 NOTE — PROGRESS NOTES
Post-Discharge Transitional Care  Follow Up      Katerina Franco   YOB: 1950    Date of Office Visit:  8/31/2022  Date of Hospital Admission: 8/17/22  Date of Hospital Discharge: 8/22/22  Risk of hospital readmission (high >=14%. Medium >=10%) :Readmission Risk Score: 7.6      Care management risk score Rising risk (score 2-5) and Complex Care (Scores >=6): No Risk Score On File     Non face to face  following discharge, date last encounter closed (first attempt may have been earlier): 08/23/2022    Call initiated 2 business days of discharge: Yes    ASSESSMENT/PLAN:   S/P lobectomy of lung  Pure hypercholesterolemia  Primary osteoarthritis of right knee  Stage 3b chronic kidney disease (HCC)  PAD (peripheral artery disease) (HCC)  HTN (hypertension), benign  Coronary artery disease involving native coronary artery of native heart with angina pectoris ([address]  Spinal stenosis of lumbar region with neurogenic claudication  Atherosclerosis of native arteries of extremities with intermittent claudication, bilateral legs ([address]  Pacemaker  Hospital discharge follow-up  -     FL DISCHARGE MEDS RECONCILED W/ CURRENT OUTPATIENT MED LIST  Candidiasis, cutaneous  -     clotrimazole-betamethasone (LOTRISONE) 1-0.05 % cream; Apply topically 2 times daily. , Disp-45 g, R-0, Normal    Medical Decision Making: moderate complexity  Return if symptoms worsen or fail to improve. Subjective:   HPI:  Follow up of Hospital problems/diagnosis(es): Malignant neoplasm of upper lobe of left lung(final diagnosis adenocarcinoma), coronary artery disease, chronic kidney disease, irritable bowel syndrome, osteoarthritis, peripheral artery disease, hyperlipidemia, hypertension, spinal stenosis    Inpatient course: Discharge summary reviewed- see chart. Interval history/Current status: Patient was discharged to home on 22 August and when she took her first dose of gabapentin she felt very dizzy and lightheaded.   She presented to the emergency room and was evaluated in sent back home. Patient discontinued the gabapentin and states she is feeling well today. Patient Active Problem List   Diagnosis    Hyperglycemia    Pure hypercholesterolemia    Lumbago    Osteoarthritis    CKD (chronic kidney disease) stage 3, GFR 30-59 ml/min ([facility])    Abnormal mammogram    Fibrocystic disease of breast    Diarrhea    PAD (peripheral artery disease) ([facility])    Atherosclerosis of native arteries of extremities with intermittent claudication, left leg ([facility])    Syncope    Sinus node dysfunction [facility]    Encounter for loop recorder at end of battery life    Status post total left knee replacement    Adhesive capsulitis of right shoulder    Right shoulder pain    Psoriasis    Sick sinus syndrome ([facility])    Syncope and collapse    Urinary tract infection without hematuria    Partial seizure ([facility])    HTN (hypertension), benign    Coronary artery disease involving native coronary artery of native heart with angina pectoris ([facility])    Seasonal allergic rhinitis due to pollen    Spinal stenosis of lumbar region with neurogenic claudication    Nontraumatic complete tear of left rotator cuff    Atherosclerosis of native arteries of extremities with intermittent claudication, bilateral legs ([facility])    Symptomatic bradycardia    Pacemaker    Sunburn    CKD (chronic kidney disease) stage 4, GFR 15-29 ml/min ([facility])    Nodule of upper lobe of left lung    S/P lobectomy of lung       Medications listed as ordered at the time of discharge from hospital     Medication List            Accurate as of August 31, 2022  6:24 PM. If you have any questions, ask your nurse or doctor. START taking these medications      clotrimazole-betamethasone 1-0.05 % cream  Commonly known as: Lotrisone  Apply topically 2 times daily.   Started by: Rehan Bryan MD            CONTINUE taking these medications      acetaminophen-codeine 300-30 MG per tablet  Commonly known as: TYLENOL #3     atorvastatin 10 MG tablet  Commonly known as: LIPITOR  TAKE 1 TABLET BY MOUTH DAILY     celecoxib 200 MG capsule  Commonly known as: CELEBREX  TAKE 1 CAPSULE BY MOUTH DAILY     clopidogrel 75 MG tablet  Commonly known as: PLAVIX  TAKE 1 TABLET BY MOUTH DAILY *NEED TO ESTABLISH WITH A NEW PROVIDER FOR FUTURE REFILLS*     dicyclomine 10 MG capsule  Commonly known as: Bentyl  Take 1 capsule by mouth in the morning. famotidine 20 MG tablet  Commonly known as: PEPCID  Take 1 tablet by mouth at bedtime     loratadine 10 MG tablet  Commonly known as: Claritin  Take 1 tablet by mouth in the morning. losartan-hydroCHLOROthiazide 100-25 MG per tablet  Commonly known as: HYZAAR  TAKE 1 TABLET BY MOUTH DAILY            STOP taking these medications      gabapentin 100 MG capsule  Commonly known as: NEURONTIN  Stopped by: Puneet Vincent MD     methocarbamol 750 MG tablet  Commonly known as: ROBAXIN  Stopped by: Puneet Vincent MD               Where to Get Your Medications        These medications were sent to [address] [facility] [place] [facility] [address] [place] [address]      Phone: [phone]   clotrimazole-betamethasone 1-0.05 % cream           Medications marked \"taking\" at this time  Outpatient Medications Marked as Taking for the 8/31/22 encounter (Office Visit) with Catarina Ballesteros MD   Medication Sig Dispense Refill    acetaminophen-codeine (TYLENOL #3) 300-30 MG per tablet Take 1 tablet by mouth every 4 hours as needed for Pain. clotrimazole-betamethasone (LOTRISONE) 1-0.05 % cream Apply topically 2 times daily. 45 g 0    famotidine (PEPCID) 20 MG tablet Take 1 tablet by mouth at bedtime 30 tablet 0    celecoxib (CELEBREX) 200 MG capsule TAKE 1 CAPSULE BY MOUTH DAILY 30 capsule 3    dicyclomine (BENTYL) 10 MG capsule Take 1 capsule by mouth in the morning.  90 capsule 3    loratadine (CLARITIN) 10 MG tablet Take 1 tablet by mouth in the morning. 90 tablet 3    atorvastatin (LIPITOR) 10 MG tablet TAKE 1 TABLET BY MOUTH DAILY 90 tablet 3    clopidogrel (PLAVIX) 75 MG tablet TAKE 1 TABLET BY MOUTH DAILY *NEED TO ESTABLISH WITH A NEW PROVIDER FOR FUTURE REFILLS* 90 tablet 3    losartan-hydroCHLOROthiazide (HYZAAR) 100-25 MG per tablet TAKE 1 TABLET BY MOUTH DAILY 90 tablet 3        Medications patient taking as of now reconciled against medications ordered at time of hospital discharge: Yes    Patient feels that her strength is improving. Her symptoms of shortness of breath have improved. She does have some irritation where her bandage has been on her left chest wall. He still has some discomfort along the left anterior chest wall    Objective:    BP (!) 140/88   Pulse 71   Wt 162 lb (73.5 kg)   SpO2 99%   BMI 30.61 kg/m²   General Appearance: alert and oriented to person, place and time, well developed and well- nourished, in no acute distress  Skin: warm and dry, rash on the left abdomen and chest wall the rash extends slightly under the left breast and appears consistent with candidiasis. There is also erythema that appears to be from irritation secondary to her bandages.   Head: normocephalic and atraumatic  Eyes: pupils equal, round, and reactive to light, extraocular eye movements intact, conjunctivae normal  ENT: tympanic membrane, external ear and ear canal normal bilaterally, nose without deformity, nasal mucosa and turbinates normal without polyps  Neck: supple and non-tender without mass, no thyromegaly or thyroid nodules, no cervical lymphadenopathy  Pulmonary/Chest: clear to auscultation bilaterally- no wheezes, rales or rhonchi, normal air movement, no respiratory distress  Cardiovascular: normal rate, regular rhythm, normal S1 and S2, no murmurs, rubs, clicks, or gallops, distal pulses intact, no carotid bruits  Extremities: no cyanosis, clubbing or edema  Musculoskeletal: normal range of motion, no joint swelling, deformity or tenderness      An electronic signature was used to authenticate this note.   --Alicia Frankel MD

## 2022-09-02 ENCOUNTER — OFFICE VISIT (OUTPATIENT)
Dept: CARDIOTHORACIC SURGERY | Age: 72
End: 2022-09-02

## 2022-09-02 VITALS
HEIGHT: 62 IN | HEART RATE: 70 BPM | DIASTOLIC BLOOD PRESSURE: 80 MMHG | BODY MASS INDEX: 30.14 KG/M2 | OXYGEN SATURATION: 98 % | TEMPERATURE: 97.6 F | SYSTOLIC BLOOD PRESSURE: 176 MMHG | WEIGHT: 163.8 LBS

## 2022-09-02 DIAGNOSIS — R91.1 NODULE OF UPPER LOBE OF LEFT LUNG: ICD-10-CM

## 2022-09-02 DIAGNOSIS — Z90.2 S/P LOBECTOMY OF LUNG: Primary | ICD-10-CM

## 2022-09-02 PROCEDURE — 99024 POSTOP FOLLOW-UP VISIT: CPT | Performed by: PHYSICIAN ASSISTANT

## 2022-09-02 NOTE — PATIENT INSTRUCTIONS
- plan to see Dr Krishan Perez in 3 weeks     - DO NOT LIFT, PUSH, OR PULL ANYTHING OVER 5 POUNDS FOR another 3 weeks, then as tolerated  - no driving for 3 more weeks  - continue cardiac diet   - Wash incisions daily with soap and water, dry well. Do not wash your incisions after you have cleansed other parts of your body.      - educated on importance to keeping incision clean and very dry  - gauze under breast or dry washcloth to help keep dry     - continue medication regimen  - refill for tylenol #3 prescribed

## 2022-09-02 NOTE — PROGRESS NOTES
Progress Note    S/P L VATS, left muscle-sparing lateral thoracotomy, wedge excision of left upper lobe nodule, completion left upper lobectomy, mediastinal lymph node sampling, intercostal cryo nerve block levels 3 through 5 and 8 ([facility]) on 8/17/2022 per Dr Madan Celis on 8/17/22. Discharged 8/22/22. Presented back to the ER 8/22/22 d/t dizziness after taking her gabapentin - stopped taking and resolved. Was not admitted. Patient seen today for 1st postop appointment. Eating and drinking adequately. Sleeping okay - some pain still at night and needing tylenol #3 before bed. Bowel movements back to normal. Pathology report reviewed. Vital Signs:                                                 BP (!) 176/80 (Site: Right Upper Arm, Position: Sitting, Cuff Size: Medium Adult)   Pulse 70   Temp 97.6 °F (36.4 °C) (Temporal)   Ht 5' 1.5\" (1.562 m)   Wt 163 lb 12.8 oz (74.3 kg)   SpO2 98%   BMI 30.45 kg/m²        Weights:  Day of surgery: 161 lb 13.1 oz (73.4 kg)  Day of discharge Weight: 161 lb 2.5 oz (73.1 kg)  Today: 163 lbs    CV: reg  Pulm: clear, decreased at bases  Abd: soft  Ext: warm. No edema. Skin: incision under L breast with fibrin build-up, mild erythema     Medications:  Prior to Admission medications    Medication Sig Start Date End Date Taking? Authorizing Provider   acetaminophen-codeine (TYLENOL #3) 300-30 MG per tablet Take 1 tablet by mouth every 4 hours as needed for Pain. Yes Historical Provider, MD   famotidine (PEPCID) 20 MG tablet Take 1 tablet by mouth at bedtime 8/22/22  Yes Addison Jose APRN - CNP   celecoxib (CELEBREX) 200 MG capsule TAKE 1 CAPSULE BY MOUTH DAILY 8/18/22  Yes Radha Heck MD   dicyclomine (BENTYL) 10 MG capsule Take 1 capsule by mouth in the morning. 8/3/22  Yes Radha Heck MD   loratadine (CLARITIN) 10 MG tablet Take 1 tablet by mouth in the morning.  8/3/22  Yes Radha Heck MD   atorvastatin

## 2022-09-07 ENCOUNTER — CARE COORDINATION (OUTPATIENT)
Dept: CASE MANAGEMENT | Age: 72
End: 2022-09-07

## 2022-09-07 NOTE — CARE COORDINATION
[address] Transitions Follow Up Call    2022    Patient: Juwan Reyes  Patient : 1950   MRN: [id number]  Reason for Admission: s/p thoracotomy  Discharge Date: 22 RARS: Readmission Risk Score: 7.6         Spoke with: Bao Franco      Care Transitions Follow Up Call    Needs to be reviewed by the provider   Additional needs identified to be addressed with provider: No  none             Method of communication with provider : none      Care Transition Nurse (CTN) contacted the patient by telephone to follow up after admission. Verified name and  with patient as identifiers. Addressed changes since last contact: none  Discussed follow-up appointments. If no appointment was previously scheduled, appointment scheduling offered: Yes. Is follow up appointment scheduled within 7 days of discharge? Yes. Advance Care Planning:   Does patient have an Advance Directive: reviewed and current. Advance Care Planning   Healthcare Decision Maker:    Primary Decision Maker: Javed Menendez Child - [phone]    Secondary Decision Maker: cely huang - Child - [phone]    CTN reviewed discharge instructions, medical action plan and red flags with patient and discussed any barriers to care and/or understanding of plan of care after discharge. Discussed appropriate site of care based on symptoms and resources available to patient including: PCP  Specialist. The patient agrees to contact the PCP office for questions related to their healthcare. Patients top risk factors for readmission: medical condition-s/p thoracotomy    Patient verified  and was pleasant and agreeable to transition calls. States she is really good. Denies pain, reports more of a \"tightness. \" Incisions are healing well. Sutures x3 were removed at post op. Both f/u appts went well. Patient has some SOB when going up her steps. Appetite good. Denies problems with bowels or bladder. Denies medication changes. Another post op scheduled 9/23. Patient does report some occ nosebleeds recently. LPN CC encouraged patient to reach out to vascular, who prescribes her Plavix. Patient verbalized understanding. CTN provided contact information for future needs. Plan for follow-up call in 7-10 days based on severity of symptoms and risk factors. Care Transitions Subsequent and Final Call    Subsequent and Final Calls  Do you have any ongoing symptoms?: No  Have your medications changed?: No  Do you have any questions related to your medications?: No  Do you currently have any active services?: No  Do you have any needs or concerns that I can assist you with?: No  Identified Barriers: None  Care Transitions Interventions  Other Interventions:                 Follow Up  Future Appointments   Date Time Provider Herberth Olive   9/23/2022  9:30 AM Toshia Michel MD [facility]   9/26/2022  7:45 AM SCHEDULE, [place] REMOTE TRANSMISSION FF Cardio MMA   11/30/2022  9:45 AM [facility] VASC & ENDO, VASCULAR LAB SCHED FF VASC/ENDO MMA   12/6/2022 10:15 AM Jaylyn Florian MD FF VASC/ENDO [facility]   12/26/2022  7:45 AM SCHEDULE, [place] REMOTE TRANSMISSION FF Cardio MMA   1/19/2023  9:20 AM Agusto Foster MD FF NEURO [facility]   2/3/2023 10:00 AM Michelle Richards MD [id number] [place], LPN

## 2022-09-14 ENCOUNTER — CARE COORDINATION (OUTPATIENT)
Dept: CASE MANAGEMENT | Age: 72
End: 2022-09-14

## 2022-09-14 NOTE — CARE COORDINATION
[address] [facility] Transitions Follow Up Call    2022    Patient: Chidi Piper  Patient : 1950   MRN: [id number]  Reason for Admission: Nodule of upper lobe lung  Discharge Date: 22 RARS: Readmission Risk Score: 7.6         Spoke with: no one    Care Transitions Subsequent and Final Call    Subsequent and Final Calls  Care Transitions Interventions  Other Interventions: Follow Up  Future Appointments   Date Time Provider Herberth Schaefer   2022  9:30 AM Yeni Langston MD [facility]   2022  7:45 AM SCHEDULE, [place] REMOTE TRANSMISSION FF Cardio MMA   2022  9:45 AM [facility] VASC & ENDO, VASCULAR LAB SCHED FF VASC/ENDO MMA   2022 10:15 AM Chris Bridges MD  VASC/ENDO MMA   2022  7:45 AM SCHEDULE, [place] REMOTE TRANSMISSION FF Cardio MMA   2023  9:20 AM Benoit Arteaga MD FF NEURO [facility]   2/3/2023 10:00 AM Sarah Garnica MD [address]       Follow up outreach call attempt, no answer. CTN left  with contact information and request for return call. CTN will continue with outreach call attempts.     VASYL Reina, RN   Care Transition Nurse  Mobile: [phone]

## 2022-09-20 ENCOUNTER — CARE COORDINATION (OUTPATIENT)
Dept: CASE MANAGEMENT | Age: 72
End: 2022-09-20

## 2022-09-20 NOTE — CARE COORDINATION
[facility] Transitions Follow Up Call    2022    Patient: Leatha Staples  Patient : 1950   MRN: [id number]  Reason for Admission: lung nodule  Discharge Date: 22 RARS: Readmission Risk Score: 7.6         Spoke with: no one    Care Transitions Subsequent and Final Call    Subsequent and Final Calls  Care Transitions Interventions  Other Interventions: Follow Up  Future Appointments   Date Time Provider Herberth Schaefer   2022  9:30 AM Tigist Maciel MD [facility]   2022  7:45 AM SCHEDULE, [place] REMOTE TRANSMISSION  Cardio MMA   2022  9:45 AM [facility] FF VASC & ENDO, VASCULAR LAB SCHED FF VASC/ENDO MMA   2022 10:15 AM Barry Nunes MD  VASC/ENDO MMA   2022  7:45 AM SCHEDULE, [place] REMOTE TRANSMISSION FF Cardio MMA   2023  9:20 AM Warren Lara MD  NEURO MMA   2/3/2023 10:00 AM MD Mattie Malhotra [address] and final outreach call attempt, no answer. CTN left VM with contact information and request for return call. CTN will resolve episode and remain available.     VASYL Mishra, RN   Care Transition Nurse  Mobile: [phone]

## 2022-09-23 ENCOUNTER — OFFICE VISIT (OUTPATIENT)
Dept: CARDIOTHORACIC SURGERY | Age: 72
End: 2022-09-23

## 2022-09-23 VITALS
HEIGHT: 62 IN | DIASTOLIC BLOOD PRESSURE: 80 MMHG | TEMPERATURE: 97.2 F | BODY MASS INDEX: 30.18 KG/M2 | SYSTOLIC BLOOD PRESSURE: 134 MMHG | HEART RATE: 74 BPM | WEIGHT: 164 LBS | OXYGEN SATURATION: 97 %

## 2022-09-23 DIAGNOSIS — R91.1 PULMONARY NODULE: Primary | ICD-10-CM

## 2022-09-23 PROCEDURE — 99024 POSTOP FOLLOW-UP VISIT: CPT | Performed by: THORACIC SURGERY (CARDIOTHORACIC VASCULAR SURGERY)

## 2022-09-23 RX ORDER — CLOPIDOGREL BISULFATE 75 MG/1
75 TABLET ORAL DAILY
COMMUNITY

## 2022-09-23 NOTE — LETTER
09/23/22        [facility]) Cardiovascular and Thoracic Surgeons  [address]  [address] [place] [address]        RE:    Lia Richards,   [id number]    Dear Dr. Mortimer Lou,    It was my pleasure to see your patient, Lia Richards, in the office today in follow up of LAKESHA lobectomy 8/17/22 at [facility].    I have attached a copy of the office evaluation. Thank you for allowing me to participate in the care of this patient.       Sincerely,     Marshall Kelley MD, MD    CC: Rosalinda Meléndez MD  [address] [facility] [address]  Olegario Lucia MD  [address] [address]  [place]

## 2022-09-26 ENCOUNTER — NURSE ONLY (OUTPATIENT)
Dept: CARDIOLOGY CLINIC | Age: 72
End: 2022-09-26
Payer: COMMERCIAL

## 2022-09-26 DIAGNOSIS — R00.1 SYMPTOMATIC BRADYCARDIA: ICD-10-CM

## 2022-09-26 DIAGNOSIS — Z95.0 PACEMAKER: ICD-10-CM

## 2022-09-26 DIAGNOSIS — R55 SYNCOPE AND COLLAPSE: ICD-10-CM

## 2022-09-26 DIAGNOSIS — I49.5 SINUS NODE DYSFUNCTION (HCC): ICD-10-CM

## 2022-09-26 PROCEDURE — 93296 REM INTERROG EVL PM/IDS: CPT | Performed by: INTERNAL MEDICINE

## 2022-09-26 PROCEDURE — 93294 REM INTERROG EVL PM/LDLS PM: CPT | Performed by: INTERNAL MEDICINE

## 2022-09-27 NOTE — PROGRESS NOTES
We received remote transmission from patient's monitor at home. Transmission shows normal sensing and pacing function. Noted NSVT. Pt is not on a beta blocker. Last echo 2022 showed EF of 55-60%. EP physician will review. See interrogation under cardiology tab in the [address] [place] [address] [address] field for more details.  < 0.1% (MVP On)  AP 12.0%    End of 91-day monitoring period 9-26-22.

## 2022-10-12 ENCOUNTER — TELEPHONE (OUTPATIENT)
Dept: FAMILY MEDICINE CLINIC | Age: 72
End: 2022-10-12

## 2022-10-12 NOTE — TELEPHONE ENCOUNTER
Patient was seen in the hospital due to syncope/collapse and a uti. She needs a hospital follow-up. She was discharged on 10/7/22. Everyones schedules are full.  Any ideas where I can schedule her at?

## 2022-10-12 NOTE — TELEPHONE ENCOUNTER
Scheduled  pt on 10/18/22-     She was given an antibiotic at the hospital and now has a yeast infection and requesting a medication to help with this. [facility].

## 2022-10-12 NOTE — TELEPHONE ENCOUNTER
----- Message from Da Rony sent at 10/10/2022 11:36 AM EDT -----  Subject: Hospital Follow Up    QUESTIONS  What hospital was the Patient Discharged from? [facility]  Date of Discharge? 2022-10-07  Discharge Location? Home  Reason for hospitalization as patient stated? Episodes of confusion and   loss of consciousness. 10/5/22-10/7/22  What question does the patient have, if applicable?   ---------------------------------------------------------------------------  --------------  CALL BACK INFO  What is the best way for the office to contact you? OK to leave message on   voicemail  Preferred Call Back Phone Number? [phone]  ---------------------------------------------------------------------------  --------------  SCRIPT ANSWERS  Relationship to Patient?  Self

## 2022-10-13 DIAGNOSIS — B37.31 CANDIDA VAGINITIS: ICD-10-CM

## 2022-10-13 RX ORDER — FLUCONAZOLE 150 MG/1
150 TABLET ORAL
Qty: 2 TABLET | Refills: 0 | Status: SHIPPED | OUTPATIENT
Start: 2022-10-13 | End: 2022-10-18

## 2022-10-18 ENCOUNTER — OFFICE VISIT (OUTPATIENT)
Dept: FAMILY MEDICINE CLINIC | Age: 72
End: 2022-10-18

## 2022-10-18 VITALS
SYSTOLIC BLOOD PRESSURE: 126 MMHG | BODY MASS INDEX: 30.49 KG/M2 | OXYGEN SATURATION: 98 % | WEIGHT: 164 LBS | HEART RATE: 65 BPM | DIASTOLIC BLOOD PRESSURE: 80 MMHG

## 2022-10-18 DIAGNOSIS — R41.89 EPISODE OF UNRESPONSIVENESS: ICD-10-CM

## 2022-10-18 DIAGNOSIS — I25.119 CORONARY ARTERY DISEASE INVOLVING NATIVE CORONARY ARTERY OF NATIVE HEART WITH ANGINA PECTORIS (HCC): ICD-10-CM

## 2022-10-18 DIAGNOSIS — Z09 HOSPITAL DISCHARGE FOLLOW-UP: ICD-10-CM

## 2022-10-18 DIAGNOSIS — I10 HTN (HYPERTENSION), BENIGN: ICD-10-CM

## 2022-10-18 DIAGNOSIS — Z23 NEED FOR PROPHYLACTIC VACCINATION AND INOCULATION AGAINST INFLUENZA: Primary | ICD-10-CM

## 2022-10-18 DIAGNOSIS — N18.32 STAGE 3B CHRONIC KIDNEY DISEASE (HCC): ICD-10-CM

## 2022-10-18 DIAGNOSIS — E78.2 MIXED HYPERLIPIDEMIA: ICD-10-CM

## 2022-10-18 RX ORDER — M-VIT,TX,IRON,MINS/CALC/FOLIC 27MG-0.4MG
1 TABLET ORAL DAILY
COMMUNITY

## 2022-10-18 NOTE — PROGRESS NOTES
Post-Discharge Transitional Care Management Progress Note      Miquel Franco   YOB: 1950    Date of Office Visit:  10/18/2022  Date of Hospital Admission: 10/5/2022  Date of Hospital Discharge: 10/7/2022    Care management risk score Rising risk (score 2-5) and Complex Care (Scores >=6): No Risk Score On File     Non face to face  following discharge, date last encounter closed (first attempt may have been earlier): *No documented post hospital discharge outreach found in the last 14 days *No documented post hospital discharge outreach found in the last 14 days    Call initiated 2 business days of discharge: *No response recorded in the last 14 days    ASSESSMENT/PLAN:   Need for prophylactic vaccination and inoculation against influenza  -     Influenza, FLUAD, (age 72 y+), IM, PF, 0.5 mL  Episode of unresponsiveness  Stage 3b chronic kidney disease ([facility] [address]  Coronary artery disease involving native coronary artery of native heart with angina pectoris ([facility] [address]  HTN (hypertension), benign  -     Comprehensive Metabolic Panel, Fasting; Future  -     CBC with Auto Differential; Future  -     TSH with Reflex; Future  Hospital discharge follow-up  -     [place] DISCHARGE MEDS RECONCILED W/ CURRENT OUTPATIENT MED LIST  Mixed hyperlipidemia  -     Comprehensive Metabolic Panel, Fasting; Future  -     CBC with Auto Differential; Future  -     Lipid, Fasting; Future  -     TSH with Reflex; Future      Medical Decision Making: moderate complexity  Return if symptoms worsen or fail to improve, for regularly scheduled follow-up. Subjective:   HPI:  Follow up of Hospital problems/diagnosis(es): Unresponsiveness episode, fever, UTI    Inpatient course: Discharge summary reviewed- see chart. Interval history/Current status: Patient states she has done well since going home from the hospital.  She is at her regular activities. Denies any further spells. Patient's son and daughter-in-law live with her.   She has an appointment with Dr. Denny Daily, neurologist next month. Patient Active Problem List   Diagnosis    Hyperglycemia    Pure hypercholesterolemia    Lumbago    Osteoarthritis    CKD (chronic kidney disease) stage 3, GFR 30-59 ml/min ([facility])    Abnormal mammogram    Fibrocystic disease of breast    Diarrhea    PAD (peripheral artery disease) ([facility])    Atherosclerosis of native arteries of extremities with intermittent claudication, left leg (HCC)    Syncope    Sinus node dysfunction ([address]    Encounter for loop recorder at end of battery life    Status post total left knee replacement    Adhesive capsulitis of right shoulder    Right shoulder pain    Psoriasis    Sick sinus syndrome (HCC)    Syncope and collapse    Urinary tract infection without hematuria    Partial seizure (HCC)    HTN (hypertension), benign    Coronary artery disease involving native coronary artery of native heart with angina pectoris ([facility])    Seasonal allergic rhinitis due to pollen    Spinal stenosis of lumbar region with neurogenic claudication    Nontraumatic complete tear of left rotator cuff    Atherosclerosis of native arteries of extremities with intermittent claudication, bilateral legs (HCC)    Symptomatic bradycardia    Pacemaker    Sunburn    CKD (chronic kidney disease) stage 4, GFR 15-29 ml/min ([facility])    Nodule of upper lobe of left lung    S/P lobectomy of lung       Medications listed as ordered at the time of discharge from hospital     Medication List            Accurate as of October 18, 2022  1:45 PM. If you have any questions, ask your nurse or doctor. CHANGE how you take these medications      loratadine 10 MG tablet  Commonly known as: Claritin  Take 1 tablet by mouth in the morning.   What changed:   when to take this  reasons to take this            CONTINUE taking these medications      atorvastatin 10 MG tablet  Commonly known as: LIPITOR  TAKE 1 TABLET BY MOUTH DAILY     celecoxib 200 MG capsule  Commonly known as: CELEBREX  TAKE 1 CAPSULE BY MOUTH DAILY     clopidogrel 75 MG tablet  Commonly known as: PLAVIX     dicyclomine 10 MG capsule  Commonly known as: Bentyl  Take 1 capsule by mouth in the morning. losartan-hydroCHLOROthiazide 100-25 MG per tablet  Commonly known as: HYZAAR  TAKE 1 TABLET BY MOUTH DAILY     therapeutic multivitamin-minerals tablet            STOP taking these medications      acetaminophen-codeine 300-30 MG per tablet  Commonly known as: TYLENOL #3  Stopped by: Jayleen Sesay MD     fluconazole 150 MG tablet  Commonly known as: DIFLUCAN  Stopped by: Jayleen Sesay MD                Medications marked \"taking\" at this time  Outpatient Medications Marked as Taking for the 10/18/22 encounter (Office Visit) with Nadia Boo MD   Medication Sig Dispense Refill    Multiple Vitamins-Minerals (THERAPEUTIC MULTIVITAMIN-MINERALS) tablet Take 1 tablet by mouth daily      clopidogrel (PLAVIX) 75 MG tablet Take 75 mg by mouth daily      celecoxib (CELEBREX) 200 MG capsule TAKE 1 CAPSULE BY MOUTH DAILY 30 capsule 3    dicyclomine (BENTYL) 10 MG capsule Take 1 capsule by mouth in the morning. 90 capsule 3    loratadine (CLARITIN) 10 MG tablet Take 1 tablet by mouth in the morning. (Patient taking differently: Take 10 mg by mouth daily as needed) 90 tablet 3    atorvastatin (LIPITOR) 10 MG tablet TAKE 1 TABLET BY MOUTH DAILY 90 tablet 3    losartan-hydroCHLOROthiazide (HYZAAR) 100-25 MG per tablet TAKE 1 TABLET BY MOUTH DAILY 90 tablet 3        Medications patient taking as of now reconciled against medications ordered at time of hospital discharge: Yes    Patient has history of irritable bowel and occasionally has diarrhea. She states this has improved since starting Bentyl. She denies any chest pains, shortness of breath, or generalized weakness. Her gait is normal.  Denies any URI symptoms.     Objective:    /80   Pulse 65   Wt 164 lb (74.4 kg)   SpO2 98%   BMI 30.49 kg/m²   General Appearance: alert and oriented to person, place and time, well developed and well- nourished, in no acute distress  Skin: warm and dry, no rash or erythema  Head: normocephalic and atraumatic  Eyes: pupils equal, round, and reactive to light, extraocular eye movements intact, conjunctivae normal  ENT: tympanic membrane, external ear and ear canal normal bilaterally, nose without deformity, nasal mucosa and turbinates normal without polyps  Neck: supple and non-tender without mass, no thyromegaly or thyroid nodules, no cervical lymphadenopathy  Pulmonary/Chest: clear to auscultation bilaterally- no wheezes, rales or rhonchi, normal air movement, no respiratory distress  Cardiovascular: normal rate, regular rhythm, normal S1 and S2, no murmurs, rubs, clicks, or gallops, distal pulses intact, no carotid bruits  Extremities: no cyanosis, clubbing or edema  Musculoskeletal: normal range of motion, no joint swelling, deformity or tenderness  Neurologic: no cranial nerve deficit, gait, coordination and speech normal    An electronic signature was used to authenticate this note. --Jennifer Pack MD   Post-Discharge Transitional Care  Follow Up      Yecenia Franco   YOB: 1950    Date of Office Visit:  10/18/2022  Date of Hospital Admission: 8/22/22  Date of Hospital Discharge: 8/22/22  Risk of hospital readmission (high >=14%.  Medium >=10%) :Readmission Risk Score: 7.6      Care management risk score Rising risk (score 2-5) and Complex Care (Scores >=6): No Risk Score On File     Non face to face  following discharge, date last encounter closed (first attempt may have been earlier): *No documented post hospital discharge outreach found in the last 14 days    Call initiated 2 business days of discharge: *No response recorded in the last 14 days    ASSESSMENT/PLAN:   Need for prophylactic vaccination and inoculation against influenza  -     Influenza, FLUAD, (age 72 y+), IM, PF, 0.5 mL  Episode of unresponsiveness  Stage 3b chronic kidney disease (HCC)  Coronary artery disease involving native coronary artery of native heart with angina pectoris (HCC)  HTN (hypertension), benign  -     Comprehensive Metabolic Panel, Fasting; Future  -     CBC with Auto Differential; Future  -     TSH with Reflex; Future  Hospital discharge follow-up  -     [place] DISCHARGE MEDS RECONCILED W/ CURRENT OUTPATIENT MED LIST  Mixed hyperlipidemia  -     Comprehensive Metabolic Panel, Fasting; Future  -     CBC with Auto Differential; Future  -     Lipid, Fasting; Future  -     TSH with Reflex; Future      Medical Decision Making: moderate complexity  Return if symptoms worsen or fail to improve, for regularly scheduled follow-up.

## 2022-11-15 DIAGNOSIS — E78.2 MIXED HYPERLIPIDEMIA: ICD-10-CM

## 2022-11-17 RX ORDER — LOSARTAN POTASSIUM AND HYDROCHLOROTHIAZIDE 25; 100 MG/1; MG/1
1 TABLET ORAL DAILY
Qty: 90 TABLET | Refills: 3 | Status: SHIPPED | OUTPATIENT
Start: 2022-11-17

## 2022-11-17 RX ORDER — CLOPIDOGREL BISULFATE 75 MG/1
TABLET ORAL
Qty: 90 TABLET | Refills: 3 | Status: SHIPPED | OUTPATIENT
Start: 2022-11-17

## 2022-11-17 RX ORDER — ATORVASTATIN CALCIUM 10 MG/1
10 TABLET, FILM COATED ORAL DAILY
Qty: 90 TABLET | Refills: 3 | Status: SHIPPED | OUTPATIENT
Start: 2022-11-17

## 2022-11-18 ENCOUNTER — OFFICE VISIT (OUTPATIENT)
Dept: NEUROLOGY | Age: 72
End: 2022-11-18
Payer: COMMERCIAL

## 2022-11-18 VITALS
WEIGHT: 164 LBS | BODY MASS INDEX: 30.18 KG/M2 | HEIGHT: 62 IN | DIASTOLIC BLOOD PRESSURE: 84 MMHG | SYSTOLIC BLOOD PRESSURE: 132 MMHG | HEART RATE: 85 BPM

## 2022-11-18 DIAGNOSIS — R56.9 SEIZURE-LIKE ACTIVITY (HCC): ICD-10-CM

## 2022-11-18 DIAGNOSIS — G25.0 BENIGN ESSENTIAL TREMOR: Primary | ICD-10-CM

## 2022-11-18 PROCEDURE — 3074F SYST BP LT 130 MM HG: CPT | Performed by: PSYCHIATRY & NEUROLOGY

## 2022-11-18 PROCEDURE — 3078F DIAST BP <80 MM HG: CPT | Performed by: PSYCHIATRY & NEUROLOGY

## 2022-11-18 PROCEDURE — 99213 OFFICE O/P EST LOW 20 MIN: CPT | Performed by: PSYCHIATRY & NEUROLOGY

## 2022-11-18 PROCEDURE — 1123F ACP DISCUSS/DSCN MKR DOCD: CPT | Performed by: PSYCHIATRY & NEUROLOGY

## 2022-11-18 NOTE — PROGRESS NOTES
[facility]onda Parikh [facility]   Neurology followup    Subjective:   CC/HP  History was obtained from the patient. Additional history was obtained from her family. Patient states that she is doing well. Patient states that her tremors are about the same. Stress and anxiety may make it a little worse. Patient sister has Parkinson's disease. Patient's mother had benign essential tremors. Patient had some \"seizure-like\" spells. Patient had 1 more episode after the last visit. This was after lung surgery. It was felt tby the paramedics that the spell was because of gabapentin and muscle relaxers. The medications were stopped and she has not had any further spells since that time. Patient also has history of bradycardia. Patient was later to find to have the sick sinus syndrome and has a pacemaker and since that she has not had any further of those spells.     REVIEW OF SYSTEMS    Constitutional:  []   Chills   []  Fatigue   []  Fevers   []  Malaise   []  Weight loss     [x] Denies all of the above    Respiratory:   []  Cough    []  Shortness of breath         [x] Denies all of the above     Cardiovascular:   []  Chest pain    []  Exertional chest pressure/discomfort           [] Palpitations    []  Syncope     [x] Denies all of the above        Past Medical History:   Diagnosis Date    CAD (coronary artery disease)     NONOBSTRUCTING    CKD (chronic kidney disease) stage 3, GFR 30-59 ml/min ([facility])     IBS (irritable bowel syndrome)     Lumbago     Malignant neoplasm of upper lobe of left lung ([facility] [address] 08/2022    LAKESHA adenoCa 1.7cm    Osteoarthrosis, unspecified whether generalized or localized, unspecified site     Other abnormal blood chemistry     PAD (peripheral artery disease) ([facility])     has stents in lower legs bilaterally implanted by Dr. Roni Mallory    Pure hypercholesterolemia     Risk for falls 02/02/2016    S/p Left TKR    Spinal stenosis     Tremor     Unspecified essential hypertension     Wears glasses      Family History Problem Relation Age of Onset    Cancer Mother         cancer of [place]    Heart Disease Father          of MI age 71 - smoker,     Diabetes Sister     Hypertension Sister     Cancer Brother     Emphysema Brother     Stroke Brother     Diabetes Brother     Heart Disease Brother     Hypertension Brother     High Cholesterol Neg Hx     High Blood Pressure Neg Hx      Social History     Socioeconomic History    Marital status:      Spouse name: None    Number of children: None    Years of education: None    Highest education level: None   Tobacco Use    Smoking status: Every Day     Packs/day: 0.25     Years: 40.00     Pack years: 10.00     Types: Cigarettes    Smokeless tobacco: Never    Tobacco comments:     <1 pack of cigarettes per day     Started at age 21   Vaping Use    Vaping Use: Never used   Substance and Sexual Activity    Alcohol use: Yes     Comment: glass of wine once a month    Drug use: Never    Sexual activity: Not Currently     Social Determinants of Health     Financial Resource Strain: Low Risk     Difficulty of Paying Living Expenses: Not hard at all   Food Insecurity: Food Insecurity Present    Worried About Running Out of Food in the Last Year: Sometimes true    Ran Out of Food in the Last Year: Sometimes true        Objective:  Exam:  /84   Pulse 85   Ht 5' 1.5\" (1.562 m)   Wt 164 lb (74.4 kg)   BMI 30.49 kg/m²   This is a well-nourished patient in no acute distress  Patient is awake, alert and oriented x3. Speech is normal.  Pupils are equal round reacting to light. Extraocular movements intact. Face symmetrical. Tongue midline. Motor exam shows normal symmetrical strength. Deep tendon reflexes normal. Plantar reflexes downgoing. Sensory exam normal. Coordination normal. Gait normal. No carotid bruit. No neck stiffness.       Data :  LABS:  General Labs:    CBC:   Lab Results   Component Value Date/Time    WBC 15.2 2022 07:08 AM    RBC 4.17 2022 07:08 AM    HGB 13.0 08/19/2022 07:08 AM    HCT 40.1 08/19/2022 07:08 AM    MCV 96.0 08/19/2022 07:08 AM    MCH 31.2 08/19/2022 07:08 AM    MCHC 32.5 08/19/2022 07:08 AM    RDW 15.2 08/19/2022 07:08 AM     08/19/2022 07:08 AM    MPV 9.4 08/19/2022 07:08 AM     BMP:    Lab Results   Component Value Date/Time     08/19/2022 07:07 AM    K 4.7 08/19/2022 07:07 AM     08/19/2022 07:07 AM    CO2 19 08/19/2022 07:07 AM    BUN 31 08/19/2022 07:07 AM    LABALBU 3.5 08/17/2022 11:41 AM    CREATININE 1.2 08/19/2022 07:07 AM    CALCIUM 9.7 08/19/2022 07:07 AM    GFRAA 53 08/19/2022 07:07 AM    GFRAA >60 11/30/2011 10:30 AM    LABGLOM 44 08/19/2022 07:07 AM    GLUCOSE 119 08/19/2022 07:07 AM     RADIOLOGY REVIEW:  I have reviewed radiology image(s) and reports(s) of: MRI brain    Impression :  Benign essential tremor  No clinical evidence of Parkinson's disease  Seizure-like episodes probably due to significant bradycardia, now resolved after pacemaker insertion  Patient had 1 another spell which was felt to be due to medication and now she is doing better. MRI brain images were reviewed and were normal  EEG was normal    Plan :  Discussed with patient  We discussed about medications for the tremors  Since her tremors are reasonably mild we decided not to put her on any medication at this time  Since she is doing well from a neurological standpoint I will see her only on a as needed basis. Please note a portion of  this chart was generated using dragon dictation software. Although every effort was made to ensure the accuracy of this automated transcription, some errors in transcription may have occurred.

## 2022-11-30 ENCOUNTER — TELEPHONE (OUTPATIENT)
Dept: VASCULAR SURGERY | Age: 72
End: 2022-11-30

## 2022-11-30 ENCOUNTER — PROCEDURE VISIT (OUTPATIENT)
Dept: VASCULAR SURGERY | Age: 72
End: 2022-11-30

## 2022-11-30 DIAGNOSIS — I70.213 ATHEROSCLEROSIS OF NATIVE ARTERIES OF EXTREMITIES WITH INTERMITTENT CLAUDICATION, BILATERAL LEGS (HCC): ICD-10-CM

## 2022-11-30 DIAGNOSIS — I73.9 PVD (PERIPHERAL VASCULAR DISEASE) WITH CLAUDICATION (HCC): Primary | ICD-10-CM

## 2022-11-30 DIAGNOSIS — I65.23 CAROTID ATHEROSCLEROSIS, BILATERAL: ICD-10-CM

## 2022-11-30 NOTE — TELEPHONE ENCOUNTER
DMITRIY, patient in for a Carotid US today. She has an appt with you on 12/6 at 10:15. I wanted to make you aware of her preliminary results:    Right Impression  The right internal carotid artery appears to have a <50% diameter reducing stenosis based on velocity criteria. The right vertebral artery demonstrates antegrade flow. Left Impression  The left internal carotid artery appears to have a greater than 70% diameter reducing stenosis based on velocity criteria. The peak  systolic and end diastolic velocities are significantly higher than the previous study of 3/3/22. Prox CCA 50.2 12.7 0.75  Mid CCA 42.3 11 0.74  Dist CCA 65.9 17.1 0.74  Prox ICA .6 .1   Mid  53.8 0.86  There is antegrade vertebral flow noted on the left side. Additional Measurements: ICAPSV/CCAPSV 11.67. ICAEDV/CCAEDV 14.89. Left vertebral artery demonstrates antegrade flow.

## 2022-12-06 ENCOUNTER — OFFICE VISIT (OUTPATIENT)
Dept: VASCULAR SURGERY | Age: 72
End: 2022-12-06
Payer: COMMERCIAL

## 2022-12-06 VITALS
DIASTOLIC BLOOD PRESSURE: 78 MMHG | WEIGHT: 162 LBS | SYSTOLIC BLOOD PRESSURE: 126 MMHG | HEIGHT: 62 IN | BODY MASS INDEX: 29.81 KG/M2

## 2022-12-06 DIAGNOSIS — I70.213 ATHEROSCLEROSIS OF NATIVE ARTERIES OF EXTREMITIES WITH INTERMITTENT CLAUDICATION, BILATERAL LEGS (HCC): ICD-10-CM

## 2022-12-06 DIAGNOSIS — I65.23 CAROTID ATHEROSCLEROSIS, BILATERAL: Primary | ICD-10-CM

## 2022-12-06 PROCEDURE — 3078F DIAST BP <80 MM HG: CPT | Performed by: SURGERY

## 2022-12-06 PROCEDURE — 99213 OFFICE O/P EST LOW 20 MIN: CPT | Performed by: SURGERY

## 2022-12-06 PROCEDURE — 1123F ACP DISCUSS/DSCN MKR DOCD: CPT | Performed by: SURGERY

## 2022-12-06 PROCEDURE — 3074F SYST BP LT 130 MM HG: CPT | Performed by: SURGERY

## 2022-12-06 NOTE — PROGRESS NOTES
[facility])   Vascular Surgery Followup    Referring Provider:  Chaparro Das MD     Chief Complaint   Patient presents with    Follow-up        History of Present Illness:  1year-old female here today for follow-up of carotid atherosclerosis and peripheral vascular disease. She underwent both carotid duplex and lower extremity arterial duplex last week and is here to discuss results. She has a history of left leg claudication with left SFA intervention in March 2022. She denies any recurrent claudication. She does complain of some mild numbness in her feet. Otherwise no complaints with activity. She denies TIA stroke or amaurosis. Past Medical History:   has a past medical history of CAD (coronary artery disease), CKD (chronic kidney disease) stage 3, GFR 30-59 ml/min ([facility]), IBS (irritable bowel syndrome), Lumbago, Malignant neoplasm of upper lobe of left lung ([facility] [address] Osteoarthrosis, unspecified whether generalized or localized, unspecified site, Other abnormal blood chemistry, PAD (peripheral artery disease) ([address] Pure hypercholesterolemia, Risk for falls, Spinal stenosis, Tremor, Unspecified essential hypertension, and Wears glasses. Surgical History:   has a past surgical history that includes Cholecystectomy; Tubal ligation; knee surgery; Rotator cuff repair (Bilateral); Colonoscopy; Leg Surgery; Insertable Cardiac Monitor (09/2015); Total knee arthroplasty (Left, 01/12/2016); Shoulder arthroscopy (Left, 08/21/2020); US BIOPSY OF SALIVARY GLAND (06/17/2022); CT NEEDLE BIOPSY LUNG PERCUTANEOUS (06/17/2022); Pacemaker insertion (05/27/2022); Lung lobectomy (Left, 08/17/2022); and Thoracoscopy (Left, 8/17/2022). Social History:   reports that she has been smoking cigarettes. She has a 10.00 pack-year smoking history. She has never used smokeless tobacco. She reports current alcohol use. She reports that she does not use drugs.      Family History:  family history includes Cancer in her brother and mother; Diabetes in her brother and sister; Emphysema in her brother; Heart Disease in her brother and father; Hypertension in her brother and sister; Stroke in her brother. Home Medications:  Current Outpatient Medications   Medication Sig Dispense Refill    clopidogrel (PLAVIX) 75 MG tablet TAKE 1 TABLET BY MOUTH DAILY *NEED TO ESTABLISH WITH A NEW PROVIDER FOR FUTURE REFILLS* 90 tablet 3    atorvastatin (LIPITOR) 10 MG tablet TAKE 1 TABLET BY MOUTH DAILY 90 tablet 3    losartan-hydroCHLOROthiazide (HYZAAR) 100-25 MG per tablet TAKE 1 TABLET BY MOUTH DAILY 90 tablet 3    Multiple Vitamins-Minerals (THERAPEUTIC MULTIVITAMIN-MINERALS) tablet Take 1 tablet by mouth daily      celecoxib (CELEBREX) 200 MG capsule TAKE 1 CAPSULE BY MOUTH DAILY 30 capsule 3    dicyclomine (BENTYL) 10 MG capsule Take 1 capsule by mouth in the morning. 90 capsule 3    loratadine (CLARITIN) 10 MG tablet Take 1 tablet by mouth in the morning. (Patient taking differently: Take 10 mg by mouth daily as needed) 90 tablet 3     No current facility-administered medications for this visit. Allergies:  Morphine, Pletal [cilostazol], Gabapentin, Ace inhibitors, Chantix [varenicline], Keflex [cephalexin], Niaspan [niacin er], and Pravastatin     Review of Systems:   Constitutional: there has been no unanticipated weight loss. There's been no change in energy level, sleep pattern, or activity level. Eyes: No visual changes or diplopia. No scleral icterus. ENT: No Headaches, hearing loss or vertigo. No mouth sores or sore throat. Cardiovascular: Reviewed in HPI  Respiratory: No cough or wheezing, no sputum production. No hematemesis. Gastrointestinal: No abdominal pain, appetite loss, blood in stools. No change in bowel or bladder habits. Genitourinary: No dysuria, trouble voiding, or hematuria. Musculoskeletal:  No gait disturbance, weakness or joint complaints. Integumentary: No rash or pruritis.   Neurological: No headache, diplopia, change in muscle strength, numbness or tingling. No change in gait, balance, coordination, mood, affect, memory, mentation, behavior. Psychiatric: No anxiety, no depression. Endocrine: No malaise, fatigue or temperature intolerance. No excessive thirst, fluid intake, or urination. No tremor. Hematologic/Lymphatic: No abnormal bruising or bleeding, blood clots or swollen lymph nodes. Allergic/Immunologic: No nasal congestion or hives. Physical Examination:    Vitals:    12/06/22 1021   BP: 126/78          General appearance: alert, appears stated age, cooperative, and no distress  Head: Normocephalic, without obvious abnormality, atraumatic  Neck: no adenopathy, no carotid bruit, no JVD, supple, symmetrical, trachea midline, and thyroid: not enlarged, symmetric, no tenderness/mass/nodules  Lungs: clear to auscultation bilaterally  Heart: regular rate and rhythm, S1, S2 normal, no murmur, click, rub or gallop  Abdomen: soft, non-tender. Bowel sounds normal. No masses,  no organomegaly  Extremities: extremities normal, atraumatic, no cyanosis or edema    Pulses:   L brachial 2 R brachial 2   L radial 2 R radial 2   L femoral 2 R femoral 2   L popliteal 1 R popliteal 1   L posterior tibial 1 R posterior tibial 1   L dorsalis pedis 11 R dorsalis pedis 1   Doppler Signals:  +    Neurologic: Grossly normal    MEDICAL DECISION MAKING/TESTING  I have reviewed the testing personally and my interpretation is below. Carotid duplex scan was reviewed and shows greater than 70% left ICA stenosis.   Less than 50 on the right    Right FRANK 0.93 and left FRANK 1.03    Assessment:     Patient Active Problem List   Diagnosis    Hyperglycemia    Pure hypercholesterolemia    Lumbago    Osteoarthritis    CKD (chronic kidney disease) stage 3, GFR 30-59 ml/min ([facility])    Abnormal mammogram    Fibrocystic disease of breast    Diarrhea    PAD (peripheral artery disease) ([facility])    Atherosclerosis of native arteries of extremities with intermittent claudication, left leg (HCC)    Syncope    Sinus node dysfunction (HCC)    Encounter for loop recorder at end of battery life    Status post total left knee replacement    Adhesive capsulitis of right shoulder    Right shoulder pain    Psoriasis    Sick sinus syndrome (HCC)    Syncope and collapse    Urinary tract infection without hematuria    Partial seizure (HCC)    HTN (hypertension), benign    Coronary artery disease involving native coronary artery of native heart with angina pectoris (HCC)    Seasonal allergic rhinitis due to pollen    Spinal stenosis of lumbar region with neurogenic claudication    Nontraumatic complete tear of left rotator cuff    Atherosclerosis of native arteries of extremities with intermittent claudication, bilateral legs (HCC)    Symptomatic bradycardia    Pacemaker    Sunburn    CKD (chronic kidney disease) stage 4, GFR 15-29 ml/min (HCC)    Nodule of upper lobe of left lung    S/P lobectomy of lung       Plan:  1. Carotid atherosclerosis, bilateral  Patient with asymptomatic carotid atherosclerosis. There has been an increased velocity on the left side consistent with greater than 70% stenosis. Recommend repeat CT angiogram of the neck. Will contact with results of the study. Did briefly discussed with her both options for endarterectomy and carotid stenting. We will base further discussions off of CT scanning. I did also discussed with her that her continued smoking could affect long-term outcomes. - CTA NECK W WO CONTRAST; Future    2. Atherosclerosis of native arteries of extremities with intermittent claudication, bilateral legs (HCC)  Stable asymptomatic peripheral vascular disease. We will continue to monitor symptoms. Stressed the importance of increased exercise        Thank you for allowing me to participate in the care of this individual.  Please do not hesitate to contact me with any questions. Steve Ritchie M.D., FACS.  12/6/2022  10:24 AM

## 2022-12-06 NOTE — LETTER
[facility]) - Vascular and Endovascular Surgeons  [address] [address]  Phone: [phone]  Fax: [phone]           Og Michaud MD      December 6, 2022     Patient: Kalyn Reyes   MR Number: [id number]   YOB: 1950   Date of Visit: 12/6/2022       Dear Dr. Davis Bars: Thank you for referring Kelle Franco to me for evaluation/treatment. Below are the relevant portions of my assessment and plan of care. If you have questions, please do not hesitate to call me. I look forward to following Kelle Jimbo along with you.     Sincerely,        Og Michaud MD     providers:  Vero Lynn MD  [address] [place] [address] [id number] [place] [address]

## 2022-12-20 ENCOUNTER — HOSPITAL ENCOUNTER (OUTPATIENT)
Dept: CT IMAGING | Age: 72
Discharge: HOME | End: 2022-12-20
Payer: COMMERCIAL

## 2022-12-20 ENCOUNTER — HOSPITAL ENCOUNTER (OUTPATIENT)
Dept: ONCOLOGY | Age: 72
Setting detail: INFUSION SERIES
Discharge: HOME | End: 2022-12-20
Payer: COMMERCIAL

## 2022-12-20 VITALS
SYSTOLIC BLOOD PRESSURE: 132 MMHG | WEIGHT: 166.5 LBS | HEART RATE: 62 BPM | TEMPERATURE: 97.4 F | BODY MASS INDEX: 30.95 KG/M2 | RESPIRATION RATE: 16 BRPM | DIASTOLIC BLOOD PRESSURE: 58 MMHG

## 2022-12-20 DIAGNOSIS — I65.23 CAROTID ATHEROSCLEROSIS, BILATERAL: ICD-10-CM

## 2022-12-20 PROCEDURE — 2500000003 HC RX 250 WO HCPCS: Performed by: SURGERY

## 2022-12-20 PROCEDURE — 82565 ASSAY OF CREATININE: CPT

## 2022-12-20 PROCEDURE — 36415 COLL VENOUS BLD VENIPUNCTURE: CPT

## 2022-12-20 PROCEDURE — 96361 HYDRATE IV INFUSION ADD-ON: CPT

## 2022-12-20 PROCEDURE — 99211 OFF/OP EST MAY X REQ PHY/QHP: CPT

## 2022-12-20 PROCEDURE — 6360000004 HC RX CONTRAST MEDICATION: Performed by: SURGERY

## 2022-12-20 PROCEDURE — 70498 CT ANGIOGRAPHY NECK: CPT

## 2022-12-20 PROCEDURE — 2580000003 HC RX 258: Performed by: SURGERY

## 2022-12-20 PROCEDURE — 96360 HYDRATION IV INFUSION INIT: CPT

## 2022-12-20 PROCEDURE — 84520 ASSAY OF UREA NITROGEN: CPT

## 2022-12-20 RX ADMIN — IOPAMIDOL 75 ML: 755 INJECTION, SOLUTION INTRAVENOUS at 10:42

## 2022-12-20 RX ADMIN — SODIUM BICARBONATE: 84 INJECTION, SOLUTION INTRAVENOUS at 10:57

## 2022-12-20 RX ADMIN — SODIUM BICARBONATE: 84 INJECTION, SOLUTION INTRAVENOUS at 09:23

## 2022-12-20 NOTE — PROGRESS NOTES
ALong RN spoke with Dr Noemy Doss office to confirm orders of NaHCO3 to infuse at 75cc/hr for 5 hours post CT SCAN. Orders noted and pt receptive to plan of care.

## 2022-12-20 NOTE — PROGRESS NOTES
Patient ambulatory to infusion center for sodium bicarb hydration. Patient completed 1 hour before CT scan and 5 hours after test. Patient tolerated hydration well. AVS reviewed with patient. Patient will f/u with physician.

## 2022-12-21 ENCOUNTER — TELEPHONE (OUTPATIENT)
Dept: VASCULAR SURGERY | Age: 72
End: 2022-12-21

## 2022-12-21 LAB
BUN BLDV-MCNC: 27 MG/DL (ref 7–20)
CREAT SERPL-MCNC: 1.3 MG/DL (ref 0.6–1.2)
GFR SERPL CREATININE-BSD FRML MDRD: 43 ML/MIN/{1.73_M2}

## 2022-12-21 NOTE — TELEPHONE ENCOUNTER
Left message with results of CTA neck which does report severe left ICA stenosis and previous carotid duplex reported greater than 70% stenosis of the left ICA. Will have Dr. Carl Alcaraz review CTA imaging and will contact patient with further surgical plan.     Electronically signed by JIM Cardenas CNP on 12/21/2022 at 10:54 AM

## 2022-12-26 ENCOUNTER — NURSE ONLY (OUTPATIENT)
Dept: CARDIOLOGY CLINIC | Age: 72
End: 2022-12-26
Payer: COMMERCIAL

## 2022-12-26 DIAGNOSIS — Z95.0 PACEMAKER: ICD-10-CM

## 2022-12-26 DIAGNOSIS — R55 SYNCOPE AND COLLAPSE: ICD-10-CM

## 2022-12-26 DIAGNOSIS — I49.5 SINUS NODE DYSFUNCTION (HCC): ICD-10-CM

## 2022-12-26 DIAGNOSIS — R00.1 SYMPTOMATIC BRADYCARDIA: ICD-10-CM

## 2022-12-26 PROCEDURE — 93296 REM INTERROG EVL PM/IDS: CPT | Performed by: INTERNAL MEDICINE

## 2022-12-26 PROCEDURE — 93294 REM INTERROG EVL PM/LDLS PM: CPT | Performed by: INTERNAL MEDICINE

## 2022-12-27 NOTE — PROGRESS NOTES
We received remote transmission from patient's monitor at home. Transmission shows normal sensing and pacing function. Noted NSVT and AT. Pt is not on a beta blocker. Last echo 2022 showed EF of 55-60%. EP physician will review. See interrogation under cardiology tab in the [address] [place] [address] [address] field for more details.  < 0.1% (MVP On)  AP 12.8%    End of 91-day monitoring period 12-26-22.

## 2022-12-28 RX ORDER — ASPIRIN 81 MG/1
81 TABLET ORAL DAILY
COMMUNITY

## 2022-12-28 RX ORDER — MAGNESIUM 200 MG
TABLET ORAL
COMMUNITY

## 2022-12-28 NOTE — PROGRESS NOTES
Name_______________________________________Printed:____________________  Date and time of surgery___1/11/2022_____0730________________Arrival Time:__0600  main______________   1. The instructions given regarding when and if a patient needs to stop oral intake prior to surgery varies. Follow the specific instructions you were given                  _x__Nothing to eat or to drink after Midnight the night before.                   ____Carbo loading or ERAS instructions will be given to select patients-if you have been given those instructions -please do the following                           The evening before your surgery after dinner before midnight drink 40 ounces of gatorade. If you are diabetic use sugar free. The morning of surgery drink 40 ounces of water. This needs to be finished 3 hours prior to your surgery start time. 2. Take the following pills with a small sip of water on the morning of surgery__none_________________________________________________                  Do not take blood pressure medications ending in pril or sartan the dixie prior to surgery or the morning of surgery. Dr Katja Hernandez patient are not to take any medications the AM of surgery. 3. Aspirin, Ibuprofen, Advil, Naproxen, Vitamin E and other Anti-inflammatory products and supplements should be stopped for 5 -7days before surgery or as directed by your physician. 4. Check with your Doctor regarding stopping Plavix, Coumadin,Eliquis, Lovenox,Effient,Pradaxa,Xarelto, Fragmin or other blood thinners and follow their instructions. 5. Do not smoke, and do not drink any alcoholic beverages 24 hours prior to surgery. This includes NA Beer. Refrain from the usage of any recreational drugs. 6. You may brush your teeth and gargle the morning of surgery. DO NOT SWALLOW WATER   7. You MUST make arrangements for a responsible adult to stay on site while you are here and take you home after your surgery.  You will not be allowed to leave alone or drive yourself home. It is strongly suggested someone stay with you the first 24 hrs. Your surgery will be cancelled if you do not have a ride home. 8. A parent/legal guardian must accompany a child scheduled for surgery and plan to stay at the hospital until the child is discharged. Please do not bring other children with you. 9. Please wear simple, loose fitting clothing to the hospital.  Quincy Lynch not bring valuables (money, credit cards, checkbooks, etc.) Do not wear any makeup (including no eye makeup) or nail polish on your fingers or toes. 10. DO NOT wear any jewelry or piercings on day of surgery. All body piercing jewelry must be removed. 11. If you have __x_dentures, they will be removed before going to the OR; we will provide you a container. If you wear ___contact lenses or __x_glasses, they will be removed; please bring a case for them. 12. Please see your family doctor/pediatrician for a history & physical and/or concerning medications. Bring any test results/reports from your physician's office. PCP__________________Phone___________H&P Appt. Date________             13 If you  have a Living Will and Durable Power of  for Healthcare, please bring in a copy. 15. Notify your Surgeon if you develop any illness between now and surgery  time, cough, cold, fever, sore throat, nausea, vomiting, etc.  Please notify your surgeon if you experience dizziness, shortness of breath or blurred vision between now & the time of your surgery             15. DO NOT shave your operative site 96 hours prior to surgery. For face & neck surgery, men may use an electric razor 48 hours prior to surgery. 16. Shower the night before or morning of surgery using an antibacterial soap or as you have been instructed. 17. To provide excellent care visitors will be limited to one in the room at any given time.              18.  Please bring picture ID and insurance card. 19.  Visit our web site for additional information:  [email or web address]              20.During flu season no children under the age of 15 are permitted in the hospital for the safety of all patients. 21. If you take a long acting insulin in the evening only  take half of your usual  dose the night  before your procedure              22. If you use a c-pap please bring DOS if staying overnight,             23.For your convenience [facility] has a pharmacy on site to fill your prescriptions. 24. If you use oxygen and have a portable tank please bring it  with you the DOS             25. Bring a complete list of all your medications with name and dose include any supplements. 26. Other__________________________________________   *Please call pre admission testing if you any further questions   Jarod Angel   [address] [place] [address]. [facility]  [phone]   [address] [place] [address]       VISITOR POLICY(subject to change)    Current policy is 2 visitors per patient. No children. Mask is  at the discretion of the facility. Visiting hours are 8a-8p. Overnight visitors will be at the discretion of the nurse. All policies subject to change. All above information reviewed with patient in person or by phone. Patient verbalizes understanding. All questions and concerns addressed.                                                                                                  Patient/Rep_patient___________________                                                                                                                                    PRE OP INSTRUCTIONS

## 2022-12-29 ENCOUNTER — PREP FOR PROCEDURE (OUTPATIENT)
Dept: VASCULAR SURGERY | Age: 72
End: 2022-12-29

## 2022-12-29 DIAGNOSIS — I65.22 ATHEROSCLEROSIS OF LEFT CAROTID ARTERY: ICD-10-CM

## 2022-12-29 DIAGNOSIS — Z01.818 PRE-OP TESTING: Primary | ICD-10-CM

## 2022-12-29 RX ORDER — SODIUM CHLORIDE 9 MG/ML
INJECTION, SOLUTION INTRAVENOUS PRN
Status: CANCELLED | OUTPATIENT
Start: 2022-12-29

## 2022-12-29 RX ORDER — SODIUM CHLORIDE 0.9 % (FLUSH) 0.9 %
5-40 SYRINGE (ML) INJECTION EVERY 12 HOURS SCHEDULED
Status: CANCELLED | OUTPATIENT
Start: 2022-12-29

## 2022-12-29 RX ORDER — SODIUM CHLORIDE 0.9 % (FLUSH) 0.9 %
5-40 SYRINGE (ML) INJECTION PRN
Status: CANCELLED | OUTPATIENT
Start: 2022-12-29

## 2023-01-03 RX ORDER — SODIUM CHLORIDE 9 MG/ML
INJECTION, SOLUTION INTRAVENOUS PRN
Status: CANCELLED | OUTPATIENT
Start: 2023-01-03

## 2023-01-03 RX ORDER — SODIUM CHLORIDE 0.9 % (FLUSH) 0.9 %
5-40 SYRINGE (ML) INJECTION PRN
Status: CANCELLED | OUTPATIENT
Start: 2023-01-03

## 2023-01-03 RX ORDER — SODIUM CHLORIDE 9 MG/ML
INJECTION, SOLUTION INTRAVENOUS CONTINUOUS
Status: CANCELLED | OUTPATIENT
Start: 2023-01-03

## 2023-01-03 RX ORDER — SODIUM CHLORIDE 0.9 % (FLUSH) 0.9 %
5-40 SYRINGE (ML) INJECTION EVERY 12 HOURS SCHEDULED
Status: CANCELLED | OUTPATIENT
Start: 2023-01-03

## 2023-01-09 ENCOUNTER — HOSPITAL ENCOUNTER (OUTPATIENT)
Age: 73
Discharge: HOME | DRG: 038 | End: 2023-01-09
Payer: COMMERCIAL

## 2023-01-09 DIAGNOSIS — Z01.818 PRE-OP TESTING: ICD-10-CM

## 2023-01-09 DIAGNOSIS — I65.22 ATHEROSCLEROSIS OF LEFT CAROTID ARTERY: ICD-10-CM

## 2023-01-09 LAB
A/G RATIO: 1.1 (ref 1.1–2.2)
ABO/RH: NORMAL
ALBUMIN SERPL-MCNC: 3.8 G/DL (ref 3.4–5)
ALP BLD-CCNC: 86 U/L (ref 40–129)
ALT SERPL-CCNC: 9 U/L (ref 10–40)
ANION GAP SERPL CALCULATED.3IONS-SCNC: 12 MMOL/L (ref 3–16)
ANTIBODY SCREEN: NORMAL
APTT: 42.1 SEC (ref 23–34.3)
AST SERPL-CCNC: 14 U/L (ref 15–37)
BACTERIA: NORMAL /HPF
BASOPHILS ABSOLUTE: 0.1 K/UL (ref 0–0.2)
BASOPHILS RELATIVE PERCENT: 1.2 %
BILIRUB SERPL-MCNC: 0.4 MG/DL (ref 0–1)
BILIRUBIN URINE: NEGATIVE
BLOOD, URINE: NEGATIVE
BUN BLDV-MCNC: 26 MG/DL (ref 7–20)
CALCIUM SERPL-MCNC: 9.9 MG/DL (ref 8.3–10.6)
CHLORIDE BLD-SCNC: 104 MMOL/L (ref 99–110)
CLARITY: CLEAR
CO2: 24 MMOL/L (ref 21–32)
COLOR: YELLOW
CREAT SERPL-MCNC: 1.5 MG/DL (ref 0.6–1.2)
EKG ATRIAL RATE: 87 BPM
EKG DIAGNOSIS: NORMAL
EKG P AXIS: 71 DEGREES
EKG P-R INTERVAL: 130 MS
EKG Q-T INTERVAL: 376 MS
EKG QRS DURATION: 112 MS
EKG QTC CALCULATION (BAZETT): 452 MS
EKG R AXIS: 13 DEGREES
EKG T AXIS: 50 DEGREES
EKG VENTRICULAR RATE: 87 BPM
EOSINOPHILS ABSOLUTE: 0.3 K/UL (ref 0–0.6)
EOSINOPHILS RELATIVE PERCENT: 4 %
EPITHELIAL CELLS, UA: 3 /HPF (ref 0–5)
GFR SERPL CREATININE-BSD FRML MDRD: 36 ML/MIN/{1.73_M2}
GLUCOSE BLD-MCNC: 92 MG/DL (ref 70–99)
GLUCOSE URINE: NEGATIVE MG/DL
HCT VFR BLD CALC: 42.1 % (ref 36–48)
HEMOGLOBIN: 13.5 G/DL (ref 12–16)
HYALINE CASTS: 1 /LPF (ref 0–8)
INR BLD: 1.05 (ref 0.87–1.14)
KETONES, URINE: NEGATIVE MG/DL
LEUKOCYTE ESTERASE, URINE: NEGATIVE
LYMPHOCYTES ABSOLUTE: 1.5 K/UL (ref 1–5.1)
LYMPHOCYTES RELATIVE PERCENT: 17.5 %
MCH RBC QN AUTO: 29.7 PG (ref 26–34)
MCHC RBC AUTO-ENTMCNC: 32.2 G/DL (ref 31–36)
MCV RBC AUTO: 92.5 FL (ref 80–100)
MICROSCOPIC EXAMINATION: YES
MONOCYTES ABSOLUTE: 0.6 K/UL (ref 0–1.3)
MONOCYTES RELATIVE PERCENT: 7.3 %
NEUTROPHILS ABSOLUTE: 5.9 K/UL (ref 1.7–7.7)
NEUTROPHILS RELATIVE PERCENT: 70 %
NITRITE, URINE: NEGATIVE
PDW BLD-RTO: 15.2 % (ref 12.4–15.4)
PH UA: 6 (ref 5–8)
PLATELET # BLD: 208 K/UL (ref 135–450)
PMV BLD AUTO: 10 FL (ref 5–10.5)
POTASSIUM SERPL-SCNC: 4.2 MMOL/L (ref 3.5–5.1)
PROTEIN UA: ABNORMAL MG/DL
PROTHROMBIN TIME: 13.7 SEC (ref 11.7–14.5)
RBC # BLD: 4.55 M/UL (ref 4–5.2)
RBC UA: 1 /HPF (ref 0–4)
SODIUM BLD-SCNC: 140 MMOL/L (ref 136–145)
SPECIFIC GRAVITY UA: 1.01 (ref 1–1.03)
TOTAL PROTEIN: 7.4 G/DL (ref 6.4–8.2)
URINE TYPE: ABNORMAL
UROBILINOGEN, URINE: 0.2 E.U./DL
WBC # BLD: 8.5 K/UL (ref 4–11)
WBC UA: 2 /HPF (ref 0–5)

## 2023-01-09 PROCEDURE — 80053 COMPREHEN METABOLIC PANEL: CPT

## 2023-01-09 PROCEDURE — 85025 COMPLETE CBC W/AUTO DIFF WBC: CPT

## 2023-01-09 PROCEDURE — 86900 BLOOD TYPING SEROLOGIC ABO: CPT

## 2023-01-09 PROCEDURE — 85610 PROTHROMBIN TIME: CPT

## 2023-01-09 PROCEDURE — 86850 RBC ANTIBODY SCREEN: CPT

## 2023-01-09 PROCEDURE — 85730 THROMBOPLASTIN TIME PARTIAL: CPT

## 2023-01-09 PROCEDURE — 86901 BLOOD TYPING SEROLOGIC RH(D): CPT

## 2023-01-09 PROCEDURE — 93010 ELECTROCARDIOGRAM REPORT: CPT | Performed by: INTERNAL MEDICINE

## 2023-01-09 PROCEDURE — 93005 ELECTROCARDIOGRAM TRACING: CPT

## 2023-01-09 PROCEDURE — 36415 COLL VENOUS BLD VENIPUNCTURE: CPT

## 2023-01-09 PROCEDURE — 81001 URINALYSIS AUTO W/SCOPE: CPT

## 2023-01-09 NOTE — PROGRESS NOTES
Per Yasmine Mancuso @ office. Left message on Son's vm re: pre op labs ordered prior to surgery. If not done before, do labs DOS, because patient has critical obstruction and needs surgery.

## 2023-01-10 NOTE — PROGRESS NOTES
Reviewed elevated appt 42.1 with Dr. Gonzalo Piña. He ordered appt, pt, INR day of surgery. Notifed Jennifer Kelley at Dr. Rajani Viera office via [facility] message that the patient's labs came back with trace of protein in urinalysis and appt of 42.1, BUN 26, creatinine 1.5. Dr. Terri Boyle wants to repeat appt, pt, INR day of surgery.

## 2023-01-11 ENCOUNTER — ANESTHESIA EVENT (OUTPATIENT)
Dept: OPERATING ROOM | Age: 73
DRG: 038 | End: 2023-01-11
Payer: COMMERCIAL

## 2023-01-11 ENCOUNTER — HOSPITAL ENCOUNTER (INPATIENT)
Age: 73
LOS: 1 days | Discharge: HOME | DRG: 038 | End: 2023-01-12
Attending: SURGERY | Admitting: SURGERY
Payer: COMMERCIAL

## 2023-01-11 ENCOUNTER — ANESTHESIA (OUTPATIENT)
Dept: OPERATING ROOM | Age: 73
DRG: 038 | End: 2023-01-11
Payer: COMMERCIAL

## 2023-01-11 DIAGNOSIS — I65.22 LEFT CAROTID STENOSIS: ICD-10-CM

## 2023-01-11 DIAGNOSIS — I65.22 OCCLUSION OF LEFT CAROTID ARTERY: ICD-10-CM

## 2023-01-11 LAB
ABO/RH: NORMAL
ANTIBODY SCREEN: NORMAL
APTT: 34.8 SEC (ref 23–34.3)
INR BLD: 1 (ref 0.87–1.14)
PROTHROMBIN TIME: 13.1 SEC (ref 11.7–14.5)

## 2023-01-11 PROCEDURE — 86901 BLOOD TYPING SEROLOGIC RH(D): CPT

## 2023-01-11 PROCEDURE — 6370000000 HC RX 637 (ALT 250 FOR IP): Performed by: NURSE PRACTITIONER

## 2023-01-11 PROCEDURE — 2580000003 HC RX 258: Performed by: NURSE ANESTHETIST, CERTIFIED REGISTERED

## 2023-01-11 PROCEDURE — 2500000003 HC RX 250 WO HCPCS: Performed by: NURSE ANESTHETIST, CERTIFIED REGISTERED

## 2023-01-11 PROCEDURE — 7100000000 HC PACU RECOVERY - FIRST 15 MIN: Performed by: SURGERY

## 2023-01-11 PROCEDURE — 6370000000 HC RX 637 (ALT 250 FOR IP): Performed by: SURGERY

## 2023-01-11 PROCEDURE — 94150 VITAL CAPACITY TEST: CPT

## 2023-01-11 PROCEDURE — 6360000002 HC RX W HCPCS: Performed by: SURGERY

## 2023-01-11 PROCEDURE — 3700000000 HC ANESTHESIA ATTENDED CARE: Performed by: SURGERY

## 2023-01-11 PROCEDURE — 03UJ07Z SUPPLEMENT LEFT COMMON CAROTID ARTERY WITH AUTOLOGOUS TISSUE SUBSTITUTE, OPEN APPROACH: ICD-10-PCS | Performed by: SURGERY

## 2023-01-11 PROCEDURE — 6360000002 HC RX W HCPCS: Performed by: ANESTHESIOLOGY

## 2023-01-11 PROCEDURE — 2709999900 HC NON-CHARGEABLE SUPPLY: Performed by: SURGERY

## 2023-01-11 PROCEDURE — G0378 HOSPITAL OBSERVATION PER HR: HCPCS

## 2023-01-11 PROCEDURE — 36415 COLL VENOUS BLD VENIPUNCTURE: CPT

## 2023-01-11 PROCEDURE — 6360000002 HC RX W HCPCS: Performed by: NURSE PRACTITIONER

## 2023-01-11 PROCEDURE — 86850 RBC ANTIBODY SCREEN: CPT

## 2023-01-11 PROCEDURE — 85730 THROMBOPLASTIN TIME PARTIAL: CPT

## 2023-01-11 PROCEDURE — 05HA33Z INSERTION OF INFUSION DEVICE INTO LEFT BRACHIAL VEIN, PERCUTANEOUS APPROACH: ICD-10-PCS | Performed by: SURGERY

## 2023-01-11 PROCEDURE — 03CJ0ZZ EXTIRPATION OF MATTER FROM LEFT COMMON CAROTID ARTERY, OPEN APPROACH: ICD-10-PCS | Performed by: SURGERY

## 2023-01-11 PROCEDURE — 85610 PROTHROMBIN TIME: CPT

## 2023-01-11 PROCEDURE — 2580000003 HC RX 258: Performed by: SURGERY

## 2023-01-11 PROCEDURE — C1768 GRAFT, VASCULAR: HCPCS | Performed by: SURGERY

## 2023-01-11 PROCEDURE — 6360000002 HC RX W HCPCS: Performed by: NURSE ANESTHETIST, CERTIFIED REGISTERED

## 2023-01-11 PROCEDURE — A4217 STERILE WATER/SALINE, 500 ML: HCPCS | Performed by: SURGERY

## 2023-01-11 PROCEDURE — 86900 BLOOD TYPING SEROLOGIC ABO: CPT

## 2023-01-11 PROCEDURE — 2000000000 HC ICU R&B

## 2023-01-11 PROCEDURE — 7100000001 HC PACU RECOVERY - ADDTL 15 MIN: Performed by: SURGERY

## 2023-01-11 PROCEDURE — 3700000001 HC ADD 15 MINUTES (ANESTHESIA): Performed by: SURGERY

## 2023-01-11 PROCEDURE — 88304 TISSUE EXAM BY PATHOLOGIST: CPT

## 2023-01-11 PROCEDURE — 3600000004 HC SURGERY LEVEL 4 BASE: Performed by: SURGERY

## 2023-01-11 PROCEDURE — 2720000010 HC SURG SUPPLY STERILE: Performed by: SURGERY

## 2023-01-11 PROCEDURE — 35301 RECHANNELING OF ARTERY: CPT | Performed by: SURGERY

## 2023-01-11 PROCEDURE — 3600000014 HC SURGERY LEVEL 4 ADDTL 15MIN: Performed by: SURGERY

## 2023-01-11 PROCEDURE — APPNB30 APP NON BILLABLE TIME 0-30 MINS: Performed by: NURSE PRACTITIONER

## 2023-01-11 PROCEDURE — 2580000003 HC RX 258: Performed by: NURSE PRACTITIONER

## 2023-01-11 DEVICE — XENOSURE BIOLOGIC PATCH, 0.8CM X 8CM, EIFU
Type: IMPLANTABLE DEVICE | Site: CAROTID | Status: FUNCTIONAL
Brand: XENOSURE BIOLOGIC PATCH

## 2023-01-11 RX ORDER — PROTAMINE SULFATE 10 MG/ML
INJECTION, SOLUTION INTRAVENOUS PRN
Status: DISCONTINUED | OUTPATIENT
Start: 2023-01-11 | End: 2023-01-11 | Stop reason: SDUPTHER

## 2023-01-11 RX ORDER — SODIUM CHLORIDE 9 MG/ML
INJECTION, SOLUTION INTRAVENOUS PRN
Status: DISCONTINUED | OUTPATIENT
Start: 2023-01-11 | End: 2023-01-12 | Stop reason: HOSPADM

## 2023-01-11 RX ORDER — SODIUM CHLORIDE 0.9 % (FLUSH) 0.9 %
5-40 SYRINGE (ML) INJECTION PRN
Status: DISCONTINUED | OUTPATIENT
Start: 2023-01-11 | End: 2023-01-11 | Stop reason: HOSPADM

## 2023-01-11 RX ORDER — SODIUM CHLORIDE 0.9 % (FLUSH) 0.9 %
5-40 SYRINGE (ML) INJECTION EVERY 12 HOURS SCHEDULED
Status: DISCONTINUED | OUTPATIENT
Start: 2023-01-11 | End: 2023-01-11 | Stop reason: HOSPADM

## 2023-01-11 RX ORDER — HYDROMORPHONE HCL 110MG/55ML
0.5 PATIENT CONTROLLED ANALGESIA SYRINGE INTRAVENOUS EVERY 5 MIN PRN
Status: DISCONTINUED | OUTPATIENT
Start: 2023-01-11 | End: 2023-01-11 | Stop reason: HOSPADM

## 2023-01-11 RX ORDER — DICYCLOMINE HYDROCHLORIDE 10 MG/1
10 CAPSULE ORAL DAILY
Status: DISCONTINUED | OUTPATIENT
Start: 2023-01-12 | End: 2023-01-12 | Stop reason: HOSPADM

## 2023-01-11 RX ORDER — NITROGLYCERIN 20 MG/100ML
INJECTION INTRAVENOUS PRN
Status: DISCONTINUED | OUTPATIENT
Start: 2023-01-11 | End: 2023-01-11 | Stop reason: SDUPTHER

## 2023-01-11 RX ORDER — DIPHENHYDRAMINE HYDROCHLORIDE 50 MG/ML
12.5 INJECTION INTRAMUSCULAR; INTRAVENOUS
Status: DISCONTINUED | OUTPATIENT
Start: 2023-01-11 | End: 2023-01-11 | Stop reason: HOSPADM

## 2023-01-11 RX ORDER — ONDANSETRON 4 MG/1
4 TABLET, ORALLY DISINTEGRATING ORAL EVERY 8 HOURS PRN
Status: DISCONTINUED | OUTPATIENT
Start: 2023-01-11 | End: 2023-01-12 | Stop reason: HOSPADM

## 2023-01-11 RX ORDER — ONDANSETRON 2 MG/ML
0.1 INJECTION INTRAMUSCULAR; INTRAVENOUS
Status: DISCONTINUED | OUTPATIENT
Start: 2023-01-11 | End: 2023-01-11 | Stop reason: HOSPADM

## 2023-01-11 RX ORDER — SODIUM CHLORIDE 9 MG/ML
INJECTION, SOLUTION INTRAVENOUS PRN
Status: DISCONTINUED | OUTPATIENT
Start: 2023-01-11 | End: 2023-01-11 | Stop reason: HOSPADM

## 2023-01-11 RX ORDER — OXYCODONE HYDROCHLORIDE 5 MG/1
5 TABLET ORAL EVERY 4 HOURS PRN
Status: DISCONTINUED | OUTPATIENT
Start: 2023-01-11 | End: 2023-01-12 | Stop reason: HOSPADM

## 2023-01-11 RX ORDER — MORPHINE SULFATE 2 MG/ML
2 INJECTION, SOLUTION INTRAMUSCULAR; INTRAVENOUS
Status: DISCONTINUED | OUTPATIENT
Start: 2023-01-11 | End: 2023-01-12 | Stop reason: HOSPADM

## 2023-01-11 RX ORDER — LIDOCAINE HYDROCHLORIDE 20 MG/ML
INJECTION, SOLUTION EPIDURAL; INFILTRATION; INTRACAUDAL; PERINEURAL PRN
Status: DISCONTINUED | OUTPATIENT
Start: 2023-01-11 | End: 2023-01-11 | Stop reason: SDUPTHER

## 2023-01-11 RX ORDER — MEPERIDINE HYDROCHLORIDE 25 MG/ML
12.5 INJECTION INTRAMUSCULAR; INTRAVENOUS; SUBCUTANEOUS EVERY 5 MIN PRN
Status: DISCONTINUED | OUTPATIENT
Start: 2023-01-11 | End: 2023-01-11 | Stop reason: HOSPADM

## 2023-01-11 RX ORDER — SODIUM CHLORIDE 9 MG/ML
INJECTION, SOLUTION INTRAVENOUS CONTINUOUS
Status: DISCONTINUED | OUTPATIENT
Start: 2023-01-11 | End: 2023-01-11 | Stop reason: HOSPADM

## 2023-01-11 RX ORDER — ASPIRIN 81 MG/1
81 TABLET ORAL DAILY
Status: DISCONTINUED | OUTPATIENT
Start: 2023-01-11 | End: 2023-01-12 | Stop reason: HOSPADM

## 2023-01-11 RX ORDER — PROPOFOL 10 MG/ML
INJECTION, EMULSION INTRAVENOUS PRN
Status: DISCONTINUED | OUTPATIENT
Start: 2023-01-11 | End: 2023-01-11 | Stop reason: SDUPTHER

## 2023-01-11 RX ORDER — CELECOXIB 200 MG/1
200 CAPSULE ORAL DAILY
Status: DISCONTINUED | OUTPATIENT
Start: 2023-01-11 | End: 2023-01-12 | Stop reason: HOSPADM

## 2023-01-11 RX ORDER — SODIUM CHLORIDE 0.9 % (FLUSH) 0.9 %
5-40 SYRINGE (ML) INJECTION PRN
Status: DISCONTINUED | OUTPATIENT
Start: 2023-01-11 | End: 2023-01-12 | Stop reason: HOSPADM

## 2023-01-11 RX ORDER — HEPARIN SODIUM 1000 [USP'U]/ML
INJECTION, SOLUTION INTRAVENOUS; SUBCUTANEOUS PRN
Status: DISCONTINUED | OUTPATIENT
Start: 2023-01-11 | End: 2023-01-11 | Stop reason: SDUPTHER

## 2023-01-11 RX ORDER — LOSARTAN POTASSIUM 100 MG/1
100 TABLET ORAL DAILY
Status: DISCONTINUED | OUTPATIENT
Start: 2023-01-11 | End: 2023-01-12 | Stop reason: HOSPADM

## 2023-01-11 RX ORDER — SODIUM CHLORIDE 0.9 % (FLUSH) 0.9 %
5-40 SYRINGE (ML) INJECTION EVERY 12 HOURS SCHEDULED
Status: DISCONTINUED | OUTPATIENT
Start: 2023-01-11 | End: 2023-01-12 | Stop reason: HOSPADM

## 2023-01-11 RX ORDER — PHENYLEPHRINE HYDROCHLORIDE 10 MG/ML
INJECTION INTRAVENOUS PRN
Status: DISCONTINUED | OUTPATIENT
Start: 2023-01-11 | End: 2023-01-11 | Stop reason: SDUPTHER

## 2023-01-11 RX ORDER — ONDANSETRON 2 MG/ML
4 INJECTION INTRAMUSCULAR; INTRAVENOUS
Status: DISCONTINUED | OUTPATIENT
Start: 2023-01-11 | End: 2023-01-11

## 2023-01-11 RX ORDER — HYDROCHLOROTHIAZIDE 25 MG/1
25 TABLET ORAL DAILY
Status: DISCONTINUED | OUTPATIENT
Start: 2023-01-11 | End: 2023-01-12 | Stop reason: HOSPADM

## 2023-01-11 RX ORDER — LABETALOL HYDROCHLORIDE 5 MG/ML
10 INJECTION, SOLUTION INTRAVENOUS
Status: DISCONTINUED | OUTPATIENT
Start: 2023-01-11 | End: 2023-01-12 | Stop reason: HOSPADM

## 2023-01-11 RX ORDER — HYDRALAZINE HYDROCHLORIDE 20 MG/ML
10 INJECTION INTRAMUSCULAR; INTRAVENOUS
Status: DISCONTINUED | OUTPATIENT
Start: 2023-01-11 | End: 2023-01-12 | Stop reason: HOSPADM

## 2023-01-11 RX ORDER — ONDANSETRON 2 MG/ML
INJECTION INTRAMUSCULAR; INTRAVENOUS PRN
Status: DISCONTINUED | OUTPATIENT
Start: 2023-01-11 | End: 2023-01-11 | Stop reason: SDUPTHER

## 2023-01-11 RX ORDER — LOSARTAN POTASSIUM AND HYDROCHLOROTHIAZIDE 25; 100 MG/1; MG/1
1 TABLET ORAL DAILY
Status: DISCONTINUED | OUTPATIENT
Start: 2023-01-12 | End: 2023-01-11 | Stop reason: CLARIF

## 2023-01-11 RX ORDER — DEXAMETHASONE SODIUM PHOSPHATE 4 MG/ML
INJECTION, SOLUTION INTRA-ARTICULAR; INTRALESIONAL; INTRAMUSCULAR; INTRAVENOUS; SOFT TISSUE PRN
Status: DISCONTINUED | OUTPATIENT
Start: 2023-01-11 | End: 2023-01-11 | Stop reason: SDUPTHER

## 2023-01-11 RX ORDER — SODIUM CHLORIDE, SODIUM LACTATE, POTASSIUM CHLORIDE, CALCIUM CHLORIDE 600; 310; 30; 20 MG/100ML; MG/100ML; MG/100ML; MG/100ML
INJECTION, SOLUTION INTRAVENOUS CONTINUOUS
Status: DISCONTINUED | OUTPATIENT
Start: 2023-01-11 | End: 2023-01-12

## 2023-01-11 RX ORDER — ATORVASTATIN CALCIUM 10 MG/1
10 TABLET, FILM COATED ORAL NIGHTLY
Status: DISCONTINUED | OUTPATIENT
Start: 2023-01-11 | End: 2023-01-12 | Stop reason: HOSPADM

## 2023-01-11 RX ORDER — FENTANYL CITRATE 50 UG/ML
INJECTION, SOLUTION INTRAMUSCULAR; INTRAVENOUS PRN
Status: DISCONTINUED | OUTPATIENT
Start: 2023-01-11 | End: 2023-01-11 | Stop reason: SDUPTHER

## 2023-01-11 RX ORDER — ONDANSETRON 2 MG/ML
4 INJECTION INTRAMUSCULAR; INTRAVENOUS EVERY 6 HOURS PRN
Status: DISCONTINUED | OUTPATIENT
Start: 2023-01-11 | End: 2023-01-12 | Stop reason: HOSPADM

## 2023-01-11 RX ORDER — ACETAMINOPHEN 325 MG/1
650 TABLET ORAL EVERY 4 HOURS PRN
Status: DISCONTINUED | OUTPATIENT
Start: 2023-01-11 | End: 2023-01-12 | Stop reason: HOSPADM

## 2023-01-11 RX ORDER — HYDROMORPHONE HCL 110MG/55ML
0.25 PATIENT CONTROLLED ANALGESIA SYRINGE INTRAVENOUS EVERY 5 MIN PRN
Status: DISCONTINUED | OUTPATIENT
Start: 2023-01-11 | End: 2023-01-11 | Stop reason: HOSPADM

## 2023-01-11 RX ORDER — ROCURONIUM BROMIDE 10 MG/ML
INJECTION, SOLUTION INTRAVENOUS PRN
Status: DISCONTINUED | OUTPATIENT
Start: 2023-01-11 | End: 2023-01-11 | Stop reason: SDUPTHER

## 2023-01-11 RX ADMIN — PROTAMINE SULFATE 30 MG: 10 INJECTION, SOLUTION INTRAVENOUS at 08:36

## 2023-01-11 RX ADMIN — LOSARTAN POTASSIUM 100 MG: 100 TABLET, FILM COATED ORAL at 16:22

## 2023-01-11 RX ADMIN — LIDOCAINE HYDROCHLORIDE 100 MG: 20 INJECTION, SOLUTION EPIDURAL; INFILTRATION; INTRACAUDAL; PERINEURAL at 07:43

## 2023-01-11 RX ADMIN — CELECOXIB 200 MG: 200 CAPSULE ORAL at 16:22

## 2023-01-11 RX ADMIN — ASPIRIN 81 MG: 81 TABLET, COATED ORAL at 16:22

## 2023-01-11 RX ADMIN — SODIUM CHLORIDE, POTASSIUM CHLORIDE, SODIUM LACTATE AND CALCIUM CHLORIDE: 600; 310; 30; 20 INJECTION, SOLUTION INTRAVENOUS at 15:37

## 2023-01-11 RX ADMIN — FENTANYL CITRATE 50 MCG: 50 INJECTION, SOLUTION INTRAMUSCULAR; INTRAVENOUS at 07:38

## 2023-01-11 RX ADMIN — SODIUM CHLORIDE: 9 INJECTION, SOLUTION INTRAVENOUS at 07:21

## 2023-01-11 RX ADMIN — HEPARIN SODIUM 5000 UNITS: 1000 INJECTION INTRAVENOUS; SUBCUTANEOUS at 08:03

## 2023-01-11 RX ADMIN — PHENYLEPHRINE HYDROCHLORIDE 100 MCG/MIN: 10 INJECTION INTRAVENOUS at 07:43

## 2023-01-11 RX ADMIN — ROCURONIUM BROMIDE 50 MG: 10 INJECTION, SOLUTION INTRAVENOUS at 07:43

## 2023-01-11 RX ADMIN — ATORVASTATIN CALCIUM 10 MG: 10 TABLET, FILM COATED ORAL at 21:41

## 2023-01-11 RX ADMIN — PHENYLEPHRINE HYDROCHLORIDE 100 MCG: 10 INJECTION INTRAVENOUS at 07:51

## 2023-01-11 RX ADMIN — HYDROCHLOROTHIAZIDE 25 MG: 25 TABLET ORAL at 16:22

## 2023-01-11 RX ADMIN — PROPOFOL 100 MG: 10 INJECTION, EMULSION INTRAVENOUS at 07:43

## 2023-01-11 RX ADMIN — FENTANYL CITRATE 25 MCG: 50 INJECTION, SOLUTION INTRAMUSCULAR; INTRAVENOUS at 09:04

## 2023-01-11 RX ADMIN — SUGAMMADEX 200 MG: 100 INJECTION, SOLUTION INTRAVENOUS at 08:52

## 2023-01-11 RX ADMIN — ONDANSETRON 4 MG: 2 INJECTION INTRAMUSCULAR; INTRAVENOUS at 08:44

## 2023-01-11 RX ADMIN — VANCOMYCIN HYDROCHLORIDE 1000 MG: 1 INJECTION, POWDER, LYOPHILIZED, FOR SOLUTION INTRAVENOUS at 07:27

## 2023-01-11 RX ADMIN — NITROGLYCERIN 100 MCG: 20 INJECTION INTRAVENOUS at 09:10

## 2023-01-11 RX ADMIN — FENTANYL CITRATE 25 MCG: 50 INJECTION, SOLUTION INTRAMUSCULAR; INTRAVENOUS at 09:12

## 2023-01-11 RX ADMIN — HYDROMORPHONE HYDROCHLORIDE 0.5 MG: 2 INJECTION, SOLUTION INTRAMUSCULAR; INTRAVENOUS; SUBCUTANEOUS at 10:15

## 2023-01-11 RX ADMIN — ACETAMINOPHEN 650 MG: 325 TABLET ORAL at 21:41

## 2023-01-11 RX ADMIN — DEXAMETHASONE SODIUM PHOSPHATE 8 MG: 4 INJECTION, SOLUTION INTRAMUSCULAR; INTRAVENOUS at 08:06

## 2023-01-11 RX ADMIN — Medication 10 ML: at 21:52

## 2023-01-11 ASSESSMENT — PAIN DESCRIPTION - LOCATION
LOCATION: INCISION
LOCATION: INCISION
LOCATION: HEAD

## 2023-01-11 ASSESSMENT — PAIN SCALES - GENERAL
PAINLEVEL_OUTOF10: 6
PAINLEVEL_OUTOF10: 3
PAINLEVEL_OUTOF10: 6
PAINLEVEL_OUTOF10: 3

## 2023-01-11 ASSESSMENT — PAIN DESCRIPTION - DESCRIPTORS
DESCRIPTORS: ACHING
DESCRIPTORS: ACHING

## 2023-01-11 ASSESSMENT — ENCOUNTER SYMPTOMS: SHORTNESS OF BREATH: 0

## 2023-01-11 ASSESSMENT — PAIN - FUNCTIONAL ASSESSMENT: PAIN_FUNCTIONAL_ASSESSMENT: NONE - DENIES PAIN

## 2023-01-11 ASSESSMENT — PAIN DESCRIPTION - ORIENTATION: ORIENTATION: LEFT

## 2023-01-11 NOTE — OP NOTE
Operative Note      Patient: Hughes Galeazzi  YOB: 1950  MRN: [id number]    Date of Procedure: 1/11/2023    Pre-Op Diagnosis: Left carotid stenosis [I65.22]    Post-Op Diagnosis: Same       Procedure(s):  LEFT CAROTID ENDARTERECTOMY    Surgeon(s):  Ifeoma Perla MD    Assistant:   First Assistant: Ely Barker    Anesthesia: General    Estimated Blood Loss (mL): Minimal    Complications: None    Specimens:   ID Type Source Tests Collected by Time Destination   A : PLAQUE LEFT CAROTID   Tissue Tissue SURGICAL PATHOLOGY Ifeoma Perla MD 1/11/2023 0818        Implants:  Implant Name Type Inv. Item Serial No.  Lot No. LRB No. Used Action   GRAFT VASC W0.8XL8CM THK0.35-0.75MM CAR PERICARD PROC BOV -  Vascular grafts GRAFT VASC W0.8XL8CM THK0.35-0.75MM CAR PERICARD PROC BOV 0000 [employer]WD [id number] Left 1 Implanted         Drains: * No LDAs found *    Findings: as above    Detailed Description of Procedure:         Preoperative Diagnosis:   Left Carotid Stenosis    Postoperative Diagnosis:  Same    Procedure:   1. Left carotid endarterectomy    2.  Completion Duplex Scan    Surgeon: Ifeoma Perla MD    Date of Surgery: 01/11/23      Indications: carotid occlusive disease     Anesthesia: General endotrachial anesthesia    Findings:    Plaque Characteristics: 90% stenosis,      Ulceration: No      Fresh Thrombus: No    Cerebral Protection:   Blood pressure maintenance      Reeves-Inahara shunt    Completion Duplex:  No B-mode abnormalities      ICA with low resistive waveform pattern    Drain:   none  Closure: Platysma : Running 3-0 vicryl Skin: Running 4-0 moncryl    Dressing: Dermabond/Steristrip, Gauze    Sponge, Needle, Instrument Counts: Correct    Estimated Blood Loss: 100 ml    Fluids: 500 ml crystalloid        Disposition:   The patient awoke in the operating room, was extubated, moved all 4 extremities to command, and returned to PACU in satisfactory condition. Technique:          The patient was brought to the operating room, after being properly identified and marked, and placed on the table in supine positions. After induction of General endotrachial anesthesia, the neck was prepped and draped in the usual sterile fashion. A neck incision was made with a # 10 scalpel blade and deepened through subcutaneous tissue and platysma using electrocautery. The facial vein was identified and doubly clamped. It was  suture-ligated with 3-0 silk and then divided. The common, external and internal carotid arteries were sharply dissected and looped with veseel loops. 5000 units of heparin were administered and allowed to circulate for 3 minutes. PRIMARY SHUNT   Clamps were sequentially placed on the internal, external and common carotid arteries. Using  a # 11 blade and a Her scissors, a longitudinal arteriotomy was extended from the common through the bifurcation plaque to normal internal carotid artery. A Reeves Inahara shunt was then placed into the internal carotid artery in standard fashion and flow was confirmed with a handheld doppler. STANDARD ENDARTERECTOMY   Endarterectomy was begun by elevating the plaque transversely in the common carotid with a freer-elevator. The plaque was divided transversely and the eversion endarterectomy was performed in the external carotid. Distal internal carotid endarterectomy was then accomplished by feathering the plaque distally. distal tacking sutures of 7-0 prolene were necessary. Proximal endarterectomy was completed by elevating the plaque in the common carotid and then amputating it flush with the arteriotomy. The endarterectomy bed was inspected under loop magnification and all fronds and defects were removed. A Bovine Pericardial patch was brought on to the field and a patch angioplasty performed using 2 running # 6-0 prolene suture.    SHUNT REMOVAL        Prior to completion, the shunt was clamped and removed from the internal carotid. The internal carotid was backbled and clamped. The shunt was then removed from the common carotid. This vessel was flushed and then clamped. REPERFUSION AND CLOSURE       The distal internal and external carotids were backbled and the proximal common carotid was flushed. The endarterectomy bed was then flushed with heparinized saline and the suture line was secured. Flow was sequentially restored into the external followed by the internal carotid arteries. Hemostasis was obtained with surgicel and protamine. The duplex scan probe was then brought onto the field and the aforementioned findings were noted. Hemostasis was noted to be excellent. Platysma was approximated using a running technnique and skin was approximated with a running subcuticilar technique. Darling Rollins M.D., FACS.   1/11/2023  8:46 AM       Electronically signed by Wilmar Gao MD on 1/11/2023 at 8:46 AM

## 2023-01-11 NOTE — PROGRESS NOTES
Patient admitted to CVU 8 from PACU. Left radial arterial line in place. Left neck incision site without oozing, hematoma. Vital signs obtained. /70   Pulse 84   Temp 97 °F (36.1 °C) (Temporal)   Resp 17   Ht 5' 1.5\" (1.562 m)   Wt 167 lb 4.8 oz (75.9 kg)   SpO2 100%   BMI 31.10 kg/m² . Patient oriented to room. Will monitor.

## 2023-01-11 NOTE — ANESTHESIA PRE PROCEDURE
Pre-Anesthesia Evaluation/Consultation       Name:  Tere Ontiveros  : 1950  Age:  74 y.o. MRN:  [id number]  Date: 2023           Surgeon: Surgeon(s):  Josefina Irizarry MD    Procedure: Procedure(s):  LEFT CAROTID ENDARTERECTOMY     Allergies   Allergen Reactions    Morphine Nausea And Vomiting     Causes mental impairment    Pletal [Cilostazol] Diarrhea and Headaches    Gabapentin      \"Loopy\", forgetful, arms heavy feeling.      Ace Inhibitors Other (See Comments)     Cough     Chantix [Varenicline] Other (See Comments)     Depression, mood swings, nightmares    Keflex [Cephalexin] Diarrhea    Niaspan [Niacin Er] Other (See Comments)     Skin irritation    Pravastatin Other (See Comments)     Pt unable to recall reaction     Patient Active Problem List   Diagnosis    Hyperglycemia    Pure hypercholesterolemia    Lumbago    Osteoarthritis    CKD (chronic kidney disease) stage 3, GFR 30-59 ml/min ([facility])    Abnormal mammogram    Fibrocystic disease of breast    Diarrhea    PAD (peripheral artery disease) ([address]    Atherosclerosis of native arteries of extremities with intermittent claudication, left leg (HCC)    Syncope    Sinus node dysfunction ([address]    Encounter for loop recorder at end of battery life    Status post total left knee replacement    Adhesive capsulitis of right shoulder    Right shoulder pain    Psoriasis    Sick sinus syndrome ([address]    Syncope and collapse    Urinary tract infection without hematuria    Partial seizure ([address]    HTN (hypertension), benign    Coronary artery disease involving native coronary artery of native heart with angina pectoris (HCC)    Seasonal allergic rhinitis due to pollen    Spinal stenosis of lumbar region with neurogenic claudication    Nontraumatic complete tear of left rotator cuff    Atherosclerosis of native arteries of extremities with intermittent claudication, bilateral legs (HCC)    Symptomatic bradycardia    Pacemaker    Sunburn    CKD (chronic kidney disease) stage 4, GFR 15-29 ml/min (HCC)    Nodule of upper lobe of left lung    S/P lobectomy of lung    Occlusion of left carotid artery     Past Medical History:   Diagnosis Date    CAD (coronary artery disease)     NONOBSTRUCTING    CKD (chronic kidney disease) stage 3, GFR 30-59 ml/min (HCC)     IBS (irritable bowel syndrome)     Lumbago     Malignant neoplasm of upper lobe of left lung ([address] 08/2022    LAKESHA adenoCa 1.7cm    Osteoarthrosis, unspecified whether generalized or localized, unspecified site     Other abnormal blood chemistry     PAD (peripheral artery disease) (HCC)     has stents in lower legs bilaterally implanted by Dr. Willie Morris Pure hypercholesterolemia     Risk for falls 02/02/2016    S/p Left TKR    Spinal stenosis     Tremor     Unspecified essential hypertension     Wears glasses      Past Surgical History:   Procedure Laterality Date    CHOLECYSTECTOMY      COLONOSCOPY      CT NEEDLE BIOPSY LUNG PERCUTANEOUS  06/17/2022    CT NEEDLE BIOPSY LUNG PERCUTANEOUS 6/17/2022 [facility] CT SCAN    INSERTABLE CARDIAC MONITOR  09/2015    ILR at [facility]- implanted 10/02/2015    KNEE SURGERY      RIGHT need replacement    LEG SURGERY      2 stents placed in right leg, 1 stent and balloon in left leg. LSFA stenting 03/02/2022    LOBECTOMY Left 08/17/2022    LAKESHA wedge/completion lobectomy, LN sampling, IC cryo nerve block    PACEMAKER INSERTION  05/27/2022    biV ([employer])    ROTATOR CUFF REPAIR Bilateral     SHOULDER ARTHROSCOPY Left 08/21/2020    LEFT SHOULDER ARTHROSCOPY, SUBACROMINAL DECOMPRESSION, DISTAL CLAVICLE EXCISION,DEBRIDEMENT, ROTATOR CUFF REPAIR, AUGMENTATION performed by Awa Goodwin DO at [facility] . ARTHROSCOPY Right     THORACOSCOPY Left 08/17/2022    LEFT VIDEO ASSISTED THORACOSCOPIC SURGERY. THORACOTOMY WITH WEDGE EXCISION OF LEFT UPPER LOBE LUNG NODULE. LOBECTOMY. INTERCOSTAL CRYO BLOCK. performed by Yasmine Armenta MD at [address] Left 01/12/2016    Dr Hernandez Graft GLAND  06/17/2022    US BIOPSY OF SALIVARY GLAND 6/17/2022 F ULTRASOUND     Social History     Tobacco Use    Smoking status: Every Day     Packs/day: 0.25     Years: 40.00     Pack years: 10.00     Types: Cigarettes    Smokeless tobacco: Never    Tobacco comments:     <1 pack of cigarettes per day     Started at age 21   Vaping Use    Vaping Use: Never used   Substance Use Topics    Alcohol use: Yes     Comment: glass of wine once a month    Drug use: Never     Medications  Current Facility-Administered Medications on File Prior to Visit   Medication Dose Route Frequency Provider Last Rate Last Admin    0.9 % sodium chloride infusion   IntraVENous Continuous JIM Gordon CNP        sodium chloride flush 0.9 % injection 5-40 mL  5-40 mL IntraVENous 2 times per day JIM Gordon - CNP        sodium chloride flush 0.9 % injection 5-40 mL  5-40 mL IntraVENous PRN JIM Gordon - CNP        0.9 % sodium chloride infusion   IntraVENous PRN JIM Gordon CNP        vancomycin 1000 mg IVPB in 250 mL D5W addavial  1,000 mg IntraVENous On Call to [address] [place] [address], APRN - CNP         Current Outpatient Medications on File Prior to Visit   Medication Sig Dispense Refill    aspirin 81 MG EC tablet Take 81 mg by mouth daily      Cyanocobalamin (B-12) 1000 MCG SUBL Place under the tongue      Multiple Vitamins-Minerals (VITAMIN D3 COMPLETE PO) Take by mouth      atorvastatin (LIPITOR) 10 MG tablet TAKE 1 TABLET BY MOUTH DAILY 90 tablet 3    losartan-hydroCHLOROthiazide (HYZAAR) 100-25 MG per tablet TAKE 1 TABLET BY MOUTH DAILY 90 tablet 3    Multiple Vitamins-Minerals (THERAPEUTIC MULTIVITAMIN-MINERALS) tablet Take 1 tablet by mouth daily      celecoxib (CELEBREX) 200 MG capsule TAKE 1 CAPSULE BY MOUTH DAILY 30 capsule 3    dicyclomine (BENTYL) 10 MG capsule Take 1 capsule by mouth in the morning. 90 capsule 3    loratadine (CLARITIN) 10 MG tablet Take 1 tablet by mouth in the morning. (Patient taking differently: Take 10 mg by mouth daily as needed) 90 tablet 3     No current facility-administered medications for this visit. No current outpatient medications on file. Facility-Administered Medications Ordered in Other Visits   Medication Dose Route Frequency Provider Last Rate Last Admin    0.9 % sodium chloride infusion   IntraVENous Continuous JIM Warren CNP        sodium chloride flush 0.9 % injection 5-40 mL  5-40 mL IntraVENous 2 times per day JIM Warren - CNP        sodium chloride flush 0.9 % injection 5-40 mL  5-40 mL IntraVENous PRN JIM Warren CNP        0.9 % sodium chloride infusion   IntraVENous PRN JIM Warren CNP        vancomycin 1000 mg IVPB in 250 mL D5W addavial  1,000 mg IntraVENous On Call to [address] [place] [address], APRN - CNP         Vital Signs (Current)   There were no vitals filed for this visit. Vital Signs Statistics (for past 48 hrs)     Temp  Av.6 °F (35.9 °C)  Min: 96.6 °F (35.9 °C)   Min taken time: 2345  Max: 96.6 °F (35.9 °C)   Max taken time: 2345  Pulse  Av  Min: 76   Min taken time: 2345  Max: 76   Max taken time: 2345  Resp  Av  Min: 18   Min taken time: 2345  Max: 18   Max taken time: 2345  BP  Min: 195/75   Min taken time: 2345  Max: 195/75   Max taken time: 2345  SpO2  Av %  Min: 99 %   Min taken time: 23  Max: 99 %   Max taken time: 2345  BP Readings from Last 3 Encounters:   23 (!) 195/75   22 (!) 132/58   22 126/78       BMI  There is no height or weight on file to calculate BMI.   Estimated body mass index is 31.1 kg/m² as calculated from the following:    Height as of 1/9/23: 5' 1.5\" (1.562 m). Weight as of an earlier encounter on 1/11/23: 167 lb 4.8 oz (75.9 kg).     CBC   Lab Results   Component Value Date/Time    WBC 8.5 01/09/2023 01:51 PM    RBC 4.55 01/09/2023 01:51 PM    HGB 13.5 01/09/2023 01:51 PM    HCT 42.1 01/09/2023 01:51 PM    MCV 92.5 01/09/2023 01:51 PM    RDW 15.2 01/09/2023 01:51 PM     01/09/2023 01:51 PM     CMP    Lab Results   Component Value Date/Time     01/09/2023 01:53 PM    K 4.2 01/09/2023 01:53 PM     01/09/2023 01:53 PM    CO2 24 01/09/2023 01:53 PM    BUN 26 01/09/2023 01:53 PM    CREATININE 1.5 01/09/2023 01:53 PM    GFRAA 53 08/19/2022 07:07 AM    GFRAA >60 11/30/2011 10:30 AM    AGRATIO 1.1 01/09/2023 01:53 PM    LABGLOM 36 01/09/2023 01:53 PM    GLUCOSE 92 01/09/2023 01:53 PM    PROT 7.4 01/09/2023 01:53 PM    PROT 8.0 11/30/2011 10:30 AM    CALCIUM 9.9 01/09/2023 01:53 PM    BILITOT 0.4 01/09/2023 01:53 PM    ALKPHOS 86 01/09/2023 01:53 PM    AST 14 01/09/2023 01:53 PM    ALT 9 01/09/2023 01:53 PM     BMP    Lab Results   Component Value Date/Time     01/09/2023 01:53 PM    K 4.2 01/09/2023 01:53 PM     01/09/2023 01:53 PM    CO2 24 01/09/2023 01:53 PM    BUN 26 01/09/2023 01:53 PM    CREATININE 1.5 01/09/2023 01:53 PM    CALCIUM 9.9 01/09/2023 01:53 PM    GFRAA 53 08/19/2022 07:07 AM    GFRAA >60 11/30/2011 10:30 AM    LABGLOM 36 01/09/2023 01:53 PM    GLUCOSE 92 01/09/2023 01:53 PM     POCGlucose  Recent Labs     01/09/23  1353   GLUCOSE 92      Coags    Lab Results   Component Value Date/Time    PROTIME 13.1 01/11/2023 06:40 AM    PROTIME 25.2 01/21/2016 12:15 PM    INR 1.00 01/11/2023 06:40 AM    APTT 34.8 01/11/2023 06:40 AM     HCG (If Applicable) No results found for: PREGTESTUR, PREGSERUM, HCG, HCGQUANT   ABGs   Lab Results   Component Value Date/Time    PHART 7.380 08/15/2022 01:20 PM    PO2ART 103.0 08/15/2022 01:20 PM    XER2EOD 35.9 08/15/2022 01:20 PM    WJI5PWI 21.2 08/15/2022 01:20 PM    BEART -3.3 08/15/2022 01:20 PM    X3BLUGID 98.2 08/15/2022 01:20 PM      Type & Screen (If Applicable)  No results found for: LABABO, LABRH                         BMI: Wt Readings from Last 3 Encounters:       NPO Status:                          Anesthesia Evaluation  Patient summary reviewed and Nursing notes reviewed  Airway: Mallampati: II  TM distance: >3 FB   Neck ROM: full  Mouth opening: > = 3 FB   Dental:    (+) edentulous      Pulmonary:       (-) COPD, asthma and shortness of breath                           Cardiovascular:  Exercise tolerance: poor (<4 METS),   (+) hypertension:, angina:, pacemaker: pacemaker, CAD: non-obstructive, hyperlipidemia    (-) valvular problems/murmurs, past MI, CABG/stent and dysrhythmias    ECG reviewed      Echocardiogram reviewed               ROS comment: Echo 2022     1. Left ventricle: The cavity size was normal. Wall thickness was     normal. Systolic function was normal. The estimated ejection     fraction was in the range of 55% to 60%. Wall motion was normal;     there were no regional wall motion abnormalities. Doppler     parameters are consistent with abnormal left ventricular     relaxation (grade 1 diastolic dysfunction). 2. Right ventricle: The cavity size was normal. Wall thickness was     normal. Systolic function was normal.  3. Pulmonary arteries: Estimated PA peak pressure is 20mm Hg (S). 4. Pericardium, extracardiac: There was no pericardial effusion. Neuro/Psych:   (+) neuromuscular disease:,    (-) seizures, TIA and CVA           GI/Hepatic/Renal:        (-) GERD, PUD, liver disease and no renal disease       Endo/Other:    (+) : arthritis:., .    (-) diabetes mellitus               Abdominal:             Vascular: negative vascular ROS.  + PVD, aortic or cerebral, . Other Findings:             Anesthesia Plan      general     ASA 3     (I di)  Induction: intravenous and rapid sequence.   arterial line  MIPS: Postoperative opioids intended, Prophylactic antiemetics administered and Postoperative trial extubation. Anesthetic plan and risks discussed with patient and child/children. Plan discussed with CRNA.                     This pre-anesthesia assessment may be used as a history and physical.        Moriah Astorga MD  January 11, 2023  7:18 AM

## 2023-01-11 NOTE — PROGRESS NOTES
Left arterial line discontinued. Manual pressure held for 10 minutes and tegaderm on site. No hematoma, no oozing noted. Patient tolerated well.

## 2023-01-11 NOTE — PROGRESS NOTES
Pt admitted to PACu, awakening and following commands, Neuro status intact, Hypertensive on arrival, Nitro given by CRNA upon arrival, NSR on tele.  L carotid incision CD&I, Dentures back in pt's mouth per pt request. Will monitor

## 2023-01-11 NOTE — H&P
Consultation/History & Physical    Date of Admission:  1/11/2023  Date of Consultation:  1/11/2023    PCP:  Sami Rousseau MD     Chief Complaint: Left ICA stenosis    History of Present Illness:  Will Pillai is a 74 y.o. female who presents with left ICA stenosis. Presents for left CEA. PMH:   has a past medical history of CAD (coronary artery disease), CKD (chronic kidney disease) stage 3, GFR 30-59 ml/min ([facility]), IBS (irritable bowel syndrome), Lumbago, Malignant neoplasm of upper lobe of left lung ([address] Osteoarthrosis, unspecified whether generalized or localized, unspecified site, Other abnormal blood chemistry, PAD (peripheral artery disease) ([address] Pure hypercholesterolemia, Risk for falls, Spinal stenosis, Tremor, Unspecified essential hypertension, and Wears glasses. PSH:   has a past surgical history that includes Cholecystectomy; Tubal ligation; knee surgery; Rotator cuff repair (Bilateral); Colonoscopy; Leg Surgery; Insertable Cardiac Monitor (09/2015); Total knee arthroplasty (Left, 01/12/2016); Shoulder arthroscopy (Left, 08/21/2020); US BIOPSY OF SALIVARY GLAND (06/17/2022); CT NEEDLE BIOPSY LUNG PERCUTANEOUS (06/17/2022); Pacemaker insertion (05/27/2022); Lung lobectomy (Left, 08/17/2022); Thoracoscopy (Left, 08/17/2022); and Shoulder arthroscopy (Right). Allergies: Allergies   Allergen Reactions    Morphine Nausea And Vomiting     Causes mental impairment    Pletal [Cilostazol] Diarrhea and Headaches    Gabapentin      \"Loopy\", forgetful, arms heavy feeling. Ace Inhibitors Other (See Comments)     Cough     Chantix [Varenicline] Other (See Comments)     Depression, mood swings, nightmares    Keflex [Cephalexin] Diarrhea    Niaspan [Niacin Er] Other (See Comments)     Skin irritation    Pravastatin Other (See Comments)     Pt unable to recall reaction        Home Meds:    Prior to Admission medications    Medication Sig Start Date End Date Taking?  Authorizing Provider   aspirin 81 MG EC tablet Take 81 mg by mouth daily   Yes Historical Provider, MD   Cyanocobalamin (B-12) 1000 MCG SUBL Place under the tongue   Yes Historical Provider, MD   Multiple Vitamins-Minerals (VITAMIN D3 COMPLETE PO) Take by mouth   Yes Historical Provider, MD   atorvastatin (LIPITOR) 10 MG tablet TAKE 1 TABLET BY MOUTH DAILY 11/17/22   Naida Boo MD   losartan-hydroCHLOROthiazide [facility]) 100-25 MG per tablet TAKE 1 TABLET BY MOUTH DAILY 11/17/22   Nadia Boo MD   Multiple Vitamins-Minerals (THERAPEUTIC MULTIVITAMIN-MINERALS) tablet Take 1 tablet by mouth daily    Historical Provider, MD   celecoxib (CELEBREX) 200 MG capsule TAKE 1 CAPSULE BY MOUTH DAILY 8/18/22   Nadia Boo MD   dicyclomine (BENTYL) 10 MG capsule Take 1 capsule by mouth in the morning. 8/3/22   Nadia Boo MD   loratadine (CLARITIN) 10 MG tablet Take 1 tablet by mouth in the morning. Patient taking differently: Take 10 mg by mouth daily as needed 8/3/22   Nadia Boo MD        [facility] Meds:    Current Facility-Administered Medications   Medication Dose Route Frequency Provider Last Rate Last Admin    0.9 % sodium chloride infusion   IntraVENous Continuous Humberto Vega APRN - CNP        sodium chloride flush 0.9 % injection 5-40 mL  5-40 mL IntraVENous 2 times per day Humberto Vega, APRN - CNP        sodium chloride flush 0.9 % injection 5-40 mL  5-40 mL IntraVENous PRN Humberto Vega, APRN - CNP        0.9 % sodium chloride infusion   IntraVENous PRN Humberto Vega APRN - CNP        vancomycin 1000 mg IVPB in 250 mL D5W addavial  1,000 mg IntraVENous On Call to [address] [place], [place] [address]JIM - CNP           Social History:       Social History     Socioeconomic History    Marital status:       Spouse name: None    Number of children: None    Years of education: None    Highest education level: None   Tobacco Use    Smoking status: Every Day     Packs/day: 0.25 Years: 40.00     Pack years: 10.00     Types: Cigarettes    Smokeless tobacco: Never    Tobacco comments:     <1 pack of cigarettes per day     Started at age 21   Vaping Use    Vaping Use: Never used   Substance and Sexual Activity    Alcohol use: Yes     Comment: glass of wine once a month    Drug use: Never    Sexual activity: Not Currently     Social Determinants of Health     Financial Resource Strain: Low Risk     Difficulty of Paying Living Expenses: Not hard at all   Food Insecurity: Food Insecurity Present    Worried About Running Out of Food in the Last Year: Sometimes true    Ran Out of Food in the Last Year: Sometimes true       Family Histroy:      Family History   Problem Relation Age of Onset    Cancer Mother         cancer of [place]    Heart Disease Father          of MI age 71 - smoker,     Diabetes Sister     Hypertension Sister     Cancer Brother     Emphysema Brother     Stroke Brother     Diabetes Brother     Heart Disease Brother     Hypertension Brother     High Cholesterol Neg Hx     High Blood Pressure Neg Hx        Review of Systems:  Review of systems performed and negative with the exception of the above findings        Physical Exam   Vital Signs: BP (!) 195/75   Pulse 68   Temp (!) 96.6 °F (35.9 °C) (Temporal)   Resp 18   Ht 5' 1.5\" (1.562 m)   Wt 167 lb 4.8 oz (75.9 kg)   SpO2 99%   BMI 31.10 kg/m²        Admission Weight: 167 lb (75.8 kg)     General appearance: alert, appears stated age, cooperative, and no distress  Head: Normocephalic, without obvious abnormality, atraumatic  Lungs: clear to auscultation bilaterally  Heart: regular rate and rhythm, S1, S2 normal, no murmur, click, rub or gallop  Abdomen: soft, non-tender.  Bowel sounds normal. No masses,  no organomegaly  Extremities: extremities normal, atraumatic, no cyanosis or edema  Pulses:      Labs:    BMP:   Lab Results   Component Value Date/Time     2023 01:53 PM    K 4.2 2023 01:53 PM     01/09/2023 01:53 PM    CO2 24 01/09/2023 01:53 PM    PHOS 3.4 08/15/2022 01:14 PM    BUN 26 01/09/2023 01:53 PM    CREATININE 1.5 01/09/2023 01:53 PM      No results found for: GLU  Lab Results   Component Value Date/Time    MG 1.90 08/17/2022 11:41 AM      CBC:   Lab Results   Component Value Date/Time    WBC 8.5 01/09/2023 01:51 PM    HGB 13.5 01/09/2023 01:51 PM    HCT 42.1 01/09/2023 01:51 PM    MCV 92.5 01/09/2023 01:51 PM     01/09/2023 01:51 PM      Coagulation:   Lab Results   Component Value Date/Time    INR 1.00 01/11/2023 06:40 AM    APTT 34.8 01/11/2023 06:40 AM     Cardiac markers:   Lab Results   Component Value Date/Time    TROPONINI <0.01 04/10/2017 03:50 PM     Lab Results   Component Value Date/Time    LABA1C 5.8 10/05/2021 12:20 PM    No results found for: ALB    Lab Results   Component Value Date/Time    BILITOT 0.4 01/09/2023 01:53 PM    BILIDIR <0.2 08/17/2022 11:41 AM    AST 14 01/09/2023 01:53 PM    ALT 9 01/09/2023 01:53 PM    ALKPHOS 86 01/09/2023 01:53 PM      Lab Results   Component Value Date/Time    CHOL 144 12/18/2018 10:02 AM    HDL 31 01/12/2021 12:47 PM    HDL 35 11/30/2011 10:30 AM    LDLCALC 83 01/12/2021 12:47 PM    TRIG 141 12/18/2018 10:02 AM          Diagnosis:  Patient Active Problem List   Diagnosis    Hyperglycemia    Pure hypercholesterolemia    Lumbago    Osteoarthritis    CKD (chronic kidney disease) stage 3, GFR 30-59 ml/min ([facility])    Abnormal mammogram    Fibrocystic disease of breast    Diarrhea    PAD (peripheral artery disease) ([facility])    Atherosclerosis of native arteries of extremities with intermittent claudication, left leg ([facility])    Syncope    Sinus node dysfunction ([address]    Encounter for loop recorder at end of battery life    Status post total left knee replacement    Adhesive capsulitis of right shoulder    Right shoulder pain    Psoriasis    Sick sinus syndrome (HCC)    Syncope and collapse    Urinary tract infection without hematuria Partial seizure ([facility]    HTN (hypertension), benign    Coronary artery disease involving native coronary artery of native heart with angina pectoris (HCC)    Seasonal allergic rhinitis due to pollen    Spinal stenosis of lumbar region with neurogenic claudication    Nontraumatic complete tear of left rotator cuff    Atherosclerosis of native arteries of extremities with intermittent claudication, bilateral legs (HCC)    Symptomatic bradycardia    Pacemaker    Sunburn    CKD (chronic kidney disease) stage 4, GFR 15-29 ml/min (HCC)    Nodule of upper lobe of left lung    S/P lobectomy of lung    Occlusion of left carotid artery           Plan:  Left CEA        Guido Birch M.D., FACS.   1/11/2023  7:10 AM

## 2023-01-11 NOTE — PROGRESS NOTES
Pt awake and oriented, weaned to RA, vss, family updated on status, medicated with Dilaudid for pain.  Will reasses

## 2023-01-11 NOTE — ANESTHESIA PROCEDURE NOTES
Arterial Line:    An arterial line was placed using surface landmarks, in the OR for the following indication(s): continuous blood pressure monitoring and blood sampling needed. A 20 gauge (size), 1 and 3/8 inch (length), Arrow (type) catheter was placed, Seldinger technique used, into the left radial artery, secured by tape and Tegaderm. Anesthesia type: Local    Events:  patient tolerated procedure well with no complications and EBL < 5mL. 1/11/2023 7:05 AM1/11/2023 7:03 AM  Anesthesiologist: Sarah Duckworth MD  Preanesthetic Checklist  Completed: patient identified, IV checked, site marked, risks and benefits discussed, surgical/procedural consents, equipment checked, pre-op evaluation, timeout performed, anesthesia consent given, oxygen available and monitors applied/VS acknowledged

## 2023-01-11 NOTE — PROGRESS NOTES
Status unchanged, vss, pt awake and oriented, incision intact, family left bedside, pain controlled, neuro status intact.  Will transfer to CVU

## 2023-01-11 NOTE — ANESTHESIA POSTPROCEDURE EVALUATION
Department of Anesthesiology  Postprocedure Note    Patient: Dhara Murphy  MRN: [id number]  YOB: 1950  Date of evaluation: 1/11/2023      Procedure Summary     Date: 01/11/23 Room / Location: [address]    Anesthesia Start: 0730 Anesthesia Stop:     Procedure: LEFT CAROTID ENDARTERECTOMY (Left) Diagnosis:       Left carotid stenosis      (Left carotid stenosis [I65.22])    Surgeons: Jerri Meier MD Responsible Provider: Ambar Brooke MD    Anesthesia Type: general ASA Status: 3          Anesthesia Type: No value filed.     Nola Phase I: Nola Score: 10    Nola Phase II:        Anesthesia Post Evaluation    Patient location during evaluation: PACU  Patient participation: complete - patient participated  Level of consciousness: awake and alert  Pain score: 2  Airway patency: patent  Nausea & Vomiting: no vomiting  Complications: no  Cardiovascular status: blood pressure returned to baseline  Respiratory status: acceptable  Hydration status: euvolemic  Multimodal analgesia pain management approach

## 2023-01-11 NOTE — DISCHARGE SUMMARY
DISCHARGE SUMMARY    Admission Date:  1/11/2023  5:59 AM  Discharge Date:  1/12/23    Principle Diagnosis:  Stenosis of left carotid artery    Secondary Diagnosis:  Principal Problem:    Occlusion of left carotid artery  Active Problems:    Carotid atherosclerosis, left  Resolved Problems:    * No resolved hospital problems. *    Patient Active Problem List   Diagnosis    Hyperglycemia    Pure hypercholesterolemia    Lumbago    Osteoarthritis    CKD (chronic kidney disease) stage 3, GFR 30-59 ml/min ([facility])    Abnormal mammogram    Fibrocystic disease of breast    Diarrhea    PAD (peripheral artery disease) ([facility])    Atherosclerosis of native arteries of extremities with intermittent claudication, left leg ([facility])    Syncope    Sinus node dysfunction ([facility] [address]    Encounter for loop recorder at end of battery life    Status post total left knee replacement    Adhesive capsulitis of right shoulder    Right shoulder pain    Psoriasis    Sick sinus syndrome ([facility])    Syncope and collapse    Urinary tract infection without hematuria    Partial seizure ([facility])    HTN (hypertension), benign    Coronary artery disease involving native coronary artery of native heart with angina pectoris ([facility])    Seasonal allergic rhinitis due to pollen    Spinal stenosis of lumbar region with neurogenic claudication    Nontraumatic complete tear of left rotator cuff    Atherosclerosis of native arteries of extremities with intermittent claudication, bilateral legs ([facility])    Symptomatic bradycardia    Pacemaker    Sunburn    CKD (chronic kidney disease) stage 4, GFR 15-29 ml/min ([facility])    Nodule of upper lobe of left lung    S/P lobectomy of lung    Occlusion of left carotid artery    Carotid atherosclerosis, left        Procedure: LEFT CAROTID ENDARTERECTOMY     Consults:  None    History:  The patient is a 74 y.o. female with history of CAD, CKD, PAD, carotid stenosis and lung cancer. Hospital Course:   The patient underwent left carotid endarterectomy on 1/11/2023  5:59 AM.  Their post-operative course was uncomplicated. Pain was controlled with combination of oral and IV medications. They experienced no neurologic changes from baseline following surgery. They were able to void without difficulty. They were able to ambulate without difficulty and tolerate a regular diet. Patient was continued on ASA and statin therapy. The patient was discharged on 1/12/2023. Disposition:  home in stable condition. Discharge medications:     Medication List        CONTINUE taking these medications      aspirin 81 MG EC tablet     atorvastatin 10 MG tablet  Commonly known as: LIPITOR  TAKE 1 TABLET BY MOUTH DAILY     B-12 1000 MCG Subl     celecoxib 200 MG capsule  Commonly known as: CELEBREX  TAKE 1 CAPSULE BY MOUTH DAILY     dicyclomine 10 MG capsule  Commonly known as: Bentyl  Take 1 capsule by mouth in the morning. losartan-hydroCHLOROthiazide 100-25 MG per tablet  Commonly known as: HYZAAR  TAKE 1 TABLET BY MOUTH DAILY     * therapeutic multivitamin-minerals tablet     * VITAMIN D3 COMPLETE PO           * This list has 2 medication(s) that are the same as other medications prescribed for you. Read the directions carefully, and ask your doctor or other care provider to review them with you. ASK your doctor about these medications      loratadine 10 MG tablet  Commonly known as: Claritin  Take 1 tablet by mouth in the morning. Patient Instructions: Activity:  no heavy lifting, driving, or strenuous exercise for 2 weeks  Diet:  regular diet  Wound Care:  keep incision clean and dry. Can use soap and water to clean, keep open to air. Okay to shower on Friday. No tub baths, swimming or hot tubs until seen in the office. Follow up with Dr. Carl Alcaraz on 1/30 at 12:15 pm.  Please call the office at [phone] with questions or concerns.         Electronically signed by JIM Cardenas CNP on 1/12/2023 at 8:22 AM

## 2023-01-11 NOTE — PROGRESS NOTES
Pt awake and oriented, pain controlled, vss, remains on 1L NC, family at bedside, Incision intact, neuro status intact, phase 1 discharge criteria met. Awaiting room to be assigned.

## 2023-01-11 NOTE — DISCHARGE INSTRUCTIONS
No heavy lifting, driving, or strenuous exercise for 2 weeks  Keep incision clean and dry. Can use soap and water to clean, keep open to air. Okay to shower on Friday. No tub baths, swimming or hot tubs until seen in the office. Follow up with Dr. Jaime Garibay on 1/30 at 12:15 pm.  Please call the office at [phone] with questions or concerns. Carotid Endarterectomy: What to Expect at [address]  A carotid endarterectomy (say \"elvirah-RAW-raymondd kh-wlo-vxu-REK-tuh-mindy\") is surgery to remove fatty build-up (plaque) from one of the carotid arteries. Your doctor made a cut (incision) in your neck and carotid artery to take out the plaque. You may have a sore throat for a few days. You can expect the incision to be sore for about a week. The area around it may also be swollen and bruised at first. The area in front of the incision may be numb. This usually gets better after several months. Your doctor closed the incision in your neck with stitches. The stitches will be removed 7 to 10 days after surgery, or you may have stitches that dissolve on their own. You may feel more tired than usual for several weeks after surgery. You can do light activities around the house. But don't do anything strenuous until your doctor says it is okay. This may be for at least 2 weeks. You will have regular tests to check blood flow in your carotid arteries. This care sheet gives you a general idea about how long it will take for you to recover. But each person recovers at a different pace. Follow the steps below to feel better as quickly as possible. How can you care for yourself at home? Activity    Rest when you feel tired. Getting enough sleep will help you recover. Try to walk each day. Start by walking a little more than you did the day before. Bit by bit, increase the amount you walk. Walking boosts blood flow and helps prevent pneumonia and constipation.      Avoid strenuous activities, such as bicycle riding, jogging, weight lifting, or aerobic exercise. Your doctor will tell you when it's okay to do strenuous activity. For at least 2 weeks, avoid lifting anything that would make you strain. This may include a child, heavy grocery bags and milk containers, a heavy briefcase or backpack, cat litter or dog food bags, or a vacuum . Ask your doctor when you can drive again. You may need to take 1 to 2 weeks off from work. It depends on the type of work you do and how you feel. Your doctor will tell you when you can have sex again. Diet    You can eat your normal diet. If your stomach is upset, try bland, low-fat foods like plain rice, broiled chicken, toast, and yogurt. Drink plenty of fluids (unless your doctor tells you not to). You may notice that your bowel movements are not regular right after your surgery. This is common. Try to avoid constipation and straining with bowel movements. You may want to take a fiber supplement every day. If you have not had a bowel movement after a couple of days, ask your doctor about taking a mild laxative. Eat a heart-healthy diet. If you have not been eating this way, talk to your doctor. You also may want to talk to a dietitian. A dietitian can help you plan meals and learn about healthy foods. Medicines    Your doctor will tell you if and when you can restart your medicines. You will also get instructions about taking any new medicines. If you stopped taking aspirin or some other blood thinner, your doctor will tell you when to start taking it again. Be safe with medicines. Take your medicines exactly as prescribed. Call your doctor if you think you are having a problem with your medicine. If you take a blood thinner, such as aspirin, be sure you get instructions about how to take your medicine safely. Blood thinners can cause serious bleeding problems. If your doctor prescribed antibiotics, take them as directed.  Do not stop taking them just because you feel better. You need to take the full course of antibiotics. Incision care    If you have strips of tape over your incision, leave the tape on for a week or until it falls off. You may shower and take baths as usual. But do not soak the incision for the first 2 weeks, or until your doctor tells you it is okay. Pat the incision dry. Wash the area daily with water and pat it dry. Other cleaning products, such as hydrogen peroxide, can make the wound heal more slowly. You may cover the area with a gauze bandage if it weeps or rubs against clothing. Change the bandage every day. Keep the area clean and dry. Follow-up care is a key part of your treatment and safety. Be sure to make and go to all appointments, and call your doctor if you are having problems. It's also a good idea to know your test results and keep a list of the medicines you take. When should you call for help? Call 911 anytime you think you may need emergency care. For example, call if:    You passed out (lost consciousness). You have severe trouble breathing. You have a tight bulge in your neck on the side where the surgery was done. You have symptoms of a stroke. These may include:  Sudden numbness, tingling, weakness, or loss of movement in your face, arm, or leg, especially on only one side of your body. Sudden vision changes. Sudden trouble speaking. Sudden confusion or trouble understanding simple statements. Sudden problems with walking or balance. A sudden, severe headache that is different from past headaches. You have symptoms of a heart attack. These may include:  Chest pain or pressure, or a strange feeling in the chest.  Sweating. Shortness of breath. Nausea or vomiting. Pain, pressure, or a strange feeling in the back, neck, jaw, or upper belly, or in one or both shoulders or arms. Lightheadedness or sudden weakness. A fast or irregular heartbeat.   After calling 911, chew 1 adult-strength or 2 to 4 low-dose aspirin. Wait for an ambulance. Do not try to drive yourself. Call your doctor now or seek immediate medical care if:    You have pain that does not get better after you take pain medicine. You have loose stitches, or your incision comes open. Bright red blood has soaked through the bandage over your incision. You have signs of infection, such as: Increased pain, swelling, warmth, or redness. Red streaks leading from the incision. Pus draining from the incision. A fever. Watch closely for any changes in your health, and be sure to contact your doctor if you have any problems. Where can you learn more? Go to http://www.woods.com/ and enter D902 to learn more about \"Carotid Endarterectomy: What to Expect at Home. \"  Current as of: September 7, 2022               Content Version: 13.5  © 7304-4170 Healthwise, [facility]. Care instructions adapted under license by [facility] ([facility]). If you have questions about a medical condition or this instruction, always ask your healthcare professional. [place] [address] any warranty or liability for your use of this information.

## 2023-01-12 VITALS
HEIGHT: 62 IN | BODY MASS INDEX: 30.75 KG/M2 | DIASTOLIC BLOOD PRESSURE: 75 MMHG | TEMPERATURE: 97.9 F | WEIGHT: 167.11 LBS | SYSTOLIC BLOOD PRESSURE: 146 MMHG | OXYGEN SATURATION: 95 % | HEART RATE: 64 BPM | RESPIRATION RATE: 18 BRPM

## 2023-01-12 LAB
BASOPHILS ABSOLUTE: 0.1 K/UL (ref 0–0.2)
BASOPHILS RELATIVE PERCENT: 0.8 %
EOSINOPHILS ABSOLUTE: 0 K/UL (ref 0–0.6)
EOSINOPHILS RELATIVE PERCENT: 0 %
HCT VFR BLD CALC: 36.6 % (ref 36–48)
HEMOGLOBIN: 11.7 G/DL (ref 12–16)
LYMPHOCYTES ABSOLUTE: 1 K/UL (ref 1–5.1)
LYMPHOCYTES RELATIVE PERCENT: 6 %
MCH RBC QN AUTO: 29.4 PG (ref 26–34)
MCHC RBC AUTO-ENTMCNC: 32 G/DL (ref 31–36)
MCV RBC AUTO: 91.9 FL (ref 80–100)
MONOCYTES ABSOLUTE: 0.8 K/UL (ref 0–1.3)
MONOCYTES RELATIVE PERCENT: 4.6 %
NEUTROPHILS ABSOLUTE: 14.6 K/UL (ref 1.7–7.7)
NEUTROPHILS RELATIVE PERCENT: 88.6 %
PDW BLD-RTO: 14.8 % (ref 12.4–15.4)
PLATELET # BLD: 192 K/UL (ref 135–450)
PMV BLD AUTO: 9.4 FL (ref 5–10.5)
RBC # BLD: 3.98 M/UL (ref 4–5.2)
WBC # BLD: 16.5 K/UL (ref 4–11)

## 2023-01-12 PROCEDURE — 96361 HYDRATE IV INFUSION ADD-ON: CPT

## 2023-01-12 PROCEDURE — 6370000000 HC RX 637 (ALT 250 FOR IP): Performed by: NURSE PRACTITIONER

## 2023-01-12 PROCEDURE — 96360 HYDRATION IV INFUSION INIT: CPT

## 2023-01-12 PROCEDURE — G0378 HOSPITAL OBSERVATION PER HR: HCPCS

## 2023-01-12 PROCEDURE — APPNB30 APP NON BILLABLE TIME 0-30 MINS: Performed by: NURSE PRACTITIONER

## 2023-01-12 PROCEDURE — 85025 COMPLETE CBC W/AUTO DIFF WBC: CPT

## 2023-01-12 PROCEDURE — 2580000003 HC RX 258: Performed by: SURGERY

## 2023-01-12 PROCEDURE — 6370000000 HC RX 637 (ALT 250 FOR IP): Performed by: SURGERY

## 2023-01-12 PROCEDURE — APPSS30 APP SPLIT SHARED TIME 16-30 MINUTES: Performed by: NURSE PRACTITIONER

## 2023-01-12 RX ADMIN — Medication 10 ML: at 07:50

## 2023-01-12 RX ADMIN — HYDROCHLOROTHIAZIDE 25 MG: 25 TABLET ORAL at 07:49

## 2023-01-12 RX ADMIN — CELECOXIB 200 MG: 200 CAPSULE ORAL at 07:49

## 2023-01-12 RX ADMIN — LOSARTAN POTASSIUM 100 MG: 100 TABLET, FILM COATED ORAL at 07:49

## 2023-01-12 RX ADMIN — ASPIRIN 81 MG: 81 TABLET, COATED ORAL at 07:49

## 2023-01-12 NOTE — PROGRESS NOTES
Discharge instructions reviewed with patient. Patient verbalized understanding. All home medications have been reviewed, questions answered and patient voiced understanding. All medication side effects reviewed and patient verbalized understanding. Patient given prescriptions, discharge instructions, and appointment times. Patient discharged to home with family via private car. Taken to lobby via wheelchair. Follow up appointment(s) reviewed with patient and all attempts made to schedule within 7 days of discharge. [facility]  Depression Screen    During the past month, have you often been bothered by feeling down, depressed, or hopeless?   no    2. During the past month, have you often been bothered by little interest or pleasure in       doing things?    no

## 2023-01-12 NOTE — PROGRESS NOTES
Vascular Progress Note    1/12/2023 8:16 AM    Chief complaint / Reason for visit : s/p left CEA - POD # 1     Subjective:  Patient resting in bed. She denies any complaints. No pain, no neurological changes from baseline. Voiding adequately. VSS, afebrile.      Vital Signs: BP (!) 146/75   Pulse 64   Temp 97.9 °F (36.6 °C) (Temporal)   Resp 18   Ht 5' 1.5\" (1.562 m)   Wt 167 lb 1.7 oz (75.8 kg)   SpO2 95%   BMI 31.06 kg/m²  O2 Flow Rate (L/min): 1 L/min   I/O:    Intake/Output Summary (Last 24 hours) at 1/12/2023 0816  Last data filed at 1/12/2023 0718  Gross per 24 hour   Intake 1706.37 ml   Output 1900 ml   Net -193.63 ml       Physical Exam:   General: no apparent distress, appears stated age  Neuro: AAOx4, clear speech, tongue midline, hand grasp equal bilaterally   Chest/Lungs: clear to auscultation bilaterally, no accessory muscle use  Cardiac:  regular rate and rhythm, S1, S2 normal, no murmur, click, rub or gallop  Abdomen:  abdomen is soft without significant tenderness, masses, organomegaly or guarding  Vascular:  radial pulses normal  Extremities: warm and well perfused, no signs of cyanosis or ischemia, no significant edema, bilateral upper and lower extremity motorsensory intact  Skin:  left neck incision c/d/I with no drainage or hematoma     Labs:   Lab Results   Component Value Date/Time     01/09/2023 01:53 PM    K 4.2 01/09/2023 01:53 PM     01/09/2023 01:53 PM    CO2 24 01/09/2023 01:53 PM    BUN 26 01/09/2023 01:53 PM    CREATININE 1.5 01/09/2023 01:53 PM    GFRAA 53 08/19/2022 07:07 AM    GFRAA >60 11/30/2011 10:30 AM    LABGLOM 36 01/09/2023 01:53 PM    GLUCOSE 92 01/09/2023 01:53 PM    PHOS 3.4 08/15/2022 01:14 PM    MG 1.90 08/17/2022 11:41 AM    CALCIUM 9.9 01/09/2023 01:53 PM     Lab Results   Component Value Date/Time    WBC 16.5 01/12/2023 04:40 AM    RBC 3.98 01/12/2023 04:40 AM    HGB 11.7 01/12/2023 04:40 AM    HCT 36.6 01/12/2023 04:40 AM    MCV 91.9 01/12/2023 04:40 AM    RDW 14.8 01/12/2023 04:40 AM     01/12/2023 04:40 AM     Lab Results   Component Value Date    INR 1.00 01/11/2023    PROTIME 13.1 01/11/2023          Scheduled Meds:    sodium chloride flush  5-40 mL IntraVENous 2 times per day    aspirin  81 mg Oral Daily    atorvastatin  10 mg Oral Nightly    celecoxib  200 mg Oral Daily    dicyclomine  10 mg Oral Daily    losartan  100 mg Oral Daily    And    hydroCHLOROthiazide  25 mg Oral Daily     Continuous Infusions:    sodium chloride           Assessment:   S/p left CEA - POD # 1 - doing well, no neurological changes from baseline  Acute blood loss anemia post operatively - as expected following surgery: H/H stable    Plan:  D/c IVFs  Tolerating diet  Increase activity  Continue home medications  Continue ASA and statin  Discharge home today  Follow up with Dr. Roni Mallory in 2-3 weeks    Discharge instructions reviewed with patient. Patient educated on plan of care and disease process. All questions answered.         Electronically signed by JIM Traylor CNP on 1/12/2023 at 8:16 AM

## 2023-01-12 NOTE — CARE COORDINATION
Patient discharged 1/12/2023 to home  All discharge needs met per case management     Sotero Gomez RN, BSN  [phone]

## 2023-01-13 ENCOUNTER — CARE COORDINATION (OUTPATIENT)
Dept: CASE MANAGEMENT | Age: 73
End: 2023-01-13

## 2023-01-13 NOTE — CARE COORDINATION
[facility] Care Transitions Initial Follow Up Call    Call within 2 business days of discharge: Yes    First attempt at 24 hour discharge call, no answer, CTN left VM with contact information and request for return call. CTN will continue with outreach call attempts.       Follow Up  Future Appointments   Date Time Provider Herberth Olive   1/19/2023  9:20 AM Kath Arriaga MD  NEURO Neurology -   1/30/2023 12:15 PM Ingrid Galvez MD  VASC/ENDO MMA   2/3/2023 10:00 AM Harrel Soulier., MD [facility] [place] - DYD   3/28/2023 10:15 AM SCHEDULE, [place] REMOTE TRANSMISSION FF Cardio MMA   6/27/2023 10:15 AM SCHEDULE, [place] REMOTE TRANSMISSION FF Cardio MMA   9/26/2023 10:15 AM SCHEDULE, [place] REMOTE TRANSMISSION FF Cardio MMA   12/26/2023 10:15 AM SCHEDULE, [place] REMOTE TRANSMISSION FF Cardio MMA       Igor Gomez RN

## 2023-01-16 ENCOUNTER — CARE COORDINATION (OUTPATIENT)
Dept: CASE MANAGEMENT | Age: 73
End: 2023-01-16

## 2023-01-16 NOTE — CARE COORDINATION
[facility] Care Transitions Initial Follow Up Call    Call within 2 business days of discharge: Yes    Second & final attempt at 24 hour discharge call, no answer, CTN left  with contact information and request for return call. CTN will route message to PCP & send patient a note in My Chart. CTN will resolve episode & remain available.       Follow Up  Future Appointments   Date Time Provider Herberth Schaefer   1/19/2023  9:20 AM Citlali Mayer MD  NEURO Neurology -   1/30/2023 12:15 PM Stanislaw Hernandez MD  VASC/ENDO MMA   2/3/2023 10:00 AM Elena Kong MD [facility] [place] - DYD   3/28/2023 10:15 AM SCHEDULE, [place] REMOTE TRANSMISSION FF Cardio MMA   6/27/2023 10:15 AM SCHEDULE, [place] REMOTE TRANSMISSION FF Cardio MMA   9/26/2023 10:15 AM SCHEDULE, [place] REMOTE TRANSMISSION FF Cardio MMA   12/26/2023 10:15 AM SCHEDULE, [place] REMOTE TRANSMISSION FF Cardio MMA     Genaro Phelps RN

## 2023-01-16 NOTE — CARE COORDINATION
[facility] Care Transitions Initial Follow Up Call    Call within 2 business days of discharge: Yes    Patient returned CTN call. Third attempt at 24 hour discharge call, no answer, CTN left VM with contact information and request for return call.   CTN will resolve episode & remain available    Follow Up  Future Appointments   Date Time Provider Herberth Schaefer   1/30/2023 12:15 PM Kuldip Hickey MD FF VASC/ENDO MMA   2/3/2023 10:00 AM Pete Mendoza MD [facility] [place] - DYD   3/28/2023 10:15 AM SCHEDULE, [place] REMOTE TRANSMISSION FF Cardio MMA   6/27/2023 10:15 AM SCHEDULE, [place] REMOTE TRANSMISSION FF Cardio MMA   9/26/2023 10:15 AM SCHEDULE, [place] REMOTE TRANSMISSION FF Cardio MMA   12/26/2023 10:15 AM SCHEDULE, [place] REMOTE TRANSMISSION FF Cardio MMA       Greyson Samayoa RN

## 2023-01-30 ENCOUNTER — OFFICE VISIT (OUTPATIENT)
Dept: VASCULAR SURGERY | Age: 73
End: 2023-01-30

## 2023-01-30 VITALS
WEIGHT: 168 LBS | SYSTOLIC BLOOD PRESSURE: 124 MMHG | BODY MASS INDEX: 30.91 KG/M2 | DIASTOLIC BLOOD PRESSURE: 78 MMHG | HEIGHT: 62 IN

## 2023-01-30 DIAGNOSIS — Z09 POSTOPERATIVE EXAMINATION: ICD-10-CM

## 2023-01-30 DIAGNOSIS — I65.23 CAROTID ATHEROSCLEROSIS, BILATERAL: Primary | ICD-10-CM

## 2023-01-30 PROCEDURE — 99024 POSTOP FOLLOW-UP VISIT: CPT | Performed by: SURGERY

## 2023-01-30 NOTE — PROGRESS NOTES
Postop Progress Note    Subjective    Tab Franco presents to the office for postop follow up. Presents for first postoperative visit following left carotid endarterectomy on January 11. Objective    There were no vitals filed for this visit. General: alert, cooperative and no distress  Incision: healing well    Assessment  Doing well postoperatively. Plan  1. Continue any current medications  2. Wound care discussed  3. Pt is to increase activities as tolerated  4. Usual diet  5.  Follow up: carotid duplex in 6 months    Electronically signed by Toshia Reyes MD on 1/30/2023 at 12:07 PM

## 2023-01-30 NOTE — LETTER
[facility]) - Vascular and Endovascular Surgeons  [address] [address]  Phone: [phone]  Fax: [phone]    Vanessa Brody MD    January 30, 2023     Chaparro Das MD  [address] [address]    Patient: Janet Tate   MR Number: [id number]   YOB: 1950   Date of Visit: 1/30/2023       Dear Chaparro Das:    Thank you for referring Yandeljewel Mary Franco to me for evaluation/treatment. Below are the relevant portions of my assessment and plan of care. If you have questions, please do not hesitate to call me. I look forward to following Krishna Hernandez along with you.     Sincerely,      Vanessa Brody MD

## 2023-02-03 ENCOUNTER — OFFICE VISIT (OUTPATIENT)
Dept: FAMILY MEDICINE CLINIC | Age: 73
End: 2023-02-03

## 2023-02-03 VITALS
TEMPERATURE: 97.4 F | DIASTOLIC BLOOD PRESSURE: 84 MMHG | SYSTOLIC BLOOD PRESSURE: 126 MMHG | BODY MASS INDEX: 31.04 KG/M2 | WEIGHT: 167 LBS

## 2023-02-03 DIAGNOSIS — R19.7 DIARRHEA, UNSPECIFIED TYPE: ICD-10-CM

## 2023-02-03 DIAGNOSIS — C34.12 MALIGNANT NEOPLASM OF UPPER LOBE, LEFT BRONCHUS OR LUNG (HCC): ICD-10-CM

## 2023-02-03 DIAGNOSIS — E78.2 MIXED HYPERLIPIDEMIA: Primary | ICD-10-CM

## 2023-02-03 DIAGNOSIS — I49.5 SICK SINUS SYNDROME (HCC): ICD-10-CM

## 2023-02-03 DIAGNOSIS — I70.213 ATHEROSCLEROSIS OF NATIVE ARTERIES OF EXTREMITIES WITH INTERMITTENT CLAUDICATION, BILATERAL LEGS (HCC): ICD-10-CM

## 2023-02-03 DIAGNOSIS — N18.32 STAGE 3B CHRONIC KIDNEY DISEASE (HCC): ICD-10-CM

## 2023-02-03 DIAGNOSIS — I25.119 CORONARY ARTERY DISEASE INVOLVING NATIVE CORONARY ARTERY OF NATIVE HEART WITH ANGINA PECTORIS (HCC): ICD-10-CM

## 2023-02-03 PROBLEM — N18.4 CKD (CHRONIC KIDNEY DISEASE) STAGE 4, GFR 15-29 ML/MIN (HCC): Status: RESOLVED | Noted: 2022-07-29 | Resolved: 2023-02-03

## 2023-02-03 RX ORDER — COLESEVELAM 180 1/1
625 TABLET ORAL 2 TIMES DAILY WITH MEALS
Qty: 180 TABLET | Refills: 1 | Status: SHIPPED | OUTPATIENT
Start: 2023-02-03

## 2023-02-03 SDOH — ECONOMIC STABILITY: HOUSING INSECURITY
IN THE LAST 12 MONTHS, WAS THERE A TIME WHEN YOU DID NOT HAVE A STEADY PLACE TO SLEEP OR SLEPT IN A SHELTER (INCLUDING NOW)?: NO

## 2023-02-03 SDOH — ECONOMIC STABILITY: FOOD INSECURITY: WITHIN THE PAST 12 MONTHS, YOU WORRIED THAT YOUR FOOD WOULD RUN OUT BEFORE YOU GOT MONEY TO BUY MORE.: NEVER TRUE

## 2023-02-03 SDOH — ECONOMIC STABILITY: FOOD INSECURITY: WITHIN THE PAST 12 MONTHS, THE FOOD YOU BOUGHT JUST DIDN'T LAST AND YOU DIDN'T HAVE MONEY TO GET MORE.: NEVER TRUE

## 2023-02-03 SDOH — ECONOMIC STABILITY: INCOME INSECURITY: HOW HARD IS IT FOR YOU TO PAY FOR THE VERY BASICS LIKE FOOD, HOUSING, MEDICAL CARE, AND HEATING?: NOT VERY HARD

## 2023-02-03 ASSESSMENT — PATIENT HEALTH QUESTIONNAIRE - PHQ9
1. LITTLE INTEREST OR PLEASURE IN DOING THINGS: 0
2. FEELING DOWN, DEPRESSED OR HOPELESS: 0
SUM OF ALL RESPONSES TO PHQ QUESTIONS 1-9: 0
SUM OF ALL RESPONSES TO PHQ9 QUESTIONS 1 & 2: 0
SUM OF ALL RESPONSES TO PHQ QUESTIONS 1-9: 0

## 2023-02-03 ASSESSMENT — ENCOUNTER SYMPTOMS
RHINORRHEA: 1
BACK PAIN: 0
SHORTNESS OF BREATH: 0
DIARRHEA: 1
SINUS PRESSURE: 1
CONSTIPATION: 0
CHEST TIGHTNESS: 0

## 2023-02-21 DIAGNOSIS — M17.11 PRIMARY OSTEOARTHRITIS OF RIGHT KNEE: ICD-10-CM

## 2023-02-21 RX ORDER — CELECOXIB 200 MG/1
200 CAPSULE ORAL DAILY
Qty: 30 CAPSULE | Refills: 3 | Status: SHIPPED | OUTPATIENT
Start: 2023-02-21

## 2023-03-28 ENCOUNTER — NURSE ONLY (OUTPATIENT)
Dept: CARDIOLOGY CLINIC | Age: 73
End: 2023-03-28
Payer: COMMERCIAL

## 2023-03-28 DIAGNOSIS — E78.2 MIXED HYPERLIPIDEMIA: ICD-10-CM

## 2023-03-28 DIAGNOSIS — Z95.0 PACEMAKER: ICD-10-CM

## 2023-03-28 DIAGNOSIS — R55 SYNCOPE AND COLLAPSE: ICD-10-CM

## 2023-03-28 DIAGNOSIS — I49.5 SINUS NODE DYSFUNCTION (HCC): ICD-10-CM

## 2023-03-28 DIAGNOSIS — R00.1 SYMPTOMATIC BRADYCARDIA: ICD-10-CM

## 2023-03-28 LAB
ALBUMIN SERPL-MCNC: 3.9 G/DL (ref 3.4–5)
ALBUMIN/GLOB SERPL: 1 {RATIO} (ref 1.1–2.2)
ALP SERPL-CCNC: 81 U/L (ref 40–129)
ALT SERPL-CCNC: 10 U/L (ref 10–40)
ANION GAP SERPL CALCULATED.3IONS-SCNC: 16 MMOL/L (ref 3–16)
AST SERPL-CCNC: 14 U/L (ref 15–37)
BILIRUB SERPL-MCNC: <0.2 MG/DL (ref 0–1)
BUN SERPL-MCNC: 36 MG/DL (ref 7–20)
CALCIUM SERPL-MCNC: 10.1 MG/DL (ref 8.3–10.6)
CHLORIDE SERPL-SCNC: 105 MMOL/L (ref 99–110)
CHOLEST SERPL-MCNC: 142 MG/DL (ref 0–199)
CO2 SERPL-SCNC: 20 MMOL/L (ref 21–32)
CREAT SERPL-MCNC: 1.6 MG/DL (ref 0.6–1.2)
GFR SERPLBLD CREATININE-BSD FMLA CKD-EPI: 33 ML/MIN/{1.73_M2}
GLUCOSE P FAST SERPL-MCNC: 108 MG/DL (ref 70–99)
HDLC SERPL-MCNC: 37 MG/DL (ref 40–60)
LDL CHOLESTEROL CALCULATED: 82 MG/DL
POTASSIUM SERPL-SCNC: 4.5 MMOL/L (ref 3.5–5.1)
PROT SERPL-MCNC: 7.7 G/DL (ref 6.4–8.2)
SODIUM SERPL-SCNC: 141 MMOL/L (ref 136–145)
TRIGL SERPL-MCNC: 116 MG/DL (ref 0–150)
TSH SERPL DL<=0.005 MIU/L-ACNC: 1.67 UIU/ML (ref 0.27–4.2)
VLDLC SERPL CALC-MCNC: 23 MG/DL

## 2023-03-28 PROCEDURE — 93296 REM INTERROG EVL PM/IDS: CPT | Performed by: INTERNAL MEDICINE

## 2023-03-28 PROCEDURE — 93294 REM INTERROG EVL PM/LDLS PM: CPT | Performed by: INTERNAL MEDICINE

## 2023-03-28 NOTE — PROGRESS NOTES
We received remote transmission from patient's monitor at home. Transmission shows normal sensing and pacing function. Noted AT. EP physician will review. See interrogation under cardiology tab in the Paceart field for more details.     < 0.1% (MVP On)  AP 12.7%    End of 91-day monitoring period 3-28-23.

## 2023-03-30 ENCOUNTER — TELEPHONE (OUTPATIENT)
Dept: CARDIOLOGY CLINIC | Age: 73
End: 2023-03-30

## 2023-03-30 NOTE — TELEPHONE ENCOUNTER
Pt rosalba she received a call and is asking if it is regarding her 3/28/23 transmission. Please call to advise.

## 2023-05-03 ENCOUNTER — OFFICE VISIT (OUTPATIENT)
Dept: FAMILY MEDICINE CLINIC | Age: 73
End: 2023-05-03

## 2023-05-03 VITALS
OXYGEN SATURATION: 96 % | TEMPERATURE: 97.2 F | HEART RATE: 77 BPM | BODY MASS INDEX: 30.49 KG/M2 | DIASTOLIC BLOOD PRESSURE: 84 MMHG | SYSTOLIC BLOOD PRESSURE: 134 MMHG | WEIGHT: 164 LBS

## 2023-05-03 DIAGNOSIS — N18.32 STAGE 3B CHRONIC KIDNEY DISEASE (HCC): ICD-10-CM

## 2023-05-03 DIAGNOSIS — E78.00 PURE HYPERCHOLESTEROLEMIA: ICD-10-CM

## 2023-05-03 DIAGNOSIS — I65.22 OCCLUSION OF LEFT CAROTID ARTERY: ICD-10-CM

## 2023-05-03 DIAGNOSIS — I10 HTN (HYPERTENSION), BENIGN: ICD-10-CM

## 2023-05-03 DIAGNOSIS — L98.9 SKIN LESION: ICD-10-CM

## 2023-05-03 DIAGNOSIS — J40 BRONCHITIS: Primary | ICD-10-CM

## 2023-05-03 RX ORDER — DOXYCYCLINE HYCLATE 100 MG
100 TABLET ORAL 2 TIMES DAILY
Qty: 20 TABLET | Refills: 0 | Status: SHIPPED | OUTPATIENT
Start: 2023-05-03 | End: 2023-05-13

## 2023-05-03 RX ORDER — METHYLPREDNISOLONE 4 MG/1
TABLET ORAL
Qty: 1 KIT | Refills: 0 | Status: SHIPPED | OUTPATIENT
Start: 2023-05-03 | End: 2023-05-09

## 2023-05-03 ASSESSMENT — ENCOUNTER SYMPTOMS
BACK PAIN: 0
SHORTNESS OF BREATH: 0
SORE THROAT: 0
COUGH: 1
CONSTIPATION: 0
RHINORRHEA: 1
DIARRHEA: 1

## 2023-05-03 NOTE — PROGRESS NOTES
SUBJECTIVE:    Lory Valle is a 74 y.o. female who presents for a follow up visit. Chief Complaint   Patient presents with    Follow-up     Patient is here for a follow up. Discuss lab. C/O productive cough x 1 month. Declines colonoscopy/cologurad at this time. Check skin lesion on back of left arm x 6 months. Cough  This is a chronic problem. The current episode started more than 1 month ago. The cough is Productive of sputum. Associated symptoms include ear congestion, ear pain, nasal congestion, postnasal drip and rhinorrhea. Pertinent negatives include no fever, headaches, sore throat or shortness of breath. Treatments tried: mucinex, claritin, Robitussin, Dayquil. The treatment provided mild relief. Hyperlipidemia  This is a chronic problem. The current episode started more than 1 year ago. Lipid results: Total cholesterol 142, triglycerides 116, HDL 37, LDL 82. Factors aggravating her hyperlipidemia include thiazides. Pertinent negatives include no shortness of breath. Current antihyperlipidemic treatment includes statins. The current treatment provides significant improvement of lipids. Compliance problems include adherence to exercise and adherence to diet. Risk factors for coronary artery disease include hypertension, obesity, a sedentary lifestyle, post-menopausal and dyslipidemia. Hypertension  This is a chronic problem. The current episode started more than 1 year ago. The problem is controlled. Pertinent negatives include no headaches or shortness of breath. Agents associated with hypertension include NSAIDs. Risk factors for coronary artery disease include sedentary lifestyle, obesity, dyslipidemia and post-menopausal state. Past treatments include angiotensin blockers and diuretics. The current treatment provides significant improvement. Compliance problems include exercise and diet. Hypertensive end-organ damage includes kidney disease and PVD.       Patient's medications,

## 2023-05-08 ENCOUNTER — OFFICE VISIT (OUTPATIENT)
Dept: SURGERY | Age: 73
End: 2023-05-08
Payer: COMMERCIAL

## 2023-05-08 VITALS
HEIGHT: 62 IN | SYSTOLIC BLOOD PRESSURE: 124 MMHG | DIASTOLIC BLOOD PRESSURE: 72 MMHG | BODY MASS INDEX: 30 KG/M2 | WEIGHT: 163 LBS

## 2023-05-08 DIAGNOSIS — L98.9 SKIN LESION OF LEFT ARM: ICD-10-CM

## 2023-05-08 PROCEDURE — 99203 OFFICE O/P NEW LOW 30 MIN: CPT | Performed by: SURGERY

## 2023-05-08 PROCEDURE — 3078F DIAST BP <80 MM HG: CPT | Performed by: SURGERY

## 2023-05-08 PROCEDURE — 1123F ACP DISCUSS/DSCN MKR DOCD: CPT | Performed by: SURGERY

## 2023-05-08 PROCEDURE — 3074F SYST BP LT 130 MM HG: CPT | Performed by: SURGERY

## 2023-05-08 ASSESSMENT — ENCOUNTER SYMPTOMS
CHEST TIGHTNESS: 0
ABDOMINAL DISTENTION: 0
COLOR CHANGE: 0
ABDOMINAL PAIN: 0
EYE DISCHARGE: 0
BACK PAIN: 0
EYE ITCHING: 0
APNEA: 0

## 2023-05-08 NOTE — PROGRESS NOTES
[facility]  SUBJECTIVE:    Chief Complaint: left arm skin lesion    Tony Franco   1950   74 y.o. female presents for evaluation of a left arm skin lesion just above her elbow. Describes a keratin horn that recently was pulled off as it caught on clothing. There is still a red lesion underneath and she is concerned about recurrence. She wishes to have this lesion removed and has been present for about a year and growing. Medical conditions include peripheral vascular disease for which she is on Plavix and smokes    Review of Systems   Constitutional:  Negative for activity change and appetite change. HENT:  Negative for congestion and dental problem. Eyes:  Negative for discharge and itching. Respiratory:  Negative for apnea and chest tightness. Cardiovascular:  Negative for chest pain and leg swelling. Gastrointestinal:  Negative for abdominal distention and abdominal pain. Endocrine: Negative for cold intolerance and heat intolerance. Genitourinary:  Negative for difficulty urinating and dyspareunia. Musculoskeletal:  Negative for arthralgias and back pain. Skin:  Negative for color change and pallor. Allergic/Immunologic: Positive for environmental allergies. Negative for food allergies. Neurological:  Negative for dizziness and facial asymmetry. Hematological:  Negative for adenopathy. Does not bruise/bleed easily. Psychiatric/Behavioral:  Negative for agitation and behavioral problems.       Past Medical History:   Diagnosis Date    CAD (coronary artery disease)     NONOBSTRUCTING    CKD (chronic kidney disease) stage 3, GFR 30-59 ml/min ([facility]     IBS (irritable bowel syndrome)     Lumbago     Malignant neoplasm of upper lobe of left lung ([facility] [address] 08/2022    LAKESHA adenoCa 1.7cm    Osteoarthrosis, unspecified whether generalized or localized, unspecified site     Other abnormal blood chemistry     PAD (peripheral artery disease)

## 2023-05-10 ENCOUNTER — PROCEDURE VISIT (OUTPATIENT)
Dept: SURGERY | Age: 73
End: 2023-05-10

## 2023-05-10 VITALS — SYSTOLIC BLOOD PRESSURE: 118 MMHG | BODY MASS INDEX: 30.3 KG/M2 | DIASTOLIC BLOOD PRESSURE: 74 MMHG | WEIGHT: 163 LBS

## 2023-05-10 DIAGNOSIS — L98.9 SKIN LESION OF LEFT ARM: Primary | ICD-10-CM

## 2023-05-10 NOTE — PATIENT INSTRUCTIONS
Keep dressing as dry as possible. Leave dressing in place for 48 hours and then may remove. Replace daily with dry dressing as needed, Reapply antibiotic ointment daily, and May shower after dressing is removed.  Avoid scrubbing over incision  Pain control with tylenol and ibuprofen with food as needed and Minimize heavy lifting for 2 weeks  Return to office in 10-14 days for suture removal  Will discuss pathology at return office visit

## 2023-05-10 NOTE — PROGRESS NOTES
heparin gtt      Office Procedure Note  Indications:   Raji Song   : 1950   Today: 5/10/2023  74 y.o. female presents for excision of a left arm skin lesion, growing uncomfortable and history of keratin horn that interferes with clothing    Procedure: Excision of skin lesion on left arm    Anesthesia: Subcutaneous lidocaine    Procedure Details   Using clean technique, the skin lesion on left arm  was cleaned with alcohol scrub. Local anesthetic was injected around the skin lesion circumferentially. An elliptical incision was made around the skin lesion. The widest skin lesion diameter with narrowest excised diameter was 1.5 cm and The length of the incision was 2 cm. The lesion was removed in its entirety and passed off as specimen. The incision was closed with interrupted 3-0 nylon suture. The wound was dressed with antibiotic ointment, gauze, tape. The patient tolerated the procedure well without complications    Bleeding:  Minimal    Hemostasis Achieved:  by pressure    Response to treatment:  Well tolerated by patient. Post-procedure dressing:  Antibiotic ointment, gauze and tape    Post-procedure instructions:  Keep dressing as dry as possible. Leave dressing in place for 48 hours and then may remove. Replace daily with dry dressing as needed, Reapply antibiotic ointment daily, and May shower after dressing is removed. Avoid scrubbing over incision  Pain control with tylenol and ibuprofen with food as needed and Minimize heavy lifting for 2 weeks  Return to office in 10-14 days for suture removal  Will discuss pathology at return office visit    [address] [facility] [address].  Norma Ervin MD, FACS  5/10/2023  2:19 PM

## 2023-05-24 ENCOUNTER — OFFICE VISIT (OUTPATIENT)
Dept: SURGERY | Age: 73
End: 2023-05-24

## 2023-05-24 VITALS — BODY MASS INDEX: 30.3 KG/M2 | WEIGHT: 163 LBS | DIASTOLIC BLOOD PRESSURE: 70 MMHG | SYSTOLIC BLOOD PRESSURE: 118 MMHG

## 2023-05-24 DIAGNOSIS — Z09 SURGERY FOLLOW-UP: Primary | ICD-10-CM

## 2023-05-24 PROCEDURE — 99024 POSTOP FOLLOW-UP VISIT: CPT | Performed by: SURGERY

## 2023-05-24 NOTE — PATIENT INSTRUCTIONS
1. Steri-strips will fall off on their own  2. No restrictions with regard to bathing, swimming  3. No restrictions with regard to diet  4. No restrictions with regard to activity  5. Pathology with squamous/basal cell carcinoma, but margins negative. Does not require further excision or treatment  6.  Follow with general surgery as needed

## 2023-05-24 NOTE — PROGRESS NOTES
[facility]  SUBJECTIVE:    Lory Valle   1950   74 y.o. 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 Incisional pain minimal.  Presents primarily to discuss pathology and suture removal    Past Medical History:   Diagnosis Date    CAD (coronary artery disease)     NONOBSTRUCTING    CKD (chronic kidney disease) stage 3, GFR 30-59 ml/min (HCC)     IBS (irritable bowel syndrome)     Lumbago     Malignant neoplasm of upper lobe of left lung ([address] 08/2022    LAKESHA adenoCa 1.7cm    Osteoarthrosis, unspecified whether generalized or localized, unspecified site     Other abnormal blood chemistry     PAD (peripheral artery disease) ([address]     has stents in lower legs bilaterally implanted by Dr. Nargis Lucero    Pure hypercholesterolemia     Risk for falls 02/02/2016    S/p Left TKR    Spinal stenosis     Tremor     Unspecified essential hypertension     Wears glasses      Past Surgical History:   Procedure Laterality Date    CAROTID ENDARTERECTOMY Left 1/11/2023    LEFT CAROTID ENDARTERECTOMY performed by Kaila Gross MD at [address]      CT NEEDLE BIOPSY LUNG PERCUTANEOUS  06/17/2022    CT NEEDLE BIOPSY LUNG PERCUTANEOUS 6/17/2022 [facility] CT SCAN    INSERTABLE CARDIAC MONITOR  09/2015    ILR at [facility]- implanted 10/02/2015    KNEE SURGERY      RIGHT need replacement    LEG SURGERY      2 stents placed in right leg, 1 stent and balloon in left leg.  LSFA stenting 03/02/2022    LOBECTOMY Left 08/17/2022    LAKESHA wedge/completion lobectomy, LN sampling, IC cryo nerve block    PACEMAKER INSERTION  05/27/2022    biV ([employer])    ROTATOR CUFF REPAIR Bilateral     SHOULDER ARTHROSCOPY Left 08/21/2020    LEFT SHOULDER ARTHROSCOPY, SUBACROMINAL DECOMPRESSION, DISTAL CLAVICLE EXCISION,DEBRIDEMENT, ROTATOR CUFF REPAIR, AUGMENTATION performed by Bishop Avendano

## 2023-06-07 DIAGNOSIS — M17.11 PRIMARY OSTEOARTHRITIS OF RIGHT KNEE: ICD-10-CM

## 2023-06-07 RX ORDER — CELECOXIB 200 MG/1
200 CAPSULE ORAL DAILY
Qty: 30 CAPSULE | Refills: 3 | Status: SHIPPED | OUTPATIENT
Start: 2023-06-07

## 2023-06-22 ENCOUNTER — OFFICE VISIT (OUTPATIENT)
Dept: FAMILY MEDICINE CLINIC | Age: 73
End: 2023-06-22
Payer: COMMERCIAL

## 2023-06-22 VITALS — OXYGEN SATURATION: 95 % | TEMPERATURE: 98.5 F | HEART RATE: 83 BPM

## 2023-06-22 DIAGNOSIS — Z72.0 TOBACCO ABUSE: ICD-10-CM

## 2023-06-22 DIAGNOSIS — J40 BRONCHITIS: Primary | ICD-10-CM

## 2023-06-22 PROCEDURE — 1123F ACP DISCUSS/DSCN MKR DOCD: CPT | Performed by: NURSE PRACTITIONER

## 2023-06-22 PROCEDURE — 99213 OFFICE O/P EST LOW 20 MIN: CPT | Performed by: NURSE PRACTITIONER

## 2023-06-22 RX ORDER — ALBUTEROL SULFATE 90 UG/1
2 AEROSOL, METERED RESPIRATORY (INHALATION) 4 TIMES DAILY PRN
Qty: 18 G | Refills: 0 | Status: SHIPPED | OUTPATIENT
Start: 2023-06-22

## 2023-06-22 RX ORDER — BUDESONIDE AND FORMOTEROL FUMARATE DIHYDRATE 160; 4.5 UG/1; UG/1
2 AEROSOL RESPIRATORY (INHALATION) 2 TIMES DAILY
Qty: 30.6 G | Refills: 1 | Status: SHIPPED | OUTPATIENT
Start: 2023-06-22

## 2023-06-22 RX ORDER — PREDNISONE 20 MG/1
TABLET ORAL
Qty: 18 TABLET | Refills: 0 | Status: SHIPPED | OUTPATIENT
Start: 2023-06-22

## 2023-06-27 ENCOUNTER — NURSE ONLY (OUTPATIENT)
Dept: CARDIOLOGY CLINIC | Age: 73
End: 2023-06-27
Payer: COMMERCIAL

## 2023-06-27 DIAGNOSIS — Z95.0 PACEMAKER: ICD-10-CM

## 2023-06-27 DIAGNOSIS — R00.1 SYMPTOMATIC BRADYCARDIA: ICD-10-CM

## 2023-06-27 DIAGNOSIS — I49.5 SINUS NODE DYSFUNCTION (HCC): ICD-10-CM

## 2023-06-27 DIAGNOSIS — R55 SYNCOPE AND COLLAPSE: ICD-10-CM

## 2023-06-27 PROCEDURE — 93294 REM INTERROG EVL PM/LDLS PM: CPT | Performed by: INTERNAL MEDICINE

## 2023-06-27 PROCEDURE — 93296 REM INTERROG EVL PM/IDS: CPT | Performed by: INTERNAL MEDICINE

## 2023-06-29 ASSESSMENT — ENCOUNTER SYMPTOMS
NAUSEA: 0
SHORTNESS OF BREATH: 0
DIARRHEA: 0
COUGH: 1
VOMITING: 0

## 2023-07-12 NOTE — TELEPHONE ENCOUNTER
----- Message from JIM Kwong CNP sent at 9/30/2021  2:08 PM EDT -----  Yes this patient is cleared from vascular standpoint for right knee replacement. I did discuss with Dr. Prince Cheung. Thanks.  ----- Message -----  From: Amara Cordova MA  Sent: 9/30/2021   1:07 PM EDT  To: JIM Kwong CNP    Are you able to clear her from a vascular standpoint? English

## 2023-07-19 ENCOUNTER — OFFICE VISIT (OUTPATIENT)
Dept: FAMILY MEDICINE CLINIC | Age: 73
End: 2023-07-19

## 2023-07-19 VITALS
DIASTOLIC BLOOD PRESSURE: 68 MMHG | RESPIRATION RATE: 16 BRPM | BODY MASS INDEX: 29.87 KG/M2 | OXYGEN SATURATION: 94 % | HEART RATE: 91 BPM | TEMPERATURE: 98.2 F | HEIGHT: 61 IN | WEIGHT: 158.2 LBS | SYSTOLIC BLOOD PRESSURE: 110 MMHG

## 2023-07-19 DIAGNOSIS — R05.3 CHRONIC COUGH: Primary | ICD-10-CM

## 2023-07-19 DIAGNOSIS — R91.1 LUNG NODULE: ICD-10-CM

## 2023-07-19 DIAGNOSIS — J42 CHRONIC BRONCHITIS, UNSPECIFIED CHRONIC BRONCHITIS TYPE (HCC): ICD-10-CM

## 2023-07-19 DIAGNOSIS — C34.90 MALIGNANT NEOPLASM OF LUNG, UNSPECIFIED LATERALITY, UNSPECIFIED PART OF LUNG (HCC): ICD-10-CM

## 2023-07-19 PROBLEM — J44.9 CHRONIC OBSTRUCTIVE PULMONARY DISEASE, UNSPECIFIED (HCC): Status: ACTIVE | Noted: 2023-07-19

## 2023-07-19 ASSESSMENT — PATIENT HEALTH QUESTIONNAIRE - PHQ9
SUM OF ALL RESPONSES TO PHQ QUESTIONS 1-9: 0
SUM OF ALL RESPONSES TO PHQ QUESTIONS 1-9: 0
SUM OF ALL RESPONSES TO PHQ9 QUESTIONS 1 & 2: 0
SUM OF ALL RESPONSES TO PHQ QUESTIONS 1-9: 0
SUM OF ALL RESPONSES TO PHQ QUESTIONS 1-9: 0
2. FEELING DOWN, DEPRESSED OR HOPELESS: 0
1. LITTLE INTEREST OR PLEASURE IN DOING THINGS: 0

## 2023-07-19 ASSESSMENT — ENCOUNTER SYMPTOMS
SHORTNESS OF BREATH: 1
COUGH: 1
RHINORRHEA: 1

## 2023-07-19 NOTE — PROGRESS NOTES
SUBJECTIVE:    Darryn Su is a 74 y.o. female who presents for a follow up visit. Chief Complaint   Patient presents with    Cough     Started x3 months ago. Has tried 2 inhalers that were prescribed by NP as well as prednisone. Cough  This is a chronic problem. The current episode started more than 1 month ago. The problem has been rapidly worsening. The problem occurs constantly. The cough is Productive of sputum. Associated symptoms include nasal congestion, rhinorrhea and shortness of breath. She has tried a beta-agonist inhaler and steroid inhaler for the symptoms. The treatment provided mild relief. Her past medical history is significant for COPD. Patient had a CT scan in April 2022 that demonstrated an enlarging 1.2 x 1.5 cm solid nodule in the left upper lobe her scan also revealed underlying mild emphysema. CT-guided needle biopsy left upper lobe nodule on 6/17/2022 showed multiple fragments of atypical cells suspicious for malignancy. Dr. Humphrey Nurse removed her cancer via video-assisted cyst thoracotomy and lobectomy with mediastinal lymph node sampling on 8/17/2022. She is also followed by Dr. Duglas Wright, oncologist.  He plans to get CT scan of the chest every 6 months. Her next scan is scheduled in the future. Patient continues to smoke in spite of her lung cancer diagnosis and vascular disease. Patient's medications, allergies, past medical,surgical, social and family histories were reviewed and updated as appropriate.      Past Medical History:   Diagnosis Date    CAD (coronary artery disease)     NONOBSTRUCTING    CKD (chronic kidney disease) stage 3, GFR 30-59 ml/min ([facility])     IBS (irritable bowel syndrome)     Lumbago     Malignant neoplasm of upper lobe of left lung ([address]) 08/2022    LAKESHA adenoCa 1.7cm    Osteoarthrosis, unspecified whether generalized or localized, unspecified site     Other abnormal blood chemistry     PAD (peripheral artery disease) ([address])     has stents

## 2023-07-20 ENCOUNTER — OFFICE VISIT (OUTPATIENT)
Dept: PULMONOLOGY | Age: 73
End: 2023-07-20
Payer: COMMERCIAL

## 2023-07-20 ENCOUNTER — HOSPITAL ENCOUNTER (OUTPATIENT)
Dept: CT IMAGING | Age: 73
Discharge: HOME | End: 2023-07-20
Attending: INTERNAL MEDICINE
Payer: COMMERCIAL

## 2023-07-20 VITALS
OXYGEN SATURATION: 94 % | SYSTOLIC BLOOD PRESSURE: 122 MMHG | BODY MASS INDEX: 29.83 KG/M2 | HEART RATE: 94 BPM | HEIGHT: 61 IN | DIASTOLIC BLOOD PRESSURE: 70 MMHG | WEIGHT: 158 LBS

## 2023-07-20 DIAGNOSIS — Z85.118 HISTORY OF LUNG CANCER: ICD-10-CM

## 2023-07-20 DIAGNOSIS — R05.3 CHRONIC COUGH: Primary | ICD-10-CM

## 2023-07-20 DIAGNOSIS — C34.90 RECURRENT NON-SMALL CELL LUNG CANCER (HCC): ICD-10-CM

## 2023-07-20 DIAGNOSIS — R05.3 CHRONIC COUGH: ICD-10-CM

## 2023-07-20 PROCEDURE — 1123F ACP DISCUSS/DSCN MKR DOCD: CPT | Performed by: INTERNAL MEDICINE

## 2023-07-20 PROCEDURE — 3078F DIAST BP <80 MM HG: CPT | Performed by: INTERNAL MEDICINE

## 2023-07-20 PROCEDURE — 71250 CT THORAX DX C-: CPT

## 2023-07-20 PROCEDURE — 99214 OFFICE O/P EST MOD 30 MIN: CPT | Performed by: INTERNAL MEDICINE

## 2023-07-20 PROCEDURE — 3074F SYST BP LT 130 MM HG: CPT | Performed by: INTERNAL MEDICINE

## 2023-07-20 RX ORDER — AMOXICILLIN AND CLAVULANATE POTASSIUM 500; 125 MG/1; MG/1
1 TABLET, FILM COATED ORAL 2 TIMES DAILY
Qty: 20 TABLET | Refills: 0 | Status: ON HOLD | OUTPATIENT
Start: 2023-07-20 | End: 2023-07-30

## 2023-07-20 ASSESSMENT — ENCOUNTER SYMPTOMS
APNEA: 0
SINUS PRESSURE: 0
SORE THROAT: 0
ABDOMINAL PAIN: 0
BLOOD IN STOOL: 0
VOICE CHANGE: 0
ABDOMINAL DISTENTION: 0
STRIDOR: 0
COUGH: 1
CHOKING: 0
DIARRHEA: 0
RHINORRHEA: 0
SHORTNESS OF BREATH: 0
ANAL BLEEDING: 0
CONSTIPATION: 0
WHEEZING: 0
CHEST TIGHTNESS: 0
BACK PAIN: 0

## 2023-07-20 NOTE — PROGRESS NOTES
not performed yet. Given patient's persistent cough, I would organize for an urgent CT chest without contrast in order to evaluate for recurrent disease. Preoperative PFT study from June 2022 was noted to be normal with no evidence of obstruction or COPD. I will empirically treat her with a course of oral Augmentin 500, twice daily which has been ordered today. Await review of repeat CT chest.  Will call patient with the result. Return in about 2 weeks (around 8/3/2023). Addendum: 7/21. Reviewed patient's CT chest without contrast obtained yesterday, which shows signs of metastatic disease. 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 Recurrent malignancy versus small cell lung cancer. Discussed with Dr. Ernestine Burkett, agrees with biopsy-we will perform for bronchoscopy/EBUS for mediastinal lymph node sampling which will be organized early next week. I have discussed with patient about the results of the CT scan.

## 2023-07-22 ENCOUNTER — APPOINTMENT (OUTPATIENT)
Dept: GENERAL RADIOLOGY | Age: 73
DRG: 871 | End: 2023-07-22
Payer: COMMERCIAL

## 2023-07-22 ENCOUNTER — HOSPITAL ENCOUNTER (INPATIENT)
Age: 73
LOS: 3 days | Discharge: HOME HEALTH | DRG: 871 | End: 2023-07-25
Attending: EMERGENCY MEDICINE | Admitting: STUDENT IN AN ORGANIZED HEALTH CARE EDUCATION/TRAINING PROGRAM
Payer: COMMERCIAL

## 2023-07-22 DIAGNOSIS — A41.9 SEPTIC SHOCK (HCC): Primary | ICD-10-CM

## 2023-07-22 DIAGNOSIS — J18.9 PNEUMONIA DUE TO INFECTIOUS ORGANISM, UNSPECIFIED LATERALITY, UNSPECIFIED PART OF LUNG: ICD-10-CM

## 2023-07-22 DIAGNOSIS — N17.9 AKI (ACUTE KIDNEY INJURY) (HCC): ICD-10-CM

## 2023-07-22 DIAGNOSIS — R65.21 SEPTIC SHOCK (HCC): Primary | ICD-10-CM

## 2023-07-22 LAB
ALBUMIN SERPL-MCNC: 3 G/DL (ref 3.4–5)
ALBUMIN/GLOB SERPL: 0.8 {RATIO} (ref 1.1–2.2)
ALP SERPL-CCNC: 82 U/L (ref 40–129)
ALT SERPL-CCNC: 7 U/L (ref 10–40)
ANION GAP SERPL CALCULATED.3IONS-SCNC: 13 MMOL/L (ref 3–16)
AST SERPL-CCNC: 18 U/L (ref 15–37)
BASOPHILS # BLD: 0.1 K/UL (ref 0–0.2)
BASOPHILS NFR BLD: 0.5 %
BILIRUB SERPL-MCNC: 0.3 MG/DL (ref 0–1)
BUN SERPL-MCNC: 38 MG/DL (ref 7–20)
CALCIUM SERPL-MCNC: 8.8 MG/DL (ref 8.3–10.6)
CHLORIDE SERPL-SCNC: 106 MMOL/L (ref 99–110)
CO2 SERPL-SCNC: 18 MMOL/L (ref 21–32)
CREAT SERPL-MCNC: 2.1 MG/DL (ref 0.6–1.2)
DEPRECATED RDW RBC AUTO: 17.7 % (ref 12.4–15.4)
EOSINOPHIL # BLD: 0.2 K/UL (ref 0–0.6)
EOSINOPHIL NFR BLD: 1.3 %
GFR SERPLBLD CREATININE-BSD FMLA CKD-EPI: 24 ML/MIN/{1.73_M2}
GLUCOSE SERPL-MCNC: 111 MG/DL (ref 70–99)
HCT VFR BLD AUTO: 36.7 % (ref 36–48)
HGB BLD-MCNC: 11.3 G/DL (ref 12–16)
LACTATE BLDV-SCNC: 0.9 MMOL/L (ref 0.4–1.9)
LACTATE BLDV-SCNC: 1.2 MMOL/L (ref 0.4–1.9)
LYMPHOCYTES # BLD: 1.5 K/UL (ref 1–5.1)
LYMPHOCYTES NFR BLD: 10.8 %
MAGNESIUM SERPL-MCNC: 1.8 MG/DL (ref 1.8–2.4)
MCH RBC QN AUTO: 27.1 PG (ref 26–34)
MCHC RBC AUTO-ENTMCNC: 30.9 G/DL (ref 31–36)
MCV RBC AUTO: 87.8 FL (ref 80–100)
MONOCYTES # BLD: 1.1 K/UL (ref 0–1.3)
MONOCYTES NFR BLD: 7.9 %
NEUTROPHILS # BLD: 11.4 K/UL (ref 1.7–7.7)
NEUTROPHILS NFR BLD: 79.5 %
NT-PROBNP SERPL-MCNC: 424 PG/ML (ref 0–449)
PLATELET # BLD AUTO: 319 K/UL (ref 135–450)
PMV BLD AUTO: 8.9 FL (ref 5–10.5)
POTASSIUM SERPL-SCNC: 4.5 MMOL/L (ref 3.5–5.1)
PROCALCITONIN SERPL IA-MCNC: 0.12 NG/ML (ref 0–0.15)
PROT SERPL-MCNC: 6.9 G/DL (ref 6.4–8.2)
RBC # BLD AUTO: 4.18 M/UL (ref 4–5.2)
SARS-COV-2 RDRP RESP QL NAA+PROBE: NOT DETECTED
SODIUM SERPL-SCNC: 137 MMOL/L (ref 136–145)
TROPONIN, HIGH SENSITIVITY: 16 NG/L (ref 0–14)
TROPONIN, HIGH SENSITIVITY: 17 NG/L (ref 0–14)
WBC # BLD AUTO: 14.4 K/UL (ref 4–11)

## 2023-07-22 PROCEDURE — 2500000003 HC RX 250 WO HCPCS: Performed by: EMERGENCY MEDICINE

## 2023-07-22 PROCEDURE — 83880 ASSAY OF NATRIURETIC PEPTIDE: CPT

## 2023-07-22 PROCEDURE — 83605 ASSAY OF LACTIC ACID: CPT

## 2023-07-22 PROCEDURE — 80053 COMPREHEN METABOLIC PANEL: CPT

## 2023-07-22 PROCEDURE — 94760 N-INVAS EAR/PLS OXIMETRY 1: CPT

## 2023-07-22 PROCEDURE — 84145 PROCALCITONIN (PCT): CPT

## 2023-07-22 PROCEDURE — 87040 BLOOD CULTURE FOR BACTERIA: CPT

## 2023-07-22 PROCEDURE — 85025 COMPLETE CBC W/AUTO DIFF WBC: CPT

## 2023-07-22 PROCEDURE — 87635 SARS-COV-2 COVID-19 AMP PRB: CPT

## 2023-07-22 PROCEDURE — 2000000000 HC ICU R&B

## 2023-07-22 PROCEDURE — 99285 EMERGENCY DEPT VISIT HI MDM: CPT

## 2023-07-22 PROCEDURE — 84484 ASSAY OF TROPONIN QUANT: CPT

## 2023-07-22 PROCEDURE — B5181ZA FLUOROSCOPY OF SUPERIOR VENA CAVA USING LOW OSMOLAR CONTRAST, GUIDANCE: ICD-10-PCS | Performed by: INTERNAL MEDICINE

## 2023-07-22 PROCEDURE — 2700000000 HC OXYGEN THERAPY PER DAY

## 2023-07-22 PROCEDURE — 96375 TX/PRO/DX INJ NEW DRUG ADDON: CPT

## 2023-07-22 PROCEDURE — 6360000002 HC RX W HCPCS: Performed by: PHYSICIAN ASSISTANT

## 2023-07-22 PROCEDURE — 96367 TX/PROPH/DG ADDL SEQ IV INF: CPT

## 2023-07-22 PROCEDURE — 96365 THER/PROPH/DIAG IV INF INIT: CPT

## 2023-07-22 PROCEDURE — 6370000000 HC RX 637 (ALT 250 FOR IP): Performed by: PHYSICIAN ASSISTANT

## 2023-07-22 PROCEDURE — 02HV33Z INSERTION OF INFUSION DEVICE INTO SUPERIOR VENA CAVA, PERCUTANEOUS APPROACH: ICD-10-PCS | Performed by: INTERNAL MEDICINE

## 2023-07-22 PROCEDURE — 83735 ASSAY OF MAGNESIUM: CPT

## 2023-07-22 PROCEDURE — 2580000003 HC RX 258: Performed by: PHYSICIAN ASSISTANT

## 2023-07-22 PROCEDURE — 94640 AIRWAY INHALATION TREATMENT: CPT

## 2023-07-22 PROCEDURE — 36556 INSERT NON-TUNNEL CV CATH: CPT

## 2023-07-22 PROCEDURE — 36415 COLL VENOUS BLD VENIPUNCTURE: CPT

## 2023-07-22 PROCEDURE — 93005 ELECTROCARDIOGRAM TRACING: CPT | Performed by: EMERGENCY MEDICINE

## 2023-07-22 PROCEDURE — 71045 X-RAY EXAM CHEST 1 VIEW: CPT

## 2023-07-22 RX ORDER — SODIUM CHLORIDE 0.9 % (FLUSH) 0.9 %
5-40 SYRINGE (ML) INJECTION PRN
Status: DISCONTINUED | OUTPATIENT
Start: 2023-07-22 | End: 2023-07-23 | Stop reason: SDUPTHER

## 2023-07-22 RX ORDER — DEXAMETHASONE SODIUM PHOSPHATE 4 MG/ML
10 INJECTION, SOLUTION INTRA-ARTICULAR; INTRALESIONAL; INTRAMUSCULAR; INTRAVENOUS; SOFT TISSUE ONCE
Status: COMPLETED | OUTPATIENT
Start: 2023-07-22 | End: 2023-07-22

## 2023-07-22 RX ORDER — NOREPINEPHRINE BITARTRATE 0.06 MG/ML
1-100 INJECTION, SOLUTION INTRAVENOUS CONTINUOUS
Status: DISCONTINUED | OUTPATIENT
Start: 2023-07-22 | End: 2023-07-24

## 2023-07-22 RX ORDER — SODIUM CHLORIDE 0.9 % (FLUSH) 0.9 %
5-40 SYRINGE (ML) INJECTION EVERY 12 HOURS SCHEDULED
Status: DISCONTINUED | OUTPATIENT
Start: 2023-07-22 | End: 2023-07-23 | Stop reason: SDUPTHER

## 2023-07-22 RX ORDER — IPRATROPIUM BROMIDE AND ALBUTEROL SULFATE 2.5; .5 MG/3ML; MG/3ML
1 SOLUTION RESPIRATORY (INHALATION) ONCE
Status: COMPLETED | OUTPATIENT
Start: 2023-07-22 | End: 2023-07-22

## 2023-07-22 RX ORDER — SODIUM CHLORIDE 9 MG/ML
INJECTION, SOLUTION INTRAVENOUS PRN
Status: DISCONTINUED | OUTPATIENT
Start: 2023-07-22 | End: 2023-07-23 | Stop reason: SDUPTHER

## 2023-07-22 RX ORDER — 0.9 % SODIUM CHLORIDE 0.9 %
30 INTRAVENOUS SOLUTION INTRAVENOUS ONCE
Status: COMPLETED | OUTPATIENT
Start: 2023-07-22 | End: 2023-07-22

## 2023-07-22 RX ADMIN — SODIUM CHLORIDE 2151 ML: 9 INJECTION, SOLUTION INTRAVENOUS at 20:02

## 2023-07-22 RX ADMIN — AZITHROMYCIN MONOHYDRATE 500 MG: 500 INJECTION, POWDER, LYOPHILIZED, FOR SOLUTION INTRAVENOUS at 21:08

## 2023-07-22 RX ADMIN — IPRATROPIUM BROMIDE AND ALBUTEROL SULFATE 1 DOSE: .5; 3 SOLUTION RESPIRATORY (INHALATION) at 20:03

## 2023-07-22 RX ADMIN — SODIUM CHLORIDE, PRESERVATIVE FREE 5 ML: 5 INJECTION INTRAVENOUS at 20:32

## 2023-07-22 RX ADMIN — SODIUM CHLORIDE, PRESERVATIVE FREE 10 ML: 5 INJECTION INTRAVENOUS at 20:32

## 2023-07-22 RX ADMIN — Medication 5 MCG/MIN: at 22:30

## 2023-07-22 RX ADMIN — DEXAMETHASONE SODIUM PHOSPHATE 10 MG: 4 INJECTION, SOLUTION INTRAMUSCULAR; INTRAVENOUS at 20:28

## 2023-07-22 RX ADMIN — CEFTRIAXONE SODIUM 1000 MG: 1 INJECTION, POWDER, FOR SOLUTION INTRAMUSCULAR; INTRAVENOUS at 20:32

## 2023-07-22 ASSESSMENT — LIFESTYLE VARIABLES
HOW OFTEN DO YOU HAVE A DRINK CONTAINING ALCOHOL: NEVER
HOW MANY STANDARD DRINKS CONTAINING ALCOHOL DO YOU HAVE ON A TYPICAL DAY: PATIENT DOES NOT DRINK

## 2023-07-22 ASSESSMENT — PAIN - FUNCTIONAL ASSESSMENT: PAIN_FUNCTIONAL_ASSESSMENT: NONE - DENIES PAIN

## 2023-07-23 PROBLEM — J18.9 CAP (COMMUNITY ACQUIRED PNEUMONIA): Status: ACTIVE | Noted: 2023-07-23

## 2023-07-23 PROBLEM — C34.12 MALIGNANT NEOPLASM OF UPPER LOBE OF LEFT LUNG (HCC): Status: ACTIVE | Noted: 2022-08-01

## 2023-07-23 LAB
ANION GAP SERPL CALCULATED.3IONS-SCNC: 9 MMOL/L (ref 3–16)
BUN SERPL-MCNC: 31 MG/DL (ref 7–20)
CALCIUM SERPL-MCNC: 8.3 MG/DL (ref 8.3–10.6)
CHLORIDE SERPL-SCNC: 111 MMOL/L (ref 99–110)
CO2 SERPL-SCNC: 20 MMOL/L (ref 21–32)
CREAT SERPL-MCNC: 1.7 MG/DL (ref 0.6–1.2)
EKG ATRIAL RATE: 83 BPM
EKG DIAGNOSIS: NORMAL
EKG P AXIS: 67 DEGREES
EKG P-R INTERVAL: 126 MS
EKG Q-T INTERVAL: 382 MS
EKG QRS DURATION: 108 MS
EKG QTC CALCULATION (BAZETT): 448 MS
EKG R AXIS: -9 DEGREES
EKG T AXIS: 88 DEGREES
EKG VENTRICULAR RATE: 83 BPM
GFR SERPLBLD CREATININE-BSD FMLA CKD-EPI: 31 ML/MIN/{1.73_M2}
GLUCOSE SERPL-MCNC: 165 MG/DL (ref 70–99)
POTASSIUM SERPL-SCNC: 4.2 MMOL/L (ref 3.5–5.1)
SODIUM SERPL-SCNC: 140 MMOL/L (ref 136–145)
TSH SERPL DL<=0.005 MIU/L-ACNC: 0.64 UIU/ML (ref 0.27–4.2)

## 2023-07-23 PROCEDURE — 87641 MR-STAPH DNA AMP PROBE: CPT

## 2023-07-23 PROCEDURE — 2580000003 HC RX 258: Performed by: STUDENT IN AN ORGANIZED HEALTH CARE EDUCATION/TRAINING PROGRAM

## 2023-07-23 PROCEDURE — 6370000000 HC RX 637 (ALT 250 FOR IP): Performed by: STUDENT IN AN ORGANIZED HEALTH CARE EDUCATION/TRAINING PROGRAM

## 2023-07-23 PROCEDURE — 6360000002 HC RX W HCPCS: Performed by: STUDENT IN AN ORGANIZED HEALTH CARE EDUCATION/TRAINING PROGRAM

## 2023-07-23 PROCEDURE — 6370000000 HC RX 637 (ALT 250 FOR IP): Performed by: INTERNAL MEDICINE

## 2023-07-23 PROCEDURE — 2580000003 HC RX 258: Performed by: INTERNAL MEDICINE

## 2023-07-23 PROCEDURE — 2700000000 HC OXYGEN THERAPY PER DAY

## 2023-07-23 PROCEDURE — 80048 BASIC METABOLIC PNL TOTAL CA: CPT

## 2023-07-23 PROCEDURE — 6370000000 HC RX 637 (ALT 250 FOR IP): Performed by: NURSE PRACTITIONER

## 2023-07-23 PROCEDURE — 99223 1ST HOSP IP/OBS HIGH 75: CPT | Performed by: INTERNAL MEDICINE

## 2023-07-23 PROCEDURE — 94761 N-INVAS EAR/PLS OXIMETRY MLT: CPT

## 2023-07-23 PROCEDURE — 2000000000 HC ICU R&B

## 2023-07-23 PROCEDURE — 87449 NOS EACH ORGANISM AG IA: CPT

## 2023-07-23 PROCEDURE — 84443 ASSAY THYROID STIM HORMONE: CPT

## 2023-07-23 PROCEDURE — 93010 ELECTROCARDIOGRAM REPORT: CPT | Performed by: INTERNAL MEDICINE

## 2023-07-23 PROCEDURE — 94640 AIRWAY INHALATION TREATMENT: CPT

## 2023-07-23 RX ORDER — LANOLIN ALCOHOL/MO/W.PET/CERES
6 CREAM (GRAM) TOPICAL NIGHTLY PRN
Status: DISCONTINUED | OUTPATIENT
Start: 2023-07-23 | End: 2023-07-25 | Stop reason: HOSPADM

## 2023-07-23 RX ORDER — POTASSIUM CHLORIDE 7.45 MG/ML
10 INJECTION INTRAVENOUS PRN
Status: DISCONTINUED | OUTPATIENT
Start: 2023-07-23 | End: 2023-07-25 | Stop reason: HOSPADM

## 2023-07-23 RX ORDER — IPRATROPIUM BROMIDE AND ALBUTEROL SULFATE 2.5; .5 MG/3ML; MG/3ML
1 SOLUTION RESPIRATORY (INHALATION) EVERY 4 HOURS PRN
Status: DISCONTINUED | OUTPATIENT
Start: 2023-07-23 | End: 2023-07-25 | Stop reason: HOSPADM

## 2023-07-23 RX ORDER — ASPIRIN 81 MG/1
81 TABLET ORAL DAILY
Status: DISCONTINUED | OUTPATIENT
Start: 2023-07-23 | End: 2023-07-25 | Stop reason: HOSPADM

## 2023-07-23 RX ORDER — ACETAMINOPHEN 650 MG/1
650 SUPPOSITORY RECTAL EVERY 6 HOURS PRN
Status: DISCONTINUED | OUTPATIENT
Start: 2023-07-23 | End: 2023-07-25 | Stop reason: HOSPADM

## 2023-07-23 RX ORDER — MAGNESIUM SULFATE IN WATER 40 MG/ML
2000 INJECTION, SOLUTION INTRAVENOUS PRN
Status: DISCONTINUED | OUTPATIENT
Start: 2023-07-23 | End: 2023-07-25 | Stop reason: HOSPADM

## 2023-07-23 RX ORDER — SODIUM CHLORIDE 0.9 % (FLUSH) 0.9 %
5-40 SYRINGE (ML) INJECTION EVERY 12 HOURS SCHEDULED
Status: DISCONTINUED | OUTPATIENT
Start: 2023-07-23 | End: 2023-07-25 | Stop reason: HOSPADM

## 2023-07-23 RX ORDER — SODIUM CHLORIDE 9 MG/ML
INJECTION, SOLUTION INTRAVENOUS CONTINUOUS
Status: DISCONTINUED | OUTPATIENT
Start: 2023-07-23 | End: 2023-07-25 | Stop reason: HOSPADM

## 2023-07-23 RX ORDER — ATORVASTATIN CALCIUM 10 MG/1
10 TABLET, FILM COATED ORAL DAILY
Status: DISCONTINUED | OUTPATIENT
Start: 2023-07-23 | End: 2023-07-25 | Stop reason: HOSPADM

## 2023-07-23 RX ORDER — HEPARIN SODIUM 5000 [USP'U]/ML
5000 INJECTION, SOLUTION INTRAVENOUS; SUBCUTANEOUS EVERY 8 HOURS SCHEDULED
Status: DISCONTINUED | OUTPATIENT
Start: 2023-07-23 | End: 2023-07-25 | Stop reason: HOSPADM

## 2023-07-23 RX ORDER — SODIUM CHLORIDE 9 MG/ML
INJECTION, SOLUTION INTRAVENOUS PRN
Status: DISCONTINUED | OUTPATIENT
Start: 2023-07-23 | End: 2023-07-25 | Stop reason: HOSPADM

## 2023-07-23 RX ORDER — ACETAMINOPHEN 325 MG/1
650 TABLET ORAL EVERY 6 HOURS PRN
Status: DISCONTINUED | OUTPATIENT
Start: 2023-07-23 | End: 2023-07-25 | Stop reason: HOSPADM

## 2023-07-23 RX ORDER — SODIUM CHLORIDE 0.9 % (FLUSH) 0.9 %
5-40 SYRINGE (ML) INJECTION PRN
Status: DISCONTINUED | OUTPATIENT
Start: 2023-07-23 | End: 2023-07-25 | Stop reason: HOSPADM

## 2023-07-23 RX ORDER — HYDROCORTISONE 10 MG/1
50 TABLET ORAL 4 TIMES DAILY
Status: DISCONTINUED | OUTPATIENT
Start: 2023-07-23 | End: 2023-07-24

## 2023-07-23 RX ORDER — GUAIFENESIN/DEXTROMETHORPHAN 100-10MG/5
5 SYRUP ORAL EVERY 4 HOURS PRN
Status: DISCONTINUED | OUTPATIENT
Start: 2023-07-23 | End: 2023-07-25 | Stop reason: HOSPADM

## 2023-07-23 RX ORDER — IPRATROPIUM BROMIDE AND ALBUTEROL SULFATE 2.5; .5 MG/3ML; MG/3ML
1 SOLUTION RESPIRATORY (INHALATION)
Status: DISCONTINUED | OUTPATIENT
Start: 2023-07-24 | End: 2023-07-25

## 2023-07-23 RX ORDER — POTASSIUM CHLORIDE 20 MEQ/1
40 TABLET, EXTENDED RELEASE ORAL PRN
Status: DISCONTINUED | OUTPATIENT
Start: 2023-07-23 | End: 2023-07-25 | Stop reason: HOSPADM

## 2023-07-23 RX ADMIN — Medication 10 ML: at 20:46

## 2023-07-23 RX ADMIN — SODIUM CHLORIDE: 9 INJECTION, SOLUTION INTRAVENOUS at 09:42

## 2023-07-23 RX ADMIN — HYDROCORTISONE 50 MG: 10 TABLET ORAL at 17:03

## 2023-07-23 RX ADMIN — ASPIRIN 81 MG: 81 TABLET, COATED ORAL at 08:57

## 2023-07-23 RX ADMIN — ATORVASTATIN CALCIUM 10 MG: 10 TABLET, FILM COATED ORAL at 08:57

## 2023-07-23 RX ADMIN — HEPARIN SODIUM 5000 UNITS: 5000 INJECTION INTRAVENOUS; SUBCUTANEOUS at 13:13

## 2023-07-23 RX ADMIN — HEPARIN SODIUM 5000 UNITS: 5000 INJECTION INTRAVENOUS; SUBCUTANEOUS at 06:15

## 2023-07-23 RX ADMIN — HYDROCORTISONE 50 MG: 10 TABLET ORAL at 22:08

## 2023-07-23 RX ADMIN — SODIUM CHLORIDE: 9 INJECTION, SOLUTION INTRAVENOUS at 00:30

## 2023-07-23 RX ADMIN — Medication 2 PUFF: at 07:50

## 2023-07-23 RX ADMIN — IPRATROPIUM BROMIDE AND ALBUTEROL SULFATE 1 DOSE: .5; 3 SOLUTION RESPIRATORY (INHALATION) at 23:32

## 2023-07-23 RX ADMIN — GUAIFENESIN AND DEXTROMETHORPHAN 5 ML: 100; 10 SYRUP ORAL at 18:20

## 2023-07-23 RX ADMIN — CEFTRIAXONE SODIUM 1000 MG: 1 INJECTION, POWDER, FOR SOLUTION INTRAMUSCULAR; INTRAVENOUS at 20:44

## 2023-07-23 RX ADMIN — Medication 2 PUFF: at 21:00

## 2023-07-23 RX ADMIN — GUAIFENESIN AND DEXTROMETHORPHAN 5 ML: 100; 10 SYRUP ORAL at 23:30

## 2023-07-23 RX ADMIN — GUAIFENESIN AND DEXTROMETHORPHAN 5 ML: 100; 10 SYRUP ORAL at 13:08

## 2023-07-23 RX ADMIN — HYDROCORTISONE 50 MG: 10 TABLET ORAL at 13:03

## 2023-07-23 RX ADMIN — SODIUM CHLORIDE: 9 INJECTION, SOLUTION INTRAVENOUS at 19:43

## 2023-07-23 RX ADMIN — AZITHROMYCIN MONOHYDRATE 250 MG: 500 INJECTION, POWDER, LYOPHILIZED, FOR SOLUTION INTRAVENOUS at 22:07

## 2023-07-23 ASSESSMENT — PAIN SCALES - GENERAL
PAINLEVEL_OUTOF10: 0

## 2023-07-23 NOTE — H&P
Take 1 tablet by mouth daily as needed 8/3/22   Irlanda Miller MD       Physical Exam:    Physical Exam     General: NAD  Eyes: EOMI  ENT: neck supple  Cardiovascular: Regular rate. Respiratory: Expiratory wheezing on auscultation present  Gastrointestinal: Soft, non tender  Genitourinary: no suprapubic tenderness  Musculoskeletal: No edema  Skin: warm, dry  Neuro: Alert. Psych: Mood appropriate. Past Medical History:   PMHx   Past Medical History:   Diagnosis Date    CAD (coronary artery disease)     NONOBSTRUCTING    CKD (chronic kidney disease) stage 3, GFR 30-59 ml/min ([facility])     IBS (irritable bowel syndrome)     Lumbago     Malignant neoplasm of upper lobe of left lung ([address]) 08/2022    LAKESHA adenoCa 1.7cm    Osteoarthrosis, unspecified whether generalized or localized, unspecified site     Other abnormal blood chemistry     PAD (peripheral artery disease) ([facility])     has stents in lower legs bilaterally implanted by Dr. Thomson Fails    Pure hypercholesterolemia     Risk for falls 02/02/2016    S/p Left TKR    Spinal stenosis     Tremor     Unspecified essential hypertension     Wears glasses      PSHX:  has a past surgical history that includes Cholecystectomy; Tubal ligation; knee surgery; Rotator cuff repair (Bilateral); Colonoscopy; Leg Surgery; Insertable Cardiac Monitor (09/2015); Total knee arthroplasty (Bilateral, 01/12/2016); Shoulder arthroscopy (Left, 08/21/2020); US BIOPSY OF SALIVARY GLAND (06/17/2022); CT NEEDLE BIOPSY LUNG PERCUTANEOUS (06/17/2022); Pacemaker insertion (05/27/2022); Lung lobectomy (Left, 08/17/2022); Thoracoscopy (Left, 08/17/2022); Shoulder arthroscopy (Right); and Carotid endarterectomy (Left, 01/11/2023). Allergies: Allergies   Allergen Reactions    Morphine Nausea And Vomiting     Causes mental impairment    Pletal [Cilostazol] Diarrhea and Headaches    Gabapentin      \"Loopy\", forgetful, arms heavy feeling.      Ace Inhibitors Other (See Comments)     Cough

## 2023-07-23 NOTE — CONSULTS
PULMONARY AND CRITICAL CARE MEDICINE CONSULTATION NOTE    CONSULTING PHYSICIAN:      ADMISSION DATE: 7/22/2023  ADMISSION DIAGNOSIS: KHRIS (acute kidney injury) ([address]) [N17.9]  Septic shock (HCC) [A41.9, R65.21]  Sepsis ([address]) [A41.9]  Pneumonia due to infectious organism, unspecified laterality, unspecified part of lung [J18.9]    REASON FOR CONSULT:   Chief Complaint   Patient presents with    Dizziness     From home via [facility]. PT has been feeling nauseous since this morning, had a near syncopal episode before calling EMS. Per EMS, hypotensive at scene with BP 70/30, oxygen saturation 90% RA. PT given 4 IV zofran, 500 NS, placed on 2 L of O2 on en route. Hx of pacemaker, lung nodule which she is supposed to have checked on Monday. DATE OF CONSULT: 7/22/2023    HISTORY OF PRESENT ILLNESS: 74y.o. year old female with a past medical history significant for chronic active tobacco abuse, lung cancer s/p left upper lobectomy, coronary artery disease, irritable bowel syndrome, spinal stenosis, hyperlipidemia presented to the hospital with generalized weakness and persistent cough. Patient was recently seen in the pulmonary office for ongoing cough for last few months. Patient has also reported weight loss and poor appetite. Patient reports that her cough is intermittent but can happen anytime of the day. Mostly dry but occasionally productive of yellowish to whitish expectoration. She has not had any hemoptysis, fevers or night sweats. Denies any chest pain or chest tightness. She was recently started on Symbicort and albuterol which did not help. Simultaneously was given multiple courses of antibiotics which have failed to resolve her symptoms. Thereafter she underwent a CT chest which showed pleural nodules, loculated effusion as well as possible adrenal mets. Patient felt extremely weak and came into the ER. In the ER she was seen to be hypotensive. Patient was started on Levophed gtt.

## 2023-07-23 NOTE — ED PROVIDER NOTES
[facility]        Pt Name: Jose Cruz Blackmon  MRN: [id number]  [address] [facility] [place] 1950  Date of evaluation: 7/22/2023  Provider: Bipin Castaneda PA-C  PCP: Williams Carbajal MD  Note Started: 8:07 PM EDT 7/22/23       I have seen and evaluated this patient with my supervising physician Kiesha Acosta MD.    I personally saw the patient and independently provided 33 minutes of non-concurrent critical care time out of the total critical care time provided. This excludes time spent doing separately billable procedures. This includes time at the bedside, data interpretation, medication management, obtaining critical history from collateral sources if the patient is unable to provide it directly, and physician consultation. Specifics of interventions taken and potentially life-threatening diagnostic considerations are listed above in the medical decision making. CHIEF COMPLAINT       Chief Complaint   Patient presents with    Dizziness     From home via [facility]. PT has been feeling nauseous since this morning, had a near syncopal episode before calling EMS. Per EMS, hypotensive at scene with BP 70/30, oxygen saturation 90% RA. PT given 4 IV zofran, 500 NS, placed on 2 L of O2 on en route. Hx of pacemaker, lung nodule which she is supposed to have checked on Monday. HISTORY OF PRESENT ILLNESS: 1 or more Elements     History From: patient  Limitations to history : None    Jose Cruz Blackmon is a 74 y.o. female who presents to the emergency department with a chief complaints of nausea, general weakness and near syncope. States has been having a cough for multiple weeks. Was recently seen by her pulmonologist 2 days ago and had CT imaging as an outpatient was placed on Augmentin. She has been taking this at home. States she had decreased oral intake and had a near syncopal episode today.   At the time I see her she denies

## 2023-07-23 NOTE — ED PROVIDER NOTES
This patient was seen by the Mid-Level Provider. I have seen and examined the patient, agree with the workup, evaluation, management and diagnosis. Care plan has been discussed. My assessment reveals a 68-year-old female who presents with a cough. This is a 68-year-old female presents with a cough and lightheadedness. The patient been feeling bad now for the last several days but got worse this morning. She denies any actual fevers or chills. Radiology results:    XR CHEST PORTABLE   Final Result   1. Dense consolidation left parahilar region and lower left lung with   obscuration left hemidiaphragm in keeping with pleural effusion. Pneumonia   is a consideration. 2. Pulmonary sequela typical of that seen with smoking, including COPD;   correlate with clinical history. 3. Calcific atherosclerotic disease aorta. LABS:    Labs Reviewed   CBC WITH AUTO DIFFERENTIAL - Abnormal; Notable for the following components:       Result Value    WBC 14.4 (*)     Hemoglobin 11.3 (*)     MCHC 30.9 (*)     RDW 17.7 (*)     Neutrophils Absolute 11.4 (*)     All other components within normal limits   COMPREHENSIVE METABOLIC PANEL W/ REFLEX TO MG FOR LOW K - Abnormal; Notable for the following components:    CO2 18 (*)     Glucose 111 (*)     BUN 38 (*)     Creatinine 2.1 (*)     Est, Glom Filt Rate 24 (*)     Albumin 3.0 (*)     Albumin/Globulin Ratio 0.8 (*)     ALT 7 (*)     All other components within normal limits   TROPONIN - Abnormal; Notable for the following components:    Troponin, High Sensitivity 17 (*)     All other components within normal limits   COVID-19, RAPID   CULTURE, BLOOD 1   CULTURE, BLOOD 2   BRAIN NATRIURETIC PEPTIDE   LACTATE, SEPSIS   MAGNESIUM   TROPONIN   LACTATE, SEPSIS           EKG:    Sinus rhythm at a rate of 83 beats a minute with no acute ST elevations or depressions. Q waves in the anterior precordial leads noted.     Exam:    Well-nourished female, mildly

## 2023-07-23 NOTE — ED NOTES
PT transported to ICU at this time in stable condition, via bed, with ED cardiac monitor in place, Levophed infusing at 16mcg/min, by this RN.      Lilly Ovalles RN  07/22/23 8051
Glucose 111 (*)     BUN 38 (*)     Creatinine 2.1 (*)     Est, Glom Filt Rate 24 (*)     Albumin 3.0 (*)     Albumin/Globulin Ratio 0.8 (*)     ALT 7 (*)     All other components within normal limits   TROPONIN - Abnormal; Notable for the following components:    Troponin, High Sensitivity 17 (*)     All other components within normal limits   TROPONIN - Abnormal; Notable for the following components:    Troponin, High Sensitivity 16 (*)     All other components within normal limits     Critical values: yes, hypotensive     Abnormal Assessment Findings: Bilateral crackles, weakness, lightheadedness, frequent productive cough    Background  History:   Past Medical History:   Diagnosis Date    CAD (coronary artery disease)     NONOBSTRUCTING    CKD (chronic kidney disease) stage 3, GFR 30-59 ml/min ([facility])     IBS (irritable bowel syndrome)     Lumbago     Malignant neoplasm of upper lobe of left lung ([address]) 08/2022    LAKESHA adenoCa 1.7cm    Osteoarthrosis, unspecified whether generalized or localized, unspecified site     Other abnormal blood chemistry     PAD (peripheral artery disease) ([facility])     has stents in lower legs bilaterally implanted by Dr. Debbie Abrams    Pure hypercholesterolemia     Risk for falls 02/02/2016    S/p Left TKR    Spinal stenosis     Tremor     Unspecified essential hypertension     Wears glasses        Assessment    Vitals/MEWS: MEWS Score: 3  Level of Consciousness: Alert (0)   Vitals:    07/22/23 2245 07/22/23 2250 07/22/23 2255 07/22/23 2300   BP: 85/65 (!) 76/48 (!) 69/52 (!) 78/62   Pulse: 77 90 81 79   Resp: 21 18 25 22   Temp:       TempSrc:       SpO2: 94% 92% 97% 95%   Weight:       Height:         FiO2 (%): nasal cannula for respiratory distress  O2 Flow Rate: O2 Device: Nasal cannula O2 Flow Rate (L/min): 2 L/min  Cardiac Rhythm: Cardiac Rhythm: Sinus rhythm  Pain Assessment: N/A [x] Verbal [] Pearlene Spruce Scale  Pain Scale: Pain Assessment  Pain Assessment: None - Denies Pain  Last documented

## 2023-07-24 ENCOUNTER — ANESTHESIA EVENT (OUTPATIENT)
Dept: ENDOSCOPY | Age: 73
DRG: 871 | End: 2023-07-24
Payer: COMMERCIAL

## 2023-07-24 ENCOUNTER — ANESTHESIA (OUTPATIENT)
Dept: ENDOSCOPY | Age: 73
DRG: 871 | End: 2023-07-24
Payer: COMMERCIAL

## 2023-07-24 PROBLEM — Z85.118 HISTORY OF LUNG CANCER: Status: ACTIVE | Noted: 2023-07-24

## 2023-07-24 PROBLEM — I95.9 HYPOTENSION: Status: ACTIVE | Noted: 2023-07-24

## 2023-07-24 PROBLEM — R59.0 MEDIASTINAL LYMPHADENOPATHY: Status: ACTIVE | Noted: 2023-07-24

## 2023-07-24 LAB
ALBUMIN SERPL-MCNC: 3.2 G/DL (ref 3.4–5)
ANION GAP SERPL CALCULATED.3IONS-SCNC: 12 MMOL/L (ref 3–16)
APPEARANCE BRONCH: CLEAR
BASOPHILS # BLD: 0 K/UL (ref 0–0.2)
BASOPHILS NFR BLD: 0.2 %
BUN SERPL-MCNC: 32 MG/DL (ref 7–20)
CALCIUM SERPL-MCNC: 8.3 MG/DL (ref 8.3–10.6)
CHLORIDE SERPL-SCNC: 110 MMOL/L (ref 99–110)
CILIATED BAL QL: 5 %
CLOT SPEC QL: ABNORMAL
CO2 SERPL-SCNC: 19 MMOL/L (ref 21–32)
COLOR BRONCH: COLORLESS
CREAT SERPL-MCNC: 1.3 MG/DL (ref 0.6–1.2)
DEPRECATED RDW RBC AUTO: 17.5 % (ref 12.4–15.4)
EOSINOPHIL # BLD: 0 K/UL (ref 0–0.6)
EOSINOPHIL NFR BLD: 0 %
GFR SERPLBLD CREATININE-BSD FMLA CKD-EPI: 43 ML/MIN/{1.73_M2}
GLUCOSE SERPL-MCNC: 174 MG/DL (ref 70–99)
HCT VFR BLD AUTO: 32.3 % (ref 36–48)
HGB BLD-MCNC: 10 G/DL (ref 12–16)
INR PPP: 1.13 (ref 0.84–1.16)
LEGIONELLA AG UR QL: NORMAL
LYMPHOCYTES # BLD: 0.5 K/UL (ref 1–5.1)
LYMPHOCYTES NFR BLD: 3.3 %
LYMPHOCYTES NFR BRONCH MANUAL: 12 % (ref 5–10)
MACROPHAGES NFR BRONCH MANUAL: 80 % (ref 90–95)
MAGNESIUM SERPL-MCNC: 1.8 MG/DL (ref 1.8–2.4)
MCH RBC QN AUTO: 26.9 PG (ref 26–34)
MCHC RBC AUTO-ENTMCNC: 31.1 G/DL (ref 31–36)
MCV RBC AUTO: 86.6 FL (ref 80–100)
MONOCYTES # BLD: 0.9 K/UL (ref 0–1.3)
MONOCYTES NFR BLD: 5.8 %
MONONUC CELLS NFR FLD MANUAL: 1 %
NEUTROPHILS # BLD: 14.6 K/UL (ref 1.7–7.7)
NEUTROPHILS NFR BLD: 90.7 %
NEUTROPHILS NFR BRONCH MANUAL: 2 % (ref 5–10)
NUC CELL # BRONCH MANUAL: 167 /CUMM
PATH REV: YES
PHOSPHATE SERPL-MCNC: 2.5 MG/DL (ref 2.5–4.9)
PLATELET # BLD AUTO: 273 K/UL (ref 135–450)
PMV BLD AUTO: 8.3 FL (ref 5–10.5)
POTASSIUM SERPL-SCNC: 3.8 MMOL/L (ref 3.5–5.1)
PROTHROMBIN TIME: 14.5 SEC (ref 11.5–14.8)
RBC # BLD AUTO: 3.73 M/UL (ref 4–5.2)
RBC # FLD MANUAL: 1100 /CUMM
S PNEUM AG UR QL: NORMAL
SODIUM SERPL-SCNC: 141 MMOL/L (ref 136–145)
TOTAL CELLS COUNTED BRONCH: 100
WBC # BLD AUTO: 16 K/UL (ref 4–11)

## 2023-07-24 PROCEDURE — 88112 CYTOPATH CELL ENHANCE TECH: CPT

## 2023-07-24 PROCEDURE — 99233 SBSQ HOSP IP/OBS HIGH 50: CPT | Performed by: INTERNAL MEDICINE

## 2023-07-24 PROCEDURE — 2700000000 HC OXYGEN THERAPY PER DAY

## 2023-07-24 PROCEDURE — 97165 OT EVAL LOW COMPLEX 30 MIN: CPT

## 2023-07-24 PROCEDURE — 88172 CYTP DX EVAL FNA 1ST EA SITE: CPT

## 2023-07-24 PROCEDURE — 3700000000 HC ANESTHESIA ATTENDED CARE: Performed by: INTERNAL MEDICINE

## 2023-07-24 PROCEDURE — 88177 CYTP FNA EVAL EA ADDL: CPT

## 2023-07-24 PROCEDURE — 88305 TISSUE EXAM BY PATHOLOGIST: CPT

## 2023-07-24 PROCEDURE — 6370000000 HC RX 637 (ALT 250 FOR IP): Performed by: STUDENT IN AN ORGANIZED HEALTH CARE EDUCATION/TRAINING PROGRAM

## 2023-07-24 PROCEDURE — 2720000010 HC SURG SUPPLY STERILE: Performed by: INTERNAL MEDICINE

## 2023-07-24 PROCEDURE — 97535 SELF CARE MNGMENT TRAINING: CPT

## 2023-07-24 PROCEDURE — 3609020000 HC BRONCHOSCOPY W/EBUS FNA: Performed by: INTERNAL MEDICINE

## 2023-07-24 PROCEDURE — 97162 PT EVAL MOD COMPLEX 30 MIN: CPT

## 2023-07-24 PROCEDURE — 6370000000 HC RX 637 (ALT 250 FOR IP): Performed by: INTERNAL MEDICINE

## 2023-07-24 PROCEDURE — 6360000002 HC RX W HCPCS: Performed by: REGISTERED NURSE

## 2023-07-24 PROCEDURE — 0B9D8ZX DRAINAGE OF RIGHT MIDDLE LUNG LOBE, VIA NATURAL OR ARTIFICIAL OPENING ENDOSCOPIC, DIAGNOSTIC: ICD-10-PCS | Performed by: INTERNAL MEDICINE

## 2023-07-24 PROCEDURE — 97530 THERAPEUTIC ACTIVITIES: CPT

## 2023-07-24 PROCEDURE — 31652 BRONCH EBUS SAMPLNG 1/2 NODE: CPT | Performed by: INTERNAL MEDICINE

## 2023-07-24 PROCEDURE — 3700000001 HC ADD 15 MINUTES (ANESTHESIA): Performed by: INTERNAL MEDICINE

## 2023-07-24 PROCEDURE — 2580000003 HC RX 258: Performed by: REGISTERED NURSE

## 2023-07-24 PROCEDURE — 2709999900 HC NON-CHARGEABLE SUPPLY: Performed by: INTERNAL MEDICINE

## 2023-07-24 PROCEDURE — 87116 MYCOBACTERIA CULTURE: CPT

## 2023-07-24 PROCEDURE — 87070 CULTURE OTHR SPECIMN AEROBIC: CPT

## 2023-07-24 PROCEDURE — 2000000000 HC ICU R&B

## 2023-07-24 PROCEDURE — 88184 FLOWCYTOMETRY/ TC 1 MARKER: CPT

## 2023-07-24 PROCEDURE — 31624 DX BRONCHOSCOPE/LAVAGE: CPT | Performed by: INTERNAL MEDICINE

## 2023-07-24 PROCEDURE — 7100000001 HC PACU RECOVERY - ADDTL 15 MIN: Performed by: INTERNAL MEDICINE

## 2023-07-24 PROCEDURE — 94761 N-INVAS EAR/PLS OXIMETRY MLT: CPT

## 2023-07-24 PROCEDURE — 94640 AIRWAY INHALATION TREATMENT: CPT

## 2023-07-24 PROCEDURE — 36415 COLL VENOUS BLD VENIPUNCTURE: CPT

## 2023-07-24 PROCEDURE — 88342 IMHCHEM/IMCYTCHM 1ST ANTB: CPT

## 2023-07-24 PROCEDURE — 2580000003 HC RX 258: Performed by: STUDENT IN AN ORGANIZED HEALTH CARE EDUCATION/TRAINING PROGRAM

## 2023-07-24 PROCEDURE — 87206 SMEAR FLUORESCENT/ACID STAI: CPT

## 2023-07-24 PROCEDURE — 6370000000 HC RX 637 (ALT 250 FOR IP): Performed by: NURSE PRACTITIONER

## 2023-07-24 PROCEDURE — 80069 RENAL FUNCTION PANEL: CPT

## 2023-07-24 PROCEDURE — 89051 BODY FLUID CELL COUNT: CPT

## 2023-07-24 PROCEDURE — 88185 FLOWCYTOMETRY/TC ADD-ON: CPT

## 2023-07-24 PROCEDURE — 97116 GAIT TRAINING THERAPY: CPT

## 2023-07-24 PROCEDURE — 6360000002 HC RX W HCPCS: Performed by: STUDENT IN AN ORGANIZED HEALTH CARE EDUCATION/TRAINING PROGRAM

## 2023-07-24 PROCEDURE — C1725 CATH, TRANSLUMIN NON-LASER: HCPCS | Performed by: INTERNAL MEDICINE

## 2023-07-24 PROCEDURE — 88341 IMHCHEM/IMCYTCHM EA ADD ANTB: CPT

## 2023-07-24 PROCEDURE — 7100000000 HC PACU RECOVERY - FIRST 15 MIN: Performed by: INTERNAL MEDICINE

## 2023-07-24 PROCEDURE — 3609010800 HC BRONCHOSCOPY ALVEOLAR LAVAGE: Performed by: INTERNAL MEDICINE

## 2023-07-24 PROCEDURE — 6360000002 HC RX W HCPCS: Performed by: INTERNAL MEDICINE

## 2023-07-24 PROCEDURE — 2500000003 HC RX 250 WO HCPCS: Performed by: REGISTERED NURSE

## 2023-07-24 PROCEDURE — 88173 CYTOPATH EVAL FNA REPORT: CPT

## 2023-07-24 PROCEDURE — 85025 COMPLETE CBC W/AUTO DIFF WBC: CPT

## 2023-07-24 PROCEDURE — 87102 FUNGUS ISOLATION CULTURE: CPT

## 2023-07-24 PROCEDURE — 87205 SMEAR GRAM STAIN: CPT

## 2023-07-24 PROCEDURE — 85610 PROTHROMBIN TIME: CPT

## 2023-07-24 PROCEDURE — 83735 ASSAY OF MAGNESIUM: CPT

## 2023-07-24 RX ORDER — SODIUM CHLORIDE 0.9 % (FLUSH) 0.9 %
5-40 SYRINGE (ML) INJECTION PRN
Status: DISCONTINUED | OUTPATIENT
Start: 2023-07-24 | End: 2023-07-24 | Stop reason: HOSPADM

## 2023-07-24 RX ORDER — SODIUM CHLORIDE 9 MG/ML
INJECTION, SOLUTION INTRAVENOUS CONTINUOUS PRN
Status: DISCONTINUED | OUTPATIENT
Start: 2023-07-24 | End: 2023-07-24 | Stop reason: SDUPTHER

## 2023-07-24 RX ORDER — DIPHENHYDRAMINE HYDROCHLORIDE 50 MG/ML
12.5 INJECTION INTRAMUSCULAR; INTRAVENOUS
Status: DISCONTINUED | OUTPATIENT
Start: 2023-07-24 | End: 2023-07-24 | Stop reason: HOSPADM

## 2023-07-24 RX ORDER — SODIUM CHLORIDE 9 MG/ML
INJECTION, SOLUTION INTRAVENOUS PRN
Status: DISCONTINUED | OUTPATIENT
Start: 2023-07-24 | End: 2023-07-24 | Stop reason: HOSPADM

## 2023-07-24 RX ORDER — SODIUM CHLORIDE 0.9 % (FLUSH) 0.9 %
5-40 SYRINGE (ML) INJECTION EVERY 12 HOURS SCHEDULED
Status: DISCONTINUED | OUTPATIENT
Start: 2023-07-24 | End: 2023-07-24 | Stop reason: HOSPADM

## 2023-07-24 RX ORDER — HYDROMORPHONE HYDROCHLORIDE 2 MG/ML
0.25 INJECTION, SOLUTION INTRAMUSCULAR; INTRAVENOUS; SUBCUTANEOUS EVERY 5 MIN PRN
Status: DISCONTINUED | OUTPATIENT
Start: 2023-07-24 | End: 2023-07-24 | Stop reason: HOSPADM

## 2023-07-24 RX ORDER — HYDROMORPHONE HYDROCHLORIDE 2 MG/ML
0.5 INJECTION, SOLUTION INTRAMUSCULAR; INTRAVENOUS; SUBCUTANEOUS EVERY 5 MIN PRN
Status: DISCONTINUED | OUTPATIENT
Start: 2023-07-24 | End: 2023-07-24 | Stop reason: HOSPADM

## 2023-07-24 RX ORDER — EPHEDRINE SULFATE 50 MG/ML
INJECTION INTRAVENOUS PRN
Status: DISCONTINUED | OUTPATIENT
Start: 2023-07-24 | End: 2023-07-24 | Stop reason: SDUPTHER

## 2023-07-24 RX ORDER — FENTANYL CITRATE 50 UG/ML
INJECTION, SOLUTION INTRAMUSCULAR; INTRAVENOUS PRN
Status: DISCONTINUED | OUTPATIENT
Start: 2023-07-24 | End: 2023-07-24 | Stop reason: SDUPTHER

## 2023-07-24 RX ORDER — GLYCOPYRROLATE 0.2 MG/ML
INJECTION INTRAMUSCULAR; INTRAVENOUS PRN
Status: DISCONTINUED | OUTPATIENT
Start: 2023-07-24 | End: 2023-07-24 | Stop reason: SDUPTHER

## 2023-07-24 RX ORDER — LIDOCAINE HYDROCHLORIDE 40 MG/ML
SOLUTION TOPICAL PRN
Status: DISCONTINUED | OUTPATIENT
Start: 2023-07-24 | End: 2023-07-24 | Stop reason: ALTCHOICE

## 2023-07-24 RX ORDER — PROPOFOL 10 MG/ML
INJECTION, EMULSION INTRAVENOUS PRN
Status: DISCONTINUED | OUTPATIENT
Start: 2023-07-24 | End: 2023-07-24 | Stop reason: SDUPTHER

## 2023-07-24 RX ORDER — ONDANSETRON 2 MG/ML
4 INJECTION INTRAMUSCULAR; INTRAVENOUS
Status: DISCONTINUED | OUTPATIENT
Start: 2023-07-24 | End: 2023-07-24 | Stop reason: HOSPADM

## 2023-07-24 RX ADMIN — EPHEDRINE SULFATE 10 MG: 50 INJECTION, SOLUTION INTRAVENOUS at 11:59

## 2023-07-24 RX ADMIN — PROPOFOL 50 MG: 10 INJECTION, EMULSION INTRAVENOUS at 11:39

## 2023-07-24 RX ADMIN — SODIUM CHLORIDE: 9 INJECTION, SOLUTION INTRAVENOUS at 11:26

## 2023-07-24 RX ADMIN — PHENYLEPHRINE HYDROCHLORIDE 100 MCG: 10 INJECTION INTRAVENOUS at 11:40

## 2023-07-24 RX ADMIN — PHENYLEPHRINE HYDROCHLORIDE 100 MCG: 10 INJECTION INTRAVENOUS at 11:48

## 2023-07-24 RX ADMIN — PROPOFOL 150 MG: 10 INJECTION, EMULSION INTRAVENOUS at 11:31

## 2023-07-24 RX ADMIN — CEFTRIAXONE SODIUM 1000 MG: 1 INJECTION, POWDER, FOR SOLUTION INTRAMUSCULAR; INTRAVENOUS at 21:34

## 2023-07-24 RX ADMIN — PHENYLEPHRINE HYDROCHLORIDE 200 MCG: 10 INJECTION INTRAVENOUS at 11:52

## 2023-07-24 RX ADMIN — GLYCOPYRROLATE 0.4 MG: 0.2 INJECTION, SOLUTION INTRAMUSCULAR; INTRAVENOUS at 11:27

## 2023-07-24 RX ADMIN — FENTANYL CITRATE 50 MCG: 50 INJECTION, SOLUTION INTRAMUSCULAR; INTRAVENOUS at 11:49

## 2023-07-24 RX ADMIN — IPRATROPIUM BROMIDE AND ALBUTEROL SULFATE 1 DOSE: 2.5; .5 SOLUTION RESPIRATORY (INHALATION) at 21:14

## 2023-07-24 RX ADMIN — Medication 10 ML: at 22:17

## 2023-07-24 RX ADMIN — PHENYLEPHRINE HYDROCHLORIDE 100 MCG: 10 INJECTION INTRAVENOUS at 11:33

## 2023-07-24 RX ADMIN — PHENYLEPHRINE HYDROCHLORIDE 200 MCG: 10 INJECTION INTRAVENOUS at 11:57

## 2023-07-24 RX ADMIN — Medication 2 PUFF: at 08:11

## 2023-07-24 RX ADMIN — IPRATROPIUM BROMIDE AND ALBUTEROL SULFATE 1 DOSE: 2.5; .5 SOLUTION RESPIRATORY (INHALATION) at 14:24

## 2023-07-24 RX ADMIN — Medication 10 ML: at 09:21

## 2023-07-24 RX ADMIN — GUAIFENESIN AND DEXTROMETHORPHAN 5 ML: 100; 10 SYRUP ORAL at 14:22

## 2023-07-24 RX ADMIN — Medication 2 PUFF: at 21:14

## 2023-07-24 RX ADMIN — PHENYLEPHRINE HYDROCHLORIDE 200 MCG: 10 INJECTION INTRAVENOUS at 12:12

## 2023-07-24 RX ADMIN — PROPOFOL 20 MG: 10 INJECTION, EMULSION INTRAVENOUS at 11:48

## 2023-07-24 RX ADMIN — HYDROCORTISONE SODIUM SUCCINATE 100 MG: 100 INJECTION, POWDER, FOR SOLUTION INTRAMUSCULAR; INTRAVENOUS at 09:10

## 2023-07-24 RX ADMIN — AZITHROMYCIN MONOHYDRATE 250 MG: 500 INJECTION, POWDER, LYOPHILIZED, FOR SOLUTION INTRAVENOUS at 20:53

## 2023-07-24 RX ADMIN — LIDOCAINE HYDROCHLORIDE 100 ML: 40 SOLUTION TOPICAL at 11:27

## 2023-07-24 RX ADMIN — IPRATROPIUM BROMIDE AND ALBUTEROL SULFATE 1 DOSE: 2.5; .5 SOLUTION RESPIRATORY (INHALATION) at 08:11

## 2023-07-24 RX ADMIN — IPRATROPIUM BROMIDE AND ALBUTEROL SULFATE 1 DOSE: .5; 3 SOLUTION RESPIRATORY (INHALATION) at 05:30

## 2023-07-24 RX ADMIN — FENTANYL CITRATE 25 MCG: 50 INJECTION, SOLUTION INTRAMUSCULAR; INTRAVENOUS at 12:20

## 2023-07-24 RX ADMIN — GUAIFENESIN AND DEXTROMETHORPHAN 5 ML: 100; 10 SYRUP ORAL at 20:50

## 2023-07-24 RX ADMIN — PHENYLEPHRINE HYDROCHLORIDE 100 MCG: 10 INJECTION INTRAVENOUS at 11:45

## 2023-07-24 ASSESSMENT — PAIN SCALES - GENERAL
PAINLEVEL_OUTOF10: 0

## 2023-07-24 ASSESSMENT — ENCOUNTER SYMPTOMS: SHORTNESS OF BREATH: 0

## 2023-07-24 ASSESSMENT — PAIN SCALES - WONG BAKER: WONGBAKER_NUMERICALRESPONSE: 0

## 2023-07-24 ASSESSMENT — PAIN - FUNCTIONAL ASSESSMENT: PAIN_FUNCTIONAL_ASSESSMENT: 0-10

## 2023-07-24 NOTE — ANESTHESIA POSTPROCEDURE EVALUATION
Department of Anesthesiology  Postprocedure Note    Patient: Ankush Gunter  MRN: [id number]  YOB: 1950  Date of evaluation: 7/24/2023      Procedure Summary     Date: 07/24/23 Room / Location: [address] [place] [address]    Anesthesia Start: 1125 Anesthesia Stop: 1233    Procedures:       BRONCHOSCOPY W/EBUS FNA  -PRETRACHEAL FNA X6 PASSES -SUBCARINAL FNA X4 PASSES       BRONCHOSCOPY ALVEOLAR LAVAGE Diagnosis:       Recurrent malignant neoplasm of lung of unknown cell type, unspecified laterality (HCC)      (Recurrent malignant neoplasm of lung of unknown cell type, unspecified laterality ([address]) [C34.90])    Surgeons: Jeremias Vo MD Responsible Provider: Helder Delong MD    Anesthesia Type: general ASA Status: 3          Anesthesia Type: No value filed.     Nola Phase I: Nola Score: 8    Nola Phase II:        Anesthesia Post Evaluation    Patient location during evaluation: PACU  Patient participation: complete - patient participated  Level of consciousness: awake and alert  Pain score: 2  Airway patency: patent  Nausea & Vomiting: no vomiting  Complications: no  Cardiovascular status: blood pressure returned to baseline  Respiratory status: acceptable  Hydration status: euvolemic  Multimodal analgesia pain management approach

## 2023-07-24 NOTE — CONSULTS
[facility]    Consult Note      Requesting Physician:  The pulmonologist    CHIEF COMPLAINT:  Passing out      HISTORY OF PRESENT ILLNESS:    Ms. Molly Odell  is a 74 y.o. female we are seeing in consultation for Recurrent cancer. She was diagnosed stage IIa adenocarcinoma of the lung on a year ago. She had lobectomy last August.  However she did not return for prompt follow-up. Therefore it was decided to have close observation and a follow-up. Her recent CT scan showed new mediastinal lymphadenopathy and pleural disease. She was supposed to have biopsy. She passed out this time. She was found to be hypotensive. She was weak. She has some cough. She was given hydration. She is feeling better. ICD-10-CM    1. Septic shock (HCC)  A41.9     R65.21       2. Pneumonia due to infectious organism, unspecified laterality, unspecified part of lung  J18.9       3.  KHRIS (acute kidney injury) ([address])  N17.9              Past Medical History:  Past Medical History:   Diagnosis Date    CAD (coronary artery disease)     NONOBSTRUCTING    CKD (chronic kidney disease) stage 3, GFR 30-59 ml/min ([facility])     IBS (irritable bowel syndrome)     Lumbago     Malignant neoplasm of upper lobe of left lung ([address]) 08/2022    LAKESHA adenoCa 1.7cm    Osteoarthrosis, unspecified whether generalized or localized, unspecified site     Other abnormal blood chemistry     PAD (peripheral artery disease) ([facility])     has stents in lower legs bilaterally implanted by Dr. Stephanie Monson    Pure hypercholesterolemia     Risk for falls 02/02/2016    S/p Left TKR    Spinal stenosis     Tremor     Unspecified essential hypertension     Wears glasses        Past Surgical History:  Past Surgical History:   Procedure Laterality Date    CAROTID ENDARTERECTOMY Left 01/11/2023    LEFT CAROTID ENDARTERECTOMY performed by Emilia Garcia MD

## 2023-07-25 ENCOUNTER — TELEPHONE (OUTPATIENT)
Dept: FAMILY MEDICINE CLINIC | Age: 73
End: 2023-07-25

## 2023-07-25 VITALS
SYSTOLIC BLOOD PRESSURE: 139 MMHG | OXYGEN SATURATION: 93 % | HEART RATE: 98 BPM | DIASTOLIC BLOOD PRESSURE: 93 MMHG | RESPIRATION RATE: 20 BRPM | BODY MASS INDEX: 32.29 KG/M2 | WEIGHT: 175.49 LBS | TEMPERATURE: 96.5 F | HEIGHT: 62 IN

## 2023-07-25 LAB
ACID FAST STN SPEC QL: NORMAL
ACID FAST STN SPEC QL: NORMAL
ALBUMIN SERPL-MCNC: 3.2 G/DL (ref 3.4–5)
ANION GAP SERPL CALCULATED.3IONS-SCNC: 11 MMOL/L (ref 3–16)
BUN SERPL-MCNC: 23 MG/DL (ref 7–20)
CALCIUM SERPL-MCNC: 8.7 MG/DL (ref 8.3–10.6)
CHLORIDE SERPL-SCNC: 111 MMOL/L (ref 99–110)
CO2 SERPL-SCNC: 22 MMOL/L (ref 21–32)
CREAT SERPL-MCNC: 1 MG/DL (ref 0.6–1.2)
DEPRECATED RDW RBC AUTO: 17.6 % (ref 12.4–15.4)
GFR SERPLBLD CREATININE-BSD FMLA CKD-EPI: 59 ML/MIN/{1.73_M2}
GLUCOSE SERPL-MCNC: 82 MG/DL (ref 70–99)
HCT VFR BLD AUTO: 34.9 % (ref 36–48)
HGB BLD-MCNC: 10.7 G/DL (ref 12–16)
LOEFFLER MB STN SPEC: NORMAL
LOEFFLER MB STN SPEC: NORMAL
MAGNESIUM SERPL-MCNC: 1.9 MG/DL (ref 1.8–2.4)
MCH RBC QN AUTO: 27 PG (ref 26–34)
MCHC RBC AUTO-ENTMCNC: 30.6 G/DL (ref 31–36)
MCV RBC AUTO: 88.2 FL (ref 80–100)
MRSA DNA SPEC QL NAA+PROBE: NORMAL
PHOSPHATE SERPL-MCNC: 2 MG/DL (ref 2.5–4.9)
PLATELET # BLD AUTO: 321 K/UL (ref 135–450)
PMV BLD AUTO: 8.5 FL (ref 5–10.5)
POTASSIUM SERPL-SCNC: 3.6 MMOL/L (ref 3.5–5.1)
RBC # BLD AUTO: 3.96 M/UL (ref 4–5.2)
SODIUM SERPL-SCNC: 144 MMOL/L (ref 136–145)
WBC # BLD AUTO: 16.7 K/UL (ref 4–11)

## 2023-07-25 PROCEDURE — 85027 COMPLETE CBC AUTOMATED: CPT

## 2023-07-25 PROCEDURE — 6370000000 HC RX 637 (ALT 250 FOR IP): Performed by: INTERNAL MEDICINE

## 2023-07-25 PROCEDURE — 97535 SELF CARE MNGMENT TRAINING: CPT

## 2023-07-25 PROCEDURE — 2700000000 HC OXYGEN THERAPY PER DAY

## 2023-07-25 PROCEDURE — 36415 COLL VENOUS BLD VENIPUNCTURE: CPT

## 2023-07-25 PROCEDURE — 80069 RENAL FUNCTION PANEL: CPT

## 2023-07-25 PROCEDURE — 94680 O2 UPTK RST&XERS DIR SIMPLE: CPT

## 2023-07-25 PROCEDURE — 83735 ASSAY OF MAGNESIUM: CPT

## 2023-07-25 PROCEDURE — 94761 N-INVAS EAR/PLS OXIMETRY MLT: CPT

## 2023-07-25 PROCEDURE — 94640 AIRWAY INHALATION TREATMENT: CPT

## 2023-07-25 PROCEDURE — 97110 THERAPEUTIC EXERCISES: CPT

## 2023-07-25 PROCEDURE — 6370000000 HC RX 637 (ALT 250 FOR IP): Performed by: STUDENT IN AN ORGANIZED HEALTH CARE EDUCATION/TRAINING PROGRAM

## 2023-07-25 PROCEDURE — 99233 SBSQ HOSP IP/OBS HIGH 50: CPT | Performed by: INTERNAL MEDICINE

## 2023-07-25 RX ORDER — AMOXICILLIN AND CLAVULANATE POTASSIUM 875; 125 MG/1; MG/1
1 TABLET, FILM COATED ORAL EVERY 12 HOURS SCHEDULED
Status: DISCONTINUED | OUTPATIENT
Start: 2023-07-25 | End: 2023-07-25 | Stop reason: HOSPADM

## 2023-07-25 RX ORDER — ONDANSETRON 4 MG/1
4 TABLET, ORALLY DISINTEGRATING ORAL EVERY 8 HOURS PRN
Qty: 15 TABLET | Refills: 0 | Status: SHIPPED | OUTPATIENT
Start: 2023-07-25

## 2023-07-25 RX ORDER — AMOXICILLIN AND CLAVULANATE POTASSIUM 875; 125 MG/1; MG/1
1 TABLET, FILM COATED ORAL EVERY 12 HOURS SCHEDULED
Qty: 14 TABLET | Refills: 0 | Status: SHIPPED | OUTPATIENT
Start: 2023-07-25 | End: 2023-08-01

## 2023-07-25 RX ORDER — ONDANSETRON 4 MG/1
4 TABLET, ORALLY DISINTEGRATING ORAL EVERY 8 HOURS PRN
Status: DISCONTINUED | OUTPATIENT
Start: 2023-07-25 | End: 2023-07-25 | Stop reason: HOSPADM

## 2023-07-25 RX ORDER — IPRATROPIUM BROMIDE AND ALBUTEROL SULFATE 2.5; .5 MG/3ML; MG/3ML
1 SOLUTION RESPIRATORY (INHALATION)
Status: DISCONTINUED | OUTPATIENT
Start: 2023-07-25 | End: 2023-07-25 | Stop reason: HOSPADM

## 2023-07-25 RX ADMIN — ONDANSETRON 4 MG: 4 TABLET, ORALLY DISINTEGRATING ORAL at 14:18

## 2023-07-25 RX ADMIN — ATORVASTATIN CALCIUM 10 MG: 10 TABLET, FILM COATED ORAL at 09:33

## 2023-07-25 RX ADMIN — IPRATROPIUM BROMIDE AND ALBUTEROL SULFATE 1 DOSE: .5; 3 SOLUTION RESPIRATORY (INHALATION) at 12:16

## 2023-07-25 RX ADMIN — ASPIRIN 81 MG: 81 TABLET, COATED ORAL at 09:33

## 2023-07-25 RX ADMIN — AMOXICILLIN AND CLAVULANATE POTASSIUM 1 TABLET: 875; 125 TABLET, FILM COATED ORAL at 11:38

## 2023-07-25 RX ADMIN — IPRATROPIUM BROMIDE AND ALBUTEROL SULFATE 1 DOSE: 2.5; .5 SOLUTION RESPIRATORY (INHALATION) at 08:53

## 2023-07-25 RX ADMIN — Medication 2 PUFF: at 08:53

## 2023-07-25 RX ADMIN — GUAIFENESIN AND DEXTROMETHORPHAN 5 ML: 100; 10 SYRUP ORAL at 05:22

## 2023-07-25 RX ADMIN — POTASSIUM PHOSPHATE, MONOBASIC 250 MG: 500 TABLET, SOLUBLE ORAL at 15:40

## 2023-07-25 NOTE — DISCHARGE INSTR - COC
Mobility/ADLs:  Walking   {CHP DME JJBU:256915755}  Transfer  {CHP DME FVRD:360879093}  Bathing  {CHP DME OOHE:486753828}  Dressing  {CHP DME FYEI:648709833}  Toileting  {CHP DME DEZF:376668975}  Feeding  {CHP DME PJGB:371599280}  Med Admin  {P DME OLYR:927410856}  Med Delivery   {[facility] MED Delivery:453460553}    Wound Care Documentation and Therapy:  Incision 23 Neck Left (Active)   Number of days: 195        Elimination:  Continence: Bowel: {YES / NV:91877}  Bladder: {YES / TY:23180}  Urinary Catheter: {Urinary Catheter:999819292}   Colostomy/Ileostomy/Ileal Conduit: {YES / BM:18619}       Date of Last BM: ***    Intake/Output Summary (Last 24 hours) at 2023 1125  Last data filed at 2023 0400  Gross per 24 hour   Intake 1300 ml   Output 500 ml   Net 800 ml     I/O last 3 completed shifts:   In: 3337.4 [P.O.:1080; I.V.:1927.4; IV Piggyback:330]  Out: 1100 [Urine:1100]    Safety Concerns:     [address] [place] [address] Safety Concerns:915684237}    Impairments/Disabilities:      [address] [place] [address] Impairments/Disabilities:739518387}    Nutrition Therapy:  Current Nutrition Therapy:   [address] [place] [address] Diet List:832198767}    Routes of Feeding: {[facility] DME Other Feedings:139552888}  Liquids: {Slp liquid thickness:63569}  Daily Fluid Restriction: {CHP DME Yes amt example:718001785}  Last Modified Barium Swallow with Video (Video Swallowing Test): {Done Not Done QSAS:796071898}    Treatments at the Time of Hospital Discharge:   Respiratory Treatments: ***  Oxygen Therapy:  {Therapy; copd oxygen:09939}  Ventilator:    { CC Vent YEGQ:731072901}    Rehab Therapies: {THERAPEUTIC INTERVENTION:1549068387}  Weight Bearing Status/Restrictions: [address] [place] [address] Weight Bearin}  Other Medical Equipment (for information only, NOT a DME order):  {EQUIPMENT:754045207}  Other Treatments: ***    Patient's personal belongings (please select all that are sent with patient):  {[facility] DME Belongings:687114561}    RN SIGNATURE:  {Esignature:466749185}    CASE

## 2023-07-25 NOTE — CARE COORDINATION
07/25/23 1139   IMM Letter   IMM Letter given to Patient/Family/Significant other/Guardian/POA/by: Reviewed with pt, denied Qs, signed & copy provided   IMM Letter date given: 07/25/23   IMM Letter time given: 113
Case Management -  Discharge Note      Patient Name: Sharyle Neat                   YOB: 1950            Readmission Risk (Low < 19, Mod (19-27), High > 27): Readmission Risk Score: 18    Current PCP: Erin Carlisle MD    ([facility]) Important Message from Medicare:    Date: 7/25/23    PT AM-PAC: 25 /24  OT AM-PAC: 21 /24    Patient/patient representative has been educated on the benefits of home health and home O2 as well as the possible risks of declining recommended services. Patient/patient representative has acknowledged the information provided and decided on the following discharge plan. Patient/ patient representative has been provided freedom of choice regarding service provider, supported by basic dialogue that supports the patient's individualized plan of care/goals. Home Care Information:   Is patient resuming current home health care services: No    [address] [place] [address] Avenue:   [address] [place] [address] [address]  Phone: [phone]  Fax: [phone]              Services: PT, OT, Nursing  Home Health Order Obtained: Yes    Home health agency notified of discharge.  - [place] and Respiratory Equipment:     Has HOME OXYGEN prior to admission: No    Current Liter Flow/min:  2 LPM at rest, 6 LPM w/exertion     Oxygen Provider:   Merle  [address] [place] [address], [address] [place] [address]  Phone:  [phone]  Fax: [phone]    Home oxygen evaluation and DME order on file: yes        Financial    Payor: Niko Paige / Plan: Niko Paige / Product Type: *No Product type* /     Pharmacy:  Potential assistance Purchasing Medications: No  Meds-to-Beds request: Yes      [address]  [address] [address] [address]  Phone: [phone] Fax: [phone]      Notes:     Additional Case Management Notes: nydia sandoval
Oswaldo received a referral from TRUDY to this patient for home 02 @ 2 lpm rest 6 lpm w exertion via nc. Home 02 has been arranged for delivery. Pt and son are aware to call Oswaldo [phone] to initiate setup. Two portable tanks have been delivered to the patient's room for discharge. Thank you for the referral.  Electronically signed by Warren Richmond on 7/25/2023 at 1:45 PM  Cell ph# [phone]    NOTE: After 5:00 pm, Weekends, Holidays: Call Re[facility]ana/[facility] at [phone] to coordinate delivery of home medical equipment.
individualized plan of care/goals and shares the quality data associated with the providers was provided to: Patient         The Patient and/or Patient Representative Agree with the Discharge Plan?  Yes    Electronically signed by Gina Caceres RN on 7/25/2023 at 11:25 AM

## 2023-07-25 NOTE — TELEPHONE ENCOUNTER
Care Transitions Initial Follow Up Call    Outreach made within 2 business days of discharge: Yes    Patient: Karen Dhaliwal Patient : 1950   MRN: [id number]  Reason for Admission: Pneumonia & Septic Shock  Discharge Date: 23       Spoke with: Evette Mccormick    Discharge department/facility: [facility]    TCM Interactive Patient Contact:  Was patient able to fill all prescriptions: Yes  Was patient instructed to bring all medications to the follow-up visit: Yes  Is patient taking all medications as directed in the discharge summary?  Yes  Does patient understand their discharge instructions: Yes  Does patient have questions or concerns that need addressed prior to 7-14 day follow up office visit: no    Scheduled appointment with PCP within 7-14 days    Follow Up  Future Appointments   Date Time Provider [address] [place] [address]   2023  1:00 PM Erika Medel MD PULM & CC MMA   8/3/2023  3:00 PM Erika Medel MD PULM & CC MMA   2023  1:30 PM [facility] FF VASC & ENDO, VASCULAR LAB SCHED FF VASC/ENDO MMA   2023  2:30 PM Chinmay Whitaker MD FF VASC/ENDO MMA   2023  9:15 AM Westley Meadows MD FF Cardio MMA   2023 10:00 AM Nieves Montemayor MD [facility]   2023 10:15 AM SCHEDULE, [place] REMOTE TRANSMISSION FF Cardio MMA   2023 10:15 AM SCHEDULE, [place] REMOTE TRANSMISSION FF Cardio MMA       Scooby Monzon LPN

## 2023-07-25 NOTE — PLAN OF CARE
Problem: Discharge Planning  Goal: Discharge to home or other facility with appropriate resources  7/24/2023 2103 by Mariano Duncan RN  Outcome: Progressing       Problem: Safety - Adult  Goal: Free from fall injury  7/24/2023 2103 by Mariano Duncan RN  Outcome: Progressing       Problem: ABCDS Injury Assessment  Goal: Absence of physical injury  7/24/2023 2103 by Mariano Duncan RN  Outcome: Progressing       Problem: Skin/Tissue Integrity  Goal: Absence of new skin breakdown  Description: 1. Monitor for areas of redness and/or skin breakdown  2. Assess vascular access sites hourly  3. Every 4-6 hours minimum:  Change oxygen saturation probe site  4. Every 4-6 hours:  If on nasal continuous positive airway pressure, respiratory therapy assess nares and determine need for appliance change or resting period.   7/24/2023 2103 by Mariano Duncan RN  Outcome: Progressing       Problem: Pain  Goal: Verbalizes/displays adequate comfort level or baseline comfort level  7/24/2023 2103 by Mariano Duncan RN  Outcome: Progressing

## 2023-07-26 LAB
BACTERIA BLD CULT ORG #2: NORMAL
BACTERIA BLD CULT: NORMAL
BACTERIA SPEC RESP CULT: NORMAL
BACTERIA SPEC RESP CULT: NORMAL
GRAM STN SPEC: NORMAL
GRAM STN SPEC: NORMAL

## 2023-07-27 ENCOUNTER — OFFICE VISIT (OUTPATIENT)
Dept: PULMONOLOGY | Age: 73
End: 2023-07-27
Payer: COMMERCIAL

## 2023-07-27 VITALS
HEART RATE: 90 BPM | SYSTOLIC BLOOD PRESSURE: 152 MMHG | HEIGHT: 62 IN | WEIGHT: 174 LBS | BODY MASS INDEX: 32.02 KG/M2 | OXYGEN SATURATION: 92 % | DIASTOLIC BLOOD PRESSURE: 80 MMHG

## 2023-07-27 DIAGNOSIS — C34.92 PRIMARY MALIGNANT NEOPLASM OF LEFT LUNG METASTATIC TO OTHER SITE (HCC): ICD-10-CM

## 2023-07-27 DIAGNOSIS — R91.1 LUNG NODULE: Primary | ICD-10-CM

## 2023-07-27 DIAGNOSIS — J90 PLEURAL EFFUSION: ICD-10-CM

## 2023-07-27 PROCEDURE — 3077F SYST BP >= 140 MM HG: CPT | Performed by: INTERNAL MEDICINE

## 2023-07-27 PROCEDURE — 99214 OFFICE O/P EST MOD 30 MIN: CPT | Performed by: INTERNAL MEDICINE

## 2023-07-27 PROCEDURE — 3079F DIAST BP 80-89 MM HG: CPT | Performed by: INTERNAL MEDICINE

## 2023-07-27 PROCEDURE — 1123F ACP DISCUSS/DSCN MKR DOCD: CPT | Performed by: INTERNAL MEDICINE

## 2023-07-27 ASSESSMENT — ENCOUNTER SYMPTOMS
ABDOMINAL DISTENTION: 0
SINUS PRESSURE: 0
CHEST TIGHTNESS: 0
SORE THROAT: 0
ANAL BLEEDING: 0
VOICE CHANGE: 0
BLOOD IN STOOL: 0
DIARRHEA: 0
SHORTNESS OF BREATH: 1
COUGH: 1
ABDOMINAL PAIN: 0
STRIDOR: 0
RHINORRHEA: 0
CONSTIPATION: 0
APNEA: 0
WHEEZING: 0
CHOKING: 0

## 2023-07-28 ENCOUNTER — TELEPHONE (OUTPATIENT)
Dept: FAMILY MEDICINE CLINIC | Age: 73
End: 2023-07-28

## 2023-07-28 NOTE — TELEPHONE ENCOUNTER
Jessica Galloway , the physical therapist from [address] [place] [address] went out to do PT eval today, she does not feel like the pt needs any therapy at this time, she is independent.

## 2023-08-03 ENCOUNTER — HOSPITAL ENCOUNTER (OUTPATIENT)
Dept: INTERVENTIONAL RADIOLOGY/VASCULAR | Age: 73
Discharge: HOME | End: 2023-08-03
Payer: COMMERCIAL

## 2023-08-03 ENCOUNTER — TELEPHONE (OUTPATIENT)
Dept: PULMONOLOGY | Age: 73
End: 2023-08-03

## 2023-08-03 ENCOUNTER — HOSPITAL ENCOUNTER (OUTPATIENT)
Dept: GENERAL RADIOLOGY | Age: 73
Discharge: HOME | End: 2023-08-03
Payer: COMMERCIAL

## 2023-08-03 VITALS
RESPIRATION RATE: 16 BRPM | DIASTOLIC BLOOD PRESSURE: 69 MMHG | SYSTOLIC BLOOD PRESSURE: 139 MMHG | OXYGEN SATURATION: 95 % | TEMPERATURE: 97.3 F | HEART RATE: 86 BPM

## 2023-08-03 DIAGNOSIS — J90 PLEURAL EFFUSION: ICD-10-CM

## 2023-08-03 DIAGNOSIS — R19.7 DIARRHEA, UNSPECIFIED TYPE: ICD-10-CM

## 2023-08-03 DIAGNOSIS — Z98.890 STATUS POST THORACENTESIS: ICD-10-CM

## 2023-08-03 PROCEDURE — 76604 US EXAM CHEST: CPT

## 2023-08-03 PROCEDURE — 7100000010 HC PHASE II RECOVERY - FIRST 15 MIN

## 2023-08-03 RX ORDER — DICYCLOMINE HYDROCHLORIDE 10 MG/1
10 CAPSULE ORAL DAILY
Qty: 90 CAPSULE | Refills: 3 | Status: SHIPPED | OUTPATIENT
Start: 2023-08-03

## 2023-08-03 ASSESSMENT — PAIN - FUNCTIONAL ASSESSMENT: PAIN_FUNCTIONAL_ASSESSMENT: NONE - DENIES PAIN

## 2023-08-03 NOTE — PROGRESS NOTES
From Radiology, no procedure performed, pt.  To follow up with PCP and Pulmonologist, left by wheelchair with daughter

## 2023-08-03 NOTE — TELEPHONE ENCOUNTER
Phone call from Dr. Taryn Duckworth from IR. Left basilar pleural effusion is heavily loculated and could also represent pleural deposits/solid mass. Best to wait for the PET CT scan to determine if chest tube placement would be helpful. Hold off on thoracentesis today. Agree with the plan.

## 2023-08-03 NOTE — PROGRESS NOTES
Teaching / education initiated regarding perioperative experience, expectations, and pain management during stay. Patient verbalized understanding. Radiology made aware of plavix and asa yesterday.

## 2023-08-07 ENCOUNTER — TELEPHONE (OUTPATIENT)
Dept: PULMONOLOGY | Age: 73
End: 2023-08-07

## 2023-08-07 ENCOUNTER — HOSPITAL ENCOUNTER (OUTPATIENT)
Dept: MRI IMAGING | Age: 73
Discharge: HOME | End: 2023-08-07
Attending: INTERNAL MEDICINE
Payer: COMMERCIAL

## 2023-08-07 ENCOUNTER — HOSPITAL ENCOUNTER (OUTPATIENT)
Dept: PET IMAGING | Age: 73
Discharge: HOME | End: 2023-08-07
Attending: INTERNAL MEDICINE
Payer: COMMERCIAL

## 2023-08-07 DIAGNOSIS — C34.92 PRIMARY MALIGNANT NEOPLASM OF LEFT LUNG METASTATIC TO OTHER SITE (HCC): ICD-10-CM

## 2023-08-07 DIAGNOSIS — C34.12 SQUAMOUS CELL CARCINOMA OF BRONCHUS IN LEFT UPPER LOBE (HCC): ICD-10-CM

## 2023-08-07 DIAGNOSIS — R91.1 LUNG NODULE: ICD-10-CM

## 2023-08-07 LAB
ALBUMIN SERPL-MCNC: 3.8 G/DL (ref 3.4–5)
ALBUMIN/GLOB SERPL: 1.1 {RATIO} (ref 1.1–2.2)
ALP SERPL-CCNC: 91 U/L (ref 40–129)
ALT SERPL-CCNC: 8 U/L (ref 10–40)
ANION GAP SERPL CALCULATED.3IONS-SCNC: 11 MMOL/L (ref 3–16)
AST SERPL-CCNC: <5 U/L (ref 15–37)
BILIRUB SERPL-MCNC: 0.4 MG/DL (ref 0–1)
BUN SERPL-MCNC: 22 MG/DL (ref 7–20)
CALCIUM SERPL-MCNC: 9.9 MG/DL (ref 8.3–10.6)
CHLORIDE SERPL-SCNC: 101 MMOL/L (ref 99–110)
CO2 SERPL-SCNC: 27 MMOL/L (ref 21–32)
CREAT SERPL-MCNC: 1.4 MG/DL (ref 0.6–1.2)
FUNGUS SPEC CULT: NORMAL
FUNGUS SPEC CULT: NORMAL
GFR SERPLBLD CREATININE-BSD FMLA CKD-EPI: 39 ML/MIN/{1.73_M2}
GLUCOSE SERPL-MCNC: 121 MG/DL (ref 70–99)
LOEFFLER MB STN SPEC: NORMAL
LOEFFLER MB STN SPEC: NORMAL
POTASSIUM SERPL-SCNC: 4.3 MMOL/L (ref 3.5–5.1)
PROT SERPL-MCNC: 7.2 G/DL (ref 6.4–8.2)
SODIUM SERPL-SCNC: 139 MMOL/L (ref 136–145)

## 2023-08-07 PROCEDURE — 80053 COMPREHEN METABOLIC PANEL: CPT

## 2023-08-07 PROCEDURE — A9552 F18 FDG: HCPCS | Performed by: INTERNAL MEDICINE

## 2023-08-07 PROCEDURE — 36415 COLL VENOUS BLD VENIPUNCTURE: CPT

## 2023-08-07 PROCEDURE — 3430000000 HC RX DIAGNOSTIC RADIOPHARMACEUTICAL: Performed by: INTERNAL MEDICINE

## 2023-08-07 PROCEDURE — 78815 PET IMAGE W/CT SKULL-THIGH: CPT

## 2023-08-07 RX ORDER — FLUDEOXYGLUCOSE F 18 200 MCI/ML
15.28 INJECTION, SOLUTION INTRAVENOUS
Status: COMPLETED | OUTPATIENT
Start: 2023-08-07 | End: 2023-08-07

## 2023-08-07 RX ADMIN — FLUDEOXYGLUCOSE F 18 15.28 MILLICURIE: 200 INJECTION, SOLUTION INTRAVENOUS at 12:40

## 2023-08-07 NOTE — TELEPHONE ENCOUNTER
PER Dr Cristino Burkitt  Phone call from Dr. Aries Bray from IR. Left basilar pleural effusion is heavily loculated and could also represent pleural deposits/solid mass. Best to wait for the PET CT scan to determine if chest tube placement would be helpful. Hold off on thoracentesis today. Agree with the plan.

## 2023-08-07 NOTE — TELEPHONE ENCOUNTER
Patients child called and said patient was supposed to have a procedure done last week to drain fluid and said due to thick fluid patient would have to be admitted to hospital to have drained and has not heard anything.      [address] [place]: [phone]

## 2023-08-08 ENCOUNTER — OFFICE VISIT (OUTPATIENT)
Dept: FAMILY MEDICINE CLINIC | Age: 73
End: 2023-08-08

## 2023-08-08 VITALS
RESPIRATION RATE: 20 BRPM | BODY MASS INDEX: 29.67 KG/M2 | HEART RATE: 72 BPM | SYSTOLIC BLOOD PRESSURE: 122 MMHG | DIASTOLIC BLOOD PRESSURE: 80 MMHG | OXYGEN SATURATION: 99 % | WEIGHT: 159.6 LBS | TEMPERATURE: 97.6 F

## 2023-08-08 DIAGNOSIS — Z09 HOSPITAL DISCHARGE FOLLOW-UP: Primary | ICD-10-CM

## 2023-08-08 DIAGNOSIS — Z90.2 S/P LOBECTOMY OF LUNG: ICD-10-CM

## 2023-08-08 DIAGNOSIS — J42 CHRONIC BRONCHITIS, UNSPECIFIED CHRONIC BRONCHITIS TYPE (HCC): ICD-10-CM

## 2023-08-08 DIAGNOSIS — I10 HTN (HYPERTENSION), BENIGN: ICD-10-CM

## 2023-08-08 DIAGNOSIS — C34.12 MALIGNANT NEOPLASM OF UPPER LOBE OF LEFT LUNG (HCC): ICD-10-CM

## 2023-08-08 DIAGNOSIS — R59.0 MEDIASTINAL LYMPHADENOPATHY: ICD-10-CM

## 2023-08-08 LAB
ACID FAST STN SPEC QL: NORMAL
ACID FAST STN SPEC QL: NORMAL
MYCOBACTERIUM SPEC CULT: NORMAL
MYCOBACTERIUM SPEC CULT: NORMAL

## 2023-08-11 ENCOUNTER — OFFICE VISIT (OUTPATIENT)
Dept: PULMONOLOGY | Age: 73
End: 2023-08-11
Payer: COMMERCIAL

## 2023-08-11 VITALS
SYSTOLIC BLOOD PRESSURE: 104 MMHG | HEIGHT: 62 IN | HEART RATE: 99 BPM | BODY MASS INDEX: 28.71 KG/M2 | WEIGHT: 156 LBS | DIASTOLIC BLOOD PRESSURE: 60 MMHG | OXYGEN SATURATION: 87 %

## 2023-08-11 DIAGNOSIS — J90 PLEURAL EFFUSION: Primary | ICD-10-CM

## 2023-08-11 DIAGNOSIS — C34.92 METASTATIC LUNG CANCER (METASTASIS FROM LUNG TO OTHER SITE), LEFT (HCC): ICD-10-CM

## 2023-08-11 PROCEDURE — 3078F DIAST BP <80 MM HG: CPT | Performed by: INTERNAL MEDICINE

## 2023-08-11 PROCEDURE — 99214 OFFICE O/P EST MOD 30 MIN: CPT | Performed by: INTERNAL MEDICINE

## 2023-08-11 PROCEDURE — 3074F SYST BP LT 130 MM HG: CPT | Performed by: INTERNAL MEDICINE

## 2023-08-11 PROCEDURE — 1123F ACP DISCUSS/DSCN MKR DOCD: CPT | Performed by: INTERNAL MEDICINE

## 2023-08-11 ASSESSMENT — ENCOUNTER SYMPTOMS
SHORTNESS OF BREATH: 1
BACK PAIN: 0
BLOOD IN STOOL: 0
DIARRHEA: 0
SORE THROAT: 0
ABDOMINAL PAIN: 0
RHINORRHEA: 0
SINUS PRESSURE: 0
CHOKING: 0
CHEST TIGHTNESS: 0
WHEEZING: 0
COUGH: 0
CONSTIPATION: 0
STRIDOR: 0
VOICE CHANGE: 0
ABDOMINAL DISTENTION: 0
ANAL BLEEDING: 0
APNEA: 0

## 2023-08-14 LAB
FUNGUS SPEC CULT: NORMAL
FUNGUS SPEC CULT: NORMAL
LOEFFLER MB STN SPEC: NORMAL
LOEFFLER MB STN SPEC: NORMAL

## 2023-08-15 ENCOUNTER — TELEPHONE (OUTPATIENT)
Dept: FAMILY MEDICINE CLINIC | Age: 73
End: 2023-08-15

## 2023-08-15 NOTE — TELEPHONE ENCOUNTER
Per Dr. Sarah Ervin, reached out to patient today by phone requesting the name and phone number for her home health nurse. He would like \"wound care\" for the blisters on her feet and consider wrapping her legs since she's not able to easily apply compression hose. Patient proceeds to advise that now her feet and legs have \" improved and are not swollen any more\". Advised noted and that I would notify Dr. Sarah Ervin.

## 2023-09-08 ENCOUNTER — HOSPITAL ENCOUNTER (OUTPATIENT)
Dept: MRI IMAGING | Age: 73
Discharge: HOME | End: 2023-09-08
Attending: INTERNAL MEDICINE
Payer: COMMERCIAL

## 2023-09-08 PROCEDURE — 6360000004 HC RX CONTRAST MEDICATION: Performed by: INTERNAL MEDICINE

## 2023-09-08 PROCEDURE — 70553 MRI BRAIN STEM W/O & W/DYE: CPT

## 2023-09-08 PROCEDURE — A9577 INJ MULTIHANCE: HCPCS | Performed by: INTERNAL MEDICINE

## 2023-09-08 RX ADMIN — GADOBENATE DIMEGLUMINE 12 ML: 529 INJECTION, SOLUTION INTRAVENOUS at 13:26

## 2023-09-26 ENCOUNTER — TELEPHONE (OUTPATIENT)
Dept: VASCULAR SURGERY | Age: 73
End: 2023-09-26

## 2023-09-26 ENCOUNTER — PROCEDURE VISIT (OUTPATIENT)
Dept: VASCULAR SURGERY | Age: 73
End: 2023-09-26
Payer: COMMERCIAL

## 2023-09-26 DIAGNOSIS — I65.23 CAROTID ATHEROSCLEROSIS, BILATERAL: ICD-10-CM

## 2023-09-26 DIAGNOSIS — I65.23 CAROTID ATHEROSCLEROSIS, BILATERAL: Primary | ICD-10-CM

## 2023-09-26 PROCEDURE — 93880 EXTRACRANIAL BILAT STUDY: CPT | Performed by: SURGERY

## 2023-09-26 NOTE — TELEPHONE ENCOUNTER
Carotid doppler order placed for 1 year. Dr Sintia Covarrubias discussed with patient no change and will repeat scan in 1 year.

## 2023-10-06 ENCOUNTER — OFFICE VISIT (OUTPATIENT)
Dept: FAMILY MEDICINE CLINIC | Age: 73
End: 2023-10-06

## 2023-10-06 VITALS
OXYGEN SATURATION: 96 % | BODY MASS INDEX: 28.51 KG/M2 | WEIGHT: 151 LBS | HEART RATE: 86 BPM | SYSTOLIC BLOOD PRESSURE: 130 MMHG | TEMPERATURE: 98.1 F | HEIGHT: 61 IN | DIASTOLIC BLOOD PRESSURE: 86 MMHG

## 2023-10-06 DIAGNOSIS — M17.11 PRIMARY OSTEOARTHRITIS OF RIGHT KNEE: ICD-10-CM

## 2023-10-06 DIAGNOSIS — G89.29 CHRONIC RIGHT SHOULDER PAIN: ICD-10-CM

## 2023-10-06 DIAGNOSIS — E78.2 MIXED HYPERLIPIDEMIA: ICD-10-CM

## 2023-10-06 DIAGNOSIS — R19.7 DIARRHEA, UNSPECIFIED TYPE: ICD-10-CM

## 2023-10-06 DIAGNOSIS — Z00.00 INITIAL MEDICARE ANNUAL WELLNESS VISIT: Primary | ICD-10-CM

## 2023-10-06 DIAGNOSIS — I10 PRIMARY HYPERTENSION: ICD-10-CM

## 2023-10-06 DIAGNOSIS — M25.511 CHRONIC RIGHT SHOULDER PAIN: ICD-10-CM

## 2023-10-06 DIAGNOSIS — R11.0 NAUSEA: ICD-10-CM

## 2023-10-06 DIAGNOSIS — J30.1 SEASONAL ALLERGIC RHINITIS DUE TO POLLEN: ICD-10-CM

## 2023-10-06 PROCEDURE — 93296 REM INTERROG EVL PM/IDS: CPT | Performed by: INTERNAL MEDICINE

## 2023-10-06 PROCEDURE — 93294 REM INTERROG EVL PM/LDLS PM: CPT | Performed by: INTERNAL MEDICINE

## 2023-10-06 RX ORDER — DICYCLOMINE HYDROCHLORIDE 10 MG/1
10 CAPSULE ORAL DAILY
Qty: 90 CAPSULE | Refills: 3 | Status: SHIPPED | OUTPATIENT
Start: 2023-10-06

## 2023-10-06 RX ORDER — LOSARTAN POTASSIUM AND HYDROCHLOROTHIAZIDE 25; 100 MG/1; MG/1
1 TABLET ORAL DAILY
Qty: 90 TABLET | Refills: 3 | Status: SHIPPED | OUTPATIENT
Start: 2023-10-06

## 2023-10-06 RX ORDER — ONDANSETRON 4 MG/1
4 TABLET, ORALLY DISINTEGRATING ORAL EVERY 8 HOURS PRN
Qty: 15 TABLET | Refills: 0 | Status: SHIPPED | OUTPATIENT
Start: 2023-10-06

## 2023-10-06 RX ORDER — LORATADINE 10 MG/1
10 TABLET ORAL DAILY
Qty: 90 TABLET | Refills: 3 | Status: SHIPPED | OUTPATIENT
Start: 2023-10-06

## 2023-10-06 RX ORDER — OXYCODONE HYDROCHLORIDE 10 MG/1
10 TABLET ORAL EVERY 8 HOURS PRN
Qty: 21 TABLET | Refills: 0 | Status: SHIPPED | OUTPATIENT
Start: 2023-10-06 | End: 2023-10-13

## 2023-10-06 RX ORDER — PREDNISONE 10 MG/1
TABLET ORAL
COMMUNITY
Start: 2023-09-11

## 2023-10-06 RX ORDER — ATORVASTATIN CALCIUM 10 MG/1
10 TABLET, FILM COATED ORAL DAILY
Qty: 90 TABLET | Refills: 3 | Status: SHIPPED | OUTPATIENT
Start: 2023-10-06

## 2023-10-06 RX ORDER — CELECOXIB 200 MG/1
200 CAPSULE ORAL DAILY
Qty: 30 CAPSULE | Refills: 3 | Status: SHIPPED | OUTPATIENT
Start: 2023-10-06

## 2023-10-06 ASSESSMENT — PATIENT HEALTH QUESTIONNAIRE - PHQ9
SUM OF ALL RESPONSES TO PHQ QUESTIONS 1-9: 0
SUM OF ALL RESPONSES TO PHQ QUESTIONS 1-9: 0
1. LITTLE INTEREST OR PLEASURE IN DOING THINGS: 0
SUM OF ALL RESPONSES TO PHQ9 QUESTIONS 1 & 2: 0
2. FEELING DOWN, DEPRESSED OR HOPELESS: 0
SUM OF ALL RESPONSES TO PHQ QUESTIONS 1-9: 0
SUM OF ALL RESPONSES TO PHQ QUESTIONS 1-9: 0

## 2023-10-06 ASSESSMENT — ENCOUNTER SYMPTOMS
CHEST TIGHTNESS: 1
COUGH: 1
SHORTNESS OF BREATH: 1

## 2023-10-06 NOTE — PROGRESS NOTES
SUBJECTIVE:    Padilla Gr is a 74 y.o. female who presents for a follow up visit. Chief Complaint   Patient presents with    Medicare AWV     Knot in left armpit x3wks, discussed with Dr Sylvia Han, came up on PET scan  Right shoulder blade pain x3wks        Shoulder Pain   The pain is present in the right shoulder. This is a recurrent problem. The current episode started more than 1 year ago. Progression since onset: Much worse starting 3 weeks ago. The quality of the pain is described as aching and sharp. The pain is moderate. Pertinent negatives include no fever. The symptoms are aggravated by activity. She has tried acetaminophen for the symptoms. The treatment provided no relief. Patient's medications, allergies, past medical,surgical, social and family histories were reviewed and updated as appropriate.      Past Medical History:   Diagnosis Date    CAD (coronary artery disease)     NONOBSTRUCTING    CKD (chronic kidney disease) stage 3, GFR 30-59 ml/min (HCC)     IBS (irritable bowel syndrome)     Lumbago     Malignant neoplasm of upper lobe of left lung ([address]) 08/2022    LAKESHA adenoCa 1.7cm    Osteoarthrosis, unspecified whether generalized or localized, unspecified site     Other abnormal blood chemistry     PAD (peripheral artery disease) (HCC)     has stents in lower legs bilaterally implanted by Dr. Cristal Bryant    Pure hypercholesterolemia     Risk for falls 02/02/2016    S/p Left TKR    Spinal stenosis     Tremor     Unspecified essential hypertension     Wears glasses      Past Surgical History:   Procedure Laterality Date    BRONCHOSCOPY N/A 7/24/2023    BRONCHOSCOPY W/EBUS FNA  -PRETRACHEAL FNA X6 PASSES -SUBCARINAL FNA X4 PASSES  performed by Guerline Alvarez MD at [facility]  7/24/2023    BRONCHOSCOPY ALVEOLAR LAVAGE performed by Guerline Alvarez MD at [facility] 01/11/2023    LEFT CAROTID ENDARTERECTOMY performed by

## 2023-10-29 ENCOUNTER — HOSPITAL ENCOUNTER (INPATIENT)
Age: 73
LOS: 3 days | Discharge: HOME HEALTH | DRG: 065 | End: 2023-11-01
Attending: EMERGENCY MEDICINE | Admitting: INTERNAL MEDICINE
Payer: COMMERCIAL

## 2023-10-29 ENCOUNTER — APPOINTMENT (OUTPATIENT)
Dept: CT IMAGING | Age: 73
DRG: 065 | End: 2023-10-29
Payer: COMMERCIAL

## 2023-10-29 ENCOUNTER — APPOINTMENT (OUTPATIENT)
Dept: GENERAL RADIOLOGY | Age: 73
DRG: 065 | End: 2023-10-29
Payer: COMMERCIAL

## 2023-10-29 DIAGNOSIS — R00.1 SYMPTOMATIC BRADYCARDIA: ICD-10-CM

## 2023-10-29 DIAGNOSIS — R47.81 SLURRED SPEECH: Primary | ICD-10-CM

## 2023-10-29 DIAGNOSIS — R53.1 RIGHT SIDED WEAKNESS: ICD-10-CM

## 2023-10-29 DIAGNOSIS — I70.213 ATHEROSCLEROSIS OF NATIVE ARTERIES OF EXTREMITIES WITH INTERMITTENT CLAUDICATION, BILATERAL LEGS (HCC): ICD-10-CM

## 2023-10-29 PROBLEM — R47.01 APHASIA: Status: ACTIVE | Noted: 2023-10-29

## 2023-10-29 PROBLEM — I63.9 ACUTE CVA (CEREBROVASCULAR ACCIDENT) (HCC): Status: ACTIVE | Noted: 2023-10-29

## 2023-10-29 LAB
ALBUMIN SERPL-MCNC: 3.3 G/DL (ref 3.4–5)
ALBUMIN/GLOB SERPL: 0.9 {RATIO} (ref 1.1–2.2)
ALP SERPL-CCNC: 291 U/L (ref 40–129)
ALT SERPL-CCNC: 21 U/L (ref 10–40)
ANION GAP SERPL CALCULATED.3IONS-SCNC: 13 MMOL/L (ref 3–16)
APTT BLD: 31.6 SEC (ref 22.7–35.9)
AST SERPL-CCNC: 27 U/L (ref 15–37)
BACTERIA URNS QL MICRO: NORMAL /HPF
BASOPHILS # BLD: 0 K/UL (ref 0–0.2)
BASOPHILS NFR BLD: 0 %
BILIRUB SERPL-MCNC: 0.3 MG/DL (ref 0–1)
BILIRUB UR QL STRIP.AUTO: NEGATIVE
BUN SERPL-MCNC: 63 MG/DL (ref 7–20)
CALCIUM SERPL-MCNC: 8.8 MG/DL (ref 8.3–10.6)
CHLORIDE SERPL-SCNC: 103 MMOL/L (ref 99–110)
CLARITY UR: CLEAR
CO2 SERPL-SCNC: 22 MMOL/L (ref 21–32)
COLOR UR: YELLOW
CREAT SERPL-MCNC: 1.4 MG/DL (ref 0.6–1.2)
DEPRECATED RDW RBC AUTO: 18.9 % (ref 12.4–15.4)
EOSINOPHIL # BLD: 0 K/UL (ref 0–0.6)
EOSINOPHIL NFR BLD: 0 %
EPI CELLS #/AREA URNS AUTO: 2 /HPF (ref 0–5)
GFR SERPLBLD CREATININE-BSD FMLA CKD-EPI: 39 ML/MIN/{1.73_M2}
GLUCOSE BLD-MCNC: 160 MG/DL (ref 70–99)
GLUCOSE BLD-MCNC: 185 MG/DL (ref 70–99)
GLUCOSE SERPL-MCNC: 164 MG/DL (ref 70–99)
GLUCOSE UR STRIP.AUTO-MCNC: NEGATIVE MG/DL
HCT VFR BLD AUTO: 41.7 % (ref 36–48)
HGB BLD-MCNC: 13.3 G/DL (ref 12–16)
HGB UR QL STRIP.AUTO: NEGATIVE
HYALINE CASTS #/AREA URNS AUTO: 2 /LPF (ref 0–8)
INR PPP: 1.08 (ref 0.84–1.16)
KETONES UR STRIP.AUTO-MCNC: NEGATIVE MG/DL
LEUKOCYTE ESTERASE UR QL STRIP.AUTO: ABNORMAL
LYMPHOCYTES # BLD: 0.6 K/UL (ref 1–5.1)
LYMPHOCYTES NFR BLD: 3 %
MAGNESIUM SERPL-MCNC: 2 MG/DL (ref 1.8–2.4)
MCH RBC QN AUTO: 27 PG (ref 26–34)
MCHC RBC AUTO-ENTMCNC: 31.9 G/DL (ref 31–36)
MCV RBC AUTO: 84.8 FL (ref 80–100)
MONOCYTES # BLD: 0.4 K/UL (ref 0–1.3)
MONOCYTES NFR BLD: 3 %
NEUTROPHILS # BLD: 13 K/UL (ref 1.7–7.7)
NEUTROPHILS NFR BLD: 92 %
NEUTS BAND NFR BLD MANUAL: 1 % (ref 0–7)
NITRITE UR QL STRIP.AUTO: NEGATIVE
NT-PROBNP SERPL-MCNC: 614 PG/ML (ref 0–449)
PERFORMED ON: ABNORMAL
PERFORMED ON: ABNORMAL
PH UR STRIP.AUTO: 5.5 [PH] (ref 5–8)
PLATELET # BLD AUTO: 265 K/UL (ref 135–450)
PLATELET BLD QL SMEAR: ADEQUATE
PMV BLD AUTO: 8.6 FL (ref 5–10.5)
POTASSIUM SERPL-SCNC: 4.7 MMOL/L (ref 3.5–5.1)
PROT SERPL-MCNC: 7 G/DL (ref 6.4–8.2)
PROT UR STRIP.AUTO-MCNC: ABNORMAL MG/DL
PROTHROMBIN TIME: 14 SEC (ref 11.5–14.8)
RBC # BLD AUTO: 4.92 M/UL (ref 4–5.2)
RBC CLUMPS #/AREA URNS AUTO: 0 /HPF (ref 0–4)
RBC MORPH BLD: NORMAL
SLIDE REVIEW: ABNORMAL
SODIUM SERPL-SCNC: 138 MMOL/L (ref 136–145)
SP GR UR STRIP.AUTO: >=1.03 (ref 1–1.03)
TROPONIN, HIGH SENSITIVITY: 28 NG/L (ref 0–14)
TROPONIN, HIGH SENSITIVITY: 31 NG/L (ref 0–14)
TROPONIN, HIGH SENSITIVITY: 34 NG/L (ref 0–14)
UA COMPLETE W REFLEX CULTURE PNL UR: ABNORMAL
UA DIPSTICK W REFLEX MICRO PNL UR: YES
URN SPEC COLLECT METH UR: ABNORMAL
UROBILINOGEN UR STRIP-ACNC: 0.2 E.U./DL
VARIANT LYMPHS NFR BLD MANUAL: 1 % (ref 0–6)
WBC # BLD AUTO: 14 K/UL (ref 4–11)
WBC #/AREA URNS AUTO: 4 /HPF (ref 0–5)

## 2023-10-29 PROCEDURE — 85610 PROTHROMBIN TIME: CPT

## 2023-10-29 PROCEDURE — 87186 SC STD MICRODIL/AGAR DIL: CPT

## 2023-10-29 PROCEDURE — 94640 AIRWAY INHALATION TREATMENT: CPT

## 2023-10-29 PROCEDURE — 85730 THROMBOPLASTIN TIME PARTIAL: CPT

## 2023-10-29 PROCEDURE — 81001 URINALYSIS AUTO W/SCOPE: CPT

## 2023-10-29 PROCEDURE — 70496 CT ANGIOGRAPHY HEAD: CPT

## 2023-10-29 PROCEDURE — 83880 ASSAY OF NATRIURETIC PEPTIDE: CPT

## 2023-10-29 PROCEDURE — 99285 EMERGENCY DEPT VISIT HI MDM: CPT

## 2023-10-29 PROCEDURE — 94761 N-INVAS EAR/PLS OXIMETRY MLT: CPT

## 2023-10-29 PROCEDURE — 6360000004 HC RX CONTRAST MEDICATION: Performed by: EMERGENCY MEDICINE

## 2023-10-29 PROCEDURE — 84484 ASSAY OF TROPONIN QUANT: CPT

## 2023-10-29 PROCEDURE — 6370000000 HC RX 637 (ALT 250 FOR IP): Performed by: INTERNAL MEDICINE

## 2023-10-29 PROCEDURE — 87077 CULTURE AEROBIC IDENTIFY: CPT

## 2023-10-29 PROCEDURE — 2060000000 HC ICU INTERMEDIATE R&B

## 2023-10-29 PROCEDURE — 83036 HEMOGLOBIN GLYCOSYLATED A1C: CPT

## 2023-10-29 PROCEDURE — 36415 COLL VENOUS BLD VENIPUNCTURE: CPT

## 2023-10-29 PROCEDURE — 2580000003 HC RX 258: Performed by: INTERNAL MEDICINE

## 2023-10-29 PROCEDURE — 93005 ELECTROCARDIOGRAM TRACING: CPT | Performed by: PHYSICIAN ASSISTANT

## 2023-10-29 PROCEDURE — 6360000002 HC RX W HCPCS: Performed by: INTERNAL MEDICINE

## 2023-10-29 PROCEDURE — 80053 COMPREHEN METABOLIC PANEL: CPT

## 2023-10-29 PROCEDURE — 87086 URINE CULTURE/COLONY COUNT: CPT

## 2023-10-29 PROCEDURE — 2700000000 HC OXYGEN THERAPY PER DAY

## 2023-10-29 PROCEDURE — 70450 CT HEAD/BRAIN W/O DYE: CPT

## 2023-10-29 PROCEDURE — 71045 X-RAY EXAM CHEST 1 VIEW: CPT

## 2023-10-29 PROCEDURE — 85025 COMPLETE CBC W/AUTO DIFF WBC: CPT

## 2023-10-29 PROCEDURE — 83735 ASSAY OF MAGNESIUM: CPT

## 2023-10-29 PROCEDURE — 6370000000 HC RX 637 (ALT 250 FOR IP): Performed by: EMERGENCY MEDICINE

## 2023-10-29 RX ORDER — ACETAMINOPHEN 325 MG/1
650 TABLET ORAL EVERY 6 HOURS PRN
Status: DISCONTINUED | OUTPATIENT
Start: 2023-10-29 | End: 2023-11-01 | Stop reason: HOSPADM

## 2023-10-29 RX ORDER — OXYCODONE HYDROCHLORIDE AND ACETAMINOPHEN 325; 5 MG/5ML; MG/5ML
5 SOLUTION ORAL EVERY 4 HOURS PRN
COMMUNITY

## 2023-10-29 RX ORDER — ONDANSETRON 2 MG/ML
4 INJECTION INTRAMUSCULAR; INTRAVENOUS EVERY 6 HOURS PRN
Status: DISCONTINUED | OUTPATIENT
Start: 2023-10-29 | End: 2023-10-29 | Stop reason: SDUPTHER

## 2023-10-29 RX ORDER — ONDANSETRON 4 MG/1
4 TABLET, ORALLY DISINTEGRATING ORAL EVERY 8 HOURS PRN
Status: DISCONTINUED | OUTPATIENT
Start: 2023-10-29 | End: 2023-11-01 | Stop reason: HOSPADM

## 2023-10-29 RX ORDER — LOSARTAN POTASSIUM 100 MG/1
100 TABLET ORAL DAILY
Status: DISCONTINUED | OUTPATIENT
Start: 2023-10-30 | End: 2023-11-01 | Stop reason: HOSPADM

## 2023-10-29 RX ORDER — SODIUM CHLORIDE 0.9 % (FLUSH) 0.9 %
5-40 SYRINGE (ML) INJECTION PRN
Status: DISCONTINUED | OUTPATIENT
Start: 2023-10-29 | End: 2023-11-01 | Stop reason: HOSPADM

## 2023-10-29 RX ORDER — SODIUM CHLORIDE 9 MG/ML
INJECTION, SOLUTION INTRAVENOUS CONTINUOUS
Status: DISCONTINUED | OUTPATIENT
Start: 2023-10-29 | End: 2023-11-01 | Stop reason: HOSPADM

## 2023-10-29 RX ORDER — ACETAMINOPHEN 650 MG/1
650 SUPPOSITORY RECTAL EVERY 6 HOURS PRN
Status: DISCONTINUED | OUTPATIENT
Start: 2023-10-29 | End: 2023-11-01 | Stop reason: HOSPADM

## 2023-10-29 RX ORDER — ASPIRIN 81 MG/1
81 TABLET ORAL DAILY
Status: DISCONTINUED | OUTPATIENT
Start: 2023-10-30 | End: 2023-11-01 | Stop reason: HOSPADM

## 2023-10-29 RX ORDER — POLYETHYLENE GLYCOL 3350 17 G/17G
17 POWDER, FOR SOLUTION ORAL DAILY PRN
Status: DISCONTINUED | OUTPATIENT
Start: 2023-10-29 | End: 2023-11-01 | Stop reason: HOSPADM

## 2023-10-29 RX ORDER — SODIUM CHLORIDE 9 MG/ML
INJECTION, SOLUTION INTRAVENOUS PRN
Status: DISCONTINUED | OUTPATIENT
Start: 2023-10-29 | End: 2023-11-01 | Stop reason: HOSPADM

## 2023-10-29 RX ORDER — ATORVASTATIN CALCIUM 10 MG/1
10 TABLET, FILM COATED ORAL NIGHTLY
Status: DISCONTINUED | OUTPATIENT
Start: 2023-10-29 | End: 2023-10-30

## 2023-10-29 RX ORDER — DEXTROSE MONOHYDRATE 100 MG/ML
INJECTION, SOLUTION INTRAVENOUS CONTINUOUS PRN
Status: DISCONTINUED | OUTPATIENT
Start: 2023-10-29 | End: 2023-11-01 | Stop reason: HOSPADM

## 2023-10-29 RX ORDER — INSULIN LISPRO 100 [IU]/ML
0-4 INJECTION, SOLUTION INTRAVENOUS; SUBCUTANEOUS NIGHTLY
Status: DISCONTINUED | OUTPATIENT
Start: 2023-10-29 | End: 2023-11-01 | Stop reason: HOSPADM

## 2023-10-29 RX ORDER — INSULIN LISPRO 100 [IU]/ML
0-8 INJECTION, SOLUTION INTRAVENOUS; SUBCUTANEOUS
Status: DISCONTINUED | OUTPATIENT
Start: 2023-10-29 | End: 2023-11-01 | Stop reason: HOSPADM

## 2023-10-29 RX ORDER — LOSARTAN POTASSIUM AND HYDROCHLOROTHIAZIDE 25; 100 MG/1; MG/1
1 TABLET ORAL DAILY
Status: DISCONTINUED | OUTPATIENT
Start: 2023-10-29 | End: 2023-10-29 | Stop reason: SDUPTHER

## 2023-10-29 RX ORDER — ASPIRIN 81 MG/1
324 TABLET, CHEWABLE ORAL ONCE
Status: COMPLETED | OUTPATIENT
Start: 2023-10-29 | End: 2023-10-29

## 2023-10-29 RX ORDER — CELECOXIB 200 MG/1
200 CAPSULE ORAL DAILY
Status: DISCONTINUED | OUTPATIENT
Start: 2023-10-29 | End: 2023-11-01 | Stop reason: HOSPADM

## 2023-10-29 RX ORDER — DICYCLOMINE HYDROCHLORIDE 10 MG/1
10 CAPSULE ORAL DAILY
Status: DISCONTINUED | OUTPATIENT
Start: 2023-10-30 | End: 2023-11-01 | Stop reason: HOSPADM

## 2023-10-29 RX ORDER — SODIUM CHLORIDE 0.9 % (FLUSH) 0.9 %
5-40 SYRINGE (ML) INJECTION EVERY 12 HOURS SCHEDULED
Status: DISCONTINUED | OUTPATIENT
Start: 2023-10-29 | End: 2023-11-01 | Stop reason: HOSPADM

## 2023-10-29 RX ORDER — HYDROCHLOROTHIAZIDE 25 MG/1
25 TABLET ORAL DAILY
Status: DISCONTINUED | OUTPATIENT
Start: 2023-10-30 | End: 2023-11-01 | Stop reason: HOSPADM

## 2023-10-29 RX ORDER — ONDANSETRON 4 MG/1
4 TABLET, ORALLY DISINTEGRATING ORAL EVERY 8 HOURS PRN
Status: DISCONTINUED | OUTPATIENT
Start: 2023-10-29 | End: 2023-10-29 | Stop reason: SDUPTHER

## 2023-10-29 RX ORDER — ENOXAPARIN SODIUM 100 MG/ML
40 INJECTION SUBCUTANEOUS DAILY
Status: DISCONTINUED | OUTPATIENT
Start: 2023-10-29 | End: 2023-11-01 | Stop reason: HOSPADM

## 2023-10-29 RX ORDER — ONDANSETRON 2 MG/ML
4 INJECTION INTRAMUSCULAR; INTRAVENOUS EVERY 6 HOURS PRN
Status: DISCONTINUED | OUTPATIENT
Start: 2023-10-29 | End: 2023-11-01 | Stop reason: HOSPADM

## 2023-10-29 RX ADMIN — ENOXAPARIN SODIUM 40 MG: 100 INJECTION SUBCUTANEOUS at 19:00

## 2023-10-29 RX ADMIN — Medication 2 PUFF: at 21:30

## 2023-10-29 RX ADMIN — CEFTRIAXONE SODIUM 1000 MG: 1 INJECTION, POWDER, FOR SOLUTION INTRAMUSCULAR; INTRAVENOUS at 19:00

## 2023-10-29 RX ADMIN — IOPAMIDOL 75 ML: 755 INJECTION, SOLUTION INTRAVENOUS at 12:52

## 2023-10-29 RX ADMIN — ASPIRIN 324 MG: 81 TABLET, CHEWABLE ORAL at 14:03

## 2023-10-29 RX ADMIN — SODIUM CHLORIDE: 9 INJECTION, SOLUTION INTRAVENOUS at 18:57

## 2023-10-29 RX ADMIN — CELECOXIB 200 MG: 200 CAPSULE ORAL at 22:01

## 2023-10-29 RX ADMIN — ATORVASTATIN CALCIUM 10 MG: 10 TABLET, FILM COATED ORAL at 22:01

## 2023-10-29 RX ADMIN — SODIUM CHLORIDE, PRESERVATIVE FREE 10 ML: 5 INJECTION INTRAVENOUS at 22:14

## 2023-10-29 ASSESSMENT — PAIN SCALES - GENERAL
PAINLEVEL_OUTOF10: 5
PAINLEVEL_OUTOF10: 0
PAINLEVEL_OUTOF10: 0

## 2023-10-29 ASSESSMENT — ENCOUNTER SYMPTOMS
SHORTNESS OF BREATH: 0
PHOTOPHOBIA: 0
ABDOMINAL PAIN: 0
CHEST TIGHTNESS: 0
COUGH: 0
COLOR CHANGE: 0
VOMITING: 0
BACK PAIN: 0
DIARRHEA: 0
RESPIRATORY NEGATIVE: 1
CONSTIPATION: 0
NAUSEA: 0

## 2023-10-29 ASSESSMENT — PAIN DESCRIPTION - INTENSITY: RATING_2: 7

## 2023-10-29 ASSESSMENT — PAIN DESCRIPTION - DESCRIPTORS
DESCRIPTORS_2: ACHING
DESCRIPTORS: ACHING

## 2023-10-29 ASSESSMENT — PAIN DESCRIPTION - LOCATION
LOCATION_2: BACK
LOCATION: HEAD

## 2023-10-29 NOTE — ACP (ADVANCE CARE PLANNING)
Advanced Care Planning Note. Purpose of Encounter: Advanced care planning in light of metastatic lung adenocarcinoma  Parties In Attendance: Patient  Decisional Capacity: Yes  Subjective: Patient with expressive aphasia  Objective: Cr 1.4  Goals of Care Determination: Patient wants full support (CPR, vent, surgery, HD, trach, PEG)  Plan:  MRI Brain, Echo, PT/OT/SLP eval, Neuro and Onc consults  Code Status: Full code   Time spent on Advanced care Plannin minutes  Advanced Care Planning Documents: Completed advanced directives on chart, children share the POA.     Nico Gusman MD  10/29/2023 4:25 PM

## 2023-10-29 NOTE — ED PROVIDER NOTES
[facility]        Pt Name: Bri Villalpando  MRN: [id number]  [address] 1950  Date of evaluation: 10/29/2023  Provider: SIVA Jin Che  PCP: Azeb Lopes MD  Note Started: 4:21 PM EDT 10/29/23       I have seen and evaluated this patient with my supervising physician [address] [place] [address]       Chief Complaint   Patient presents with    Aphasia     Pt to ED via EMS from home c/o right facial droop and slurred speech. PT speech and facial droop resolved upon arrival. Upon initial assessment pt developed significant right facial droop and significant slurred speech and expressive aphasia. PT symptoms improve off and on. Facial Droop       HISTORY OF PRESENT ILLNESS: 1 or more Elements     History From: Patient  Limitations to history : None    Bri Villalpando is a 74 y.o. female with past medical history of lung cancer, CAD, CKD, peripheral artery disease, hyperlipidemia and hypertension who presents ED with complaint of speech problems. Patient brought to the ED by EMS from home. Patient denies any blood thinners. Patient reports she woke up this morning around 3:00. She states that she had a conversation with her son at 18 and she felt normal.  She states she went back to bed around 735/740 this morning. Woke up around 1030 and reports she felt like she could not use her right arm. She had some tingling to it and states it felt weak. She then noticed that she was having difficulty with her speech. EMS was called and she was brought to the ED for further evaluation and treatment. By time she arrives emergency department she reports that symptoms have improved but may not have completely gone back to normal.  Nurse reports when she went to triage her that she had significant worsening speech and weakness to the right side from presentation to her triage.   She reports symptoms at this time are not as
condition. The plan is to admit to the hospital at this time under the hospitalist service. Hospitalist accepted the patient and will take over the patient's care. Critical care time:  The total critical care time I independently spent while evaluating and treating this patient was 40 minutes. This excludes time spent doing separately billable procedures. This includes time at the bedside, data interpretation, medication management, obtaining critical history from collateral sources if the patient is unable to provide it directly, and physician consultation. Specifics of interventions taken and potentially life-threatening diagnostic considerations are listed above in the medical decision making. If this was a shared visit with an ALESSIA, the time in this attestation is non-concurrent critical care time out of the total shared critical care time provided by the ALESSIA and myself. DISCLAIMER: This chart was created using Dragon dictation software. Efforts were made by me to ensure accuracy, however some errors may be present due to limitations of this technology and occasionally words are not transcribed correctly.        Harvey Ramos MD  10/29/23 3927

## 2023-10-29 NOTE — H&P
HOSPITALISTS HISTORY AND PHYSICAL    10/29/2023 3:38 PM    Patient Information:  Latasha Padron is a 74 y.o. female [id number]  PCP:  Christiano Cordero MD (Tel: [phone] )    Chief complaint:    Chief Complaint   Patient presents with    Aphasia     Pt to ED via EMS from home c/o right facial droop and slurred speech. PT speech and facial droop resolved upon arrival. Upon initial assessment pt developed significant right facial droop and significant slurred speech and expressive aphasia. PT symptoms improve off and on. Facial Droop        History of Present Illness:  Latasha Padron is a 74 y.o. female with history of metastatic lung adenocarcinoma, PAD s/p L CEA, CKD 3, CAD, SSS s/p PPM who came to ER with complaints of R facial droop and aphasia. Patient states she has felt some RUE>RLE weakness. Patient with expressive aphasia on exam.  No HA, dizziness, LH, CP or SOB. Uses O2 at night PRN. On immunotherapy for her metastatic lung adenocarcinoma. Lives with family. Otherwise complete ROS is negative unless listed above. REVIEW OF SYSTEMS:   Pertinent positives as noted in HPI. All other systems were reviewed and are negative. Past Medical History:   has a past medical history of CAD (coronary artery disease), CKD (chronic kidney disease) stage 3, GFR 30-59 ml/min ([facility]), IBS (irritable bowel syndrome), Lumbago, Malignant neoplasm of upper lobe of left lung ([address]), Osteoarthrosis, unspecified whether generalized or localized, unspecified site, Other abnormal blood chemistry, PAD (peripheral artery disease) ([address]), Pure hypercholesterolemia, Risk for falls, Spinal stenosis, Tremor, Unspecified essential hypertension, and Wears glasses. Past Surgical History:   has a past surgical history that includes Cholecystectomy; Tubal ligation; knee surgery;  Rotator cuff repair (Bilateral);

## 2023-10-29 NOTE — ED NOTES
ED TO INPATIENT SBAR HANDOFF    Patient Name: Stark Barthel   :  1950  74 y.o. MRN:  [id number]  Preferred Name  n/a  ED Room #:  ED-0005/05  Family/Caregiver Present yes   Restraints no   Sitter no   Sepsis Risk Score Sepsis Risk Score: 4.51    Situation  Code Status: Prior No additional code details. Allergies: Morphine, Pletal [cilostazol], Gabapentin, Ace inhibitors, Chantix [varenicline], Keflex [cephalexin], Niaspan [niacin er], and Pravastatin  Weight: Patient Vitals for the past 96 hrs (Last 3 readings):   Weight   10/29/23 1306 66.2 kg (146 lb)     Arrived from: home  Chief Complaint:   Chief Complaint   Patient presents with    Aphasia     Pt to ED via EMS from home c/o right facial droop and slurred speech. PT speech and facial droop resolved upon arrival. Upon initial assessment pt developed significant right facial droop and significant slurred speech and expressive aphasia. PT symptoms improve off and on. Facial Droop     Hospital Problem/Diagnosis:  Principal Problem:    Acute CVA (cerebrovascular accident) ([address])  Active Problems:    CKD (chronic kidney disease) stage 3, GFR 30-59 ml/min ([facility])    PAD (peripheral artery disease) ([facility])    Sick sinus syndrome (HCC)    HTN (hypertension), benign    Coronary artery disease involving native coronary artery of native heart with angina pectoris (HCC)    Pacemaker    COPD (chronic obstructive pulmonary disease) (HCC)    Malignant neoplasm of upper lobe of left lung (HCC)    History of lung cancer    Aphasia  Resolved Problems:    * No resolved hospital problems. *    Imaging:   XR CHEST PORTABLE   Final Result   1. Extensive opacification of the left lung with underlying moderate   effusion, unchanged. The right lung is clear. 2. No significant change since the prior study dated 2023. CTA HEAD NECK W CONTRAST   Final Result   Right dominance of the vertebral arteries with hypoplastic intracranial left   vertebral artery.

## 2023-10-30 ENCOUNTER — APPOINTMENT (OUTPATIENT)
Dept: CT IMAGING | Age: 73
DRG: 065 | End: 2023-10-30
Payer: COMMERCIAL

## 2023-10-30 ENCOUNTER — APPOINTMENT (OUTPATIENT)
Dept: MRI IMAGING | Age: 73
DRG: 065 | End: 2023-10-30
Payer: COMMERCIAL

## 2023-10-30 LAB
ANION GAP SERPL CALCULATED.3IONS-SCNC: 15 MMOL/L (ref 3–16)
BUN SERPL-MCNC: 48 MG/DL (ref 7–20)
CALCIUM SERPL-MCNC: 8.5 MG/DL (ref 8.3–10.6)
CHLORIDE SERPL-SCNC: 102 MMOL/L (ref 99–110)
CHOLEST SERPL-MCNC: 159 MG/DL (ref 0–199)
CO2 SERPL-SCNC: 21 MMOL/L (ref 21–32)
CREAT SERPL-MCNC: 1.2 MG/DL (ref 0.6–1.2)
DEPRECATED RDW RBC AUTO: 19.5 % (ref 12.4–15.4)
EKG ATRIAL RATE: 83 BPM
EKG DIAGNOSIS: NORMAL
EKG P AXIS: 70 DEGREES
EKG P-R INTERVAL: 112 MS
EKG Q-T INTERVAL: 382 MS
EKG QRS DURATION: 106 MS
EKG QTC CALCULATION (BAZETT): 448 MS
EKG R AXIS: -7 DEGREES
EKG T AXIS: 79 DEGREES
EKG VENTRICULAR RATE: 83 BPM
EST. AVERAGE GLUCOSE BLD GHB EST-MCNC: 145.6 MG/DL
GFR SERPLBLD CREATININE-BSD FMLA CKD-EPI: 47 ML/MIN/{1.73_M2}
GLUCOSE BLD-MCNC: 122 MG/DL (ref 70–99)
GLUCOSE BLD-MCNC: 202 MG/DL (ref 70–99)
GLUCOSE BLD-MCNC: 246 MG/DL (ref 70–99)
GLUCOSE BLD-MCNC: 275 MG/DL (ref 70–99)
GLUCOSE SERPL-MCNC: 129 MG/DL (ref 70–99)
HBA1C MFR BLD: 6.7 %
HCT VFR BLD AUTO: 41.7 % (ref 36–48)
HDLC SERPL-MCNC: 32 MG/DL (ref 40–60)
HGB BLD-MCNC: 13.2 G/DL (ref 12–16)
LDLC SERPL CALC-MCNC: 98 MG/DL
MCH RBC QN AUTO: 27.1 PG (ref 26–34)
MCHC RBC AUTO-ENTMCNC: 31.7 G/DL (ref 31–36)
MCV RBC AUTO: 85.3 FL (ref 80–100)
PERFORMED ON: ABNORMAL
PLATELET # BLD AUTO: 265 K/UL (ref 135–450)
PMV BLD AUTO: 9.1 FL (ref 5–10.5)
POTASSIUM SERPL-SCNC: 4.4 MMOL/L (ref 3.5–5.1)
RBC # BLD AUTO: 4.89 M/UL (ref 4–5.2)
REASON FOR REJECTION: NORMAL
REJECTED TEST: NORMAL
SODIUM SERPL-SCNC: 138 MMOL/L (ref 136–145)
TRIGL SERPL-MCNC: 146 MG/DL (ref 0–150)
TROPONIN, HIGH SENSITIVITY: 38 NG/L (ref 0–14)
VLDLC SERPL CALC-MCNC: 29 MG/DL
WBC # BLD AUTO: 14.5 K/UL (ref 4–11)

## 2023-10-30 PROCEDURE — 6370000000 HC RX 637 (ALT 250 FOR IP): Performed by: INTERNAL MEDICINE

## 2023-10-30 PROCEDURE — 97116 GAIT TRAINING THERAPY: CPT

## 2023-10-30 PROCEDURE — 6360000004 HC RX CONTRAST MEDICATION: Performed by: PSYCHIATRY & NEUROLOGY

## 2023-10-30 PROCEDURE — 97530 THERAPEUTIC ACTIVITIES: CPT

## 2023-10-30 PROCEDURE — 6370000000 HC RX 637 (ALT 250 FOR IP): Performed by: NURSE PRACTITIONER

## 2023-10-30 PROCEDURE — 83036 HEMOGLOBIN GLYCOSYLATED A1C: CPT

## 2023-10-30 PROCEDURE — 2060000000 HC ICU INTERMEDIATE R&B

## 2023-10-30 PROCEDURE — 93010 ELECTROCARDIOGRAM REPORT: CPT | Performed by: INTERNAL MEDICINE

## 2023-10-30 PROCEDURE — 92526 ORAL FUNCTION THERAPY: CPT

## 2023-10-30 PROCEDURE — 80061 LIPID PANEL: CPT

## 2023-10-30 PROCEDURE — 84484 ASSAY OF TROPONIN QUANT: CPT

## 2023-10-30 PROCEDURE — 94640 AIRWAY INHALATION TREATMENT: CPT

## 2023-10-30 PROCEDURE — 71260 CT THORAX DX C+: CPT

## 2023-10-30 PROCEDURE — 2580000003 HC RX 258: Performed by: INTERNAL MEDICINE

## 2023-10-30 PROCEDURE — 2700000000 HC OXYGEN THERAPY PER DAY

## 2023-10-30 PROCEDURE — 92523 SPEECH SOUND LANG COMPREHEN: CPT

## 2023-10-30 PROCEDURE — 6370000000 HC RX 637 (ALT 250 FOR IP): Performed by: HOSPITALIST

## 2023-10-30 PROCEDURE — 6360000002 HC RX W HCPCS: Performed by: INTERNAL MEDICINE

## 2023-10-30 PROCEDURE — 94761 N-INVAS EAR/PLS OXIMETRY MLT: CPT

## 2023-10-30 PROCEDURE — 97535 SELF CARE MNGMENT TRAINING: CPT

## 2023-10-30 PROCEDURE — 36415 COLL VENOUS BLD VENIPUNCTURE: CPT

## 2023-10-30 PROCEDURE — 97161 PT EVAL LOW COMPLEX 20 MIN: CPT

## 2023-10-30 PROCEDURE — 85027 COMPLETE CBC AUTOMATED: CPT

## 2023-10-30 PROCEDURE — 99223 1ST HOSP IP/OBS HIGH 75: CPT | Performed by: PSYCHIATRY & NEUROLOGY

## 2023-10-30 PROCEDURE — 80048 BASIC METABOLIC PNL TOTAL CA: CPT

## 2023-10-30 PROCEDURE — 70551 MRI BRAIN STEM W/O DYE: CPT

## 2023-10-30 PROCEDURE — 97165 OT EVAL LOW COMPLEX 30 MIN: CPT

## 2023-10-30 PROCEDURE — 92610 EVALUATE SWALLOWING FUNCTION: CPT

## 2023-10-30 RX ORDER — ATORVASTATIN CALCIUM 80 MG/1
80 TABLET, FILM COATED ORAL NIGHTLY
Status: DISCONTINUED | OUTPATIENT
Start: 2023-10-30 | End: 2023-11-01 | Stop reason: HOSPADM

## 2023-10-30 RX ORDER — OXYCODONE HYDROCHLORIDE 5 MG/1
5 TABLET ORAL EVERY 4 HOURS PRN
Status: DISCONTINUED | OUTPATIENT
Start: 2023-10-30 | End: 2023-11-01 | Stop reason: HOSPADM

## 2023-10-30 RX ADMIN — CELECOXIB 200 MG: 200 CAPSULE ORAL at 08:53

## 2023-10-30 RX ADMIN — INSULIN LISPRO 2 UNITS: 100 INJECTION, SOLUTION INTRAVENOUS; SUBCUTANEOUS at 17:49

## 2023-10-30 RX ADMIN — CEFTRIAXONE SODIUM 1000 MG: 1 INJECTION, POWDER, FOR SOLUTION INTRAMUSCULAR; INTRAVENOUS at 17:51

## 2023-10-30 RX ADMIN — SODIUM CHLORIDE, PRESERVATIVE FREE 10 ML: 5 INJECTION INTRAVENOUS at 08:54

## 2023-10-30 RX ADMIN — OXYCODONE HYDROCHLORIDE 5 MG: 5 TABLET ORAL at 10:39

## 2023-10-30 RX ADMIN — Medication 2 PUFF: at 19:48

## 2023-10-30 RX ADMIN — LOSARTAN POTASSIUM 100 MG: 100 TABLET, FILM COATED ORAL at 08:52

## 2023-10-30 RX ADMIN — INSULIN LISPRO 2 UNITS: 100 INJECTION, SOLUTION INTRAVENOUS; SUBCUTANEOUS at 13:22

## 2023-10-30 RX ADMIN — OXYCODONE HYDROCHLORIDE 5 MG: 5 TABLET ORAL at 22:23

## 2023-10-30 RX ADMIN — DICYCLOMINE HYDROCHLORIDE 10 MG: 10 CAPSULE ORAL at 08:53

## 2023-10-30 RX ADMIN — OXYCODONE HYDROCHLORIDE 5 MG: 5 TABLET ORAL at 00:21

## 2023-10-30 RX ADMIN — Medication 2 PUFF: at 10:40

## 2023-10-30 RX ADMIN — SODIUM CHLORIDE, PRESERVATIVE FREE 10 ML: 5 INJECTION INTRAVENOUS at 20:16

## 2023-10-30 RX ADMIN — HYDROCHLOROTHIAZIDE 25 MG: 25 TABLET ORAL at 08:53

## 2023-10-30 RX ADMIN — IOPAMIDOL 75 ML: 755 INJECTION, SOLUTION INTRAVENOUS at 12:37

## 2023-10-30 RX ADMIN — SODIUM CHLORIDE: 9 INJECTION, SOLUTION INTRAVENOUS at 20:29

## 2023-10-30 RX ADMIN — ASPIRIN 81 MG: 81 TABLET, COATED ORAL at 08:53

## 2023-10-30 RX ADMIN — ATORVASTATIN CALCIUM 80 MG: 80 TABLET, FILM COATED ORAL at 20:15

## 2023-10-30 RX ADMIN — ONDANSETRON 4 MG: 2 INJECTION INTRAMUSCULAR; INTRAVENOUS at 22:23

## 2023-10-30 RX ADMIN — PREDNISONE 30 MG: 20 TABLET ORAL at 08:53

## 2023-10-30 RX ADMIN — ENOXAPARIN SODIUM 40 MG: 100 INJECTION SUBCUTANEOUS at 08:52

## 2023-10-30 ASSESSMENT — PAIN - FUNCTIONAL ASSESSMENT: PAIN_FUNCTIONAL_ASSESSMENT: ACTIVITIES ARE NOT PREVENTED

## 2023-10-30 ASSESSMENT — PAIN DESCRIPTION - ONSET: ONSET: ON-GOING

## 2023-10-30 ASSESSMENT — PAIN SCALES - GENERAL
PAINLEVEL_OUTOF10: 0
PAINLEVEL_OUTOF10: 4
PAINLEVEL_OUTOF10: 0
PAINLEVEL_OUTOF10: 7

## 2023-10-30 ASSESSMENT — PAIN DESCRIPTION - ORIENTATION
ORIENTATION: OUTER
ORIENTATION: RIGHT

## 2023-10-30 ASSESSMENT — PAIN DESCRIPTION - FREQUENCY: FREQUENCY: CONTINUOUS

## 2023-10-30 ASSESSMENT — PAIN DESCRIPTION - LOCATION
LOCATION: GENERALIZED
LOCATION: SHOULDER

## 2023-10-30 ASSESSMENT — PAIN DESCRIPTION - PAIN TYPE: TYPE: ACUTE PAIN

## 2023-10-30 ASSESSMENT — PAIN DESCRIPTION - DIRECTION: RADIATING_TOWARDS: NO

## 2023-10-30 ASSESSMENT — PAIN DESCRIPTION - DESCRIPTORS: DESCRIPTORS: ACHING

## 2023-10-30 NOTE — CARE COORDINATION
Case Management Assessment  Initial Evaluation    Date/Time of Evaluation: 10/30/2023 4:29 PM  Assessment Completed by: Mariya He    If patient is discharged prior to next notation, then this note serves as note for discharge by case management. Patient Name: Ever Sosa                   YOB: 1950  Diagnosis: Slurred speech [R47.81]  Right sided weakness [R53.1]  Acute CVA (cerebrovascular accident) [facility]) [I63.9]                   Date / Time: 10/29/2023 12:30 PM    Patient Admission Status: Inpatient   Readmission Risk (Low < 19, Mod (19-27), High > 27): Readmission Risk Score: 15.7    Current PCP: Lokesh [place] MD  PCP verified by CM? (P) Yes    Chart Reviewed: Yes      History Provided by: (P) Patient  Patient Orientation: (P) Alert and Oriented, Person, Place    Patient Cognition: (P) Alert    Hospitalization in the last 30 days (Readmission):  No    If yes, Readmission Assessment in CM Navigator will be completed.     Advance Directives:      Code Status: Full Code   Patient's Primary Decision Maker is: (P) Legal Next of Kin    Primary Decision Maker: Irma Rell Fishman Child - [phone]    Secondary Decision Maker: celyaditya bradforda  Child - [phone]    Discharge Planning:    Patient lives with: (P) Children Type of Home: (P) House  Primary Care Giver: (P) Self  Patient Support Systems include: (P) Children, Family Members   Current Financial resources:    Current community resources: (P) None  Current services prior to admission: (P) Oxygen Therapy, Durable Medical Equipment            Current DME: (P) [employer] Shower Chair            Type of Home Care services:       ADLS  Prior functional level: (P) Independent in ADLs/IADLs  Current functional level: (P) Independent in ADLs/IADLs    PT AM-PAC: 23 /24  OT AM-PAC: 21 /24    Family can provide assistance at DC: (P) Yes  Would you like Case Management to discuss the discharge plan with any other family

## 2023-10-31 ENCOUNTER — APPOINTMENT (OUTPATIENT)
Dept: MRI IMAGING | Age: 73
DRG: 065 | End: 2023-10-31
Payer: COMMERCIAL

## 2023-10-31 LAB
BACTERIA UR CULT: ABNORMAL
EST. AVERAGE GLUCOSE BLD GHB EST-MCNC: 148.5 MG/DL
GLUCOSE BLD-MCNC: 108 MG/DL (ref 70–99)
GLUCOSE BLD-MCNC: 175 MG/DL (ref 70–99)
GLUCOSE BLD-MCNC: 187 MG/DL (ref 70–99)
GLUCOSE BLD-MCNC: 190 MG/DL (ref 70–99)
HBA1C MFR BLD: 6.8 %
ORGANISM: ABNORMAL
PERFORMED ON: ABNORMAL

## 2023-10-31 PROCEDURE — 6360000004 HC RX CONTRAST MEDICATION: Performed by: INTERNAL MEDICINE

## 2023-10-31 PROCEDURE — 72157 MRI CHEST SPINE W/O & W/DYE: CPT

## 2023-10-31 PROCEDURE — 2060000000 HC ICU INTERMEDIATE R&B

## 2023-10-31 PROCEDURE — APPNB30 APP NON BILLABLE TIME 0-30 MINS

## 2023-10-31 PROCEDURE — 94640 AIRWAY INHALATION TREATMENT: CPT

## 2023-10-31 PROCEDURE — 2700000000 HC OXYGEN THERAPY PER DAY

## 2023-10-31 PROCEDURE — 2580000003 HC RX 258: Performed by: INTERNAL MEDICINE

## 2023-10-31 PROCEDURE — 72156 MRI NECK SPINE W/O & W/DYE: CPT

## 2023-10-31 PROCEDURE — 6370000000 HC RX 637 (ALT 250 FOR IP): Performed by: INTERNAL MEDICINE

## 2023-10-31 PROCEDURE — 6370000000 HC RX 637 (ALT 250 FOR IP): Performed by: NURSE PRACTITIONER

## 2023-10-31 PROCEDURE — A9577 INJ MULTIHANCE: HCPCS | Performed by: INTERNAL MEDICINE

## 2023-10-31 PROCEDURE — 94761 N-INVAS EAR/PLS OXIMETRY MLT: CPT

## 2023-10-31 PROCEDURE — 99233 SBSQ HOSP IP/OBS HIGH 50: CPT | Performed by: PSYCHIATRY & NEUROLOGY

## 2023-10-31 PROCEDURE — 6360000002 HC RX W HCPCS: Performed by: INTERNAL MEDICINE

## 2023-10-31 PROCEDURE — 6370000000 HC RX 637 (ALT 250 FOR IP): Performed by: HOSPITALIST

## 2023-10-31 PROCEDURE — 70552 MRI BRAIN STEM W/DYE: CPT

## 2023-10-31 PROCEDURE — APPSS45 APP SPLIT SHARED TIME 31-45 MINUTES

## 2023-10-31 RX ORDER — CODEINE PHOSPHATE AND GUAIFENESIN 10; 100 MG/5ML; MG/5ML
5 SOLUTION ORAL EVERY 4 HOURS PRN
Status: DISCONTINUED | OUTPATIENT
Start: 2023-10-31 | End: 2023-11-01 | Stop reason: HOSPADM

## 2023-10-31 RX ORDER — IPRATROPIUM BROMIDE AND ALBUTEROL SULFATE 2.5; .5 MG/3ML; MG/3ML
1 SOLUTION RESPIRATORY (INHALATION) EVERY 4 HOURS PRN
Status: DISCONTINUED | OUTPATIENT
Start: 2023-10-31 | End: 2023-11-01

## 2023-10-31 RX ADMIN — HYDROCHLOROTHIAZIDE 25 MG: 25 TABLET ORAL at 08:36

## 2023-10-31 RX ADMIN — PREDNISONE 30 MG: 20 TABLET ORAL at 08:36

## 2023-10-31 RX ADMIN — Medication 2 PUFF: at 09:59

## 2023-10-31 RX ADMIN — Medication 2 PUFF: at 21:31

## 2023-10-31 RX ADMIN — GADOBENATE DIMEGLUMINE 15 ML: 529 INJECTION, SOLUTION INTRAVENOUS at 15:38

## 2023-10-31 RX ADMIN — CEFTRIAXONE SODIUM 1000 MG: 1 INJECTION, POWDER, FOR SOLUTION INTRAMUSCULAR; INTRAVENOUS at 18:29

## 2023-10-31 RX ADMIN — DICYCLOMINE HYDROCHLORIDE 10 MG: 10 CAPSULE ORAL at 08:37

## 2023-10-31 RX ADMIN — OXYCODONE HYDROCHLORIDE 5 MG: 5 TABLET ORAL at 08:36

## 2023-10-31 RX ADMIN — ASPIRIN 81 MG: 81 TABLET, COATED ORAL at 08:36

## 2023-10-31 RX ADMIN — CELECOXIB 200 MG: 200 CAPSULE ORAL at 08:36

## 2023-10-31 RX ADMIN — LOSARTAN POTASSIUM 100 MG: 100 TABLET, FILM COATED ORAL at 08:36

## 2023-10-31 RX ADMIN — OXYCODONE HYDROCHLORIDE 5 MG: 5 TABLET ORAL at 21:25

## 2023-10-31 RX ADMIN — ENOXAPARIN SODIUM 40 MG: 100 INJECTION SUBCUTANEOUS at 08:36

## 2023-10-31 RX ADMIN — ATORVASTATIN CALCIUM 80 MG: 80 TABLET, FILM COATED ORAL at 21:26

## 2023-10-31 RX ADMIN — GUAIFENESIN AND CODEINE PHOSPHATE 5 ML: 100; 10 SOLUTION ORAL at 13:24

## 2023-10-31 ASSESSMENT — ENCOUNTER SYMPTOMS
VOICE CHANGE: 1
SHORTNESS OF BREATH: 1
BACK PAIN: 1

## 2023-10-31 ASSESSMENT — PAIN SCALES - GENERAL
PAINLEVEL_OUTOF10: 7
PAINLEVEL_OUTOF10: 0
PAINLEVEL_OUTOF10: 8
PAINLEVEL_OUTOF10: 0

## 2023-10-31 ASSESSMENT — PAIN DESCRIPTION - DESCRIPTORS: DESCRIPTORS: ACHING

## 2023-10-31 ASSESSMENT — PAIN DESCRIPTION - ORIENTATION
ORIENTATION: RIGHT;OUTER;UPPER
ORIENTATION: RIGHT

## 2023-10-31 ASSESSMENT — PAIN DESCRIPTION - LOCATION
LOCATION: BACK
LOCATION: RIB CAGE

## 2023-10-31 NOTE — PLAN OF CARE
Problem: Discharge Planning  Goal: Discharge to home or other facility with appropriate resources  10/31/2023 0057 by Rocco Davis RN  Outcome: Progressing  10/30/2023 1534 by Saroj Merritt RN  Outcome: Progressing     Problem: Pain  Goal: Verbalizes/displays adequate comfort level or baseline comfort level  10/31/2023 0057 by Rocco Davis RN  Outcome: Progressing  Flowsheets (Taken 10/30/2023 2022)  Verbalizes/displays adequate comfort level or baseline comfort level: Assess pain using appropriate pain scale  10/30/2023 1534 by Saroj Merritt RN  Outcome: Progressing     Problem: Skin/Tissue Integrity  Goal: Absence of new skin breakdown  Description: 1. Monitor for areas of redness and/or skin breakdown  2. Assess vascular access sites hourly  3. Every 4-6 hours minimum:  Change oxygen saturation probe site  4. Every 4-6 hours:  If on nasal continuous positive airway pressure, respiratory therapy assess nares and determine need for appliance change or resting period.   10/30/2023 1534 by Saroj Merritt RN  Outcome: Progressing     Problem: Safety - Adult  Goal: Free from fall injury  10/30/2023 1534 by Saroj Merritt RN  Outcome: Progressing     Problem: ABCDS Injury Assessment  Goal: Absence of physical injury  10/30/2023 1534 by Saroj Merritt RN  Outcome: Progressing

## 2023-11-01 VITALS
OXYGEN SATURATION: 94 % | RESPIRATION RATE: 19 BRPM | HEIGHT: 61 IN | DIASTOLIC BLOOD PRESSURE: 78 MMHG | WEIGHT: 145.5 LBS | TEMPERATURE: 98.4 F | HEART RATE: 86 BPM | BODY MASS INDEX: 27.47 KG/M2 | SYSTOLIC BLOOD PRESSURE: 115 MMHG

## 2023-11-01 LAB
GLUCOSE BLD-MCNC: 110 MG/DL (ref 70–99)
GLUCOSE BLD-MCNC: 189 MG/DL (ref 70–99)
PERFORMED ON: ABNORMAL
PERFORMED ON: ABNORMAL

## 2023-11-01 PROCEDURE — 2580000003 HC RX 258: Performed by: INTERNAL MEDICINE

## 2023-11-01 PROCEDURE — 6370000000 HC RX 637 (ALT 250 FOR IP): Performed by: NURSE PRACTITIONER

## 2023-11-01 PROCEDURE — 6360000002 HC RX W HCPCS: Performed by: INTERNAL MEDICINE

## 2023-11-01 PROCEDURE — 6370000000 HC RX 637 (ALT 250 FOR IP): Performed by: PSYCHIATRY & NEUROLOGY

## 2023-11-01 PROCEDURE — 94640 AIRWAY INHALATION TREATMENT: CPT

## 2023-11-01 PROCEDURE — 6370000000 HC RX 637 (ALT 250 FOR IP): Performed by: INTERNAL MEDICINE

## 2023-11-01 PROCEDURE — 99232 SBSQ HOSP IP/OBS MODERATE 35: CPT | Performed by: PSYCHIATRY & NEUROLOGY

## 2023-11-01 PROCEDURE — APPSS45 APP SPLIT SHARED TIME 31-45 MINUTES

## 2023-11-01 PROCEDURE — 2700000000 HC OXYGEN THERAPY PER DAY

## 2023-11-01 PROCEDURE — APPNB30 APP NON BILLABLE TIME 0-30 MINS

## 2023-11-01 PROCEDURE — 93306 TTE W/DOPPLER COMPLETE: CPT

## 2023-11-01 RX ORDER — CODEINE PHOSPHATE AND GUAIFENESIN 10; 100 MG/5ML; MG/5ML
5 SOLUTION ORAL EVERY 4 HOURS PRN
Qty: 30 ML | Refills: 0 | Status: SHIPPED | OUTPATIENT
Start: 2023-11-01 | End: 2023-11-04

## 2023-11-01 RX ORDER — TRAMADOL HYDROCHLORIDE 50 MG/1
50 TABLET ORAL EVERY 4 HOURS PRN
Status: DISCONTINUED | OUTPATIENT
Start: 2023-11-01 | End: 2023-11-01 | Stop reason: HOSPADM

## 2023-11-01 RX ORDER — DORZOLAMIDE HYDROCHLORIDE AND TIMOLOL MALEATE PRESERVATIVE FREE 20; 5 MG/ML; MG/ML
1 SOLUTION/ DROPS OPHTHALMIC 2 TIMES DAILY
Status: DISCONTINUED | OUTPATIENT
Start: 2023-11-01 | End: 2023-11-01

## 2023-11-01 RX ORDER — TAFLUPROST OPTHALMIC 0 MG/.3ML
1 SOLUTION/ DROPS OPHTHALMIC EVERY EVENING
Status: DISCONTINUED | OUTPATIENT
Start: 2023-11-01 | End: 2023-11-01

## 2023-11-01 RX ORDER — IPRATROPIUM BROMIDE AND ALBUTEROL SULFATE 2.5; .5 MG/3ML; MG/3ML
1 SOLUTION RESPIRATORY (INHALATION)
Status: DISCONTINUED | OUTPATIENT
Start: 2023-11-01 | End: 2023-11-01 | Stop reason: HOSPADM

## 2023-11-01 RX ADMIN — Medication 2 PUFF: at 08:09

## 2023-11-01 RX ADMIN — PREDNISONE 30 MG: 20 TABLET ORAL at 09:11

## 2023-11-01 RX ADMIN — OXYCODONE HYDROCHLORIDE 5 MG: 5 TABLET ORAL at 06:54

## 2023-11-01 RX ADMIN — IPRATROPIUM BROMIDE AND ALBUTEROL SULFATE 1 DOSE: .5; 3 SOLUTION RESPIRATORY (INHALATION) at 00:15

## 2023-11-01 RX ADMIN — IPRATROPIUM BROMIDE AND ALBUTEROL SULFATE 1 DOSE: .5; 3 SOLUTION RESPIRATORY (INHALATION) at 12:51

## 2023-11-01 RX ADMIN — SODIUM CHLORIDE, PRESERVATIVE FREE 10 ML: 5 INJECTION INTRAVENOUS at 09:26

## 2023-11-01 RX ADMIN — IPRATROPIUM BROMIDE AND ALBUTEROL SULFATE 1 DOSE: .5; 3 SOLUTION RESPIRATORY (INHALATION) at 08:05

## 2023-11-01 RX ADMIN — ENOXAPARIN SODIUM 40 MG: 100 INJECTION SUBCUTANEOUS at 09:21

## 2023-11-01 RX ADMIN — DICYCLOMINE HYDROCHLORIDE 10 MG: 10 CAPSULE ORAL at 09:12

## 2023-11-01 RX ADMIN — HYDROCHLOROTHIAZIDE 25 MG: 25 TABLET ORAL at 09:11

## 2023-11-01 RX ADMIN — ASPIRIN 81 MG: 81 TABLET, COATED ORAL at 09:10

## 2023-11-01 RX ADMIN — CELECOXIB 200 MG: 200 CAPSULE ORAL at 09:09

## 2023-11-01 RX ADMIN — OXYCODONE HYDROCHLORIDE 5 MG: 5 TABLET ORAL at 01:49

## 2023-11-01 RX ADMIN — OXYCODONE HYDROCHLORIDE 5 MG: 5 TABLET ORAL at 13:22

## 2023-11-01 RX ADMIN — LOSARTAN POTASSIUM 100 MG: 100 TABLET, FILM COATED ORAL at 09:12

## 2023-11-01 ASSESSMENT — PAIN DESCRIPTION - ORIENTATION
ORIENTATION: RIGHT
ORIENTATION: RIGHT

## 2023-11-01 ASSESSMENT — PAIN - FUNCTIONAL ASSESSMENT: PAIN_FUNCTIONAL_ASSESSMENT: ACTIVITIES ARE NOT PREVENTED

## 2023-11-01 ASSESSMENT — PAIN DESCRIPTION - LOCATION
LOCATION: SHOULDER

## 2023-11-01 ASSESSMENT — PAIN DESCRIPTION - FREQUENCY: FREQUENCY: INTERMITTENT

## 2023-11-01 ASSESSMENT — PULMONARY FUNCTION TESTS
PEFR_L/MIN: 100
PEFR_L/MIN: 93

## 2023-11-01 ASSESSMENT — PAIN SCALES - GENERAL
PAINLEVEL_OUTOF10: 0
PAINLEVEL_OUTOF10: 6
PAINLEVEL_OUTOF10: 7
PAINLEVEL_OUTOF10: 1

## 2023-11-01 ASSESSMENT — ENCOUNTER SYMPTOMS
SHORTNESS OF BREATH: 1
BACK PAIN: 1
VOICE CHANGE: 1

## 2023-11-01 ASSESSMENT — PAIN DESCRIPTION - DESCRIPTORS
DESCRIPTORS: DULL
DESCRIPTORS: DULL

## 2023-11-01 ASSESSMENT — PAIN DESCRIPTION - PAIN TYPE: TYPE: ACUTE PAIN

## 2023-11-01 NOTE — CARE COORDINATION
11/01/23 1503   IMM Letter   IMM Letter given to Patient/Family/Significant other/Guardian/POA/by: CM provided copy of IMM to Pt. and son at bedside.    IMM Letter date given: 11/01/23   IMM Letter time given: 2252     Electronically signed by Jeffery Banegas on 11/1/2023 at 3:04 PM

## 2023-11-01 NOTE — CARE COORDINATION
Discharge Planning Note Re: [address]      CM/SW noted consult for discharge planning. Chart reviewed. Noted recommendations for home health care. CM met with patient and her son. Introduced self and explained role of CM and discharge planning. Patient is agreeable to home health on dc. Pateint reports she had hhc in the past, would be agreeable if same company could come. CM reviewed in previous CM notes,  placed call to Quality of Care. LVM w/ Vitaly Patrick w/referral     Referral made to [facility] . Pending [facility] order for  [x]RN [x]PT  [x]OT  []HHA  []SW  []  SLP    CM/SW will follow-up on referrals and provide any additional documentation necessary to facilitate placement. Electronically signed by Keon Bergeron on 11/1/2023 at 2:57 PM     [facility] [address] [address] [address] accepted patient. Vitaly Patrick reports will see patient tomorrow. CM placed message to MD requesting HHC order, message read, MD states okay.      Electronically signed by Keon Bergeron on 11/1/2023 at 3:15 PM

## 2023-11-01 NOTE — DISCHARGE INSTR - COC
continuing treatment of the diagnosis listed and that she requires Home Care for greater 30 days.      Update Admission H&P: No change in H&P    PHYSICIAN SIGNATURE:  Electronically signed by Peggy Garcia MD on 11/1/23 at 3:33 PM EDT

## 2023-11-02 ENCOUNTER — TELEPHONE (OUTPATIENT)
Dept: FAMILY MEDICINE CLINIC | Age: 73
End: 2023-11-02

## 2023-11-02 NOTE — TELEPHONE ENCOUNTER
Care Transitions Initial Follow Up Call    Outreach made within 2 business days of discharge: Yes    Patient: Kathe Hood Patient : 1950   MRN: [id number]  Reason for Admission: mini stroke  Discharge Date: 23       Spoke with: Catracho Jesus    Discharge department/facility: [facility]    TCM Interactive Patient Contact:  Was patient able to fill all prescriptions: Yes  Was patient instructed to bring all medications to the follow-up visit: Yes  Is patient taking all medications as directed in the discharge summary? Yes  Does patient understand their discharge instructions: Yes  Does patient have questions or concerns that need addressed prior to 7-14 day follow up office visit: no, states she is feeling fine and doesn't want to be seen. Will call back if she changes her mind.     Scheduled appointment with PCP within 7-14 days    Follow Up  Future Appointments   Date Time Provider [address] [place] [address]   2023 10:45 AM Susy Khan MD PULM & CC MMA   11/15/2023  9:30 AM Dai Cleary MD FF Cardio MMA   2024  2:30 PM Ale Santos  [facility]   2024 10:45 AM [facility] FF VASC & ENDO, VASCULAR LAB SCHED FF VASC/ENDO MMA       Richie Perez LPN

## 2023-11-08 ENCOUNTER — OFFICE VISIT (OUTPATIENT)
Dept: FAMILY MEDICINE CLINIC | Age: 73
End: 2023-11-08

## 2023-11-08 VITALS
SYSTOLIC BLOOD PRESSURE: 112 MMHG | RESPIRATION RATE: 20 BRPM | HEART RATE: 94 BPM | TEMPERATURE: 97.3 F | WEIGHT: 149.6 LBS | DIASTOLIC BLOOD PRESSURE: 68 MMHG | BODY MASS INDEX: 28.27 KG/M2 | OXYGEN SATURATION: 89 %

## 2023-11-08 DIAGNOSIS — J30.1 SEASONAL ALLERGIC RHINITIS DUE TO POLLEN: ICD-10-CM

## 2023-11-08 DIAGNOSIS — Z85.118 HISTORY OF LUNG CANCER: ICD-10-CM

## 2023-11-08 DIAGNOSIS — Z86.73 HISTORY OF CVA (CEREBROVASCULAR ACCIDENT): ICD-10-CM

## 2023-11-08 DIAGNOSIS — I25.119 CORONARY ARTERY DISEASE INVOLVING NATIVE CORONARY ARTERY OF NATIVE HEART WITH ANGINA PECTORIS (HCC): ICD-10-CM

## 2023-11-08 DIAGNOSIS — I10 HTN (HYPERTENSION), BENIGN: ICD-10-CM

## 2023-11-08 DIAGNOSIS — Z09 HOSPITAL DISCHARGE FOLLOW-UP: ICD-10-CM

## 2023-11-08 DIAGNOSIS — R60.0 LOCALIZED EDEMA: Primary | ICD-10-CM

## 2023-11-08 DIAGNOSIS — C34.12 MALIGNANT NEOPLASM OF UPPER LOBE OF LEFT LUNG (HCC): ICD-10-CM

## 2023-11-08 RX ORDER — LORATADINE 10 MG/1
10 TABLET ORAL DAILY
Qty: 90 TABLET | Refills: 3 | Status: SHIPPED | OUTPATIENT
Start: 2023-11-08

## 2023-11-08 RX ORDER — FUROSEMIDE 20 MG/1
20 TABLET ORAL DAILY
Qty: 30 TABLET | Refills: 1 | Status: SHIPPED | OUTPATIENT
Start: 2023-11-08 | End: 2024-01-07

## 2023-11-08 NOTE — PROGRESS NOTES
upper lobe of left lung    S/P lobectomy of lung    Carotid atherosclerosis, left    Skin lesion of left arm    COPD (chronic obstructive pulmonary disease) (HCC)    Sepsis (HCC)    Malignant neoplasm of upper lobe of left lung (HCC)    CAP (community acquired pneumonia)    Hypotension    Mediastinal lymphadenopathy    History of lung cancer    History of CVA (cerebrovascular accident)    Aphasia       Medications listed as ordered at the time of discharge from hospital     Medication List            Accurate as of November 8, 2023  5:53 PM. If you have any questions, ask your nurse or doctor. START taking these medications      furosemide 20 MG tablet  Commonly known as: Lasix  Take 1 tablet by mouth daily  Started by: Seble Solano MD            CONTINUE taking these medications      aspirin 81 MG EC tablet     atorvastatin 10 MG tablet  Commonly known as: LIPITOR  Take 1 tablet by mouth daily     B-12 1000 MCG Subl     budesonide-formoterol 160-4.5 MCG/ACT Aero  Commonly known as: Symbicort  Inhale 2 puffs into the lungs 2 times daily     celecoxib 200 MG capsule  Commonly known as: CELEBREX  Take 1 capsule by mouth daily     dicyclomine 10 MG capsule  Commonly known as: BENTYL  Take 1 capsule by mouth daily     loratadine 10 MG tablet  Commonly known as: Claritin  Take 1 tablet by mouth daily     losartan-hydroCHLOROthiazide 100-25 MG per tablet  Commonly known as: HYZAAR  Take 1 tablet by mouth daily     oxyCODONE-acetaminophen 5-325 MG/5ML solution  Commonly known as: ROXICET     Oxygen Concentrator     predniSONE 10 MG tablet  Commonly known as: DELTASONE     * therapeutic multivitamin-minerals tablet     * VITAMIN D3 COMPLETE PO           * This list has 2 medication(s) that are the same as other medications prescribed for you. Read the directions carefully, and ask your doctor or other care provider to review them with you.                    Where to Get Your Medications

## 2023-11-13 ENCOUNTER — OFFICE VISIT (OUTPATIENT)
Dept: PULMONOLOGY | Age: 73
End: 2023-11-13
Payer: COMMERCIAL

## 2023-11-13 VITALS
DIASTOLIC BLOOD PRESSURE: 70 MMHG | OXYGEN SATURATION: 83 % | WEIGHT: 149 LBS | HEART RATE: 53 BPM | SYSTOLIC BLOOD PRESSURE: 116 MMHG | HEIGHT: 61 IN | BODY MASS INDEX: 28.13 KG/M2

## 2023-11-13 DIAGNOSIS — J90 LOCULATED PLEURAL EFFUSION: ICD-10-CM

## 2023-11-13 DIAGNOSIS — C34.90 PRIMARY MALIGNANT NEOPLASM OF LUNG METASTATIC TO OTHER SITE, UNSPECIFIED LATERALITY (HCC): ICD-10-CM

## 2023-11-13 DIAGNOSIS — J44.9 CHRONIC OBSTRUCTIVE PULMONARY DISEASE, UNSPECIFIED COPD TYPE (HCC): Primary | ICD-10-CM

## 2023-11-13 PROCEDURE — 1123F ACP DISCUSS/DSCN MKR DOCD: CPT | Performed by: INTERNAL MEDICINE

## 2023-11-13 PROCEDURE — 3074F SYST BP LT 130 MM HG: CPT | Performed by: INTERNAL MEDICINE

## 2023-11-13 PROCEDURE — 99215 OFFICE O/P EST HI 40 MIN: CPT | Performed by: INTERNAL MEDICINE

## 2023-11-13 PROCEDURE — 3078F DIAST BP <80 MM HG: CPT | Performed by: INTERNAL MEDICINE

## 2023-11-13 RX ORDER — ALBUTEROL SULFATE 2.5 MG/3ML
2.5 SOLUTION RESPIRATORY (INHALATION) EVERY 6 HOURS PRN
Qty: 120 EACH | Refills: 3 | Status: SHIPPED | OUTPATIENT
Start: 2023-11-13

## 2023-11-13 RX ORDER — AMOXICILLIN AND CLAVULANATE POTASSIUM 875; 125 MG/1; MG/1
1 TABLET, FILM COATED ORAL 2 TIMES DAILY
Qty: 14 TABLET | Refills: 0 | Status: SHIPPED | OUTPATIENT
Start: 2023-11-13 | End: 2023-11-20

## 2023-11-13 RX ORDER — BUDESONIDE AND FORMOTEROL FUMARATE DIHYDRATE 160; 4.5 UG/1; UG/1
2 AEROSOL RESPIRATORY (INHALATION) 2 TIMES DAILY
Qty: 30.6 G | Refills: 3 | Status: SHIPPED | OUTPATIENT
Start: 2023-11-13

## 2023-11-13 ASSESSMENT — ENCOUNTER SYMPTOMS
WHEEZING: 0
SHORTNESS OF BREATH: 1
APNEA: 0
VOMITING: 0
RHINORRHEA: 0
STRIDOR: 0
ABDOMINAL PAIN: 0
ABDOMINAL DISTENTION: 0
CHOKING: 0
SINUS PRESSURE: 0
SORE THROAT: 0
BACK PAIN: 1
VOICE CHANGE: 0
CHEST TIGHTNESS: 1
BLOOD IN STOOL: 0
CONSTIPATION: 0
DIARRHEA: 0
COLOR CHANGE: 0
COUGH: 1

## 2023-11-13 NOTE — PROGRESS NOTES
progressive disease despite immunotherapy, with mediastinal/axillary/supraclavicular and abdominal adenopathy and multiple bone metastasis. Recent hospitalization for suspected TIA/CVA, no evidence of acute stroke or metastatic disease on MRI brain. Patient refused chemotherapy, is considering palliative XRT for bone metastasis. Clinical decline noted, worsening oxygenation. CT chest with contrast performed on 10/29 was personally reviewed, evidence of worsening disease-adenopathy and pathologic rib fractures. There is a persistent loculated left pleural effusion, dense on ultrasound and not amenable for chest tube placement. Patient is currently using home oxygen, 3 L. Patient will benefit from a facemask, since she is a mouth breather and supplies will be provided. Patient will also benefit from home nebulizer machine with albuterol which we will organize for. History of COPD/chronic bronchitis, currently uses Symbicort 160 and albuterol inhaler as needed. Prior PFT from June 2022 was normal, will continue about inhalers. Patient is already receiving prednisone for advanced malignancy/bone mets per oncology. Prescribe oral Augmentin x7 days.     Follow-up in 3 months

## 2023-11-20 ENCOUNTER — TELEPHONE (OUTPATIENT)
Dept: FAMILY MEDICINE CLINIC | Age: 73
End: 2023-11-20

## 2023-11-20 NOTE — TELEPHONE ENCOUNTER
Jazmine Black from [facility] coroners office called to let you know pt passed away at home over the weekend, and was released to [facility] in [facility].

## 2024-03-05 NOTE — TELEPHONE ENCOUNTER
Patient has not been seen in two years needs appointment with [facility] for clearance evaluation    Plavix was ordered by vascular and celebrex I am not sure who ordered [id number]

## (undated) DEVICE — NEEDLE HYPO 23GA L1IN THN WALL PIVOTING SHLD BVL ORIENTED

## (undated) DEVICE — LOOP,VESSEL,MAXI,BLUE,2/PK,STERILE: Brand: MEDLINE

## (undated) DEVICE — CLIP LIG M BLU TI HRT SHP WIRE HORZ 180 PER BX

## (undated) DEVICE — SUTURE PERMAHAND SZ 2-0 L12X18IN NONABSORBABLE BLK SILK A185H

## (undated) DEVICE — SOLUTION IRRIG 1000ML 0.9% SOD CHL USP POUR PLAS BTL

## (undated) DEVICE — DECANTER FLD 9IN ST BG FOR ASEP TRNSF OF FLD

## (undated) DEVICE — SECTO® DISSECTOR, KITTNER, 5/16 IN DIAMETER, (5 EA/POUCH, 24 POUCHES/PK, 4 PK/BX): Brand: SYMMETRY SURGICAL

## (undated) DEVICE — SYRINGE MED 50ML LUERLOCK TIP

## (undated) DEVICE — ADHESIVE SKIN CLSR 0.7ML TOP DERMBND ADV

## (undated) DEVICE — STERILE POLYISOPRENE POWDER-FREE SURGICAL GLOVES: Brand: PROTEXIS

## (undated) DEVICE — PROTECTOR EYE PT SELF ADH NS OPT GRD LF

## (undated) DEVICE — 3M™ STERI-DRAPE™ U-DRAPE 1015: Brand: STERI-DRAPE™

## (undated) DEVICE — SUTURE BOOT: Brand: DEROYAL

## (undated) DEVICE — SUTURE N ABSRB MONOFILAMENT 6-0 BV1 24 IN DA BLU PROLENE EP8805H

## (undated) DEVICE — PROBE ABLATN NERVE BLOCK 180 DEG 8 MM BALL TIP CRYOSPHERE

## (undated) DEVICE — DISSECTOR LAP DIA5MM BLNT TIP ENDOPATH

## (undated) DEVICE — RELOAD STPL H4.1X2MM DIA60MM THCK TISS GRN 6 ROW PWR GST B

## (undated) DEVICE — 3 ML SYRINGE LUER-LOCK TIP: Brand: MONOJECT

## (undated) DEVICE — DEVICE SECUREMENT AD W/ TRICOT ANCHR PD FOR F LTX SIL CATH

## (undated) DEVICE — SUTURE PERMAHAND SZ 4-0 L18IN NONABSORBABLE BLK SILK BRAID A183H

## (undated) DEVICE — RELOAD STPL H1-2.5XL35MM VASC THN TISS WHT B FRM NAT ARTC

## (undated) DEVICE — 3M™ TEGADERM™ TRANSPARENT FILM DRESSING FRAME STYLE, 1626W, 4 IN X 4-3/4 IN (10 CM X 12 CM), 50/CT 4CT/CASE: Brand: 3M™ TEGADERM™

## (undated) DEVICE — DRAPE,LAP,CHOLE,W/TROUGHS,STERILE: Brand: MEDLINE

## (undated) DEVICE — YANKAUER,BULB TIP,W/O VENT,RIGID,STERILE: Brand: MEDLINE

## (undated) DEVICE — SUTURE MCRYL + SZ 4-0 L27IN ABSRB UD L19MM PS-2 3/8 CIR MCP426H

## (undated) DEVICE — STERILE LATEX POWDER-FREE SURGICAL GLOVESWITH NITRILE COATING: Brand: PROTEXIS

## (undated) DEVICE — 48" PROBE COVER W/GEL, ULTRASOUND, STERILE: Brand: SITE-RITE

## (undated) DEVICE — TROCAR ENDOSCP L80MM DIA10/12MM THOR RIG SL DISP FLXPATH

## (undated) DEVICE — SPONGE,DISSECTOR,K,XRAY,9/16"X1/4",STRL: Brand: MEDLINE

## (undated) DEVICE — ELECTRODE PT RET AD L9FT HI MOIST COND ADH HYDRGEL CORDED

## (undated) DEVICE — DRAPE ADOLESCENT  LAPAROTOMY

## (undated) DEVICE — RELOAD STPL L45MM H2-4.1MM THCK TISS GRN GRIPPING SURF B

## (undated) DEVICE — MERCY FAIRFIELD TURNOVER KIT: Brand: MEDLINE INDUSTRIES, INC.

## (undated) DEVICE — 5.5 MM ELITE ACROMIOBLASTER                                    STRAIGHT DISPOSABLE BURRS, BRICK                                    RED, 10000 MAXIMUM RPM, PACKAGED 6                                    PER BOX, STERILE

## (undated) DEVICE — SYRINGE, LUER LOCK, 60ML: Brand: MEDLINE

## (undated) DEVICE — SYRINGE MED 10ML POLYPR LUERSLIP TIP FLAT TOP W/O SFTY DISP

## (undated) DEVICE — MASC TURNOVER KIT: Brand: MEDLINE INDUSTRIES, INC.

## (undated) DEVICE — Device: Brand: BALLOON

## (undated) DEVICE — 8F PRUITT F3 OUTLYING SHUNT WITH T-PORT: Brand: PRUITT F3 CAROTID SHUNT

## (undated) DEVICE — SPONGE GZ W4XL4IN COT 12 PLY TYP VII WVN C FLD DSGN

## (undated) DEVICE — GOWN AURORA NONREINF LG: Brand: MEDLINE INDUSTRIES, INC.

## (undated) DEVICE — SUTURE PROL SZ 4-0 L36IN NONABSORBABLE BLU L26MM SH 1/2 CIR 8521H

## (undated) DEVICE — HYPODERMIC SAFETY NEEDLE: Brand: MAGELLAN

## (undated) DEVICE — BOWL MED L 32OZ PLAS W/ MOLD GRAD EZ OPN PEEL PCH

## (undated) DEVICE — Device

## (undated) DEVICE — SUTURE PROL SZ 6 0 L24IN NONABSORBABLE BLU C 1 L13MM 3 8 CIR EP8726H

## (undated) DEVICE — SOLUTION IV IRRIG POUR BRL 0.9% SODIUM CHL 2F7124

## (undated) DEVICE — INTENDED TO BE USED TO OCCLUDE, RETRACT AND IDENTIFY ARTERIES, VEINS, TENDONS AND NERVES IN SURGICAL PROCEDURES: Brand: STERION®  VESSEL LOOP

## (undated) DEVICE — NEEDLE ASPIR 19GA HISTOLOGY FLX VIZISHOT 2

## (undated) DEVICE — AGENT HEMSTAT W2XL4IN OXIDIZED REGENERATED CELOS ABSRB

## (undated) DEVICE — APPLICATOR PREP 26ML 0.7% IOD POVACRYLEX 74% ISO ALC ST

## (undated) DEVICE — PAD,NON-ADHERENT,3X8,STERILE,LF,1/PK: Brand: MEDLINE

## (undated) DEVICE — SKIN AFFIX SURG ADHESIVE 72/CS 0.55ML: Brand: MEDLINE

## (undated) DEVICE — SINGLE USE BIOPSY VALVE MAJ-210: Brand: SINGLE USE BIOPSY VALVE (STERILE)

## (undated) DEVICE — SET ADMIN PRIMING 67ML L105IN NVENT 180UM FLTR 3 RLER CLMP

## (undated) DEVICE — MAJOR SET UP: Brand: MEDLINE INDUSTRIES, INC.

## (undated) DEVICE — MAT FLR ABS DISP

## (undated) DEVICE — STAPLER INT L28CM 60MM 12 FIRING B FRM PWD + ECHELON FLX

## (undated) DEVICE — GOWN SIRUS NONREIN XL W/TWL: Brand: MEDLINE INDUSTRIES, INC.

## (undated) DEVICE — ELECTRODE ES L65IN DIA3MM S STL BLDE MPLR OPN APPRCH EZ TO

## (undated) DEVICE — 20 ML SYRINGE LUER-LOCK TIP: Brand: MONOJECT

## (undated) DEVICE — CLIP INT SM WIDE RED TI TRNSVRS GRV CHEVRON SHP W/ PRECIS

## (undated) DEVICE — SET CATH 20GA L1.75IN RAD ART POLYUR RADPQ W/ INTEGR

## (undated) DEVICE — GAUZE,SPONGE,4"X4",16PLY,XRAY,STRL,LF: Brand: MEDLINE

## (undated) DEVICE — SUTURE VCRL + SZ 3-0 L36IN ABSRB UD L36MM CT-1 1/2 CIR VCP944H

## (undated) DEVICE — BLADE ES ELASTOMERIC COAT INSUL DURABLE BEND UPTO 90DEG

## (undated) DEVICE — DOUBLE  SWIVEL ELBOW 15M - DOUBLE FLIP TOP CAP WITH SEAL - 22M/15F: Brand: DOUBLE  SWIVEL ELBOW 15M - DOUBLE FLIP TOP CAP WITH SEAL - 22M/15F

## (undated) DEVICE — SUTURE MCRYL SZ 4-0 L27IN ABSRB UD L19MM PS-2 1/2 CIR PRIM Y426H

## (undated) DEVICE — CANNULA THREADED FLEX 8.0 X 72MM: Brand: CLEAR-TRAC

## (undated) DEVICE — SKIN MARKER REGULAR TIP WITH RULER CAP AND LABELS: Brand: DEVON

## (undated) DEVICE — GOWN,AURORA,NONREINF,RAGLAN,XXL,STERILE: Brand: MEDLINE

## (undated) DEVICE — CLEANER ES TIP W2XL2IN ADH BK RADPQ FOR S STL ELECTRD

## (undated) DEVICE — GAUZE,SPONGE,4"X4",8PLY,STRL,LF,10/TRAY: Brand: MEDLINE

## (undated) DEVICE — MACROBORE EXTN SET W/ 1 SMRTSITE NEEDLE-FREE VLV PRT

## (undated) DEVICE — SUTURE VCRL SZ 0 L18IN ABSRB UD L36MM CT-1 1/2 CIR J840D

## (undated) DEVICE — FOGARTY - HYDRAGRIP SURGICAL - CLAMP INSERTS: Brand: FOGARTY SOFTJAW

## (undated) DEVICE — AEGIS 1" DISK 4MM HOLE, PEEL OPEN: Brand: MEDLINE

## (undated) DEVICE — COVER LT HNDL BLU PLAS

## (undated) DEVICE — KIT,ANTI FOG,W/SPONGE & FLUID,SOFT PACK: Brand: MEDLINE

## (undated) DEVICE — SUTURE VCRL SZ 2 L27IN ABSRB UD L65MM TP-1 1/2 CIR J849G

## (undated) DEVICE — PROVE COVER: Brand: UNBRANDED

## (undated) DEVICE — AMBU ASCOPE 3 SLIM - VIDEO BRONCHIOSCOPE SINGLE-USE, FLEXIBLE AND STERILE. INSERTION CORD DIA 3.8 MM, CHANNEL WIDTH OF 1.2 MM. BENDING ANGLE OF 130°/130° MIN. ORDERING 5 PCS. = 1 BOX.: Brand: ASCOPE™ 3 SLIM  3.8/1.2FLEXIBLE VIDEOSCOPE - SINGLE USE

## (undated) DEVICE — SEALANT TISS GLUE 4ML PLEUR AIR LEAK PROGEL

## (undated) DEVICE — 3M™ IOBAN™ 2 ANTIMICROBIAL INCISE DRAPE 6650EZ: Brand: IOBAN™ 2

## (undated) DEVICE — SYRINGE TB 1ML NDL 27GA L0.5IN PLAS SLIP TIP CONVENTIONAL

## (undated) DEVICE — SUTURE VCRL SZ 2-0 L27IN ABSRB UD L26MM SH 1/2 CIR J417H

## (undated) DEVICE — STAPLER SKIN L320MM 35MM VASC TISS 12 FIRING B FRM PWR

## (undated) DEVICE — BANDAGE ADH W1XL3IN NAT FAB WVN FLX DURABLE N ADH PD SEAL

## (undated) DEVICE — SUTURE ABSORBABLE BRAIDED 2-0 CT-1 27 IN UD VICRYL J259H

## (undated) DEVICE — 1527-2 TRANSPORE TAPE         2INX10YD 6RL/BX 10BX/CS: Brand: 3M™ TRANSPORE™

## (undated) DEVICE — SHEET,DRAPE,53X77,STERILE: Brand: MEDLINE

## (undated) DEVICE — TOWEL,OR,DSP,ST,BLUE,STD,4/PK,20PK/CS: Brand: MEDLINE

## (undated) DEVICE — DRAPE THER FLUID WARMING 66X44 IN FLAT SLUSH DBL DISC ORS

## (undated) DEVICE — 3M™ TEGADERM™ TRANSPARENT FILM DRESSING FRAME STYLE, 1624W, 2-3/8 IN X 2-3/4 IN (6 CM X 7 CM), 100/CT 4CT/CASE: Brand: 3M™ TEGADERM™

## (undated) DEVICE — CLOTH SURG PREP PREOPERATIVE CHLORHEXIDINE GLUC 2% READYPREP

## (undated) DEVICE — 3M™ BAIR HUGGER® UNDERBODY BLANKET, PEDIATRIC, SMALL, 10 PER CASE 55501: Brand: BAIR HUGGER™

## (undated) DEVICE — GOWN SIRUS NONREIN LG W/TWL: Brand: MEDLINE INDUSTRIES, INC.

## (undated) DEVICE — PRESSURE MONITORING SET: Brand: TRUWAVE

## (undated) DEVICE — TRAY URIN CATH PED 16FR BLLN 5CC 2 CONN SIL STR TIP W/ URIN

## (undated) DEVICE — GLOVE SURG SZ 9 THK91MIL LTX FREE SYN POLYISOPRENE ANTI

## (undated) DEVICE — MAJOR SET UP PK

## (undated) DEVICE — STERILE LATEX POWDER-FREE SURGICAL GLOVESWITH NITRILE AND EMOLLIENT COATINGS: Brand: PROTEXIS

## (undated) DEVICE — TOWEL,OR,DSP,NS,BLUE,BULK,100/CS: Brand: MEDLINE

## (undated) DEVICE — DYONICS 25 PATIENT TUBE SET MUST                                    BE USED WITH 7211007, 12 PER BOX

## (undated) DEVICE — T-MAX DISPOSABLE FACE MASK 8 PER BOX

## (undated) DEVICE — SINGLE USE SUCTION VALVE MAJ-209: Brand: SINGLE USE SUCTION VALVE (STERILE)

## (undated) DEVICE — INTENDED FOR TISSUE SEPARATION, AND OTHER PROCEDURES THAT REQUIRE A SHARP SURGICAL BLADE TO PUNCTURE OR CUT.: Brand: BARD-PARKER ® STAINLESS STEEL BLADES

## (undated) DEVICE — BLANKET WRM W40.2XL55.9IN IORT LO BODY + MISTRAL AIR

## (undated) DEVICE — TOWEL,OR,DSP,ST,WHITE,DLX,XR,4/PK,20PK/C: Brand: MEDLINE

## (undated) DEVICE — SET ADMIN PRIMING 12ML L36IN 2ND INCL RLER CLMP SPIN M

## (undated) DEVICE — LOTION PREP REMV 5OZ IODO CLR TINC OF BENZ DURAPREP

## (undated) DEVICE — ENDOSCOPIC KIT 6X3/16 FT COLON W/ 1.1 OZ 2 GWN W/O BRSH

## (undated) DEVICE — 24" (61 CM) ARTERIAL PRESSURE TUBING: Brand: ICU MEDICAL

## (undated) DEVICE — GLOVE ORANGE PI 8 1/2   MSG9085

## (undated) DEVICE — TUBING, SUCTION, 1/4" X 10', STRAIGHT: Brand: MEDLINE

## (undated) DEVICE — DRAIN SURG SGL COLL PT TB FOR ATS BG OASIS

## (undated) DEVICE — SUTURE NONABSORBABLE MONOFILAMENT 4-0 RB-1 36 IN BLU PROLENE 8557H

## (undated) DEVICE — WEREWOLF FLOW 90 COBLATION WAND: Brand: COBLATION

## (undated) DEVICE — STERILE LATEX POWDER FREE SURGICAL GLOVES WITH HYDROGEL COATING: Brand: PROTEXIS

## (undated) DEVICE — SYRINGE MED 30ML STD CLR PLAS LUERLOCK TIP N CTRL DISP

## (undated) DEVICE — SUTURE PERMAHAND SZ 0 L30IN NONABSORBABLE BLK SILK BRAID A306H

## (undated) DEVICE — SYRINGE, LUER LOCK, 10ML: Brand: MEDLINE

## (undated) DEVICE — INCISOR PLUS PLATINUM 4.5 MM BLADE: Brand: DYONICS INCISOR

## (undated) DEVICE — CONNECTOR IV TB L28MM CLR VLV ACCS NDLLSS DISP MAXPLUS

## (undated) DEVICE — Device: Brand: MEDEX

## (undated) DEVICE — STAPLER INT L28CM 45MM B FRM PWR SHT ECHELON FLX

## (undated) DEVICE — CONMED CHANNEL MASTER PULMONARY AND PEDIATRIC CLEANING BRUSH, 160 CM X 2.0 MM: Brand: CONMED

## (undated) DEVICE — CATHETER THOR 28FR L23CM DIA9.3MM POLYVI CHL TAPR CONN TIP

## (undated) DEVICE — SUTURE PERMA-HAND SZ 2-0 L144IN NONABSORBABLE BLK LIGAPAK LA55G

## (undated) DEVICE — 3M™ STERI-DRAPE™ INSTRUMENT POUCH 1018: Brand: STERI-DRAPE™

## (undated) DEVICE — SUTURE NONABSORBABLE MONOFILAMENT 4-0 PS-2 18 IN BLK ETHILON 1667G

## (undated) DEVICE — FIRSTPASS ST SUTURE PASSER, STANDARD: Brand: FIRSTPASS

## (undated) DEVICE — SINGLE USE ASPIRATION NEEDLE: Brand: SINGLE USE ASPIRATION NEEDLE

## (undated) DEVICE — SHOULDER STABILIZATION KIT,                                    DISPOSABLE 12 PER BOX

## (undated) DEVICE — BLADE CLIPPER GEN PURP NS

## (undated) DEVICE — STRIP,CLOSURE,WOUND,MEDI-STRIP,1/2X4: Brand: MEDLINE

## (undated) DEVICE — STERILE COTTON BALLS LARGE 5/P: Brand: MEDLINE

## (undated) DEVICE — PACK PROCEDURE SURG SHLDR MFFOP CUST

## (undated) DEVICE — CATHETER THORACENTESIS STR 28 FRX23 IN 6 EYELET TAPR TIP LF